# Patient Record
Sex: MALE | Race: WHITE | NOT HISPANIC OR LATINO | URBAN - METROPOLITAN AREA
[De-identification: names, ages, dates, MRNs, and addresses within clinical notes are randomized per-mention and may not be internally consistent; named-entity substitution may affect disease eponyms.]

---

## 2017-06-21 ENCOUNTER — OFFICE (OUTPATIENT)
Dept: URBAN - METROPOLITAN AREA CLINIC 64 | Facility: CLINIC | Age: 52
End: 2017-06-21

## 2017-06-21 VITALS
HEART RATE: 83 BPM | HEIGHT: 74 IN | WEIGHT: 199 LBS | SYSTOLIC BLOOD PRESSURE: 142 MMHG | DIASTOLIC BLOOD PRESSURE: 91 MMHG

## 2017-06-21 DIAGNOSIS — K86.1 OTHER CHRONIC PANCREATITIS: ICD-10-CM

## 2017-06-21 PROCEDURE — 99213 OFFICE O/P EST LOW 20 MIN: CPT | Performed by: NURSE PRACTITIONER

## 2017-06-21 RX ORDER — PANCRELIPASE 60000; 12000; 38000 [USP'U]/1; [USP'U]/1; [USP'U]/1
CAPSULE, DELAYED RELEASE PELLETS ORAL
Qty: 1440 | Refills: 3 | Status: ACTIVE

## 2017-06-21 RX ORDER — PROMETHAZINE HYDROCHLORIDE 25 MG/1
TABLET ORAL
Qty: 60 | Refills: 3 | Status: COMPLETED
Start: 2014-12-11 | End: 2018-09-18

## 2017-06-22 LAB
AMYLASE, SERUM: 93 U/L (ref 31–124)
CBC WITH DIFFERENTIAL/PLATELET: BASO (ABSOLUTE): 0.1 X10E3/UL (ref 0–0.2)
CBC WITH DIFFERENTIAL/PLATELET: BASOS: 1 %
CBC WITH DIFFERENTIAL/PLATELET: EOS (ABSOLUTE): 0.2 X10E3/UL (ref 0–0.4)
CBC WITH DIFFERENTIAL/PLATELET: EOS: 5 %
CBC WITH DIFFERENTIAL/PLATELET: HEMATOCRIT: 42.4 % (ref 37.5–51)
CBC WITH DIFFERENTIAL/PLATELET: HEMATOLOGY COMMENTS: (no result)
CBC WITH DIFFERENTIAL/PLATELET: HEMOGLOBIN: 14 G/DL (ref 12.6–17.7)
CBC WITH DIFFERENTIAL/PLATELET: IMMATURE CELLS: (no result)
CBC WITH DIFFERENTIAL/PLATELET: IMMATURE GRANS (ABS): 0 X10E3/UL (ref 0–0.1)
CBC WITH DIFFERENTIAL/PLATELET: IMMATURE GRANULOCYTES: 0 %
CBC WITH DIFFERENTIAL/PLATELET: LYMPHS (ABSOLUTE): 1.6 X10E3/UL (ref 0.7–3.1)
CBC WITH DIFFERENTIAL/PLATELET: LYMPHS: 36 %
CBC WITH DIFFERENTIAL/PLATELET: MCH: 30.1 PG (ref 26.6–33)
CBC WITH DIFFERENTIAL/PLATELET: MCHC: 33 G/DL (ref 31.5–35.7)
CBC WITH DIFFERENTIAL/PLATELET: MCV: 91 FL (ref 79–97)
CBC WITH DIFFERENTIAL/PLATELET: MONOCYTES(ABSOLUTE): 0.8 X10E3/UL (ref 0.1–0.9)
CBC WITH DIFFERENTIAL/PLATELET: MONOCYTES: 18 %
CBC WITH DIFFERENTIAL/PLATELET: NEUTROPHILS (ABSOLUTE): 1.8 X10E3/UL (ref 1.4–7)
CBC WITH DIFFERENTIAL/PLATELET: NEUTROPHILS: 40 %
CBC WITH DIFFERENTIAL/PLATELET: NRBC: (no result)
CBC WITH DIFFERENTIAL/PLATELET: PLATELETS: 295 X10E3/UL (ref 150–379)
CBC WITH DIFFERENTIAL/PLATELET: RBC: 4.65 X10E6/UL (ref 4.14–5.8)
CBC WITH DIFFERENTIAL/PLATELET: RDW: 15.9 % — HIGH (ref 12.3–15.4)
CBC WITH DIFFERENTIAL/PLATELET: WBC: 4.4 X10E3/UL (ref 3.4–10.8)
COMP. METABOLIC PANEL (14): A/G RATIO: 1.4 (ref 1.2–2.2)
COMP. METABOLIC PANEL (14): ALBUMIN, SERUM: 4.4 G/DL (ref 3.5–5.5)
COMP. METABOLIC PANEL (14): ALKALINE PHOSPHATASE, S: 54 IU/L (ref 39–117)
COMP. METABOLIC PANEL (14): ALT (SGPT): 18 IU/L (ref 0–44)
COMP. METABOLIC PANEL (14): AST (SGOT): 27 IU/L (ref 0–40)
COMP. METABOLIC PANEL (14): BILIRUBIN, TOTAL: 0.3 MG/DL (ref 0–1.2)
COMP. METABOLIC PANEL (14): BUN/CREATININE RATIO: 12 (ref 9–20)
COMP. METABOLIC PANEL (14): BUN: 19 MG/DL (ref 6–24)
COMP. METABOLIC PANEL (14): CALCIUM, SERUM: 9.4 MG/DL (ref 8.7–10.2)
COMP. METABOLIC PANEL (14): CARBON DIOXIDE, TOTAL: 22 MMOL/L (ref 18–29)
COMP. METABOLIC PANEL (14): CHLORIDE, SERUM: 97 MMOL/L (ref 96–106)
COMP. METABOLIC PANEL (14): CREATININE, SERUM: 1.63 MG/DL — HIGH (ref 0.76–1.27)
COMP. METABOLIC PANEL (14): EGFR IF AFRICN AM: 55 ML/MIN/1.73 — LOW (ref 59–?)
COMP. METABOLIC PANEL (14): EGFR IF NONAFRICN AM: 48 ML/MIN/1.73 — LOW (ref 59–?)
COMP. METABOLIC PANEL (14): GLOBULIN, TOTAL: 3.1 G/DL (ref 1.5–4.5)
COMP. METABOLIC PANEL (14): GLUCOSE, SERUM: 90 MG/DL (ref 65–99)
COMP. METABOLIC PANEL (14): POTASSIUM, SERUM: 4.7 MMOL/L (ref 3.5–5.2)
COMP. METABOLIC PANEL (14): PROTEIN, TOTAL, SERUM: 7.5 G/DL (ref 6–8.5)
COMP. METABOLIC PANEL (14): SODIUM, SERUM: 137 MMOL/L (ref 134–144)
LIPASE, SERUM: 46 U/L (ref 0–59)

## 2017-10-12 ENCOUNTER — OFFICE (OUTPATIENT)
Dept: URBAN - METROPOLITAN AREA CLINIC 64 | Facility: CLINIC | Age: 52
End: 2017-10-12

## 2017-10-12 VITALS
SYSTOLIC BLOOD PRESSURE: 119 MMHG | WEIGHT: 192 LBS | DIASTOLIC BLOOD PRESSURE: 83 MMHG | HEART RATE: 95 BPM | HEIGHT: 74 IN

## 2017-10-12 DIAGNOSIS — R10.13 EPIGASTRIC PAIN: ICD-10-CM

## 2017-10-12 DIAGNOSIS — K86.1 OTHER CHRONIC PANCREATITIS: ICD-10-CM

## 2017-10-12 LAB
AMYLASE, SERUM: 69 U/L (ref 31–124)
C-REACTIVE PROTEIN, QUANT: 16 MG/L — HIGH (ref 0–4.9)
COMP. METABOLIC PANEL (14): A/G RATIO: 1.3 (ref 1.2–2.2)
COMP. METABOLIC PANEL (14): ALBUMIN, SERUM: 3.7 G/DL (ref 3.5–5.5)
COMP. METABOLIC PANEL (14): ALKALINE PHOSPHATASE, S: 46 IU/L (ref 39–117)
COMP. METABOLIC PANEL (14): ALT (SGPT): 13 IU/L (ref 0–44)
COMP. METABOLIC PANEL (14): AST (SGOT): 15 IU/L (ref 0–40)
COMP. METABOLIC PANEL (14): BILIRUBIN, TOTAL: <0.2 MG/DL
COMP. METABOLIC PANEL (14): BUN/CREATININE RATIO: 10 (ref 9–20)
COMP. METABOLIC PANEL (14): BUN: 14 MG/DL (ref 6–24)
COMP. METABOLIC PANEL (14): CALCIUM, SERUM: 9.1 MG/DL (ref 8.7–10.2)
COMP. METABOLIC PANEL (14): CARBON DIOXIDE, TOTAL: 25 MMOL/L (ref 18–29)
COMP. METABOLIC PANEL (14): CHLORIDE, SERUM: 99 MMOL/L (ref 96–106)
COMP. METABOLIC PANEL (14): CREATININE, SERUM: 1.45 MG/DL — HIGH (ref 0.76–1.27)
COMP. METABOLIC PANEL (14): EGFR IF AFRICN AM: 64 ML/MIN/1.73 (ref 59–?)
COMP. METABOLIC PANEL (14): EGFR IF NONAFRICN AM: 55 ML/MIN/1.73 — LOW (ref 59–?)
COMP. METABOLIC PANEL (14): GLOBULIN, TOTAL: 2.9 G/DL (ref 1.5–4.5)
COMP. METABOLIC PANEL (14): GLUCOSE, SERUM: 109 MG/DL — HIGH (ref 65–99)
COMP. METABOLIC PANEL (14): POTASSIUM, SERUM: 4.5 MMOL/L (ref 3.5–5.2)
COMP. METABOLIC PANEL (14): PROTEIN, TOTAL, SERUM: 6.6 G/DL (ref 6–8.5)
COMP. METABOLIC PANEL (14): SODIUM, SERUM: 139 MMOL/L (ref 134–144)
LIPASE, SERUM: 26 U/L (ref 13–78)

## 2017-10-12 PROCEDURE — 99213 OFFICE O/P EST LOW 20 MIN: CPT | Performed by: NURSE PRACTITIONER

## 2017-10-12 RX ORDER — AMITRIPTYLINE HYDROCHLORIDE 25 MG/1
TABLET, FILM COATED ORAL
Qty: 30 | Refills: 2 | Status: ACTIVE

## 2017-12-06 ENCOUNTER — OFFICE (OUTPATIENT)
Dept: URBAN - METROPOLITAN AREA CLINIC 64 | Facility: CLINIC | Age: 52
End: 2017-12-06
Payer: COMMERCIAL

## 2017-12-06 VITALS
HEIGHT: 74 IN | DIASTOLIC BLOOD PRESSURE: 111 MMHG | SYSTOLIC BLOOD PRESSURE: 148 MMHG | WEIGHT: 210 LBS | HEART RATE: 111 BPM

## 2017-12-06 DIAGNOSIS — K86.3 PSEUDOCYST OF PANCREAS: ICD-10-CM

## 2017-12-06 DIAGNOSIS — K86.1 OTHER CHRONIC PANCREATITIS: ICD-10-CM

## 2017-12-06 DIAGNOSIS — K21.9 GASTRO-ESOPHAGEAL REFLUX DISEASE WITHOUT ESOPHAGITIS: ICD-10-CM

## 2017-12-06 LAB
AMYLASE, SERUM: 76 U/L (ref 31–124)
C-REACTIVE PROTEIN, QUANT: 3.1 MG/L (ref 0–4.9)
CBC WITH DIFFERENTIAL/PLATELET: BASO (ABSOLUTE): 0 X10E3/UL (ref 0–0.2)
CBC WITH DIFFERENTIAL/PLATELET: BASOS: 1 %
CBC WITH DIFFERENTIAL/PLATELET: EOS (ABSOLUTE): 0.2 X10E3/UL (ref 0–0.4)
CBC WITH DIFFERENTIAL/PLATELET: EOS: 3 %
CBC WITH DIFFERENTIAL/PLATELET: HEMATOCRIT: 40.8 % (ref 37.5–51)
CBC WITH DIFFERENTIAL/PLATELET: HEMATOLOGY COMMENTS: (no result)
CBC WITH DIFFERENTIAL/PLATELET: HEMOGLOBIN: 13.8 G/DL (ref 13–17.7)
CBC WITH DIFFERENTIAL/PLATELET: IMMATURE CELLS: (no result)
CBC WITH DIFFERENTIAL/PLATELET: IMMATURE GRANS (ABS): 0 X10E3/UL (ref 0–0.1)
CBC WITH DIFFERENTIAL/PLATELET: IMMATURE GRANULOCYTES: 0 %
CBC WITH DIFFERENTIAL/PLATELET: LYMPHS (ABSOLUTE): 1.5 X10E3/UL (ref 0.7–3.1)
CBC WITH DIFFERENTIAL/PLATELET: LYMPHS: 31 %
CBC WITH DIFFERENTIAL/PLATELET: MCH: 31.2 PG (ref 26.6–33)
CBC WITH DIFFERENTIAL/PLATELET: MCHC: 33.8 G/DL (ref 31.5–35.7)
CBC WITH DIFFERENTIAL/PLATELET: MCV: 92 FL (ref 79–97)
CBC WITH DIFFERENTIAL/PLATELET: MONOCYTES(ABSOLUTE): 0.5 X10E3/UL (ref 0.1–0.9)
CBC WITH DIFFERENTIAL/PLATELET: MONOCYTES: 10 %
CBC WITH DIFFERENTIAL/PLATELET: NEUTROPHILS (ABSOLUTE): 2.8 X10E3/UL (ref 1.4–7)
CBC WITH DIFFERENTIAL/PLATELET: NEUTROPHILS: 55 %
CBC WITH DIFFERENTIAL/PLATELET: NRBC: (no result)
CBC WITH DIFFERENTIAL/PLATELET: PLATELETS: 240 X10E3/UL (ref 150–379)
CBC WITH DIFFERENTIAL/PLATELET: RBC: 4.43 X10E6/UL (ref 4.14–5.8)
CBC WITH DIFFERENTIAL/PLATELET: RDW: 13.7 % (ref 12.3–15.4)
CBC WITH DIFFERENTIAL/PLATELET: WBC: 5 X10E3/UL (ref 3.4–10.8)
COMP. METABOLIC PANEL (14): A/G RATIO: 1.3 (ref 1.2–2.2)
COMP. METABOLIC PANEL (14): ALBUMIN, SERUM: 4.2 G/DL (ref 3.5–5.5)
COMP. METABOLIC PANEL (14): ALKALINE PHOSPHATASE, S: 54 IU/L (ref 39–117)
COMP. METABOLIC PANEL (14): ALT (SGPT): 26 IU/L (ref 0–44)
COMP. METABOLIC PANEL (14): AST (SGOT): 36 IU/L (ref 0–40)
COMP. METABOLIC PANEL (14): BILIRUBIN, TOTAL: 0.3 MG/DL (ref 0–1.2)
COMP. METABOLIC PANEL (14): BUN/CREATININE RATIO: 7 — LOW (ref 9–20)
COMP. METABOLIC PANEL (14): BUN: 10 MG/DL (ref 6–24)
COMP. METABOLIC PANEL (14): CALCIUM, SERUM: 8.9 MG/DL (ref 8.7–10.2)
COMP. METABOLIC PANEL (14): CARBON DIOXIDE, TOTAL: 24 MMOL/L (ref 18–29)
COMP. METABOLIC PANEL (14): CHLORIDE, SERUM: 100 MMOL/L (ref 96–106)
COMP. METABOLIC PANEL (14): CREATININE, SERUM: 1.45 MG/DL — HIGH (ref 0.76–1.27)
COMP. METABOLIC PANEL (14): EGFR IF AFRICN AM: 64 ML/MIN/1.73 (ref 59–?)
COMP. METABOLIC PANEL (14): EGFR IF NONAFRICN AM: 55 ML/MIN/1.73 — LOW (ref 59–?)
COMP. METABOLIC PANEL (14): GLOBULIN, TOTAL: 3.2 G/DL (ref 1.5–4.5)
COMP. METABOLIC PANEL (14): GLUCOSE, SERUM: 125 MG/DL — HIGH (ref 65–99)
COMP. METABOLIC PANEL (14): POTASSIUM, SERUM: 4.3 MMOL/L (ref 3.5–5.2)
COMP. METABOLIC PANEL (14): PROTEIN, TOTAL, SERUM: 7.4 G/DL (ref 6–8.5)
COMP. METABOLIC PANEL (14): SODIUM, SERUM: 138 MMOL/L (ref 134–144)
LIPASE, SERUM: 27 U/L (ref 13–78)

## 2017-12-06 PROCEDURE — 99214 OFFICE O/P EST MOD 30 MIN: CPT | Performed by: INTERNAL MEDICINE

## 2018-01-26 ENCOUNTER — HOSPITAL ENCOUNTER (OUTPATIENT)
Dept: FAMILY MEDICINE CLINIC | Facility: CLINIC | Age: 53
Setting detail: SPECIMEN
Discharge: HOME | End: 2018-01-26
Attending: FAMILY MEDICINE | Admitting: FAMILY MEDICINE

## 2018-01-26 LAB
ALBUMIN SERPL-MCNC: 3.8 G/DL (ref 3.5–4.8)
ALBUMIN/GLOB SERPL: 1.4 {RATIO} (ref 1–1.7)
ALP SERPL-CCNC: 43 IU/L (ref 32–91)
ALT SERPL-CCNC: 18 IU/L (ref 17–63)
AMYLASE SERPL-CCNC: 56 U/L (ref 36–128)
ANION GAP SERPL CALC-SCNC: 11.8 MMOL/L (ref 10–20)
AST SERPL-CCNC: 28 IU/L (ref 15–41)
BASOPHILS # BLD AUTO: 0 10*3/UL (ref 0–0.2)
BASOPHILS NFR BLD AUTO: 1 % (ref 0–2)
BILIRUB SERPL-MCNC: 0.5 MG/DL (ref 0.3–1.2)
BUN SERPL-MCNC: 14 MG/DL (ref 8–20)
BUN/CREAT SERPL: 7.8 (ref 6.2–20.3)
CALCIUM SERPL-MCNC: 9.3 MG/DL (ref 8.9–10.3)
CHLORIDE SERPL-SCNC: 101 MMOL/L (ref 101–111)
CONV CO2: 26 MMOL/L (ref 22–32)
CONV TOTAL PROTEIN: 6.6 G/DL (ref 6.1–7.9)
CREAT UR-MCNC: 1.8 MG/DL (ref 0.7–1.2)
CRP SERPL-MCNC: 0.18 MG/DL (ref 0–0.7)
DIFFERENTIAL METHOD BLD: (no result)
EOSINOPHIL # BLD AUTO: 0.1 10*3/UL (ref 0–0.3)
EOSINOPHIL # BLD AUTO: 2 % (ref 0–3)
ERYTHROCYTE [DISTWIDTH] IN BLOOD BY AUTOMATED COUNT: 13.5 % (ref 11.5–14.5)
GLOBULIN UR ELPH-MCNC: 2.8 G/DL (ref 2.5–3.8)
GLUCOSE SERPL-MCNC: 132 MG/DL (ref 65–99)
HCT VFR BLD AUTO: 40.2 % (ref 40–54)
HGB BLD-MCNC: 13.7 G/DL (ref 14–18)
LIPASE SERPL-CCNC: 18 U/L (ref 22–51)
LYMPHOCYTES # BLD AUTO: 1.4 10*3/UL (ref 0.8–4.8)
LYMPHOCYTES NFR BLD AUTO: 26 % (ref 18–42)
MCH RBC QN AUTO: 31.5 PG (ref 26–32)
MCHC RBC AUTO-ENTMCNC: 34.1 G/DL (ref 32–36)
MCV RBC AUTO: 92.4 FL (ref 80–94)
MONOCYTES # BLD AUTO: 0.4 10*3/UL (ref 0.1–1.3)
MONOCYTES NFR BLD AUTO: 8 % (ref 2–11)
NEUTROPHILS # BLD AUTO: 3.6 10*3/UL (ref 2.3–8.6)
NEUTROPHILS NFR BLD AUTO: 63 % (ref 50–75)
NRBC BLD AUTO-RTO: 0 /100{WBCS}
NRBC/RBC NFR BLD MANUAL: 0 10*3/UL
PLATELET # BLD AUTO: 249 10*3/UL (ref 150–450)
PMV BLD AUTO: 7.6 FL (ref 7.4–10.4)
POTASSIUM SERPL-SCNC: 3.8 MMOL/L (ref 3.6–5.1)
RBC # BLD AUTO: 4.35 10*6/UL (ref 4.6–6)
SODIUM SERPL-SCNC: 135 MMOL/L (ref 136–144)
WBC # BLD AUTO: 5.6 10*3/UL (ref 4.5–11.5)

## 2018-02-14 ENCOUNTER — HOSPITAL ENCOUNTER (OUTPATIENT)
Dept: FAMILY MEDICINE CLINIC | Facility: CLINIC | Age: 53
Setting detail: SPECIMEN
Discharge: HOME | End: 2018-02-14
Attending: FAMILY MEDICINE | Admitting: FAMILY MEDICINE

## 2018-02-14 LAB
ALBUMIN SERPL-MCNC: 4.1 G/DL (ref 3.5–4.8)
ALBUMIN/GLOB SERPL: 1 {RATIO} (ref 1–1.7)
ALP SERPL-CCNC: 45 IU/L (ref 32–91)
ALT SERPL-CCNC: 22 IU/L (ref 17–63)
AMYLASE SERPL-CCNC: 171 U/L (ref 36–128)
ANION GAP SERPL CALC-SCNC: 13.7 MMOL/L (ref 10–20)
AST SERPL-CCNC: 24 IU/L (ref 15–41)
BASOPHILS # BLD AUTO: 0 10*3/UL (ref 0–0.2)
BASOPHILS NFR BLD AUTO: 0 % (ref 0–2)
BILIRUB SERPL-MCNC: 0.6 MG/DL (ref 0.3–1.2)
BUN SERPL-MCNC: 10 MG/DL (ref 8–20)
BUN/CREAT SERPL: 5.3 (ref 6.2–20.3)
CA-I SERPL ISE-MCNC: 1.39 MMOL/L (ref 1.2–1.3)
CALCIUM SERPL-MCNC: 10.2 MG/DL (ref 8.9–10.3)
CHLORIDE SERPL-SCNC: 100 MMOL/L (ref 101–111)
CONV CO2: 28 MMOL/L (ref 22–32)
CONV TOTAL PROTEIN: 8.1 G/DL (ref 6.1–7.9)
CREAT UR-MCNC: 1.9 MG/DL (ref 0.7–1.2)
CRP SERPL-MCNC: 10.09 MG/DL (ref 0–0.7)
DIFFERENTIAL METHOD BLD: (no result)
EOSINOPHIL # BLD AUTO: 0.1 10*3/UL (ref 0–0.3)
EOSINOPHIL # BLD AUTO: 1 % (ref 0–3)
ERYTHROCYTE [DISTWIDTH] IN BLOOD BY AUTOMATED COUNT: 14 % (ref 11.5–14.5)
GLOBULIN UR ELPH-MCNC: 4 G/DL (ref 2.5–3.8)
GLUCOSE SERPL-MCNC: 127 MG/DL (ref 65–99)
HCT VFR BLD AUTO: 43.8 % (ref 40–54)
HGB BLD-MCNC: 15 G/DL (ref 14–18)
LIPASE SERPL-CCNC: 291 U/L (ref 22–51)
LYMPHOCYTES # BLD AUTO: 0.9 10*3/UL (ref 0.8–4.8)
LYMPHOCYTES NFR BLD AUTO: 14 % (ref 18–42)
MCH RBC QN AUTO: 32.1 PG (ref 26–32)
MCHC RBC AUTO-ENTMCNC: 34.4 G/DL (ref 32–36)
MCV RBC AUTO: 93.4 FL (ref 80–94)
MONOCYTES # BLD AUTO: 0.6 10*3/UL (ref 0.1–1.3)
MONOCYTES NFR BLD AUTO: 8 % (ref 2–11)
NEUTROPHILS # BLD AUTO: 5.3 10*3/UL (ref 2.3–8.6)
NEUTROPHILS NFR BLD AUTO: 77 % (ref 50–75)
NRBC BLD AUTO-RTO: 0 /100{WBCS}
NRBC/RBC NFR BLD MANUAL: 0 10*3/UL
PLATELET # BLD AUTO: 231 10*3/UL (ref 150–450)
PMV BLD AUTO: 8.3 FL (ref 7.4–10.4)
POTASSIUM SERPL-SCNC: 4.7 MMOL/L (ref 3.6–5.1)
RBC # BLD AUTO: 4.69 10*6/UL (ref 4.6–6)
SODIUM SERPL-SCNC: 137 MMOL/L (ref 136–144)
WBC # BLD AUTO: 7 10*3/UL (ref 4.5–11.5)

## 2018-07-10 ENCOUNTER — HOSPITAL ENCOUNTER (OUTPATIENT)
Dept: FAMILY MEDICINE CLINIC | Facility: CLINIC | Age: 53
Setting detail: SPECIMEN
Discharge: HOME | End: 2018-07-10
Attending: FAMILY MEDICINE | Admitting: FAMILY MEDICINE

## 2018-07-10 LAB
ALBUMIN SERPL-MCNC: 3.9 G/DL (ref 3.5–4.8)
ALBUMIN/GLOB SERPL: 1.2 {RATIO} (ref 1–1.7)
ALP SERPL-CCNC: 36 IU/L (ref 32–91)
ALT SERPL-CCNC: 22 IU/L (ref 17–63)
AMYLASE SERPL-CCNC: 62 U/L (ref 36–128)
ANION GAP SERPL CALC-SCNC: 12.2 MMOL/L (ref 10–20)
AST SERPL-CCNC: 30 IU/L (ref 15–41)
BILIRUB SERPL-MCNC: 0.8 MG/DL (ref 0.3–1.2)
BUN SERPL-MCNC: 17 MG/DL (ref 8–20)
BUN/CREAT SERPL: 11.3 (ref 6.2–20.3)
CALCIUM SERPL-MCNC: 8.9 MG/DL (ref 8.9–10.3)
CHLORIDE SERPL-SCNC: 103 MMOL/L (ref 101–111)
CHOLEST SERPL-MCNC: 174 MG/DL
CHOLEST/HDLC SERPL: 3.5 {RATIO}
CONV CO2: 25 MMOL/L (ref 22–32)
CONV LDL CHOLESTEROL DIRECT: 80 MG/DL (ref 0–100)
CONV TOTAL PROTEIN: 7.1 G/DL (ref 6.1–7.9)
CREAT UR-MCNC: 1.5 MG/DL (ref 0.7–1.2)
CRP SERPL-MCNC: 0.22 MG/DL (ref 0–0.7)
GLOBULIN UR ELPH-MCNC: 3.2 G/DL (ref 2.5–3.8)
GLUCOSE SERPL-MCNC: 114 MG/DL (ref 65–99)
HDLC SERPL-MCNC: 50 MG/DL
LDLC/HDLC SERPL: 1.6 {RATIO}
LIPASE SERPL-CCNC: 24 U/L (ref 22–51)
LIPID INTERPRETATION: ABNORMAL
POTASSIUM SERPL-SCNC: 4.2 MMOL/L (ref 3.6–5.1)
SODIUM SERPL-SCNC: 136 MMOL/L (ref 136–144)
TRIGL SERPL-MCNC: 204 MG/DL
VLDLC SERPL CALC-MCNC: 43.2 MG/DL

## 2018-08-02 ENCOUNTER — INPATIENT HOSPITAL (OUTPATIENT)
Dept: URBAN - METROPOLITAN AREA HOSPITAL 84 | Facility: HOSPITAL | Age: 53
End: 2018-08-02

## 2018-08-02 DIAGNOSIS — R10.9 UNSPECIFIED ABDOMINAL PAIN: ICD-10-CM

## 2018-08-02 DIAGNOSIS — K85.90 ACUTE PANCREATITIS WITHOUT NECROSIS OR INFECTION, UNSPECIFIE: ICD-10-CM

## 2018-08-02 PROCEDURE — 99252 IP/OBS CONSLTJ NEW/EST SF 35: CPT | Performed by: NURSE PRACTITIONER

## 2018-08-03 ENCOUNTER — INPATIENT HOSPITAL (OUTPATIENT)
Dept: URBAN - METROPOLITAN AREA HOSPITAL 84 | Facility: HOSPITAL | Age: 53
End: 2018-08-03

## 2018-08-03 DIAGNOSIS — K85.90 ACUTE PANCREATITIS WITHOUT NECROSIS OR INFECTION, UNSPECIFIE: ICD-10-CM

## 2018-08-03 PROCEDURE — 99231 SBSQ HOSP IP/OBS SF/LOW 25: CPT | Performed by: NURSE PRACTITIONER

## 2018-08-04 ENCOUNTER — INPATIENT HOSPITAL (OUTPATIENT)
Dept: URBAN - METROPOLITAN AREA HOSPITAL 84 | Facility: HOSPITAL | Age: 53
End: 2018-08-04

## 2018-08-04 DIAGNOSIS — K85.90 ACUTE PANCREATITIS WITHOUT NECROSIS OR INFECTION, UNSPECIFIE: ICD-10-CM

## 2018-08-04 DIAGNOSIS — R10.9 UNSPECIFIED ABDOMINAL PAIN: ICD-10-CM

## 2018-08-04 PROCEDURE — 99231 SBSQ HOSP IP/OBS SF/LOW 25: CPT | Performed by: INTERNAL MEDICINE

## 2018-08-07 ENCOUNTER — HOSPITAL ENCOUNTER (OUTPATIENT)
Dept: FAMILY MEDICINE CLINIC | Facility: CLINIC | Age: 53
Setting detail: SPECIMEN
Discharge: HOME | End: 2018-08-07
Attending: FAMILY MEDICINE | Admitting: FAMILY MEDICINE

## 2018-08-07 LAB
ALBUMIN SERPL-MCNC: 4.2 G/DL (ref 3.5–4.8)
ALBUMIN/GLOB SERPL: 1.2 {RATIO} (ref 1–1.7)
ALP SERPL-CCNC: 47 IU/L (ref 32–91)
ALT SERPL-CCNC: 21 IU/L (ref 17–63)
AMYLASE SERPL-CCNC: 90 U/L (ref 36–128)
ANION GAP SERPL CALC-SCNC: 11.8 MMOL/L (ref 10–20)
AST SERPL-CCNC: 28 IU/L (ref 15–41)
BILIRUB SERPL-MCNC: 0.5 MG/DL (ref 0.3–1.2)
BUN SERPL-MCNC: 9 MG/DL (ref 8–20)
BUN/CREAT SERPL: 6 (ref 6.2–20.3)
CALCIUM SERPL-MCNC: 9.9 MG/DL (ref 8.9–10.3)
CHLORIDE SERPL-SCNC: 101 MMOL/L (ref 101–111)
CONV CO2: 28 MMOL/L (ref 22–32)
CONV TOTAL PROTEIN: 7.6 G/DL (ref 6.1–7.9)
CREAT UR-MCNC: 1.5 MG/DL (ref 0.7–1.2)
GLOBULIN UR ELPH-MCNC: 3.4 G/DL (ref 2.5–3.8)
GLUCOSE SERPL-MCNC: 114 MG/DL (ref 65–99)
LIPASE SERPL-CCNC: 47 U/L (ref 22–51)
POTASSIUM SERPL-SCNC: 3.8 MMOL/L (ref 3.6–5.1)
SODIUM SERPL-SCNC: 137 MMOL/L (ref 136–144)

## 2018-09-09 ENCOUNTER — INPATIENT HOSPITAL (OUTPATIENT)
Dept: URBAN - METROPOLITAN AREA HOSPITAL 84 | Facility: HOSPITAL | Age: 53
End: 2018-09-09

## 2018-09-09 DIAGNOSIS — K86.1 OTHER CHRONIC PANCREATITIS: ICD-10-CM

## 2018-09-09 DIAGNOSIS — R11.2 NAUSEA WITH VOMITING, UNSPECIFIED: ICD-10-CM

## 2018-09-09 DIAGNOSIS — K92.0 HEMATEMESIS: ICD-10-CM

## 2018-09-09 DIAGNOSIS — K85.90 ACUTE PANCREATITIS WITHOUT NECROSIS OR INFECTION, UNSPECIFIE: ICD-10-CM

## 2018-09-09 DIAGNOSIS — R94.5 ABNORMAL RESULTS OF LIVER FUNCTION STUDIES: ICD-10-CM

## 2018-09-09 DIAGNOSIS — F10.10 ALCOHOL ABUSE, UNCOMPLICATED: ICD-10-CM

## 2018-09-09 DIAGNOSIS — Z87.11 PERSONAL HISTORY OF PEPTIC ULCER DISEASE: ICD-10-CM

## 2018-09-09 PROCEDURE — 99252 IP/OBS CONSLTJ NEW/EST SF 35: CPT | Performed by: NURSE PRACTITIONER

## 2018-09-10 ENCOUNTER — INPATIENT HOSPITAL (OUTPATIENT)
Dept: URBAN - METROPOLITAN AREA HOSPITAL 84 | Facility: HOSPITAL | Age: 53
End: 2018-09-10

## 2018-09-10 DIAGNOSIS — Z87.11 PERSONAL HISTORY OF PEPTIC ULCER DISEASE: ICD-10-CM

## 2018-09-10 DIAGNOSIS — R94.5 ABNORMAL RESULTS OF LIVER FUNCTION STUDIES: ICD-10-CM

## 2018-09-10 DIAGNOSIS — K92.0 HEMATEMESIS: ICD-10-CM

## 2018-09-10 DIAGNOSIS — F10.10 ALCOHOL ABUSE, UNCOMPLICATED: ICD-10-CM

## 2018-09-10 DIAGNOSIS — K86.1 OTHER CHRONIC PANCREATITIS: ICD-10-CM

## 2018-09-10 DIAGNOSIS — R11.2 NAUSEA WITH VOMITING, UNSPECIFIED: ICD-10-CM

## 2018-09-10 DIAGNOSIS — K85.90 ACUTE PANCREATITIS WITHOUT NECROSIS OR INFECTION, UNSPECIFIE: ICD-10-CM

## 2018-09-10 PROCEDURE — 99232 SBSQ HOSP IP/OBS MODERATE 35: CPT | Performed by: NURSE PRACTITIONER

## 2018-09-11 ENCOUNTER — INPATIENT HOSPITAL (OUTPATIENT)
Dept: URBAN - METROPOLITAN AREA HOSPITAL 84 | Facility: HOSPITAL | Age: 53
End: 2018-09-11

## 2018-09-11 DIAGNOSIS — K85.90 ACUTE PANCREATITIS WITHOUT NECROSIS OR INFECTION, UNSPECIFIE: ICD-10-CM

## 2018-09-11 DIAGNOSIS — F10.10 ALCOHOL ABUSE, UNCOMPLICATED: ICD-10-CM

## 2018-09-11 DIAGNOSIS — R11.2 NAUSEA WITH VOMITING, UNSPECIFIED: ICD-10-CM

## 2018-09-11 DIAGNOSIS — R94.5 ABNORMAL RESULTS OF LIVER FUNCTION STUDIES: ICD-10-CM

## 2018-09-11 DIAGNOSIS — Z87.11 PERSONAL HISTORY OF PEPTIC ULCER DISEASE: ICD-10-CM

## 2018-09-11 DIAGNOSIS — K92.0 HEMATEMESIS: ICD-10-CM

## 2018-09-11 DIAGNOSIS — K86.1 OTHER CHRONIC PANCREATITIS: ICD-10-CM

## 2018-09-11 PROCEDURE — 99231 SBSQ HOSP IP/OBS SF/LOW 25: CPT | Performed by: NURSE PRACTITIONER

## 2018-09-18 ENCOUNTER — OFFICE (OUTPATIENT)
Dept: URBAN - METROPOLITAN AREA CLINIC 64 | Facility: CLINIC | Age: 53
End: 2018-09-18

## 2018-09-18 VITALS
SYSTOLIC BLOOD PRESSURE: 132 MMHG | DIASTOLIC BLOOD PRESSURE: 97 MMHG | WEIGHT: 185 LBS | HEIGHT: 74 IN | HEART RATE: 109 BPM

## 2018-09-18 DIAGNOSIS — K86.1 OTHER CHRONIC PANCREATITIS: ICD-10-CM

## 2018-09-18 DIAGNOSIS — R10.13 EPIGASTRIC PAIN: ICD-10-CM

## 2018-09-18 PROCEDURE — 99213 OFFICE O/P EST LOW 20 MIN: CPT | Performed by: NURSE PRACTITIONER

## 2018-10-19 ENCOUNTER — CONVERSION ENCOUNTER (OUTPATIENT)
Dept: FAMILY MEDICINE CLINIC | Facility: CLINIC | Age: 53
End: 2018-10-19

## 2018-10-19 LAB
ALBUMIN SERPL-MCNC: 4.3 G/DL (ref 3.6–5.1)
ALBUMIN/GLOB SERPL: ABNORMAL {RATIO} (ref 1–2.5)
ALP SERPL-CCNC: 45 UNITS/L (ref 40–115)
ALT SERPL-CCNC: 25 UNITS/L (ref 9–46)
AMYLASE SERPL-CCNC: 37 UNITS/L (ref 21–101)
AST SERPL-CCNC: 24 UNITS/L (ref 10–35)
BASOPHILS # BLD AUTO: ABNORMAL 10*3/MM3 (ref 0–200)
BASOPHILS NFR BLD AUTO: 1.1 %
BILIRUB SERPL-MCNC: 0.4 MG/DL (ref 0.2–1.2)
BUN SERPL-MCNC: 17 MG/DL (ref 7–25)
BUN/CREAT SERPL: ABNORMAL (ref 6–22)
CALCIUM SERPL-MCNC: 9 MG/DL (ref 8.6–10.3)
CHLORIDE SERPL-SCNC: 101 MMOL/L (ref 98–110)
CO2 CONTENT VENOUS: 27 MMOL/L (ref 20–32)
CONV % HGB A1C: ABNORMAL %
CONV NEUTROPHILS/100 LEUKOCYTES IN BODY FLUID BY MANUAL COUNT: 56.4 %
CONV TOTAL PROTEIN: 7 G/DL (ref 6.1–8.1)
CREAT UR-MCNC: 1.53 MG/DL (ref 0.7–1.33)
CRP SERPL-MCNC: 0.38 MG/DL
EOSINOPHIL # BLD AUTO: 4.6 %
EOSINOPHIL # BLD AUTO: ABNORMAL 10*3/MM3 (ref 15–500)
ERYTHROCYTE [DISTWIDTH] IN BLOOD BY AUTOMATED COUNT: 11.9 % (ref 11–15)
GLOBULIN UR ELPH-MCNC: ABNORMAL G/DL (ref 1.9–3.7)
GLUCOSE SERPL-MCNC: 211 MG/DL (ref 65–139)
HCT VFR BLD AUTO: 37.3 % (ref 38.5–50)
HGB BLD-MCNC: 13.2 G/DL (ref 13.2–17.1)
LIPASE SERPL-CCNC: 49 UNITS/L (ref 7–60)
LYMPHOCYTES # BLD AUTO: ABNORMAL 10*3/MM3 (ref 850–3900)
LYMPHOCYTES NFR BLD AUTO: 29.7 %
MCH RBC QN AUTO: 31.6 PG (ref 27–33)
MCHC RBC AUTO-ENTMCNC: ABNORMAL % (ref 32–36)
MCV RBC AUTO: 89.2 FL (ref 80–100)
MONOCYTES # BLD AUTO: ABNORMAL 10*3/MICROLITER (ref 200–950)
MONOCYTES NFR BLD AUTO: 8.2 %
NEUTROPHILS # BLD AUTO: ABNORMAL 10*3/MM3 (ref 1500–7800)
PLATELET # BLD AUTO: ABNORMAL 10*3/MM3 (ref 140–400)
PMV BLD AUTO: 10.2 FL (ref 7.5–12.5)
POTASSIUM SERPL-SCNC: 4.5 MMOL/L (ref 3.5–5.3)
PSA SERPL-MCNC: 0.8 NG/ML
RBC # BLD AUTO: ABNORMAL 10*6/MM3 (ref 4.2–5.8)
SODIUM SERPL-SCNC: 135 MMOL/L (ref 135–146)
WBC # BLD AUTO: ABNORMAL K/UL (ref 3.8–10.8)

## 2019-01-23 ENCOUNTER — HOSPITAL ENCOUNTER (OUTPATIENT)
Dept: FAMILY MEDICINE CLINIC | Facility: CLINIC | Age: 54
Setting detail: SPECIMEN
Discharge: HOME | End: 2019-01-23
Attending: FAMILY MEDICINE | Admitting: FAMILY MEDICINE

## 2019-08-13 RX ORDER — SUCRALFATE 1 G/1
1 TABLET ORAL 2 TIMES DAILY
COMMUNITY
Start: 2016-10-11 | End: 2020-02-13

## 2019-08-13 RX ORDER — PAROXETINE HYDROCHLORIDE 20 MG/1
TABLET, FILM COATED ORAL EVERY 24 HOURS
COMMUNITY
Start: 2018-05-31 | End: 2020-02-13

## 2019-08-13 RX ORDER — FENOFIBRATE 160 MG/1
TABLET ORAL EVERY 24 HOURS
COMMUNITY
Start: 2018-03-09 | End: 2019-08-13 | Stop reason: SDUPTHER

## 2019-08-13 RX ORDER — COLESEVELAM 180 1/1
TABLET ORAL EVERY 12 HOURS
COMMUNITY
Start: 2016-11-21 | End: 2020-02-13

## 2019-08-13 RX ORDER — HYDROCODONE BITARTRATE AND ACETAMINOPHEN 10; 325 MG/1; MG/1
TABLET ORAL EVERY 24 HOURS
COMMUNITY
Start: 2017-05-09 | End: 2020-02-13 | Stop reason: SDUPTHER

## 2019-08-13 RX ORDER — LEVETIRACETAM 500 MG/1
500 TABLET ORAL 2 TIMES DAILY
COMMUNITY
Start: 2017-11-07

## 2019-08-13 RX ORDER — OMEPRAZOLE 40 MG/1
1 CAPSULE, DELAYED RELEASE ORAL EVERY 24 HOURS
COMMUNITY
Start: 2016-06-06 | End: 2020-07-23 | Stop reason: SDUPTHER

## 2019-08-13 RX ORDER — ATORVASTATIN CALCIUM 20 MG/1
20 TABLET, FILM COATED ORAL DAILY
COMMUNITY
Start: 2017-05-09 | End: 2022-03-04 | Stop reason: SDUPTHER

## 2019-08-13 RX ORDER — PROMETHAZINE HYDROCHLORIDE 25 MG/1
SUPPOSITORY RECTAL
COMMUNITY
Start: 2017-06-21 | End: 2020-02-13 | Stop reason: SDUPTHER

## 2019-08-13 RX ORDER — ATENOLOL 25 MG/1
TABLET ORAL EVERY 24 HOURS
COMMUNITY
Start: 2017-08-28 | End: 2019-12-23

## 2019-08-14 RX ORDER — FENOFIBRATE 160 MG/1
160 TABLET ORAL EVERY 24 HOURS
Qty: 30 TABLET | Refills: 0 | Status: SHIPPED | OUTPATIENT
Start: 2019-08-14 | End: 2020-02-13 | Stop reason: SDUPTHER

## 2019-12-23 RX ORDER — ATENOLOL 25 MG/1
TABLET ORAL
Qty: 90 TABLET | Refills: 0 | Status: SHIPPED | OUTPATIENT
Start: 2019-12-23 | End: 2020-03-23

## 2020-02-05 RX ORDER — FENOFIBRATE 160 MG/1
TABLET ORAL
Qty: 90 TABLET | Refills: 1 | OUTPATIENT
Start: 2020-02-05

## 2020-02-05 NOTE — TELEPHONE ENCOUNTER
Last visit:2/26/19  Next visit:none  Last labs:1/23/19    Rx requested: fenofibrate 160 MG   Pharmacy:[facility] in [place]

## 2020-02-13 ENCOUNTER — OFFICE VISIT (OUTPATIENT)
Dept: FAMILY MEDICINE CLINIC | Facility: CLINIC | Age: 55
End: 2020-02-13

## 2020-02-13 VITALS
RESPIRATION RATE: 16 BRPM | OXYGEN SATURATION: 100 % | HEART RATE: 69 BPM | DIASTOLIC BLOOD PRESSURE: 93 MMHG | TEMPERATURE: 98 F | HEIGHT: 74 IN | SYSTOLIC BLOOD PRESSURE: 147 MMHG | BODY MASS INDEX: 24.51 KG/M2 | WEIGHT: 191 LBS

## 2020-02-13 DIAGNOSIS — K86.1 CHRONIC RECURRENT PANCREATITIS (HCC): ICD-10-CM

## 2020-02-13 DIAGNOSIS — K58.0 IRRITABLE BOWEL SYNDROME WITH DIARRHEA: ICD-10-CM

## 2020-02-13 DIAGNOSIS — F33.40 RECURRENT MAJOR DEPRESSIVE DISORDER, IN REMISSION (HCC): ICD-10-CM

## 2020-02-13 DIAGNOSIS — I10 ELEVATED BLOOD PRESSURE READING WITH DIAGNOSIS OF HYPERTENSION: ICD-10-CM

## 2020-02-13 DIAGNOSIS — R10.9 ABDOMINAL PAIN, ACUTE: Primary | ICD-10-CM

## 2020-02-13 DIAGNOSIS — R56.9 SEIZURES (HCC): ICD-10-CM

## 2020-02-13 PROBLEM — Z12.5 ENCOUNTER FOR SCREENING FOR MALIGNANT NEOPLASM OF PROSTATE: Status: ACTIVE | Noted: 2018-06-05

## 2020-02-13 PROBLEM — R73.9 HYPERGLYCEMIA: Status: ACTIVE | Noted: 2018-02-02

## 2020-02-13 PROBLEM — R73.01 FASTING HYPERGLYCEMIA: Status: ACTIVE | Noted: 2018-07-17

## 2020-02-13 PROBLEM — F32.A CHRONIC DEPRESSION: Status: ACTIVE | Noted: 2018-01-23

## 2020-02-13 PROBLEM — E78.5 DYSLIPIDEMIA: Status: ACTIVE | Noted: 2018-01-23

## 2020-02-13 PROBLEM — E11.9 TYPE 2 DIABETES MELLITUS WITHOUT COMPLICATIONS: Status: ACTIVE | Noted: 2018-10-30

## 2020-02-13 PROCEDURE — 36415 COLL VENOUS BLD VENIPUNCTURE: CPT | Performed by: FAMILY MEDICINE

## 2020-02-13 PROCEDURE — 99214 OFFICE O/P EST MOD 30 MIN: CPT | Performed by: FAMILY MEDICINE

## 2020-02-13 PROCEDURE — 82150 ASSAY OF AMYLASE: CPT | Performed by: FAMILY MEDICINE

## 2020-02-13 PROCEDURE — 83690 ASSAY OF LIPASE: CPT | Performed by: FAMILY MEDICINE

## 2020-02-13 PROCEDURE — 80053 COMPREHEN METABOLIC PANEL: CPT | Performed by: FAMILY MEDICINE

## 2020-02-13 RX ORDER — DICYCLOMINE HCL 20 MG
TABLET ORAL
Qty: 30 TABLET | Refills: 0 | Status: SHIPPED | OUTPATIENT
Start: 2020-02-13 | End: 2023-03-08 | Stop reason: SDUPTHER

## 2020-02-13 RX ORDER — HYDROCODONE BITARTRATE AND ACETAMINOPHEN 10; 325 MG/1; MG/1
1 TABLET ORAL 2 TIMES DAILY PRN
Qty: 40 TABLET | Refills: 0 | Status: SHIPPED | OUTPATIENT
Start: 2020-02-13 | End: 2021-02-20

## 2020-02-13 RX ORDER — FENOFIBRATE 160 MG/1
160 TABLET ORAL EVERY 24 HOURS
Qty: 90 TABLET | Refills: 1 | Status: SHIPPED | OUTPATIENT
Start: 2020-02-13 | End: 2020-05-22

## 2020-02-13 RX ORDER — SUCRALFATE 1 G/1
1 TABLET ORAL 4 TIMES DAILY
Status: SHIPPED
Start: 2020-02-13 | End: 2021-03-02

## 2020-02-13 RX ORDER — DULOXETIN HYDROCHLORIDE 30 MG/1
30 CAPSULE, DELAYED RELEASE ORAL DAILY
Start: 2020-02-13 | End: 2021-04-06 | Stop reason: SDUPTHER

## 2020-02-13 RX ORDER — PROMETHAZINE HYDROCHLORIDE 25 MG/1
25 SUPPOSITORY RECTAL 2 TIMES DAILY PRN
Qty: 40 SUPPOSITORY | Refills: 0 | Status: SHIPPED | OUTPATIENT
Start: 2020-02-13 | End: 2021-02-20

## 2020-02-13 NOTE — PROGRESS NOTES
Subjective   Mitchell Jacome is a 54 y.o. male.     Chief Complaint   Patient presents with   • Pancreatitis     fenofibrate,hydrocodone.    • Hyperlipidemia   • Depression   • Hypertension         Current Outpatient Medications:   •  atenolol (TENORMIN) 25 MG tablet, TAKE 1 TABLET BY MOUTH EVERY DAY, Disp: 90 tablet, Rfl: 0  •  atorvastatin (LIPITOR) 20 MG tablet, Daily., Disp: , Rfl:   •  fenofibrate 160 MG tablet, Take 1 tablet by mouth Daily., Disp: 90 tablet, Rfl: 1  •  HYDROcodone-acetaminophen (NORCO)  MG per tablet, Take 1 tablet by mouth 2 (Two) Times a Day As Needed for Severe Pain ., Disp: 40 tablet, Rfl: 0  •  levETIRAcetam (KEPPRA) 500 MG tablet, Every 12 (Twelve) Hours., Disp: , Rfl:   •  omeprazole (priLOSEC) 40 MG capsule, 1 capsule Daily., Disp: , Rfl:   •  pancrelipase, Lip-Prot-Amyl, (CREON) 43512-28508 units capsule delayed-release particles capsule, 3 tabs po TID W/ meals, Disp: , Rfl:   •  promethazine (PHENERGAN) 25 MG suppository, Insert 1 suppository into the rectum 2 (Two) Times a Day As Needed for Nausea or Vomiting., Disp: 40 suppository, Rfl: 0  •  sucralfate (CARAFATE) 1 g tablet, Take 1 tablet by mouth 4 (Four) Times a Day., Disp: , Rfl:   •  dicyclomine (BENTYL) 20 MG tablet, 1/2 tab - 1 po q6hrs prn Abd cramping/ bloating/ [id number], Disp: 30 tablet, Rfl: 0  •  DULoxetine (CYMBALTA) 30 MG capsule, Take 1 capsule by mouth Daily., Disp: , Rfl:     Past Medical History:   Diagnosis Date   • Benign essential hypertension     Uncontrolled   • Chronic depression    • Chronic recurrent pancreatitis (CMS/HCC)    • Diabetes mellitus type 2, controlled (CMS/HCC)    • Elevated cholesterol with high triglycerides    • GERD (gastroesophageal reflux disease)    • GI bleed    • Hearing loss, right     85%   • Seizures (CMS/HCC)    • Solitary kidney        Past Surgical History:   Procedure Laterality Date   • APPENDECTOMY     • CHOLECYSTECTOMY     • COLONOSCOPY      NEG = 2016, rech  2021 [facility]       Family History   Problem Relation Age of Onset   • Cerebral aneurysm Mother    • Hypertension Father    • Diabetes type II Father    • Other Father         CHOL   • Cancer Father 60        Bladder   • Hypertension Half-Brother    • Other Half-Brother         CHOL   • Stroke Half-Brother        Social History     Socioeconomic History   • Marital status:      Spouse name: Not on file   • Number of children: Not on file   • Years of education: Not on file   • Highest education level: Not on file   Tobacco Use   • Smoking status: Never Smoker   • Smokeless tobacco: Never Used   Substance and Sexual Activity   • Alcohol use: Not Currently   • Drug use: Not Currently   • Sexual activity: Defer       55 y/o C male here for annual f/u on chronic/recurrent pancreatitis/ CHOL/ Depression    [facility] Creon [facility] lot cheaper; Pt states he recently had an EGD and they did some biopsies-----pt states his abd has been hurting since then    PT admits he has a lot of off/on loose stools/ cramping after eating-----[facility] Wel[facility] but not sure [facility]; admits he never took anything for this on a prn basis       The following portions of the patient's history were reviewed and updated as appropriate: allergies, current medications, past family history, past medical history, past social history, past surgical history and problem list.    Review of Systems   Constitutional: Positive for unexpected weight loss. Negative for activity change, appetite change, fatigue and unexpected weight gain.   Respiratory: Negative for cough, chest tightness and shortness of breath.    Cardiovascular: Negative for chest pain, palpitations and leg swelling.   Gastrointestinal: Positive for abdominal pain, diarrhea and nausea. Negative for constipation and vomiting.   Genitourinary: Negative for frequency, urgency and urinary incontinence.   Musculoskeletal: Negative for  arthralgias and myalgias.   Neurological: Negative for dizziness, facial asymmetry, speech difficulty, weakness, light-headedness, headache, memory problem and confusion.       Vitals:    02/13/20 1409   BP: 147/93   Pulse: 69   Resp: 16   Temp: 98 °F (36.7 °C)   SpO2: 100%       Objective   Physical Exam   Constitutional: He is oriented to person, place, and time. He appears well-developed and well-nourished.   HENT:   Head: Normocephalic and atraumatic.   Neck: Normal range of motion. Neck supple.   Cardiovascular: Normal rate, regular rhythm, normal heart sounds and intact distal pulses.   No murmur heard.  Pulmonary/Chest: Effort normal and breath sounds normal.   Abdominal: Soft. Bowel sounds are normal. He exhibits no distension. There is no rigidity, no rebound and no guarding.   Musculoskeletal: He exhibits no edema.   Neurological: He is alert and oriented to person, place, and time. No cranial nerve deficit.   Skin: Skin is warm and dry. No rash noted. No erythema.   Psychiatric: He has a normal mood and affect. His behavior is normal. Judgment and thought content normal.   Nursing note and vitals reviewed.        Assessment/Plan   Mitchell was seen today for pancreatitis, hyperlipidemia, depression and hypertension.    Diagnoses and all orders for this visit:    Abdominal pain, acute  -     HYDROcodone-acetaminophen (NORCO)  MG per tablet; Take 1 tablet by mouth 2 (Two) Times a Day As Needed for Severe Pain .  -     Comprehensive Metabolic Panel  -     Amylase; Future  -     Lipase; Future  -     Amylase  -     Lipase    Chronic recurrent pancreatitis (CMS/HCC)  -     Comprehensive Metabolic Panel  -     Amylase; Future  -     Lipase; Future  -     Amylase  -     Lipase    Elevated blood pressure reading with diagnosis of hypertension    Other orders  -     pancrelipase, Lip-Prot-Amyl, (CREON) 22421-47192 units capsule delayed-release particles capsule; 3 tabs po TID W/ meals  -     sucralfate  (CARAFATE) 1 g tablet; Take 1 tablet by mouth 4 (Four) Times a Day.  -     promethazine (PHENERGAN) 25 MG suppository; Insert 1 suppository into the rectum 2 (Two) Times a Day As Needed for Nausea or Vomiting.  -     fenofibrate 160 MG tablet; Take 1 tablet by mouth Daily.  -     DULoxetine (CYMBALTA) 30 MG capsule; Take 1 capsule by mouth Daily.  -     dicyclomine (BENTYL) 20 MG tablet; 1/2 tab - 1 po q6hrs prn Abd cramping/ bloating/ [id number]      [facility]  Trial of bentyl prn  Add carafate qid

## 2020-02-14 LAB
ALBUMIN SERPL-MCNC: 4.8 G/DL (ref 3.5–5.2)
ALBUMIN/GLOB SERPL: 1.7 G/DL
ALP SERPL-CCNC: 45 U/L (ref 39–117)
ALT SERPL W P-5'-P-CCNC: 17 U/L (ref 1–41)
AMYLASE SERPL-CCNC: 73 U/L (ref 28–100)
ANION GAP SERPL CALCULATED.3IONS-SCNC: 12.3 MMOL/L (ref 5–15)
AST SERPL-CCNC: 25 U/L (ref 1–40)
BILIRUB SERPL-MCNC: 0.3 MG/DL (ref 0.2–1.2)
BUN BLD-MCNC: 16 MG/DL (ref 6–20)
BUN/CREAT SERPL: 10.1 (ref 7–25)
CALCIUM SPEC-SCNC: 9.9 MG/DL (ref 8.6–10.5)
CHLORIDE SERPL-SCNC: 98 MMOL/L (ref 98–107)
CO2 SERPL-SCNC: 26.7 MMOL/L (ref 22–29)
CREAT BLD-MCNC: 1.59 MG/DL (ref 0.76–1.27)
GFR SERPL CREATININE-BSD FRML MDRD: 46 ML/MIN/1.73
GLOBULIN UR ELPH-MCNC: 2.9 GM/DL
GLUCOSE BLD-MCNC: 101 MG/DL (ref 65–99)
LIPASE SERPL-CCNC: 51 U/L (ref 13–60)
POTASSIUM BLD-SCNC: 4.6 MMOL/L (ref 3.5–5.2)
PROT SERPL-MCNC: 7.7 G/DL (ref 6–8.5)
SODIUM BLD-SCNC: 137 MMOL/L (ref 136–145)

## 2020-02-15 PROBLEM — IMO0002 SOLITARY KIDNEY: Status: ACTIVE | Noted: 2020-02-15

## 2020-02-15 PROBLEM — F33.40 RECURRENT MAJOR DEPRESSIVE DISORDER, IN REMISSION: Status: ACTIVE | Noted: 2018-01-23

## 2020-03-23 RX ORDER — ATENOLOL 25 MG/1
TABLET ORAL
Qty: 90 TABLET | Refills: 0 | Status: SHIPPED | OUTPATIENT
Start: 2020-03-23 | End: 2020-07-23 | Stop reason: SDUPTHER

## 2020-03-23 NOTE — TELEPHONE ENCOUNTER
Last visit:  2/13/20  Next visit:none   Last labs:2/13/20    Rx requested: Atenolol   Pharmacy: [facility] in [place]

## 2020-05-22 RX ORDER — FENOFIBRATE 160 MG/1
TABLET ORAL
Qty: 90 TABLET | Refills: 1 | Status: SHIPPED | OUTPATIENT
Start: 2020-05-22 | End: 2021-02-22 | Stop reason: HOSPADM

## 2020-05-22 NOTE — TELEPHONE ENCOUNTER
Last visit:  2/13/20  Next visit:none  Last labs:2/13/20    Rx requested: Fenofibrate   Pharmacy: [facility]

## 2020-07-23 RX ORDER — OMEPRAZOLE 40 MG/1
40 CAPSULE, DELAYED RELEASE ORAL EVERY 24 HOURS
Qty: 90 CAPSULE | Refills: 0 | Status: SHIPPED | OUTPATIENT
Start: 2020-07-23

## 2020-07-23 RX ORDER — ATENOLOL 25 MG/1
25 TABLET ORAL DAILY
Qty: 90 TABLET | Refills: 0 | Status: SHIPPED | OUTPATIENT
Start: 2020-07-23

## 2020-07-23 NOTE — TELEPHONE ENCOUNTER
atenolol   Omeprazole    Pt needs paper copy faxed to [facility],, so that [facility] can get [facility]    Pt seen 2/13 w/some labs  Not sched  [facility]    Fax to:  [phone]  Attn: Dr Estrada Arreaga

## 2021-02-20 ENCOUNTER — HOSPITAL ENCOUNTER (OUTPATIENT)
Facility: HOSPITAL | Age: 56
Setting detail: OBSERVATION
Discharge: HOME | End: 2021-02-22
Attending: HOSPITALIST | Admitting: INTERNAL MEDICINE

## 2021-02-20 ENCOUNTER — APPOINTMENT (OUTPATIENT)
Dept: CT IMAGING | Facility: HOSPITAL | Age: 56
End: 2021-02-20

## 2021-02-20 ENCOUNTER — APPOINTMENT (OUTPATIENT)
Dept: GENERAL RADIOLOGY | Facility: HOSPITAL | Age: 56
End: 2021-02-20

## 2021-02-20 DIAGNOSIS — R10.9 ABDOMINAL PAIN, UNSPECIFIED ABDOMINAL LOCATION: ICD-10-CM

## 2021-02-20 DIAGNOSIS — K86.1 ACUTE ON CHRONIC PANCREATITIS (HCC): Primary | ICD-10-CM

## 2021-02-20 DIAGNOSIS — K85.90 ACUTE ON CHRONIC PANCREATITIS (HCC): Primary | ICD-10-CM

## 2021-02-20 DIAGNOSIS — R11.2 NAUSEA AND VOMITING, INTRACTABILITY OF VOMITING NOT SPECIFIED, UNSPECIFIED VOMITING TYPE: ICD-10-CM

## 2021-02-20 LAB
ALBUMIN SERPL-MCNC: 4.3 G/DL (ref 3.5–5.2)
ALBUMIN/GLOB SERPL: 1 G/DL
ALP SERPL-CCNC: 79 U/L (ref 39–117)
ALT SERPL W P-5'-P-CCNC: 11 U/L (ref 1–41)
AMPHET+METHAMPHET UR QL: NEGATIVE
AMYLASE SERPL-CCNC: 84 U/L (ref 28–100)
ANION GAP SERPL CALCULATED.3IONS-SCNC: 16 MMOL/L (ref 5–15)
AST SERPL-CCNC: 18 U/L (ref 1–40)
BACTERIA UR QL AUTO: ABNORMAL /HPF
BARBITURATES UR QL SCN: NEGATIVE
BASOPHILS # BLD AUTO: 0 10*3/MM3 (ref 0–0.2)
BASOPHILS NFR BLD AUTO: 0.3 % (ref 0–1.5)
BENZODIAZ UR QL SCN: POSITIVE
BILIRUB SERPL-MCNC: 0.5 MG/DL (ref 0–1.2)
BILIRUB UR QL STRIP: ABNORMAL
BUN SERPL-MCNC: 19 MG/DL (ref 6–20)
BUN/CREAT SERPL: 16.2 (ref 7–25)
CALCIUM SPEC-SCNC: 9.4 MG/DL (ref 8.6–10.5)
CANNABINOIDS SERPL QL: POSITIVE
CHLORIDE SERPL-SCNC: 90 MMOL/L (ref 98–107)
CHOLEST SERPL-MCNC: 148 MG/DL (ref 0–200)
CLARITY UR: CLEAR
CO2 SERPL-SCNC: 25 MMOL/L (ref 22–29)
COCAINE UR QL: NEGATIVE
COLOR UR: YELLOW
CREAT SERPL-MCNC: 1.17 MG/DL (ref 0.76–1.27)
DEPRECATED RDW RBC AUTO: 40.3 FL (ref 37–54)
EOSINOPHIL # BLD AUTO: 0.1 10*3/MM3 (ref 0–0.4)
EOSINOPHIL NFR BLD AUTO: 0.5 % (ref 0.3–6.2)
ERYTHROCYTE [DISTWIDTH] IN BLOOD BY AUTOMATED COUNT: 12.6 % (ref 12.3–15.4)
GFR SERPL CREATININE-BSD FRML MDRD: 65 ML/MIN/1.73
GLOBULIN UR ELPH-MCNC: 4.2 GM/DL
GLUCOSE SERPL-MCNC: 257 MG/DL (ref 65–99)
GLUCOSE UR STRIP-MCNC: ABNORMAL MG/DL
HCT VFR BLD AUTO: 44.7 % (ref 37.5–51)
HDLC SERPL-MCNC: 70 MG/DL (ref 40–60)
HGB BLD-MCNC: 15.6 G/DL (ref 13–17.7)
HGB UR QL STRIP.AUTO: NEGATIVE
HYALINE CASTS UR QL AUTO: ABNORMAL /LPF
KETONES UR QL STRIP: ABNORMAL
LDLC SERPL CALC-MCNC: 56 MG/DL (ref 0–100)
LDLC/HDLC SERPL: 0.75 {RATIO}
LEUKOCYTE ESTERASE UR QL STRIP.AUTO: NEGATIVE
LIPASE SERPL-CCNC: 152 U/L (ref 13–60)
LYMPHOCYTES # BLD AUTO: 1.1 10*3/MM3 (ref 0.7–3.1)
LYMPHOCYTES NFR BLD AUTO: 8.6 % (ref 19.6–45.3)
MCH RBC QN AUTO: 32 PG (ref 26.6–33)
MCHC RBC AUTO-ENTMCNC: 34.8 G/DL (ref 31.5–35.7)
MCV RBC AUTO: 91.9 FL (ref 79–97)
METHADONE UR QL SCN: NEGATIVE
MONOCYTES # BLD AUTO: 0.7 10*3/MM3 (ref 0.1–0.9)
MONOCYTES NFR BLD AUTO: 5.7 % (ref 5–12)
NEUTROPHILS NFR BLD AUTO: 10.5 10*3/MM3 (ref 1.7–7)
NEUTROPHILS NFR BLD AUTO: 84.9 % (ref 42.7–76)
NITRITE UR QL STRIP: NEGATIVE
NRBC BLD AUTO-RTO: 0.3 /100 WBC (ref 0–0.2)
OPIATES UR QL: POSITIVE
OXYCODONE UR QL SCN: POSITIVE
PH UR STRIP.AUTO: 5.5 [PH] (ref 5–8)
PLATELET # BLD AUTO: 278 10*3/MM3 (ref 140–450)
PMV BLD AUTO: 7.9 FL (ref 6–12)
POTASSIUM SERPL-SCNC: 4.1 MMOL/L (ref 3.5–5.2)
PROT SERPL-MCNC: 8.5 G/DL (ref 6–8.5)
PROT UR QL STRIP: ABNORMAL
RBC # BLD AUTO: 4.87 10*6/MM3 (ref 4.14–5.8)
RBC # UR: ABNORMAL /HPF
REF LAB TEST METHOD: ABNORMAL
SARS-COV-2 RNA PNL SPEC NAA+PROBE: NORMAL
SODIUM SERPL-SCNC: 131 MMOL/L (ref 136–145)
SP GR UR STRIP: 1.04 (ref 1–1.03)
SQUAMOUS #/AREA URNS HPF: ABNORMAL /HPF
TRIGL SERPL-MCNC: 129 MG/DL (ref 0–150)
TROPONIN T SERPL-MCNC: <0.01 NG/ML (ref 0–0.03)
UROBILINOGEN UR QL STRIP: ABNORMAL
VLDLC SERPL-MCNC: 22 MG/DL (ref 5–40)
WBC # BLD AUTO: 12.3 10*3/MM3 (ref 3.4–10.8)
WBC UR QL AUTO: ABNORMAL /HPF

## 2021-02-20 PROCEDURE — 25010000002 ONDANSETRON PER 1 MG: Performed by: PHYSICIAN ASSISTANT

## 2021-02-20 PROCEDURE — 80307 DRUG TEST PRSMV CHEM ANLYZR: CPT | Performed by: PHYSICIAN ASSISTANT

## 2021-02-20 PROCEDURE — 80053 COMPREHEN METABOLIC PANEL: CPT | Performed by: PHYSICIAN ASSISTANT

## 2021-02-20 PROCEDURE — 25010000002 MORPHINE PER 10 MG: Performed by: HOSPITALIST

## 2021-02-20 PROCEDURE — 84484 ASSAY OF TROPONIN QUANT: CPT | Performed by: PHYSICIAN ASSISTANT

## 2021-02-20 PROCEDURE — 96375 TX/PRO/DX INJ NEW DRUG ADDON: CPT

## 2021-02-20 PROCEDURE — 25010000002 HYDROMORPHONE PER 4 MG: Performed by: PHYSICIAN ASSISTANT

## 2021-02-20 PROCEDURE — 25010000002 MORPHINE PER 10 MG: Performed by: PHYSICIAN ASSISTANT

## 2021-02-20 PROCEDURE — G0378 HOSPITAL OBSERVATION PER HR: HCPCS

## 2021-02-20 PROCEDURE — 71045 X-RAY EXAM CHEST 1 VIEW: CPT

## 2021-02-20 PROCEDURE — 96376 TX/PRO/DX INJ SAME DRUG ADON: CPT

## 2021-02-20 PROCEDURE — 74177 CT ABD & PELVIS W/CONTRAST: CPT

## 2021-02-20 PROCEDURE — 85025 COMPLETE CBC W/AUTO DIFF WBC: CPT | Performed by: PHYSICIAN ASSISTANT

## 2021-02-20 PROCEDURE — 87635 SARS-COV-2 COVID-19 AMP PRB: CPT | Performed by: PHYSICIAN ASSISTANT

## 2021-02-20 PROCEDURE — 81001 URINALYSIS AUTO W/SCOPE: CPT | Performed by: PHYSICIAN ASSISTANT

## 2021-02-20 PROCEDURE — 99220 PR INITIAL OBSERVATION CARE/DAY 70 MINUTES: CPT | Performed by: HOSPITALIST

## 2021-02-20 PROCEDURE — 80061 LIPID PANEL: CPT | Performed by: HOSPITALIST

## 2021-02-20 PROCEDURE — 25010000002 IOPAMIDOL 61 % SOLUTION: Performed by: PHYSICIAN ASSISTANT

## 2021-02-20 PROCEDURE — 82150 ASSAY OF AMYLASE: CPT | Performed by: PHYSICIAN ASSISTANT

## 2021-02-20 PROCEDURE — C9803 HOPD COVID-19 SPEC COLLECT: HCPCS

## 2021-02-20 PROCEDURE — 96374 THER/PROPH/DIAG INJ IV PUSH: CPT

## 2021-02-20 PROCEDURE — 96361 HYDRATE IV INFUSION ADD-ON: CPT

## 2021-02-20 PROCEDURE — 83690 ASSAY OF LIPASE: CPT | Performed by: PHYSICIAN ASSISTANT

## 2021-02-20 PROCEDURE — 93005 ELECTROCARDIOGRAM TRACING: CPT | Performed by: PHYSICIAN ASSISTANT

## 2021-02-20 PROCEDURE — 25010000002 ONDANSETRON PER 1 MG: Performed by: HOSPITALIST

## 2021-02-20 PROCEDURE — 99284 EMERGENCY DEPT VISIT MOD MDM: CPT

## 2021-02-20 RX ORDER — SODIUM CHLORIDE 0.9 % (FLUSH) 0.9 %
10 SYRINGE (ML) INJECTION EVERY 12 HOURS SCHEDULED
Status: DISCONTINUED | OUTPATIENT
Start: 2021-02-20 | End: 2021-02-22 | Stop reason: HOSPADM

## 2021-02-20 RX ORDER — ONDANSETRON 2 MG/ML
4 INJECTION INTRAMUSCULAR; INTRAVENOUS ONCE
Status: COMPLETED | OUTPATIENT
Start: 2021-02-20 | End: 2021-02-20

## 2021-02-20 RX ORDER — BISACODYL 5 MG/1
5 TABLET, DELAYED RELEASE ORAL DAILY PRN
Status: DISCONTINUED | OUTPATIENT
Start: 2021-02-20 | End: 2021-02-22 | Stop reason: HOSPADM

## 2021-02-20 RX ORDER — SODIUM CHLORIDE 0.9 % (FLUSH) 0.9 %
10 SYRINGE (ML) INJECTION AS NEEDED
Status: DISCONTINUED | OUTPATIENT
Start: 2021-02-20 | End: 2021-02-22 | Stop reason: HOSPADM

## 2021-02-20 RX ORDER — MULTIPLE VITAMINS W/ MINERALS TAB 9MG-400MCG
1 TAB ORAL DAILY
COMMUNITY

## 2021-02-20 RX ORDER — CHOLECALCIFEROL (VITAMIN D3) 125 MCG
5 CAPSULE ORAL NIGHTLY PRN
Status: DISCONTINUED | OUTPATIENT
Start: 2021-02-20 | End: 2021-02-22 | Stop reason: HOSPADM

## 2021-02-20 RX ORDER — LEVETIRACETAM 500 MG/1
500 TABLET ORAL 2 TIMES DAILY
Status: DISCONTINUED | OUTPATIENT
Start: 2021-02-20 | End: 2021-02-22 | Stop reason: HOSPADM

## 2021-02-20 RX ORDER — MORPHINE SULFATE 4 MG/ML
4 INJECTION, SOLUTION INTRAMUSCULAR; INTRAVENOUS ONCE
Status: COMPLETED | OUTPATIENT
Start: 2021-02-20 | End: 2021-02-20

## 2021-02-20 RX ORDER — ONDANSETRON 2 MG/ML
4 INJECTION INTRAMUSCULAR; INTRAVENOUS EVERY 4 HOURS PRN
Status: DISCONTINUED | OUTPATIENT
Start: 2021-02-20 | End: 2021-02-22 | Stop reason: HOSPADM

## 2021-02-20 RX ORDER — MORPHINE SULFATE 4 MG/ML
2 INJECTION, SOLUTION INTRAMUSCULAR; INTRAVENOUS
Status: DISCONTINUED | OUTPATIENT
Start: 2021-02-20 | End: 2021-02-22 | Stop reason: HOSPADM

## 2021-02-20 RX ORDER — PAROXETINE HYDROCHLORIDE 20 MG/1
20 TABLET, FILM COATED ORAL EVERY MORNING
COMMUNITY
End: 2021-03-02

## 2021-02-20 RX ORDER — PANTOPRAZOLE SODIUM 40 MG/1
40 TABLET, DELAYED RELEASE ORAL EVERY MORNING
Status: DISCONTINUED | OUTPATIENT
Start: 2021-02-21 | End: 2021-02-22 | Stop reason: HOSPADM

## 2021-02-20 RX ORDER — ATENOLOL 25 MG/1
25 TABLET ORAL DAILY
Status: DISCONTINUED | OUTPATIENT
Start: 2021-02-20 | End: 2021-02-22 | Stop reason: HOSPADM

## 2021-02-20 RX ORDER — BISACODYL 10 MG
10 SUPPOSITORY, RECTAL RECTAL DAILY PRN
Status: DISCONTINUED | OUTPATIENT
Start: 2021-02-20 | End: 2021-02-22 | Stop reason: HOSPADM

## 2021-02-20 RX ORDER — PAROXETINE HYDROCHLORIDE 20 MG/1
20 TABLET, FILM COATED ORAL EVERY MORNING
Status: DISCONTINUED | OUTPATIENT
Start: 2021-02-21 | End: 2021-02-22 | Stop reason: HOSPADM

## 2021-02-20 RX ORDER — ATORVASTATIN CALCIUM 20 MG/1
20 TABLET, FILM COATED ORAL NIGHTLY
Status: DISCONTINUED | OUTPATIENT
Start: 2021-02-20 | End: 2021-02-22 | Stop reason: HOSPADM

## 2021-02-20 RX ORDER — SODIUM CHLORIDE 9 MG/ML
125 INJECTION, SOLUTION INTRAVENOUS CONTINUOUS
Status: DISCONTINUED | OUTPATIENT
Start: 2021-02-20 | End: 2021-02-22 | Stop reason: HOSPADM

## 2021-02-20 RX ORDER — DULOXETIN HYDROCHLORIDE 30 MG/1
30 CAPSULE, DELAYED RELEASE ORAL DAILY
Status: DISCONTINUED | OUTPATIENT
Start: 2021-02-20 | End: 2021-02-22 | Stop reason: HOSPADM

## 2021-02-20 RX ORDER — HYDROMORPHONE HCL 110MG/55ML
0.5 PATIENT CONTROLLED ANALGESIA SYRINGE INTRAVENOUS ONCE
Status: COMPLETED | OUTPATIENT
Start: 2021-02-20 | End: 2021-02-20

## 2021-02-20 RX ADMIN — SODIUM CHLORIDE 1000 ML: 9 INJECTION, SOLUTION INTRAVENOUS at 12:13

## 2021-02-20 RX ADMIN — LEVETIRACETAM 500 MG: 500 TABLET ORAL at 20:59

## 2021-02-20 RX ADMIN — ONDANSETRON 4 MG: 2 INJECTION, SOLUTION INTRAMUSCULAR; INTRAVENOUS at 18:34

## 2021-02-20 RX ADMIN — ONDANSETRON 4 MG: 2 INJECTION, SOLUTION INTRAMUSCULAR; INTRAVENOUS at 12:03

## 2021-02-20 RX ADMIN — IOPAMIDOL 100 ML: 612 INJECTION, SOLUTION INTRAVENOUS at 14:06

## 2021-02-20 RX ADMIN — SODIUM CHLORIDE 125 ML/HR: 9 INJECTION, SOLUTION INTRAVENOUS at 21:02

## 2021-02-20 RX ADMIN — Medication 10 ML: at 20:59

## 2021-02-20 RX ADMIN — MORPHINE SULFATE 4 MG: 4 INJECTION INTRAVENOUS at 12:04

## 2021-02-20 RX ADMIN — SODIUM CHLORIDE 1000 ML: 9 INJECTION, SOLUTION INTRAVENOUS at 21:02

## 2021-02-20 RX ADMIN — ATORVASTATIN CALCIUM 20 MG: 20 TABLET, FILM COATED ORAL at 20:59

## 2021-02-20 RX ADMIN — ATENOLOL 25 MG: 25 TABLET ORAL at 20:59

## 2021-02-20 RX ADMIN — PANCRELIPASE 12000 UNITS OF LIPASE: 60000; 12000; 38000 CAPSULE, DELAYED RELEASE PELLETS ORAL at 20:59

## 2021-02-20 RX ADMIN — ONDANSETRON 4 MG: 2 INJECTION, SOLUTION INTRAMUSCULAR; INTRAVENOUS at 14:48

## 2021-02-20 RX ADMIN — HYDROMORPHONE HYDROCHLORIDE 0.5 MG: 2 INJECTION, SOLUTION INTRAMUSCULAR; INTRAVENOUS; SUBCUTANEOUS at 16:04

## 2021-02-20 RX ADMIN — MORPHINE SULFATE 2 MG: 4 INJECTION INTRAVENOUS at 18:48

## 2021-02-20 RX ADMIN — ONDANSETRON 4 MG: 2 INJECTION, SOLUTION INTRAMUSCULAR; INTRAVENOUS at 22:22

## 2021-02-20 RX ADMIN — DULOXETINE 30 MG: 30 CAPSULE, DELAYED RELEASE ORAL at 20:58

## 2021-02-20 RX ADMIN — MORPHINE SULFATE 2 MG: 4 INJECTION INTRAVENOUS at 22:22

## 2021-02-20 RX ADMIN — MORPHINE SULFATE 4 MG: 4 INJECTION INTRAVENOUS at 14:26

## 2021-02-20 NOTE — ED NOTES
Called lab to add on amylase to green top and they were adding it into the system     Arlin Redmond RN  02/20/21 9344

## 2021-02-20 NOTE — ED NOTES
Pt reports abd pain in center of upper abd X 5 days. Pt reports N/V X 5 days. Pt reports the pain is a 9 and feels like a burning pressure. Pt reports pain feels like the last time he had pancreatitis. Pt reports he has a Hx of gallstones as well.     Arlin Redmond, RN  02/20/21 2486

## 2021-02-20 NOTE — ED PROVIDER NOTES
"Subjective   Patient is a 55-year-old male who presents with complaints of epigastric abdominal pain that has progressed over the past 5 days.  He describes as a constant sharp squeezing type pain that he currently rates a 9/10 severity.  Patient states he does have a history of chronic pancreatitis and feels like he is having \"a flareup\" he does report associated nausea and vomiting denies any hematemesis.  He does report 3 episodes of watery diarrhea over the past 24 hours denies any hematochezia or melena.  Patient states his epigastric pain does radiate up into his chest and through to his back.  He states he took his normal hydrocodone and migraine relief with no help.  He denies any fever body aches or chills recent travel or known sick contacts.  He also denies any urinary symptoms include dysuria hematuria.          Review of Systems   Constitutional: Negative.    HENT: Negative.    Eyes: Negative for photophobia and visual disturbance.   Respiratory: Positive for chest tightness and shortness of breath. Negative for apnea, cough, choking, wheezing and stridor.    Cardiovascular: Negative.    Gastrointestinal: Positive for abdominal pain, diarrhea, nausea and vomiting. Negative for abdominal distention, blood in stool and constipation.   Genitourinary: Negative.    Musculoskeletal: Negative for neck pain and neck stiffness.   Skin: Negative.    Neurological: Negative.        Past Medical History:   Diagnosis Date   • Benign essential hypertension     Uncontrolled   • Chronic recurrent pancreatitis (CMS/HCC)    • Elevated cholesterol with high triglycerides    • GERD (gastroesophageal reflux disease)    • Hearing loss, right     85%   • Seizures (CMS/HCC)    • Solitary kidney        No Known Allergies    Past Surgical History:   Procedure Laterality Date   • APPENDECTOMY     • CHOLECYSTECTOMY     • COLONOSCOPY      NEG = 2016, rech 2021 [facility]       Family History   Problem Relation Age of Onset   • Cerebral " aneurysm Mother    • Hypertension Father    • Diabetes type II Father    • Other Father         CHOL   • Cancer Father 60        Bladder   • Hypertension Half-Brother    • Other Half-Brother         CHOL   • Stroke Half-Brother        Social History     Socioeconomic History   • Marital status:      Spouse name: Not on file   • Number of children: Not on file   • Years of education: Not on file   • Highest education level: Not on file   Tobacco Use   • Smoking status: Never Smoker   • Smokeless tobacco: Never Used   Substance and Sexual Activity   • Alcohol use: Not Currently   • Drug use: Not Currently   • Sexual activity: Defer           Objective   Physical Exam  Vitals signs and nursing note reviewed.   Constitutional:       General: He is not in acute distress.     Appearance: He is well-developed. He is not ill-appearing, toxic-appearing or diaphoretic.   HENT:      Head: Normocephalic and atraumatic.      Mouth/Throat:      Mouth: Mucous membranes are moist.      Pharynx: Oropharynx is clear.   Eyes:      Extraocular Movements: Extraocular movements intact.      Pupils: Pupils are equal, round, and reactive to light.   Cardiovascular:      Rate and Rhythm: Regular rhythm. Tachycardia present.      Heart sounds: No murmur. No friction rub. No gallop.    Pulmonary:      Effort: Pulmonary effort is normal. No tachypnea or accessory muscle usage.      Breath sounds: Normal breath sounds. No stridor. No decreased breath sounds, wheezing, rhonchi or rales.   Chest:      Chest wall: Tenderness present. No mass, deformity or crepitus.   Abdominal:      General: Abdomen is flat. Bowel sounds are normal.      Palpations: Abdomen is soft. There is no hepatomegaly, splenomegaly or mass.      Tenderness: There is abdominal tenderness in the right upper quadrant and epigastric area. There is no right CVA tenderness, left CVA tenderness, guarding or rebound.      Hernia: No hernia is present.   Skin:     General:  "Skin is warm.      Capillary Refill: Capillary refill takes less than 2 seconds.      Findings: No rash.   Neurological:      Mental Status: He is alert and oriented to person, place, and time.   Psychiatric:         Mood and Affect: Mood normal.         Behavior: Behavior normal.         Procedures           ED Course  ED Course as of Feb 20 1504   Sat Feb 20, 2021   1502 Globulin: 4.2 [AA]      ED Course User Index  [AA] Heaven Saha PA          /98   Pulse 106   Temp 97.9 °F (36.6 °C) (Oral)   Resp 18   Ht 188 cm (74\")   Wt 80.8 kg (178 lb 2.1 oz)   SpO2 94%   BMI 22.87 kg/m²   Medications   sodium chloride 0.9 % flush 10 mL (has no administration in time range)   sodium chloride 0.9 % bolus 1,000 mL (0 mL Intravenous Stopped 2/20/21 1313)   ondansetron (ZOFRAN) injection 4 mg (4 mg Intravenous Given 2/20/21 1203)   Morphine sulfate (PF) injection 4 mg (4 mg Intravenous Given 2/20/21 1204)   iopamidol (ISOVUE-300) 61 % injection 100 mL (100 mL Intravenous Given 2/20/21 1406)   Morphine sulfate (PF) injection 4 mg (4 mg Intravenous Given 2/20/21 1426)   ondansetron (ZOFRAN) injection 4 mg (4 mg Intravenous Given 2/20/21 1448)     Labs Reviewed   COMPREHENSIVE METABOLIC PANEL - Abnormal; Notable for the following components:       Result Value    Glucose 257 (*)     Sodium 131 (*)     Chloride 90 (*)     Anion Gap 16.0 (*)     All other components within normal limits    Narrative:     GFR Normal >60  Chronic Kidney Disease <60  Kidney Failure <15     LIPASE - Abnormal; Notable for the following components:    Lipase 152 (*)     All other components within normal limits   URINALYSIS W/ CULTURE IF INDICATED - Abnormal; Notable for the following components:    Specific Gravity, UA 1.044 (*)     Glucose, UA >=1000 mg/dL (3+) (*)     Ketones, UA 15 mg/dL (1+) (*)     Bilirubin, UA Small (1+) (*)     Protein,  mg/dL (2+) (*)     All other components within normal limits   CBC WITH AUTO " DIFFERENTIAL - Abnormal; Notable for the following components:    WBC 12.30 (*)     Neutrophil % 84.9 (*)     Lymphocyte % 8.6 (*)     Neutrophils, Absolute 10.50 (*)     nRBC 0.3 (*)     All other components within normal limits   URINE DRUG SCREEN - Abnormal; Notable for the following components:    Benzodiazepine Screen, Urine Positive (*)     Opiate Screen Positive (*)     THC, Screen, Urine Positive (*)     Oxycodone Screen, Urine Positive (*)     All other components within normal limits    Narrative:     Negative Thresholds Per Drugs Screened:    Amphetamines                 500 ng/ml  Barbiturates                 200 ng/ml  Benzodiazepines              100 ng/ml  Cocaine                      300 ng/ml  Methadone                    300 ng/ml  Opiates                      300 ng/ml  Oxycodone                    100 ng/ml  THC                           50 ng/ml    The Normal Value for all drugs tested is negative. This report includes final unconfirmed screening results to be used for medical treatment purposes only. Unconfirmed results must not be used for non-medical purposes such as employment or legal testing. Clinical consideration should be applied to any drug of abuse test, particularly when unconfirmed results are used.          All urine drugs of abuse requests without chain of custody are for medical screening purposes only.  False positives are possible.     URINALYSIS, MICROSCOPIC ONLY - Abnormal; Notable for the following components:    RBC, UA 0-2 (*)     WBC, UA 0-2 (*)     All other components within normal limits   COVID-19,ABBOTT IN-HOUSE,NASAL SWAB (NO TRANSPORT MEDIA) 2 HR TAT - Normal    Narrative:     Fact sheet for providers: https://www.fda.gov/media/731155/download     Fact sheet for patients: https://www.fda.gov/media/114787/download    Test performed by PCR.  If inconsistent with clinical signs and symptoms patient should be tested with different authorized molecular test.   TROPONIN  (IN-HOUSE) - Normal    Narrative:     Troponin T Reference Range:  <= 0.03 ng/mL-   Negative for AMI  >0.03 ng/mL-     Abnormal for myocardial necrosis.  Clinicians would have to utilize clinical acumen, EKG, Troponin and serial changes to determine if it is an Acute Myocardial Infarction or myocardial injury due to an underlying chronic condition.       Results may be falsely decreased if patient taking Biotin.     AMYLASE - Normal   CBC AND DIFFERENTIAL    Narrative:     The following orders were created for panel order CBC & Differential.  Procedure                               Abnormality         Status                     ---------                               -----------         ------                     CBC Auto Differential[[id number]]        Abnormal            Final result                 Please view results for these tests on the individual orders.     Ct Abdomen Pelvis With Contrast    Result Date: 2/20/2021   1. Findings consistent with acute on chronic pancreatitis. Patchy hypodensities are present within the pancreatic head and body component of this likely related to previous pancreatitis with mild dilatation of the pancreatic duct which also appears stable. Follow-up is recommended to evaluate for pseudocyst formation. No drainable collections identified. No evidence of necrosis or suspicious air. Adjacent wall thickening is present within the second and third portions of the duodenum likely related to reactive duodenitis. 2. Distention of the bladder which may be physiologic. Correlate with findings related to outlet obstruction. 3. Status post right nephrectomy. 4. Ancillary findings as described above.  Electronically Signed By-Melissa Padilla MD On:2/20/2021 2:14 PM This report was finalized on [id number] by  Melissa Padilla MD.    Xr Chest 1 View    Result Date: 2/20/2021  No acute cardiopulmonary process.  Electronically Signed By-Melissa Padilla MD On:2/20/2021 12:59 PM This report was finalized  on [id number] by  Melissa Padilla MD.                                      MDM  Number of Diagnoses or Management Options  Abdominal pain, unspecified abdominal location:   Acute on chronic pancreatitis (CMS/HCC):   Nausea and vomiting, intractability of vomiting not specified, unspecified vomiting type:   Diagnosis management comments: Chart Review:  Comorbidity: As per past medical history  Differentials: Cute on chronic pancreatitis, intra-abdominal infection, dissection, peritonitis, peptic ulcer disease, pancreatitis, hepatitis, ischemic bowel, bowel obstruction, appendicitis      ;this list is not all inclusive and does not constitute the entirety of considered causes  ECG: Interpreted by myself and Dr. Burgos shows sinus tachycardia rate 123 left atrial enlargement previous EKG was reviewed from 9/8/2018.  Labs: Urine drug screen positive for benzodiazepines opiates THC and oxycodone.  Amylase 84 lipase 152 COVID-19 swab negative troponin within normal limits.  CBC shows WBC 12.3 hemoglobin 15.6 hematocrit 44.7 platelets 278.  CMP showed glucose 257 BUN 19 creatinine 1.17 sodium 131 potassium 4.1 liver enzymes within normal limits.  No UTI on urinalysis  Imaging: Was interpreted by physician and reviewed by myself:  Ct Abdomen Pelvis With Contrast  Result Date: 2/20/2021   1. Findings consistent with acute on chronic pancreatitis. Patchy hypodensities are present within the pancreatic head and body component of this likely related to previous pancreatitis with mild dilatation of the pancreatic duct which also appears stable. Follow-up is recommended to evaluate for pseudocyst formation. No drainable collections identified. No evidence of necrosis or suspicious air. Adjacent wall thickening is present within the second and third portions of the duodenum likely related to reactive duodenitis. 2. Distention of the bladder which may be physiologic. Correlate with findings related to outlet obstruction. 3.  Status post right nephrectomy. 4. Ancillary findings as described above.  Electronically Signed By-Melissa Padilla MD On:2/20/2021 2:14 PM This report was finalized on [id number] by  Melissa Padilla MD.    Xr Chest 1 View  Result Date: 2/20/2021  No acute cardiopulmonary process.  Electronically Signed By-Melissa Padilla MD On:2/20/2021 12:59 PM This report was finalized on [id number] by  Melissa Padilla MD.    Disposition/Treatment:  Appropriate PPE was worn during exam and throughout all encounters with the patient.  While in the ED IV was placed and labs were obtained patient given morphine Zofran and fluid he was given additional morphine and Zofran with some relief but still had continued pain.  Lab results were significant for an elevated lipase of 152.  Amylase was within normal limits.  CBC showed elevated WBC at 12.3 urinalysis showed no signs of UTI drug screen positive for benzodiazepines opiates THC and oxycodone.  CT of abdomen pelvis was significant for acute on chronic pancreatitis and reactive duodenitis.  EKG showed sinus tachycardia troponin was within normal limits chest x-ray was unremarkable.  Lab results and findings were discussed with the patient who voiced understanding admission for hydration and pain control he was in agreement with plan.  Spoke to Dr. Maxwell who agreed for admission the hospitalist group       Amount and/or Complexity of Data Reviewed  Clinical lab tests: reviewed  Tests in the radiology section of CPT®: reviewed  Tests in the medicine section of CPT®: reviewed        Final diagnoses:   Acute on chronic pancreatitis (CMS/HCC)   Abdominal pain, unspecified abdominal location   Nausea and vomiting, intractability of vomiting not specified, unspecified vomiting type            Heaven Saha PA  02/20/21 [id number]

## 2021-02-20 NOTE — H&P
[facility] Medicine Services      Patient Name: Mitchell Jacome  : 1965  MRN: [id number]  Primary Care Physician: Krystyna Hardwick DO  Date of admission: 2021    Patient Care Team:  Krystyna Hardwick DO as PCP - General          Subjective   History Present Illness     Chief Complaint:   Chief Complaint   Patient presents with   • Vomiting   • Abdominal Pain     The patient is a 55-year-old male with history of chronic pancreatitis, solitary kidney,  triglyceridemia, hypertension, GERD and cholecystectomy.    The patient has been having about 5 days of persistent epigastric pain that radiates to his thoracic back in association with fatigue and decreased oral intake.  The patient denies alcohol intake and has been compliant with his medications.    CT of the abdomen and pelvis is read as acute on chronic pancreatitis with patchy hypodensities present in the pancreatic head and body component.    The patient denies recent fevers, chills, sweats, chest pain, shortness of air, constipation or diarrhea.      Review of Systems   All other systems reviewed and are negative.          Personal History     Past Medical History:   Past Medical History:   Diagnosis Date   • Benign essential hypertension     Uncontrolled   • Chronic recurrent pancreatitis (CMS/HCC)    • Elevated cholesterol with high triglycerides    • GERD (gastroesophageal reflux disease)    • Hearing loss, right     85%   • Seizures (CMS/HCC)    • Solitary kidney        Surgical History:      Past Surgical History:   Procedure Laterality Date   • APPENDECTOMY     • CHOLECYSTECTOMY     • COLONOSCOPY      NEG = , rech  GSI           Family History: family history includes Cancer (age of onset: 60) in his father; Cerebral aneurysm in his mother; Diabetes type II in his father; Hypertension in his father and half-brother; Other in his father and half-brother; Stroke in his half-brother. Otherwise pertinent FHx  was reviewed and unremarkable.     Social History:  reports that he has never smoked. He has never used smokeless tobacco. He reports previous alcohol use. He reports previous drug use.      Medications:  Prior to Admission medications    Medication Sig Start Date End Date Taking? Authorizing Provider   atenolol (TENORMIN) 25 MG tablet Take 1 tablet by mouth Daily. 7/23/20  Yes Krystyna Hardwick DO   atorvastatin (LIPITOR) 20 MG tablet Take 20 mg by mouth Daily. 5/9/17  Yes Rachelle Alexis MD   DULoxetine (CYMBALTA) 30 MG capsule Take 1 capsule by mouth Daily. 2/13/20  Yes Krystyna Hardwick DO   fenofibrate 160 MG tablet TAKE 1 TABLET BY MOUTH EVERY DAY 5/22/20  Yes Krystyna Hardwick DO   levETIRAcetam (KEPPRA) 500 MG tablet Take 500 mg by mouth 2 (Two) Times a Day. 11/7/17  Yes Rachelle Alexis MD   multivitamin with minerals (Centrum Silver) tablet tablet Take 1 tablet by mouth Daily.   Yes Rachelle Alexis MD   omeprazole (priLOSEC) 40 MG capsule Take 1 capsule by mouth Daily. 7/23/20  Yes Krystyna Hardwick DO   pancrelipase, Lip-Prot-Amyl, (CREON) 13989-13485 units capsule delayed-release particles capsule 3 tabs po TID W/ meals 2/13/20  Yes Krystyna Hardwick DO   PARoxetine (PAXIL) 20 MG tablet Take 20 mg by mouth Every Morning.   Yes Rachelle Alexis MD   sucralfate (CARAFATE) 1 g tablet Take 1 tablet by mouth 4 (Four) Times a Day. 2/13/20  Yes Krystyna Hardwick DO   dicyclomine (BENTYL) 20 MG tablet 1/2 tab - 1 po q6hrs prn Abd cramping/ bloating/ [id number] 2/13/20   Krystyna Hardwick DO   HYDROcodone-acetaminophen (NORCO)  MG per tablet Take 1 tablet by mouth 2 (Two) Times a Day As Needed for Severe Pain . 2/13/20 2/20/21  Krystyna Hardwick DO   promethazine (PHENERGAN) 25 MG suppository Insert 1 suppository into the rectum 2 (Two) Times a Day As Needed for Nausea or Vomiting. 2/13/20 2/20/21  Krystyna Hardwick,        Allergies:  No Known Allergies    Objective   Objective     Vital  Signs  Temp:  [97.9 °F (36.6 °C)] 97.9 °F (36.6 °C)  Heart Rate:  [105-133] 105  Resp:  [18] 18  BP: (144-167)/() 163/97  SpO2:  [93 %-99 %] 96 %  on   ;   Device (Oxygen Therapy): room air  Body mass index is 22.87 kg/m².    Physical Exam  HENT:      Head: Normocephalic.      Mouth/Throat:      Mouth: Mucous membranes are moist.   Eyes:      Extraocular Movements: Extraocular movements intact.      Pupils: Pupils are equal, round, and reactive to light.   Neck:      Musculoskeletal: Normal range of motion.   Cardiovascular:      Rate and Rhythm: Normal rate and regular rhythm.   Pulmonary:      Effort: Pulmonary effort is normal.      Breath sounds: Normal breath sounds.   Abdominal:      General: Bowel sounds are normal.      Tenderness: There is abdominal tenderness in the epigastric area. There is guarding. There is no rebound.   Musculoskeletal: Normal range of motion.   Skin:     General: Skin is warm.   Neurological:      Mental Status: He is alert and oriented to person, place, and time. Mental status is at baseline.   Psychiatric:         Mood and Affect: Mood normal.           Results Review:  I have personally reviewed most recent lab results .    Results from last 7 days   Lab Units 02/20/21  1208   WBC 10*3/mm3 12.30*   HEMOGLOBIN g/dL 15.6   HEMATOCRIT % 44.7   PLATELETS 10*3/mm3 278     Results from last 7 days   Lab Units 02/20/21  1208   SODIUM mmol/L 131*   POTASSIUM mmol/L 4.1   CHLORIDE mmol/L 90*   CO2 mmol/L 25.0   BUN mg/dL 19   CREATININE mg/dL 1.17   GLUCOSE mg/dL 257*   CALCIUM mg/dL 9.4   ALT (SGPT) U/L 11   AST (SGOT) U/L 18   TROPONIN T ng/mL <0.010     Estimated Creatinine Clearance: 81.5 mL/min (by C-G formula based on SCr of 1.17 mg/dL).  Brief Urine Lab Results  (Last result in the past 365 days)      Color   Clarity   Blood   Leuk Est   Nitrite   Protein   CREAT   Urine HCG        02/20/21 1420 Yellow Clear Negative Negative Negative 100 mg/dL (2+)               Microbiology  Results (last 10 days)     Procedure Component Value - Date/Time    COVID-19, ABBOTT IN-HOUSE,NASAL Swab (NO TRANSPORT MEDIA) 2 HR TAT - Swab, Nasopharynx [[id number]]  (Normal) Collected: 02/20/21 1209    Lab Status: Final result Specimen: Swab from Nasopharynx Updated: 02/20/21 1238     COVID19 Presumptive Negative    Narrative:      Fact sheet for providers: https://www.fda.gov/media/780664/download     Fact sheet for patients: https://www.fda.gov/media/441978/download    Test performed by PCR.  If inconsistent with clinical signs and symptoms patient should be tested with different authorized molecular test.          ECG/EMG Results (most recent)     Procedure Component Value Units Date/Time    ECG 12 Lead [[id number]] Collected: 02/20/21 1155     Updated: 02/20/21 1158     QT Interval 308 ms     Narrative:      HEART RATE= 123  bpm  RR Interval= 488  ms  PA Interval= 143  ms  P Horizontal Axis= -3  deg  P Front Axis= 71  deg  QRSD Interval= 79  ms  QT Interval= 308  ms  QRS Axis= 55  deg  T Wave Axis= 52  deg  - BORDERLINE ECG -  Sinus tachycardia  LAE, consider biatrial enlargement  When compared with ECG of 08-Sep-2018 22:52:54,  No significant change  Electronically Signed By:   Date and Time of Study: 2021-02-20 11:55:35                    Ct Abdomen Pelvis With Contrast    Result Date: 2/20/2021   1. Findings consistent with acute on chronic pancreatitis. Patchy hypodensities are present within the pancreatic head and body component of this likely related to previous pancreatitis with mild dilatation of the pancreatic duct which also appears stable. Follow-up is recommended to evaluate for pseudocyst formation. No drainable collections identified. No evidence of necrosis or suspicious air. Adjacent wall thickening is present within the second and third portions of the duodenum likely related to reactive duodenitis. 2. Distention of the bladder which may be physiologic. Correlate with findings related to  outlet obstruction. 3. Status post right nephrectomy. 4. Ancillary findings as described above.  Electronically Signed By-Melissa Padilla MD On:2/20/2021 2:14 PM This report was finalized on [id number] by  Melissa Padilla MD.    Xr Chest 1 View    Result Date: 2/20/2021  No acute cardiopulmonary process.  Electronically Signed By-Melissa Padilla MD On:2/20/2021 12:59 PM This report was finalized on [id number] by  Melissa Padilla MD.        Estimated Creatinine Clearance: 81.5 mL/min (by C-G formula based on SCr of 1.17 mg/dL).    Assessment/Plan   Assessment/Plan       Active Hospital Problems    Diagnosis  POA   • **Acute on chronic pancreatitis (CMS/HCC) [K85.90, K86.1]  Yes     Priority: High   • Solitary kidney [Q60.0]  Not Applicable     Priority: Medium   • Benign essential hypertension [I10]  Yes     Priority: Medium   • Recurrent major depressive disorder, in remission (CMS/HCC) [F33.40]  Yes     Priority: Medium   • Dyslipidemia [E78.5]  Yes     Priority: Medium   • Seizures (CMS/HCC) [R56.9]  Yes     Priority: Medium      Resolved Hospital Problems   No resolved problems to display.       Acute on chronic pancreatitis:  -s/p CT abdomen and pelvis in ED  -Continue IVF and pain control  -Check triglyceride levels  -History of cholecystectomy  -Continue Creon    Hypertension:  -Continue Tylenol    GERD:  -Continue PPI    Seizure disorder:  -Continue Keppra          VTE Prophylaxis -   Mechanical Order History:     None      Pharmalogical Order History:      Ordered     Dose Route Frequency Stop    Signed and Held  enoxaparin (LOVENOX) syringe 40 mg      40 mg SC Every 24 Hours --                CODE STATUS:    Code Status and Medical Interventions:   Ordered at: 02/20/21 1548     Level Of Support Discussed With:    Patient     Code Status:    CPR     Medical Interventions (Level of Support Prior to Arrest):    Full       This patient has been examined wearing appropriate Personal Protective Equipment and  discussed with nursing. 02/20/21      I discussed the patient's findings and my recommendations with patient.      Signature: Electronically signed by Marc Lozada DO, 02/20/21, 3:46 PM EST.    [facility]

## 2021-02-21 LAB
ALBUMIN SERPL-MCNC: 3.7 G/DL (ref 3.5–5.2)
ALBUMIN/GLOB SERPL: 1 G/DL
ALP SERPL-CCNC: 116 U/L (ref 39–117)
ALT SERPL W P-5'-P-CCNC: 34 U/L (ref 1–41)
ANION GAP SERPL CALCULATED.3IONS-SCNC: 12 MMOL/L (ref 5–15)
AST SERPL-CCNC: 71 U/L (ref 1–40)
BASOPHILS # BLD AUTO: 0 10*3/MM3 (ref 0–0.2)
BASOPHILS NFR BLD AUTO: 0.5 % (ref 0–1.5)
BILIRUB SERPL-MCNC: 0.4 MG/DL (ref 0–1.2)
BUN SERPL-MCNC: 14 MG/DL (ref 6–20)
BUN/CREAT SERPL: 14.1 (ref 7–25)
CALCIUM SPEC-SCNC: 8.6 MG/DL (ref 8.6–10.5)
CHLORIDE SERPL-SCNC: 95 MMOL/L (ref 98–107)
CO2 SERPL-SCNC: 26 MMOL/L (ref 22–29)
CREAT SERPL-MCNC: 0.99 MG/DL (ref 0.76–1.27)
D-LACTATE SERPL-SCNC: 0.9 MMOL/L (ref 0.5–2)
DEPRECATED RDW RBC AUTO: 40.3 FL (ref 37–54)
EOSINOPHIL # BLD AUTO: 0.2 10*3/MM3 (ref 0–0.4)
EOSINOPHIL NFR BLD AUTO: 3.2 % (ref 0.3–6.2)
ERYTHROCYTE [DISTWIDTH] IN BLOOD BY AUTOMATED COUNT: 12.6 % (ref 12.3–15.4)
GFR SERPL CREATININE-BSD FRML MDRD: 78 ML/MIN/1.73
GLOBULIN UR ELPH-MCNC: 3.6 GM/DL
GLUCOSE SERPL-MCNC: 191 MG/DL (ref 65–99)
HCT VFR BLD AUTO: 39.8 % (ref 37.5–51)
HGB BLD-MCNC: 13.9 G/DL (ref 13–17.7)
LYMPHOCYTES # BLD AUTO: 1.1 10*3/MM3 (ref 0.7–3.1)
LYMPHOCYTES NFR BLD AUTO: 15.4 % (ref 19.6–45.3)
MAGNESIUM SERPL-MCNC: 1.6 MG/DL (ref 1.6–2.6)
MCH RBC QN AUTO: 32.2 PG (ref 26.6–33)
MCHC RBC AUTO-ENTMCNC: 34.9 G/DL (ref 31.5–35.7)
MCV RBC AUTO: 92.2 FL (ref 79–97)
MONOCYTES # BLD AUTO: 0.6 10*3/MM3 (ref 0.1–0.9)
MONOCYTES NFR BLD AUTO: 8.2 % (ref 5–12)
NEUTROPHILS NFR BLD AUTO: 5.4 10*3/MM3 (ref 1.7–7)
NEUTROPHILS NFR BLD AUTO: 72.7 % (ref 42.7–76)
NRBC BLD AUTO-RTO: 0.1 /100 WBC (ref 0–0.2)
PHOSPHATE SERPL-MCNC: 1.8 MG/DL (ref 2.5–4.5)
PHOSPHATE SERPL-MCNC: 1.8 MG/DL (ref 2.5–4.5)
PLATELET # BLD AUTO: 227 10*3/MM3 (ref 140–450)
PMV BLD AUTO: 7.7 FL (ref 6–12)
POTASSIUM SERPL-SCNC: 4 MMOL/L (ref 3.5–5.2)
PROCALCITONIN SERPL-MCNC: 0.27 NG/ML (ref 0–0.25)
PROT SERPL-MCNC: 7.3 G/DL (ref 6–8.5)
QT INTERVAL: 308 MS
RBC # BLD AUTO: 4.32 10*6/MM3 (ref 4.14–5.8)
SODIUM SERPL-SCNC: 133 MMOL/L (ref 136–145)
T4 FREE SERPL-MCNC: 1.18 NG/DL (ref 0.93–1.7)
TSH SERPL DL<=0.05 MIU/L-ACNC: 2.03 UIU/ML (ref 0.27–4.2)
WBC # BLD AUTO: 7.4 10*3/MM3 (ref 3.4–10.8)

## 2021-02-21 PROCEDURE — 80053 COMPREHEN METABOLIC PANEL: CPT | Performed by: HOSPITALIST

## 2021-02-21 PROCEDURE — 84443 ASSAY THYROID STIM HORMONE: CPT | Performed by: HOSPITALIST

## 2021-02-21 PROCEDURE — 25010000002 MORPHINE PER 10 MG: Performed by: HOSPITALIST

## 2021-02-21 PROCEDURE — 96376 TX/PRO/DX INJ SAME DRUG ADON: CPT

## 2021-02-21 PROCEDURE — 83735 ASSAY OF MAGNESIUM: CPT | Performed by: HOSPITALIST

## 2021-02-21 PROCEDURE — 84439 ASSAY OF FREE THYROXINE: CPT | Performed by: HOSPITALIST

## 2021-02-21 PROCEDURE — 85025 COMPLETE CBC W/AUTO DIFF WBC: CPT | Performed by: HOSPITALIST

## 2021-02-21 PROCEDURE — 25010000002 ONDANSETRON PER 1 MG: Performed by: HOSPITALIST

## 2021-02-21 PROCEDURE — 84145 PROCALCITONIN (PCT): CPT | Performed by: HOSPITALIST

## 2021-02-21 PROCEDURE — 84100 ASSAY OF PHOSPHORUS: CPT | Performed by: HOSPITALIST

## 2021-02-21 PROCEDURE — G0378 HOSPITAL OBSERVATION PER HR: HCPCS

## 2021-02-21 PROCEDURE — 99225 PR SBSQ OBSERVATION CARE/DAY 25 MINUTES: CPT | Performed by: HOSPITALIST

## 2021-02-21 PROCEDURE — 25010000002 ENOXAPARIN PER 10 MG: Performed by: HOSPITALIST

## 2021-02-21 PROCEDURE — 83605 ASSAY OF LACTIC ACID: CPT | Performed by: HOSPITALIST

## 2021-02-21 PROCEDURE — 96372 THER/PROPH/DIAG INJ SC/IM: CPT

## 2021-02-21 PROCEDURE — 83036 HEMOGLOBIN GLYCOSYLATED A1C: CPT | Performed by: HOSPITALIST

## 2021-02-21 RX ORDER — POTASSIUM CHLORIDE 20 MEQ/1
40 TABLET, EXTENDED RELEASE ORAL AS NEEDED
Status: DISCONTINUED | OUTPATIENT
Start: 2021-02-21 | End: 2021-02-22 | Stop reason: HOSPADM

## 2021-02-21 RX ORDER — POTASSIUM CHLORIDE 1.5 G/1.77G
40 POWDER, FOR SOLUTION ORAL AS NEEDED
Status: DISCONTINUED | OUTPATIENT
Start: 2021-02-21 | End: 2021-02-22 | Stop reason: HOSPADM

## 2021-02-21 RX ORDER — MAGNESIUM SULFATE HEPTAHYDRATE 40 MG/ML
2 INJECTION, SOLUTION INTRAVENOUS AS NEEDED
Status: DISCONTINUED | OUTPATIENT
Start: 2021-02-21 | End: 2021-02-22 | Stop reason: HOSPADM

## 2021-02-21 RX ORDER — MAGNESIUM SULFATE HEPTAHYDRATE 40 MG/ML
4 INJECTION, SOLUTION INTRAVENOUS AS NEEDED
Status: DISCONTINUED | OUTPATIENT
Start: 2021-02-21 | End: 2021-02-22 | Stop reason: HOSPADM

## 2021-02-21 RX ADMIN — ONDANSETRON 4 MG: 2 INJECTION, SOLUTION INTRAMUSCULAR; INTRAVENOUS at 09:36

## 2021-02-21 RX ADMIN — LEVETIRACETAM 500 MG: 500 TABLET ORAL at 20:13

## 2021-02-21 RX ADMIN — LEVETIRACETAM 500 MG: 500 TABLET ORAL at 09:36

## 2021-02-21 RX ADMIN — Medication 2 PACKET: at 09:35

## 2021-02-21 RX ADMIN — ATENOLOL 25 MG: 25 TABLET ORAL at 09:36

## 2021-02-21 RX ADMIN — PANCRELIPASE 12000 UNITS OF LIPASE: 60000; 12000; 38000 CAPSULE, DELAYED RELEASE PELLETS ORAL at 12:21

## 2021-02-21 RX ADMIN — MORPHINE SULFATE 2 MG: 4 INJECTION INTRAVENOUS at 20:13

## 2021-02-21 RX ADMIN — PANCRELIPASE 12000 UNITS OF LIPASE: 60000; 12000; 38000 CAPSULE, DELAYED RELEASE PELLETS ORAL at 09:00

## 2021-02-21 RX ADMIN — MORPHINE SULFATE 2 MG: 4 INJECTION INTRAVENOUS at 23:28

## 2021-02-21 RX ADMIN — ONDANSETRON 4 MG: 2 INJECTION, SOLUTION INTRAMUSCULAR; INTRAVENOUS at 04:42

## 2021-02-21 RX ADMIN — Medication 10 ML: at 20:14

## 2021-02-21 RX ADMIN — Medication 10 ML: at 23:29

## 2021-02-21 RX ADMIN — ONDANSETRON 4 MG: 2 INJECTION, SOLUTION INTRAMUSCULAR; INTRAVENOUS at 20:13

## 2021-02-21 RX ADMIN — PANCRELIPASE 12000 UNITS OF LIPASE: 60000; 12000; 38000 CAPSULE, DELAYED RELEASE PELLETS ORAL at 17:58

## 2021-02-21 RX ADMIN — MORPHINE SULFATE 2 MG: 4 INJECTION INTRAVENOUS at 01:17

## 2021-02-21 RX ADMIN — ATORVASTATIN CALCIUM 20 MG: 20 TABLET, FILM COATED ORAL at 20:13

## 2021-02-21 RX ADMIN — MORPHINE SULFATE 2 MG: 4 INJECTION INTRAVENOUS at 09:36

## 2021-02-21 RX ADMIN — MORPHINE SULFATE 2 MG: 4 INJECTION INTRAVENOUS at 04:41

## 2021-02-21 RX ADMIN — PAROXETINE HYDROCHLORIDE 20 MG: 20 TABLET, FILM COATED ORAL at 04:40

## 2021-02-21 RX ADMIN — Medication 2 PACKET: at 23:29

## 2021-02-21 RX ADMIN — ENOXAPARIN SODIUM 40 MG: 40 INJECTION SUBCUTANEOUS at 17:00

## 2021-02-21 RX ADMIN — PANTOPRAZOLE SODIUM 40 MG: 40 TABLET, DELAYED RELEASE ORAL at 04:41

## 2021-02-21 RX ADMIN — ONDANSETRON 4 MG: 2 INJECTION, SOLUTION INTRAMUSCULAR; INTRAVENOUS at 14:53

## 2021-02-21 RX ADMIN — MORPHINE SULFATE 2 MG: 4 INJECTION INTRAVENOUS at 14:52

## 2021-02-21 RX ADMIN — DULOXETINE 30 MG: 30 CAPSULE, DELAYED RELEASE ORAL at 09:36

## 2021-02-21 RX ADMIN — Medication 10 ML: at 09:36

## 2021-02-21 NOTE — PROGRESS NOTES
"      [facility] Medicine Services Daily Progress Note      Hospitalist Team  LOS 0 days      Patient Care Team:  Krystyna Hardwick DO as PCP - General    Patient Location: 359/1      Subjective   Subjective     Chief Complaint / Subjective  Chief Complaint   Patient presents with   • Vomiting   • Abdominal Pain         Brief Synopsis of Hospital Course/HPI    The patient is a 55-year-old male with history of chronic pancreatitis, solitary kidney,  triglyceridemia, hypertension, GERD and cholecystectomy.     The patient has been having about 5 days of persistent epigastric pain that radiates to his thoracic back in association with fatigue and decreased oral intake.  The patient denies alcohol intake and has been compliant with his medications.     CT of the abdomen and pelvis is read as acute on chronic pancreatitis with patchy hypodensities present in the pancreatic head and body component.     The patient denies recent fevers, chills, sweats, chest pain, shortness of air, constipation or diarrhea.         Date::    2/21/21: Complains of abdominal pain. Will advance diet. UDS with benzos, opiates, oxycodone and THC      Review of Systems   All other systems reviewed and are negative.        Objective   Objective      Vital Signs  Temp:  [98 °F (36.7 °C)-98.5 °F (36.9 °C)] 98.5 °F (36.9 °C)  Heart Rate:  [] 92  Resp:  [16-17] 16  BP: (148-174)/() 158/101  Oxygen Therapy  SpO2: 98 %  Pulse Oximetry Type: Intermittent  Device (Oxygen Therapy): room air  Flowsheet Rows      First Filed Value   Admission Height  188 cm (74\") Documented at 02/20/2021 1130   Admission Weight  80.8 kg (178 lb 2.1 oz) Documented at 02/20/2021 1130        Intake & Output (last 3 days)       02/18 0701 - 02/19 0700 02/19 0701 - 02/20 0700 02/20 0701 - 02/21 0700 02/21 0701 - 02/22 0700    P.O.   720 240    IV Piggyback   1000     Total Intake(mL/kg)   1720 (21.1) 240 (2.9)    Urine (mL/kg/hr)   1100 1820 (2.5) "    Total Output   1100 1820    Net   +620 -1580                Lines, Drains & Airways    Active LDAs     Name:   Placement date:   Placement time:   Site:   Days:    Peripheral IV 02/20/21 1159 Left Antecubital   02/20/21    1159    Antecubital   1                  Physical Exam:    Physical Exam  HENT:      Head: Normocephalic and atraumatic.      Nose: Nose normal.   Eyes:      General: No scleral icterus.     Extraocular Movements: Extraocular movements intact.      Pupils: Pupils are equal, round, and reactive to light.   Neck:      Musculoskeletal: Normal range of motion.      Thyroid: No thyroid mass.   Cardiovascular:      Rate and Rhythm: Normal rate and regular rhythm.   Pulmonary:      Effort: Pulmonary effort is normal.      Breath sounds: Normal breath sounds.   Abdominal:      General: Bowel sounds are normal.      Tenderness: There is abdominal tenderness in the epigastric area.   Musculoskeletal: Normal range of motion.   Lymphadenopathy:      Cervical: No cervical adenopathy.   Skin:     General: Skin is warm.      Findings: No rash.   Neurological:      Mental Status: He is alert and oriented to person, place, and time.   Psychiatric:         Mood and Affect: Mood normal.         Speech: Speech normal.         Cognition and Memory: Cognition normal.               Procedures:              Results Review:     I reviewed the patient's new clinical results.      Lab Results (last 24 hours)     Procedure Component Value Units Date/Time    Phosphorus [[id number]]  (Abnormal) Collected: 02/21/21 0429    Specimen: Blood Updated: 02/21/21 0543     Phosphorus 1.8 mg/dL     Comprehensive Metabolic Panel [[id number]]  (Abnormal) Collected: 02/21/21 0429    Specimen: Blood Updated: 02/21/21 0541     Glucose 191 mg/dL      BUN 14 mg/dL      Creatinine 0.99 mg/dL      Sodium 133 mmol/L      Potassium 4.0 mmol/L      Comment: Slight hemolysis detected by analyzer. Results may be affected.        Chloride 95 mmol/L   "    CO2 26.0 mmol/L      Calcium 8.6 mg/dL      Total Protein 7.3 g/dL      Albumin 3.70 g/dL      ALT (SGPT) 34 U/L      AST (SGOT) 71 U/L      Alkaline Phosphatase 116 U/L      Total Bilirubin 0.4 mg/dL      eGFR Non African Amer 78 mL/min/1.73      Globulin 3.6 gm/dL      A/G Ratio 1.0 g/dL      BUN/Creatinine Ratio 14.1     Anion Gap 12.0 mmol/L     Narrative:      GFR Normal >60  Chronic Kidney Disease <60  Kidney Failure <15      Magnesium [[id number]]  (Normal) Collected: 02/21/21 0429    Specimen: Blood Updated: 02/21/21 0541     Magnesium 1.6 mg/dL     T4, Free [[id number]]  (Normal) Collected: 02/21/21 0429    Specimen: Blood Updated: 02/21/21 0532     Free T4 1.18 ng/dL     Narrative:      Results may be falsely increased if patient taking Biotin.      TSH [[id number]]  (Normal) Collected: 02/21/21 0429    Specimen: Blood Updated: 02/21/21 0532     TSH 2.030 uIU/mL     Procalcitonin [[id number]]  (Abnormal) Collected: 02/21/21 0429    Specimen: Blood Updated: 02/21/21 0532     Procalcitonin 0.27 ng/mL     Narrative:      As a Marker for Sepsis (Non-Neonates):   1. <0.5 ng/mL represents a low risk of severe sepsis and/or septic shock.  1. >2 ng/mL represents a high risk of severe sepsis and/or septic shock.    As a Marker for Lower Respiratory Tract Infections that require antibiotic therapy:  PCT on Admission     Antibiotic Therapy             6-12 Hrs later  > 0.5                Strongly Recommended            >0.25 - <0.5         Recommended  0.1 - 0.25           Discouraged                   Remeasure/reassess PCT  <0.1                 Strongly Discouraged          Remeasure/reassess PCT      As 28 day mortality risk marker: \"Change in Procalcitonin Result\" (> 80 % or <=80 %) if Day 0 (or Day 1) and Day 4 values are available. Refer to http://www.[facility]-pct-calculator.com/   Change in PCT <=80 %   A decrease of PCT levels below or equal to 80 % defines a positive change in PCT test result representing a " higher risk for 28-day all-cause mortality of patients diagnosed with severe sepsis or septic shock.  Change in PCT > 80 %   A decrease of PCT levels of more than 80 % defines a negative change in PCT result representing a lower risk for 28-day all-cause mortality of patients diagnosed with severe sepsis or septic shock.                Results may be falsely decreased if patient taking Biotin.     Lactic Acid, Plasma [[id number]]  (Normal) Collected: 02/21/21 0429    Specimen: Blood Updated: 02/21/21 0501     Lactate 0.9 mmol/L     CBC Auto Differential [[id number]]  (Abnormal) Collected: 02/21/21 0429    Specimen: Blood Updated: 02/21/21 0445     WBC 7.40 10*3/mm3      RBC 4.32 10*6/mm3      Hemoglobin 13.9 g/dL      Hematocrit 39.8 %      MCV 92.2 fL      MCH 32.2 pg      MCHC 34.9 g/dL      RDW 12.6 %      RDW-SD 40.3 fl      MPV 7.7 fL      Platelets 227 10*3/mm3      Neutrophil % 72.7 %      Lymphocyte % 15.4 %      Monocyte % 8.2 %      Eosinophil % 3.2 %      Basophil % 0.5 %      Neutrophils, Absolute 5.40 10*3/mm3      Lymphocytes, Absolute 1.10 10*3/mm3      Monocytes, Absolute 0.60 10*3/mm3      Eosinophils, Absolute 0.20 10*3/mm3      Basophils, Absolute 0.00 10*3/mm3      nRBC 0.1 /100 WBC     Hemoglobin A1c [[id number]] Collected: 02/21/21 0429    Specimen: Blood Updated: 02/21/21 0439    Lipid Panel [[id number]]  (Abnormal) Collected: 02/20/21 1208    Specimen: Blood Updated: 02/20/21 1629     Total Cholesterol 148 mg/dL      Triglycerides 129 mg/dL      HDL Cholesterol 70 mg/dL      LDL Cholesterol  56 mg/dL      VLDL Cholesterol 22 mg/dL      LDL/HDL Ratio 0.75    Narrative:      Cholesterol Reference Ranges  (U.S. Department of Health and Human Services ATP III Classifications)    Desirable          <200 mg/dL  Borderline High    200-239 mg/dL  High Risk          >240 mg/dL      Triglyceride Reference Ranges  (U.S. Department of Health and Human Services ATP III Classifications)    Normal            <150 mg/dL  Borderline High  150-199 mg/dL  High             200-499 mg/dL  Very High        >500 mg/dL    HDL Reference Ranges  (U.S. Department of Health and Human Services ATP III Classifcations)    Low     <40 mg/dl (major risk factor for CHD)  High    >60 mg/dl ('negative' risk factor for CHD)        LDL Reference Ranges  (U.S. Department of Health and Human Services ATP III Classifcations)    Optimal          <100 mg/dL  Near Optimal     100-129 mg/dL  Borderline High  130-159 mg/dL  High             160-189 mg/dL  Very High        >189 mg/dL        No results found for: HGBA1C            Lab Results   Component Value Date    LIPASE 152 (H) 02/20/2021     Lab Results   Component Value Date    CHOL 148 02/20/2021    TRIG 129 02/20/2021    HDL 70 (H) 02/20/2021    LDL 56 02/20/2021       No results found for: INTRAOP, PREDX, FINALDX, COMDX    Microbiology Results (last 10 days)     Procedure Component Value - Date/Time    COVID-19, ABBOTT IN-HOUSE,NASAL Swab (NO TRANSPORT MEDIA) 2 HR TAT - Swab, Nasopharynx [[id number]]  (Normal) Collected: 02/20/21 1209    Lab Status: Final result Specimen: Swab from Nasopharynx Updated: 02/20/21 1238     COVID19 Presumptive Negative    Narrative:      Fact sheet for providers: https://www.fda.gov/media/088973/download     Fact sheet for patients: https://www.fda.gov/media/891328/download    Test performed by PCR.  If inconsistent with clinical signs and symptoms patient should be tested with different authorized molecular test.          ECG/EMG Results (most recent)     Procedure Component Value Units Date/Time    ECG 12 Lead [[id number]] Collected: 02/20/21 1155     Updated: 02/21/21 1300     QT Interval 308 ms     Narrative:      HEART RATE= 123  bpm  RR Interval= 488  ms  [place] Interval= 143  ms  P Horizontal Axis= -3  deg  P Front Axis= 71  deg  QRSD Interval= 79  ms  QT Interval= 308  ms  QRS Axis= 55  deg  T Wave Axis= 52  deg  - BORDERLINE ECG -  Sinus tachycardia  LAE,  consider biatrial enlargement  When compared with ECG of 08-Sep-2018 22:52:54,  No significant change  Electronically Signed By: Nikita Carbajal (ANNABEL) 21-Feb-2021 12:55:33  Date and Time of Study: 2021-02-20 11:55:35                    Ct Abdomen Pelvis With Contrast    Result Date: 2/20/2021   1. Findings consistent with acute on chronic pancreatitis. Patchy hypodensities are present within the pancreatic head and body component of this likely related to previous pancreatitis with mild dilatation of the pancreatic duct which also appears stable. Follow-up is recommended to evaluate for pseudocyst formation. No drainable collections identified. No evidence of necrosis or suspicious air. Adjacent wall thickening is present within the second and third portions of the duodenum likely related to reactive duodenitis. 2. Distention of the bladder which may be physiologic. Correlate with findings related to outlet obstruction. 3. Status post right nephrectomy. 4. Ancillary findings as described above.  Electronically Signed By-Melissa Padilla MD On:2/20/2021 2:14 PM This report was finalized on [id number] by  Melissa Padilla MD.    Xr Chest 1 View    Result Date: 2/20/2021  No acute cardiopulmonary process.  Electronically Signed By-Melissa Padilla MD On:2/20/2021 12:59 PM This report was finalized on [id number] by  Melissa Padilla MD.          Xrays, labs reviewed personally by physician.    Medication Review:   I have reviewed the patient's current medication list      Scheduled Meds  atenolol, 25 mg, Oral, Daily  atorvastatin, 20 mg, Oral, Nightly  DULoxetine, 30 mg, Oral, Daily  enoxaparin, 40 mg, Subcutaneous, Q24H  levETIRAcetam, 500 mg, Oral, BID  pancrelipase (Lip-Prot-Amyl), 12,000 units of lipase, Oral, TID With Meals  pantoprazole, 40 mg, Oral, QAM  PARoxetine, 20 mg, Oral, QAM  sodium chloride, 10 mL, Intravenous, Q12H        Meds Infusions  sodium chloride, 125 mL/hr, Last Rate: 125 mL/hr (02/20/21  2102)        Meds PRN  bisacodyl  •  bisacodyl  •  influenza vaccine  •  magnesium sulfate **OR** magnesium sulfate **OR** magnesium sulfate  •  melatonin  •  Morphine  •  ondansetron  •  potassium & sodium phosphates **OR** potassium & sodium phosphates  •  potassium chloride  •  potassium chloride  •  sodium chloride  •  sodium chloride        Assessment/Plan   Assessment/Plan     Active Hospital Problems    Diagnosis  POA   • **Acute on chronic pancreatitis (CMS/HCC) [K85.90, K86.1]  Yes     Priority: High   • Solitary kidney [Q60.0]  Not Applicable     Priority: Medium   • Benign essential hypertension [I10]  Yes     Priority: Medium   • Recurrent major depressive disorder, in remission (CMS/HCC) [F33.40]  Yes     Priority: Medium   • Dyslipidemia [E78.5]  Yes     Priority: Medium   • Seizures (CMS/[facility]) [R56.9]  Yes     Priority: Medium      Resolved Hospital Problems   No resolved problems to display.       MEDICAL DECISION MAKING COMPLEXITY BY PROBLEM:     Acute on chronic pancreatitis:  -s/p CT abdomen and pelvis in ED  -Continue IVF and pain control  -Checked triglyceride levels-->129  -History of cholecystectomy  -Continue Creon     Hypertension:  -Continue Tylenol     GERD:  -Continue PPI     Seizure disorder:  -Continue Keppra     Urine drug screen with benzodiazepines THC         VTE Prophylaxis -   Mechanical Order History:     None      Pharmalogical Order History:      Ordered     Dose Route Frequency Stop    02/20/21 1821  enoxaparin (LOVENOX) syringe 40 mg      40 mg SC Every 24 Hours --                  Code Status -   Code Status and Medical Interventions:   Ordered at: 02/20/21 1545     Level Of Support Discussed With:    Patient     Code Status:    CPR     Medical Interventions (Level of Support Prior to Arrest):    Full       This patient has been examined wearing appropriate Personal Protective Equipment and discussed with nursing. 02/21/21        Discharge Planning  Discharge home when  patient tolerates diet and pain is controlled        Electronically signed by Marc Lozada DO, 02/21/21, 16:03 EST.  [facility]ist Team

## 2021-02-21 NOTE — NURSING NOTE
Patients wife requested that I call the MD for an  Increase in pain medication. Nurse explained to the wife that they would not increase it since the patient isn't voicing pain to the nurse. Patient only requested pain medication once in the AM. Nurse explained if the pain was persistent he needed to voice that so that Nurse could help him. Patient stated he understood. Will Continue to monitor.

## 2021-02-22 ENCOUNTER — READMISSION MANAGEMENT (OUTPATIENT)
Dept: CALL CENTER | Facility: HOSPITAL | Age: 56
End: 2021-02-22

## 2021-02-22 VITALS
HEART RATE: 82 BPM | WEIGHT: 179.5 LBS | SYSTOLIC BLOOD PRESSURE: 135 MMHG | HEIGHT: 74 IN | DIASTOLIC BLOOD PRESSURE: 89 MMHG | TEMPERATURE: 98 F | RESPIRATION RATE: 14 BRPM | OXYGEN SATURATION: 98 % | BODY MASS INDEX: 23.04 KG/M2

## 2021-02-22 LAB
HBA1C MFR BLD: 8.9 % (ref 3.5–5.6)
PHOSPHATE SERPL-MCNC: 2 MG/DL (ref 2.5–4.5)
PHOSPHATE SERPL-MCNC: 2.6 MG/DL (ref 2.5–4.5)

## 2021-02-22 PROCEDURE — G0378 HOSPITAL OBSERVATION PER HR: HCPCS

## 2021-02-22 PROCEDURE — 84100 ASSAY OF PHOSPHORUS: CPT | Performed by: HOSPITALIST

## 2021-02-22 PROCEDURE — 96376 TX/PRO/DX INJ SAME DRUG ADON: CPT

## 2021-02-22 PROCEDURE — 25010000002 MORPHINE PER 10 MG: Performed by: HOSPITALIST

## 2021-02-22 PROCEDURE — G0108 DIAB MANAGE TRN  PER INDIV: HCPCS

## 2021-02-22 PROCEDURE — 99217 PR OBSERVATION CARE DISCHARGE MANAGEMENT: CPT | Performed by: INTERNAL MEDICINE

## 2021-02-22 PROCEDURE — 25010000002 ONDANSETRON PER 1 MG: Performed by: HOSPITALIST

## 2021-02-22 RX ORDER — SODIUM PHOSPHATE, DIBASIC, ANHYDROUS, POTASSIUM PHOSPHATE, MONOBASIC, AND SODIUM PHOSPHATE, MONOBASIC, MONOHYDRATE 852; 155; 130 MG/1; MG/1; MG/1
1 TABLET, COATED ORAL 2 TIMES DAILY
Qty: 14 EACH | Refills: 0 | Status: SHIPPED | OUTPATIENT
Start: 2021-02-22 | End: 2021-03-02

## 2021-02-22 RX ADMIN — ONDANSETRON 4 MG: 2 INJECTION, SOLUTION INTRAMUSCULAR; INTRAVENOUS at 00:32

## 2021-02-22 RX ADMIN — SODIUM CHLORIDE 125 ML/HR: 9 INJECTION, SOLUTION INTRAVENOUS at 12:43

## 2021-02-22 RX ADMIN — DULOXETINE 30 MG: 30 CAPSULE, DELAYED RELEASE ORAL at 07:13

## 2021-02-22 RX ADMIN — PANTOPRAZOLE SODIUM 40 MG: 40 TABLET, DELAYED RELEASE ORAL at 06:04

## 2021-02-22 RX ADMIN — MORPHINE SULFATE 2 MG: 4 INJECTION INTRAVENOUS at 12:43

## 2021-02-22 RX ADMIN — PANCRELIPASE 12000 UNITS OF LIPASE: 60000; 12000; 38000 CAPSULE, DELAYED RELEASE PELLETS ORAL at 07:12

## 2021-02-22 RX ADMIN — SODIUM CHLORIDE 125 ML/HR: 9 INJECTION, SOLUTION INTRAVENOUS at 04:42

## 2021-02-22 RX ADMIN — PANCRELIPASE 12000 UNITS OF LIPASE: 60000; 12000; 38000 CAPSULE, DELAYED RELEASE PELLETS ORAL at 12:44

## 2021-02-22 RX ADMIN — LEVETIRACETAM 500 MG: 500 TABLET ORAL at 07:13

## 2021-02-22 RX ADMIN — Medication 2 PACKET: at 07:12

## 2021-02-22 RX ADMIN — ONDANSETRON 4 MG: 2 INJECTION, SOLUTION INTRAMUSCULAR; INTRAVENOUS at 12:43

## 2021-02-22 RX ADMIN — ATENOLOL 25 MG: 25 TABLET ORAL at 07:13

## 2021-02-22 RX ADMIN — PAROXETINE HYDROCHLORIDE 20 MG: 20 TABLET, FILM COATED ORAL at 06:04

## 2021-02-22 RX ADMIN — MORPHINE SULFATE 2 MG: 4 INJECTION INTRAVENOUS at 07:11

## 2021-02-22 RX ADMIN — ONDANSETRON 4 MG: 2 INJECTION, SOLUTION INTRAMUSCULAR; INTRAVENOUS at 07:12

## 2021-02-22 RX ADMIN — MORPHINE SULFATE 2 MG: 4 INJECTION INTRAVENOUS at 03:38

## 2021-02-22 NOTE — DISCHARGE SUMMARY
[facility] Medicine Services  DISCHARGE SUMMARY        Prepared For PCP:  Krystyna Hardwick DO    Patient Name: Mitchell Jacome  : 1965  MRN: [id number]      Date of Admission:   2021    Date of Discharge:  2021    Length of stay:  LOS: 0 days     Hospital Course     Presenting Problem:   Abdominal pain, unspecified abdominal location [R10.9]  Acute on chronic pancreatitis (CMS/HCC) [K85.90, K86.1]  Nausea and vomiting, intractability of vomiting not specified, unspecified vomiting type [R11.2]      Active Hospital Problems    Diagnosis  POA   • **Acute on chronic pancreatitis (CMS/HCC) [K85.90, K86.1]  Yes   • Solitary kidney [Q60.0]  Not Applicable   • Benign essential hypertension [I10]  Yes   • Recurrent major depressive disorder, in remission (CMS/HCC) [F33.40]  Yes   • Dyslipidemia [E78.5]  Yes   • Seizures (CMS/HCC) [R56.9]  Yes      Resolved Hospital Problems   No resolved problems to display.     Acute on chronic pancreatitis:  -s/p CT abdomen and pelvis in ED  -Continue IVF and pain control  -Checked triglyceride levels-->129  -History of cholecystectomy  -Continue Creon     Hypertension:  -Continue Tylenol     GERD:  -Continue PPI     Seizure disorder:  -Continue Keppra    DM2  -lantus 6 units hs     Urine drug screen with benzodiazepines THC      Hospital Course:  Mitcehll Jacome is a 55 y.o. male with history of chronic pancreatitis, solitary kidney,  triglyceridemia, hypertension, GERD and cholecystectomy.     The patient has been having about 5 days of persistent epigastric pain that radiates to his thoracic back in association with fatigue and decreased oral intake.  The patient denies alcohol intake and has been compliant with his medications.     CT of the abdomen and pelvis is read as acute on chronic pancreatitis with patchy hypodensities present in the pancreatic head and body component.     The patient denies recent fevers, chills, sweats,  chest pain, shortness of air, constipation or diarrhea.     Date:    2/21/21: Complains of abdominal pain. Will advance diet. UDS with benzos, opiates, oxycodone and THC     2/22/21-doing better today, tolerating diet. Will dc home    Day of Discharge     Vital Signs:   Temp:  [98 °F (36.7 °C)] 98 °F (36.7 °C)  Heart Rate:  [82] 82  Resp:  [14] 14  BP: (135)/(89) 135/89       Physical Exam  Vitals signs and nursing note reviewed.   Constitutional:       General: He is not in acute distress.     Appearance: Normal appearance. He is well-developed. He is not ill-appearing, toxic-appearing or diaphoretic.   HENT:      Head: Normocephalic and atraumatic.      Nose: Nose normal. No congestion or rhinorrhea.      Mouth/Throat:      Mouth: Mucous membranes are moist.      Pharynx: No oropharyngeal exudate.   Eyes:      General: No scleral icterus.        Right eye: No discharge.         Left eye: No discharge.      Extraocular Movements: Extraocular movements intact.      Pupils: Pupils are equal, round, and reactive to light.   Neck:      Musculoskeletal: Normal range of motion and neck supple. No neck rigidity or muscular tenderness.      Thyroid: No thyromegaly.      Vascular: No carotid bruit or JVD.      Trachea: No tracheal deviation.   Cardiovascular:      Rate and Rhythm: Normal rate and regular rhythm.      Pulses: Normal pulses.      Heart sounds: Normal heart sounds. No murmur. No friction rub. No gallop.    Pulmonary:      Effort: Pulmonary effort is normal. No respiratory distress.      Breath sounds: Normal breath sounds. No stridor. No wheezing, rhonchi or rales.   Chest:      Chest wall: No tenderness.   Abdominal:      General: Bowel sounds are normal. There is no distension.      Palpations: Abdomen is soft. There is no mass.      Tenderness: There is abdominal tenderness. There is no guarding or rebound.      Hernia: No hernia is present.   Musculoskeletal: Normal range of motion.         General: No  swelling, tenderness, deformity or signs of injury.      Right lower leg: No edema.      Left lower leg: No edema.   Lymphadenopathy:      Cervical: No cervical adenopathy.   Skin:     General: Skin is warm and dry.      Coloration: Skin is not jaundiced or pale.      Findings: No bruising, erythema or rash.   Neurological:      General: No focal deficit present.      Mental Status: He is alert and oriented to person, place, and time. Mental status is at baseline.      Cranial Nerves: No cranial nerve deficit.      Sensory: No sensory deficit.      Motor: No weakness or abnormal muscle tone.      Coordination: Coordination normal.   Psychiatric:         Mood and Affect: Mood normal.         Behavior: Behavior normal.         Thought Content: Thought content normal.         Pertinent  and/or Most Recent Results     Results from last 7 days   Lab Units 02/21/21  0429 02/20/21  1208   WBC 10*3/mm3 7.40 12.30*   HEMOGLOBIN g/dL 13.9 15.6   HEMATOCRIT % 39.8 44.7   PLATELETS 10*3/mm3 227 278   SODIUM mmol/L 133* 131*   POTASSIUM mmol/L 4.0 4.1   CHLORIDE mmol/L 95* 90*   CO2 mmol/L 26.0 25.0   BUN mg/dL 14 19   CREATININE mg/dL 0.99 1.17   GLUCOSE mg/dL 191* 257*   CALCIUM mg/dL 8.6 9.4     Results from last 7 days   Lab Units 02/21/21  0429 02/20/21  1208   BILIRUBIN mg/dL 0.4 0.5   ALK PHOS U/L 116 79   ALT (SGPT) U/L 34 11   AST (SGOT) U/L 71* 18     Results from last 7 days   Lab Units 02/20/21  1208   CHOLESTEROL mg/dL 148   TRIGLYCERIDES mg/dL 129   HDL CHOL mg/dL 70*     Results from last 7 days   Lab Units 02/21/21  0429 02/20/21  1208   TSH uIU/mL 2.030  --    HEMOGLOBIN A1C % 8.9*  --    TROPONIN T ng/mL  --  <0.010   PROCALCITONIN ng/mL 0.27*  --    LACTATE mmol/L 0.9  --        Brief Urine Lab Results  (Last result in the past 365 days)      Color   Clarity   Blood   Leuk Est   Nitrite   Protein   CREAT   Urine HCG        02/20/21 1420 Yellow Clear Negative Negative Negative 100 mg/dL (2+)                Microbiology Results Abnormal     Procedure Component Value - Date/Time    COVID-19, ABBOTT IN-HOUSE,NASAL Swab (NO TRANSPORT MEDIA) 2 HR TAT - Swab, Nasopharynx [[id number]]  (Normal) Collected: 02/20/21 1209    Lab Status: Final result Specimen: Swab from Nasopharynx Updated: 02/20/21 1238     COVID19 Presumptive Negative    Narrative:      Fact sheet for providers: https://www.fda.gov/media/050888/download     Fact sheet for patients: https://www.fda.gov/media/543539/download    Test performed by PCR.  If inconsistent with clinical signs and symptoms patient should be tested with different authorized molecular test.          Ct Abdomen Pelvis With Contrast    Result Date: 2/20/2021  Impression:  1. Findings consistent with acute on chronic pancreatitis. Patchy hypodensities are present within the pancreatic head and body component of this likely related to previous pancreatitis with mild dilatation of the pancreatic duct which also appears stable. Follow-up is recommended to evaluate for pseudocyst formation. No drainable collections identified. No evidence of necrosis or suspicious air. Adjacent wall thickening is present within the second and third portions of the duodenum likely related to reactive duodenitis. 2. Distention of the bladder which may be physiologic. Correlate with findings related to outlet obstruction. 3. Status post right nephrectomy. 4. Ancillary findings as described above.  Electronically Signed By-Melissa Padilla MD On:2/20/2021 2:14 PM This report was finalized on [id number] by  Melissa Padilla MD.    Xr Chest 1 View    Result Date: 2/20/2021  Impression: No acute cardiopulmonary process.  Electronically Signed By-Melissa Padilla MD On:2/20/2021 12:59 PM This report was finalized on [id number] by  Melissa Padilla MD.                             Test Results Pending at Discharge        Procedures Performed           Consults:   Consults     Date and Time Order Name Status Description     2/20/2021 1451 Hospitalist (on-call MD unless specified) Completed             Discharge Details        Discharge Medications      New Medications      Instructions Start Date   Alcohol Wipes 70 % misc   1 each, Apply externally, 4 Times Daily Before Meals & Nightly      Insulin Glargine 100 UNIT/ML injection pen  Commonly known as: LANTUS SOLOSTAR   6 Units, Subcutaneous, Nightly      Insulin Pen Needle 32G X 4 MM misc   1 each, Does not apply, Every Night at Bedtime      K-Phos-Neutral [id number] MG tablet   1 tablet, Oral, 2 Times Daily      OneTouch Delica Plus Wyeiav25F misc   1 each, Does not apply, 4 Times Daily Before Meals & Nightly      OneTouch Verio test strip  Generic drug: glucose blood   1 each, Other, 4 Times Daily Before Meals & Nightly, Use as instructed         Continue These Medications      Instructions Start Date   atenolol 25 MG tablet  Commonly known as: TENORMIN   25 mg, Oral, Daily      atorvastatin 20 MG tablet  Commonly known as: LIPITOR   20 mg, Oral, Daily      Centrum Silver tablet tablet   1 tablet, Oral, Daily      dicyclomine 20 MG tablet  Commonly known as: BENTYL   1/2 tab - 1 po q6hrs prn Abd cramping/ bloating/ [id number]      DULoxetine 30 MG capsule  Commonly known as: CYMBALTA   30 mg, Oral, Daily      levETIRAcetam 500 MG tablet  Commonly known as: KEPPRA   500 mg, Oral, 2 Times Daily      omeprazole 40 MG capsule  Commonly known as: priLOSEC   40 mg, Oral, Every 24 Hours      pancrelipase (Lip-Prot-Amyl) 91195-41980 units capsule delayed-release particles capsule  Commonly known as: Creon   3 tabs po TID W/ meals      PARoxetine 20 MG tablet  Commonly known as: PAXIL   20 mg, Oral, Every Morning      sucralfate 1 g tablet  Commonly known as: CARAFATE   1 g, Oral, 4 Times Daily         Stop These Medications    fenofibrate 160 MG tablet            No Known Allergies      Discharge Disposition:  Home or Self Care    Diet:  Hospital:  No active diet order        Discharge  Activity:   Activity Instructions     As Tolerated                  CODE STATUS:    Code Status and Medical Interventions:   Ordered at: 02/20/21 1545     Level Of Support Discussed With:    Patient     Code Status:    CPR     Medical Interventions (Level of Support Prior to Arrest):    Full         Follow-up Appointments  No future appointments.      Condition on Discharge:      Stable      This patient has been examined wearing appropriate Personal Protective Equipment and discussed with rn. 02/23/21      Electronically signed by Omari Campos MD, 02/22/21, 2:38 PM EST.      Time: I spent  35  minutes on this discharge activity which included face-to-face encounter with the patient/reviewing the data in the system/coordination of the care with the nursing staff as well as consultants/documentation/entering orders.

## 2021-02-22 NOTE — CONSULTS
"Diabetes Education  Assessment/Teaching    Patient Name:  Mitchell Jacome  YOB: 1965  MRN: [id number]  Admit Date:  2/20/2021      Assessment Date:  2/22/2021    Most Recent Value   General Information    Referral From:  MD order, Blood glucose, A1c [MD consult for newly diagnosed, admisison blood sugar 257, and on 2/21/2021 A1c was 8.9%.]   Height  188 cm (74\")   Height Method  Stated   Weight  81.4 kg (179 lb 8 oz)   Weight Method  Bed scale   Pregnancy Assessment   Diabetes History   What type of diabetes do you have?  Type 2   Length of Diabetes Diagnosis  Newly diagnosed <6 months [Diagnosed this admission.]   Current DM knowledge  poor   Have you had diabetes education/teaching in the past?  no   Do you test your blood sugar at home?  no   Have you had high blood sugar? (>140mg/dl)  yes   How often do you have high blood sugar?  unknown   When was your last high blood sugar?  Admisison blood sugar 257.   Education Preferences   What areas of diabetes would you like to learn about?  avoiding high blood sugar, avoiding low blood sugar, diabetes complications, diet information, medications for diabetes, understanding diabetes, resources on diabetes, testing my blood sugar at home   Nutrition Information   Assessment Topics   Healthy Eating - Assessment  Needs education   Taking Medication - Assessment  Needs education   Problem Solving - Assessment  Needs education   Reducing Risk - Assessment  Needs education   Monitoring - Assessment  Needs education   DM Goals   Healthy Eating - Goal  Today   Taking Medication - Goal  Today   Problem Solving - Goal  Today   Reducing Risk - Goal  Today   Monitoring - Goal  Today            Most Recent Value   DM Education Needs   Meter  Meter provided   Meter Type  One Touch [One Touch Verio Reflect glucometer given to patient with patient doing return demonstration using the lancing device, inserting the test strip into the meter, and applying blood to " the test strip.  Blood sugar 251.]   Frequency of Testing  AC/HS [Discussed checking blood sugar before meals and at bedtime.  Plans on using manny on phone to connect with meter.]   Medication  Insulin, Actions, Side effects, Administration, Pen   Problem Solving  Hypoglycemia, Hyperglycemia, Signs, Symptoms, Treatment [Handouts given with discussion on hypoglycemia and hyperglycemia, signs, symptoms, and treatment.]   Reducing Risks  A1C testing, Lipids, Blood pressure, Eye exam, Dental exam, Foot care, Immunizations, Cardiovascular, Neuropathy, Retinopathy   Healthy Eating  Basic meal plan provided   Discharge Plan  Home   Motivation  Engaged, Strong   Teaching Method  Explanation, Discussion, Demonstration, Handouts, Teach back   Patient Response  Demonstrates adequately, Verbalized understanding            Other Comments:   Patient stated that the [facility] told him he was borderline for diabetes about a year ago. First Steps book to managing diabetes given to patient with discussion on diabetes and diagnosis. Discussed how diabetes puts you at higher risk for heart attack, stroke, kidney failure, blindness, and neuropathy.  Also discussed the importance of yearly eye exams, dental exams, regular follow-up with PCP to check blood pressure, lipids, kidney function, liver function, and to keep up-to-date on vaccinations. Foot care discussed with checking feet daily and wearing shoes when out of bed. Sharona pamphlet given on offering of classes. A1c info sheet given with discussion on A1c target and healthy blood sugar range. Handouts given with discussion on 1 serving of carbs being = 15 grams, being allowed 4 servings  of carbs per meal, counting carbs, reading food labels, and meal planning.Handouts given with discussion on types of insulin, storage of insulin, disposal of needles, injection sites, rotation of sites, and how to use an insulin pen. Patient did return demonstration applying the pen needle to the  insulin pen, priming the pen, administering the shot into the foam pad, and holding the pen for 10 seconds after administration.  Prescriptions started in discharge orders for lancets, test strips, Lantus, pen needles, and alcohol wipes. Patient has no further questions or concerns related to diabetes at this time.          Electronically signed by:  Amber Cooper RN  02/22/21 16:22 EST

## 2021-02-22 NOTE — PLAN OF CARE
Goal Outcome Evaluation:        Outcome Summary: pt requiring pain/nausea meds consistantly throughout the shift. Phosporus resulted at 1.8 again tonight. replaced, recheck in the AM. Will continue to monitor.

## 2021-02-22 NOTE — OUTREACH NOTE
Prep Survey      Responses   [facility] facility patient discharged from?  [facility]   Is LACE score < 7 ?  Yes   Emergency Room discharge w/ pulse ox?  No   Eligibility  [facility]   Date of Admission  02/20/21   Date of Discharge  02/22/21   Discharge Disposition  Home or Self Care   Discharge diagnosis  Acute on chronic pancreatitis (   Does the patient have one of the following disease processes/diagnoses(primary or secondary)?  Other   Does the patient have Home health ordered?  No   Is there a DME ordered?  No   Prep survey completed?  Yes          Amber Norman RN

## 2021-02-22 NOTE — PROGRESS NOTES
Discharge Planning Assessment   [facility]     Patient Name: Mitchell Jacome  MRN: [id number]  Today's Date: 2/22/2021    Admit Date: 2/20/2021    Discharge Needs Assessment     Row Name 02/22/21 1317       Living Environment    Lives With  spouse    Current Living Arrangements  home/apartment/condo    Potentially Unsafe Housing Conditions  unable to assess    Primary Care Provided by  self    Provides Primary Care For  no one    Family Caregiver if Needed  spouse    Quality of Family Relationships  unable to assess    Able to Return to Prior Arrangements  yes       Resource/Environmental Concerns    Resource/Environmental Concerns  none       Transition Planning    Patient/Family Anticipates Transition to  home    Patient/Family Anticipated Services at Transition  none    Transportation Anticipated  family or friend will provide       Discharge Needs Assessment    Equipment Currently Used at Home  none        Discharge Plan     Row Name 02/22/21 1938       Plan    Plan  anticipate return home    Patient/Family in Agreement with Plan  yes    Plan Comments  spoke to patient in room with ppe(goggles and amsk); staying 6 feet away and 15 mins; he states he is independent at home and follows with veronica michaels; he gets medication from [facility] in [place]; he denies any needs for discharge;  barrier is pendig pain control and diet increase      Row Name 02/22/21 1317       Functional Status    Usual Activity Tolerance  fair    Current Activity Tolerance  fair       Functional Status, IADL    Medications  independent    Meal Preparation  independent       Carol naegele rn  Case management  Office number [phone]  Cell phone [phone]

## 2021-02-23 ENCOUNTER — TRANSITIONAL CARE MANAGEMENT TELEPHONE ENCOUNTER (OUTPATIENT)
Dept: CALL CENTER | Facility: HOSPITAL | Age: 56
End: 2021-02-23

## 2021-02-23 ENCOUNTER — TELEPHONE (OUTPATIENT)
Dept: FAMILY MEDICINE CLINIC | Facility: CLINIC | Age: 56
End: 2021-02-23

## 2021-02-23 ENCOUNTER — TELEPHONE (OUTPATIENT)
Dept: DIABETES SERVICES | Facility: HOSPITAL | Age: 56
End: 2021-02-23

## 2021-02-23 RX ORDER — ISOPROPYL ALCOHOL 700 MG/ML
1 CLOTH TOPICAL
Qty: 200 EACH | Refills: 0 | Status: SHIPPED | OUTPATIENT
Start: 2021-02-23

## 2021-02-23 RX ORDER — LANCETS 30 GAUGE
1 EACH MISCELLANEOUS
Qty: 200 EACH | Refills: 0 | Status: SHIPPED | OUTPATIENT
Start: 2021-02-23 | End: 2021-05-11 | Stop reason: SDUPTHER

## 2021-02-23 RX ORDER — BLOOD SUGAR DIAGNOSTIC
1 STRIP MISCELLANEOUS
Qty: 200 EACH | Refills: 0 | Status: SHIPPED | OUTPATIENT
Start: 2021-02-23 | End: 2021-06-11 | Stop reason: SDUPTHER

## 2021-02-23 NOTE — PROGRESS NOTES
Discharge Planning Assessment   [facility]     Patient Name: Mitchell Jacome  MRN: [id number]  Today's Date: 2/23/2021    Admit Date: 2/20/2021          Plan    Final Discharge Disposition Code  01 - home or self-care    Final Note  return home       Carol naegele rn  Case management  Office number [phone]  Cell phone [phone]

## 2021-02-23 NOTE — TELEPHONE ENCOUNTER
Caller: ZAINAB CRUZ    Relationship to patient: Emergency Contact    Best call back number: [phone] (M)    New or established patient?  [] New  [x] Established    Date of discharge: 2/22/21    Facility discharged from: [facility]    Diagnosis/Symptoms: Diabetic Flare up     Length of stay (If applicable): Couple days     Specialty Only: Did you see a [facility] health provider?    [] Yes  [] No  If so, who?

## 2021-02-23 NOTE — OUTREACH NOTE
Call Center TCM Note      Responses   [facility] patient discharged from?  [facility]   Does the patient have one of the following disease processes/diagnoses(primary or secondary)?  Other   TCM attempt successful?  Yes   Discharge diagnosis  Acute on chronic pancreatitis (   Meds reviewed with patient/caregiver?  Yes   Is the patient having any side effects they believe may be caused by any medication additions or changes?  No   Does the patient have all medications ordered at discharge?  Yes   Prescription comments  Several meds, diabetic supplies not sent by hospital to pt pharm, but PCP has taken care of this and pt has all.    Is the patient taking all medications as directed (includes completed medication regime)?  Yes   Does the patient have a primary care provider?   Yes   Does the patient have an appointment with their PCP within 7 days of discharge?  No   Comments regarding PCP  Krystyna Hardwick DO  Pt states his wife will get TCM [facility] sched.    Has the patient kept scheduled appointments due by today?  N/A   Has home health visited the patient within 72 hours of discharge?  N/A   Psychosocial issues?  No   Did the patient receive a copy of their discharge instructions?  Yes   Nursing interventions  Reviewed instructions with patient   What is the patient's perception of their health status since discharge?  Improving   Is the patient/caregiver able to teach back signs and symptoms related to disease process for when to call PCP?  Yes   Is the patient/caregiver able to teach back signs and symptoms related to disease process for when to call 911?  Yes   Is the patient/caregiver able to teach back the hierarchy of who to call/visit for symptoms/problems? PCP, Specialist, Home health nurse, Urgent Care, ED, 911  Yes   If the patient is a current smoker, are they able to teach back resources for cessation?  Not a smoker   TCM call completed?  Yes   Wrap up additional comments  Pt doing ok, pretty weak, but  epigastric pain much better. Pt has all meds in place now and is focused on controlling glucose levels. Pt states his wife will get TCM [facility] sched.           Amber Bang MA    2/23/2021, 13:44 EST

## 2021-02-25 ENCOUNTER — TELEPHONE (OUTPATIENT)
Dept: DIABETES SERVICES | Facility: HOSPITAL | Age: 56
End: 2021-02-25

## 2021-02-25 NOTE — TELEPHONE ENCOUNTER
Wife, Corina, called. She states Lantus and monitoring supplies was going to cost over $500. Requesting Lantus be changed from pens to vials. Educator contacted pt's pharmacy, [facility] in [facility] ([phone]) and changed Lantus rx to vial, #1 vial with 2 refills, pt to take 6 units at hs and insulin syringes, box of 100 with 2 refills. Wife states not needing additional assistance at this time.

## 2021-03-02 ENCOUNTER — OFFICE VISIT (OUTPATIENT)
Dept: FAMILY MEDICINE CLINIC | Facility: CLINIC | Age: 56
End: 2021-03-02

## 2021-03-02 VITALS
HEART RATE: 85 BPM | TEMPERATURE: 96.6 F | OXYGEN SATURATION: 98 % | RESPIRATION RATE: 16 BRPM | SYSTOLIC BLOOD PRESSURE: 136 MMHG | WEIGHT: 173 LBS | DIASTOLIC BLOOD PRESSURE: 79 MMHG | BODY MASS INDEX: 22.2 KG/M2 | HEIGHT: 74 IN

## 2021-03-02 DIAGNOSIS — I10 BENIGN ESSENTIAL HYPERTENSION: ICD-10-CM

## 2021-03-02 DIAGNOSIS — E11.65 UNCONTROLLED TYPE 2 DIABETES MELLITUS WITH HYPERGLYCEMIA (HCC): Chronic | ICD-10-CM

## 2021-03-02 DIAGNOSIS — K86.1 CHRONIC RECURRENT PANCREATITIS (HCC): Primary | ICD-10-CM

## 2021-03-02 PROCEDURE — 99495 TRANSJ CARE MGMT MOD F2F 14D: CPT | Performed by: FAMILY MEDICINE

## 2021-03-02 RX ORDER — INSULIN GLARGINE 100 [IU]/ML
9 INJECTION, SOLUTION SUBCUTANEOUS NIGHTLY
Qty: 1 PEN | Refills: 1 | COMMUNITY
Start: 2021-03-02 | End: 2021-04-06

## 2021-03-02 RX ORDER — PROMETHAZINE HYDROCHLORIDE 25 MG/1
25 TABLET ORAL EVERY 6 HOURS PRN
Qty: 60 TABLET | Refills: 1 | Status: SHIPPED | OUTPATIENT
Start: 2021-03-02 | End: 2022-01-10

## 2021-03-02 RX ORDER — HYDROCODONE BITARTRATE AND ACETAMINOPHEN 5; 325 MG/1; MG/1
1 TABLET ORAL EVERY 6 HOURS PRN
Qty: 28 TABLET | Refills: 0 | Status: SHIPPED | OUTPATIENT
Start: 2021-03-02 | End: 2021-03-25

## 2021-03-02 RX ORDER — FENOFIBRATE 160 MG/1
160 TABLET ORAL DAILY
Qty: 90 TABLET | Refills: 0 | Status: SHIPPED | OUTPATIENT
Start: 2021-03-02 | End: 2021-06-29

## 2021-03-02 RX ORDER — PROMETHAZINE HYDROCHLORIDE 25 MG/1
25 SUPPOSITORY RECTAL 2 TIMES DAILY PRN
Qty: 40 SUPPOSITORY | Refills: 0 | Status: SHIPPED | OUTPATIENT
Start: 2021-03-02

## 2021-03-02 RX ORDER — SUCRALFATE 1 G/1
1 TABLET ORAL
Status: SHIPPED
Start: 2021-03-02 | End: 2021-05-11 | Stop reason: SDUPTHER

## 2021-03-02 NOTE — PROGRESS NOTES
Subjective   Mitchell Jacome is a 55 y.o. male.     Chief Complaint   Patient presents with   • Transitional Care Management   • Diabetes   • Pancreatitis         Current Outpatient Medications:   •  atenolol (TENORMIN) 25 MG tablet, Take 1 tablet by mouth Daily., Disp: 90 tablet, Rfl: 0  •  atorvastatin (LIPITOR) 20 MG tablet, Take 20 mg by mouth Daily., Disp: , Rfl:   •  dicyclomine (BENTYL) 20 MG tablet, 1/2 tab - 1 po q6hrs prn Abd cramping/ bloating/ [id number], Disp: 30 tablet, Rfl: 0  •  DULoxetine (CYMBALTA) 30 MG capsule, Take 1 capsule by mouth Daily., Disp: , Rfl:   •  glucose blood (OneTouch Verio) test strip, 1 each by Other route 4 (Four) Times a Day Before Meals & at Bedtime. Use as instructed, Disp: 200 each, Rfl: 0  •  Insulin Pen Needle 32G X 4 MM misc, 1 each every night at bedtime., Disp: 100 each, Rfl: 0  •  Isopropyl Alcohol (Alcohol Wipes) 70 % misc, Apply 1 each topically 4 (Four) Times a Day Before Meals & at Bedtime., Disp: 200 each, Rfl: 0  •  Lancets (OneTouch Delica Plus [id number]) misc, 1 each 4 (Four) Times a Day Before Meals & at Bedtime., Disp: 200 each, Rfl: 0  •  levETIRAcetam (KEPPRA) 500 MG tablet, Take 500 mg by mouth 2 (Two) Times a Day., Disp: , Rfl:   •  multivitamin with minerals (Centrum Silver) tablet tablet, Take 1 tablet by mouth Daily., Disp: , Rfl:   •  omeprazole (priLOSEC) 40 MG capsule, Take 1 capsule by mouth Daily., Disp: 90 capsule, Rfl: 0  •  pancrelipase, Lip-Prot-Amyl, (CREON) 22561-76481 units capsule delayed-release particles capsule, 3 tabs po TID W/ meals, Disp: , Rfl:   •  sucralfate (CARAFATE) 1 g tablet, Take 1 tablet by mouth 4 (Four) Times a Day Before Meals & at Bedtime As Needed., Disp: , Rfl:   •  fenofibrate 160 MG tablet, Take 1 tablet by mouth Daily., Disp: 90 tablet, Rfl: 0  •  HYDROcodone-acetaminophen (Norco) 7.5-325 MG per tablet, Take 1 tablet by mouth Every 8 (Eight) Hours As Needed for Severe Pain ., Disp: 45 tablet, Rfl: 0  •   Insulin Glargine (BASAGLAR KWIKPEN) 100 UNIT/ML injection pen, Inject 9 Units under the skin into the appropriate area as directed Every Night., Disp: 1 pen, Rfl: 1  •  promethazine (PHENERGAN) 25 MG suppository, Insert 1 suppository into the rectum 2 (Two) Times a Day As Needed for Nausea or Vomiting., Disp: 40 suppository, Rfl: 0  •  promethazine (PHENERGAN) 25 MG tablet, Take 1 tablet by mouth Every 6 (Six) Hours As Needed for Nausea or Vomiting., Disp: 60 tablet, Rfl: 1    Past Medical History:   Diagnosis Date   • Benign essential hypertension     Uncontrolled   • CHOL    • Chronic recurrent pancreatitis    • GERD    • Hearing loss, right     85%   • Seizures    • Solitary kidney        Past Surgical History:   Procedure Laterality Date   • APPENDECTOMY     • CHOLECYSTECTOMY     • COLONOSCOPY      NEG = 2016, rech 2021 [facility]       Family History   Problem Relation Age of Onset   • Cerebral aneurysm Mother    • Hypertension Father    • Cancer Father 60        Bladder   • Diabetes Father    • Hyperlipidemia Father    • Hypertension Half-Brother    • Other Half-Brother         CHOL   • Stroke Half-Brother    • Hypertension Brother    • Hyperlipidemia Brother    • Heart disease Brother         CVA       Social History     Socioeconomic History   • Marital status:      Spouse name: Not on file   • Number of children: Not on file   • Years of education: Not on file   • Highest education level: Not on file   Tobacco Use   • Smoking status: Never Smoker   • Smokeless tobacco: Never Used   Vaping Use   • Vaping Use: Never used   Substance and Sexual Activity   • Alcohol use: Not Currently   • Sexual activity: Defer       54 y/o C male here w/ his wife for f/u from hosp for acute pancreatitis and new onset DMII    Pt states he is some better but still having upper abd pain....     Pt needs to disc his BS's; [facility] and no new appt made yet w/ a new one       The following  portions of the patient's history were reviewed and updated as appropriate: allergies, current medications, past family history, past medical history, past social history, past surgical history and problem list.    Review of Systems   Constitutional: Positive for activity change, appetite change, fatigue and unexpected weight loss. Negative for fever and unexpected weight gain.   Gastrointestinal: Positive for abdominal distention and abdominal pain.   Skin: Negative for rash.   Psychiatric/Behavioral: Positive for sleep disturbance and stress.       Vitals:    03/02/21 1557   BP: 136/79   Pulse: 85   Resp: 16   Temp: 96.6 °F (35.9 °C)   SpO2: 98%       Objective   Physical Exam  Vitals and nursing note reviewed.   Constitutional:       General: He is not in acute distress.     Appearance: He is well-developed. He is not ill-appearing or toxic-appearing.   HENT:      Head: Normocephalic and atraumatic.   Cardiovascular:      Rate and Rhythm: Normal rate and regular rhythm.      Heart sounds: Normal heart sounds. No murmur heard.     Pulmonary:      Effort: Pulmonary effort is normal. No respiratory distress.      Breath sounds: Normal breath sounds. No wheezing, rhonchi or rales.   Abdominal:      General: Abdomen is flat.      Palpations: Abdomen is soft.      Tenderness: There is abdominal tenderness in the epigastric area. There is no guarding or rebound.   Musculoskeletal:      Cervical back: Normal range of motion and neck supple.   Skin:     General: Skin is warm and dry.      Findings: No rash.   Neurological:      Mental Status: He is alert and oriented to person, place, and time.      Cranial Nerves: No cranial nerve deficit.   Psychiatric:         Attention and Perception: Attention and perception normal.         Mood and Affect: Mood and affect normal.         Speech: Speech normal.         Behavior: Behavior normal. Behavior is cooperative.         Thought Content: Thought content normal.          Cognition and Memory: Cognition and memory normal.         Judgment: Judgment normal.           Assessment/Plan   Diagnoses and all orders for this visit:    1. Chronic recurrent pancreatitis (CMS/HCC) (Primary)  -     Discontinue: HYDROcodone-acetaminophen (NORCO) 5-325 MG per tablet; Take 1 tablet by mouth Every 6 (Six) Hours As Needed for Severe Pain .  Dispense: 28 tablet; Refill: 0    2. Uncontrolled type 2 diabetes mellitus with hyperglycemia (CMS/HCC)    3. Benign essential hypertension    Other orders  -     sucralfate (CARAFATE) 1 g tablet; Take 1 tablet by mouth 4 (Four) Times a Day Before Meals & at Bedtime As Needed.  -     Discontinue: Insulin Glargine (LANTUS SOLOSTAR) 100 UNIT/ML injection pen; Inject 9 Units under the skin into the appropriate area as directed Every Night.  Dispense: 10 mL; Refill: 6  -     fenofibrate 160 MG tablet; Take 1 tablet by mouth Daily.  Dispense: 90 tablet; Refill: 0  -     promethazine (PHENERGAN) 25 MG suppository; Insert 1 suppository into the rectum 2 (Two) Times a Day As Needed for Nausea or Vomiting.  Dispense: 40 suppository; Refill: 0  -     promethazine (PHENERGAN) 25 MG tablet; Take 1 tablet by mouth Every 6 (Six) Hours As Needed for Nausea or Vomiting.  Dispense: 60 tablet; Refill: 1  -     Insulin Glargine (BASAGLAR KWIKPEN) 100 UNIT/ML injection pen; Inject 9 Units under the skin into the appropriate area as directed Every Night.  Dispense: 1 pen; Refill: 1    DMII disc'd at length --------info given for management  Rx Basaglar pen  Pt to check BS's QAC and keep for [facility]  Med refills

## 2021-03-09 ENCOUNTER — TELEPHONE (OUTPATIENT)
Dept: FAMILY MEDICINE CLINIC | Facility: CLINIC | Age: 56
End: 2021-03-09

## 2021-03-09 NOTE — TELEPHONE ENCOUNTER
Pt called stating he wants [facility] to f[facility]u [facility], and not [facility]. Said he called to sched the f/u appt and the HUB put him on today's sched, which he feels is too soon and so he will resched for 3 mon    Also, just wanted to see if you wanted to increase his insulin units?     Current daily units = 9   FBS = 223 - 297    Please advise.

## 2021-03-25 ENCOUNTER — TELEPHONE (OUTPATIENT)
Dept: FAMILY MEDICINE CLINIC | Facility: CLINIC | Age: 56
End: 2021-03-25

## 2021-03-25 DIAGNOSIS — K86.1 CHRONIC RECURRENT PANCREATITIS (HCC): ICD-10-CM

## 2021-03-25 PROBLEM — E11.65 UNCONTROLLED TYPE 2 DIABETES MELLITUS WITH HYPERGLYCEMIA: Chronic | Status: ACTIVE | Noted: 2018-10-30

## 2021-03-25 RX ORDER — HYDROCODONE BITARTRATE AND ACETAMINOPHEN 7.5; 325 MG/1; MG/1
1 TABLET ORAL EVERY 8 HOURS PRN
Qty: 45 TABLET | Refills: 0 | Status: SHIPPED | OUTPATIENT
Start: 2021-03-25 | End: 2021-04-06 | Stop reason: SDUPTHER

## 2021-03-25 RX ORDER — HYDROCODONE BITARTRATE AND ACETAMINOPHEN 5; 325 MG/1; MG/1
1 TABLET ORAL EVERY 6 HOURS PRN
Qty: 28 TABLET | Refills: 0 | Status: CANCELLED | OUTPATIENT
Start: 2021-03-25

## 2021-03-25 NOTE — TELEPHONE ENCOUNTER
Caller: Mitchell Jacome    Relationship: Self    Best call back number: [phone]    Medication needed:   Requested Prescriptions     Pending Prescriptions Disp Refills   • HYDROcodone-acetaminophen (NORCO) 5-325 MG per tablet 28 tablet 0     Sig: Take 1 tablet by mouth Every 6 (Six) Hours As Needed for Severe Pain .       When do you need the refill by: ASAP    What additional details did the patient provide when requesting the medication: THE PATIENT IS OUT OF THIS MEDICATION. THE PATIENT STATES THAT HE IS ALSO USED TO 10 MG AND HE NOW ONLY HAS 5 MG    Does the patient have less than a 3 day supply:  [x] Yes  [] No    What is the patient's preferred pharmacy: [id number] [address] - [phone] [facility] [phone] FX       Caller: Mitchell Jacome    Relationship: Self    Best call back number: [phone]    What medications are you currently taking:   Current Outpatient Medications on File Prior to Visit   Medication Sig Dispense Refill   • atenolol (TENORMIN) 25 MG tablet Take 1 tablet by mouth Daily. 90 tablet 0   • atorvastatin (LIPITOR) 20 MG tablet Take 20 mg by mouth Daily.     • dicyclomine (BENTYL) 20 MG tablet 1/2 tab - 1 po q6hrs prn Abd cramping/ bloating/ [id number] 30 tablet 0   • DULoxetine (CYMBALTA) 30 MG capsule Take 1 capsule by mouth Daily.     • fenofibrate 160 MG tablet Take 1 tablet by mouth Daily. 90 tablet 0   • glucose blood (OneTouch Verio) test strip 1 each by Other route 4 (Four) Times a Day Before Meals & at Bedtime. Use as instructed 200 each 0   • HYDROcodone-acetaminophen (NORCO) 5-325 MG per tablet Take 1 tablet by mouth Every 6 (Six) Hours As Needed for Severe Pain . 28 tablet 0   • Insulin Glargine (BASAGLAR KWIKPEN) 100 UNIT/ML injection pen Inject 9 Units under the skin into the appropriate area as directed Every Night. 1 pen 1   • Insulin Pen Needle 32G X 4 MM misc 1 each every night at bedtime. 100 each 0   • Isopropyl Alcohol  (Alcohol Wipes) 70 % misc Apply 1 each topically 4 (Four) Times a Day Before Meals & at Bedtime. 200 each 0   • Lancets (OneTouch Delica Plus [id number]) misc 1 each 4 (Four) Times a Day Before Meals & at Bedtime. 200 each 0   • levETIRAcetam (KEPPRA) 500 MG tablet Take 500 mg by mouth 2 (Two) Times a Day.     • multivitamin with minerals (Centrum Silver) tablet tablet Take 1 tablet by mouth Daily.     • omeprazole (priLOSEC) 40 MG capsule Take 1 capsule by mouth Daily. 90 capsule 0   • pancrelipase, Lip-Prot-Amyl, (CREON) 53504-60660 units capsule delayed-release particles capsule 3 tabs po TID W/ meals     • promethazine (PHENERGAN) 25 MG suppository Insert 1 suppository into the rectum 2 (Two) Times a Day As Needed for Nausea or Vomiting. 40 suppository 0   • promethazine (PHENERGAN) 25 MG tablet Take 1 tablet by mouth Every 6 (Six) Hours As Needed for Nausea or Vomiting. 60 tablet 1   • sucralfate (CARAFATE) 1 g tablet Take 1 tablet by mouth 4 (Four) Times a Day Before Meals & at Bedtime As Needed.       No current facility-administered medications on file prior to visit.            Which medication are you concerned about:Insulin Glargine (BASAGLAR KWIKPEN) 100 UNIT/ML injection pen     What are your concerns: THE PATIENT STATES THAT HE HAS WENT UP TO THE 15 INJECTION ON 03/10/21. THE PATIENT STATES THAT HIS BLOOD SUGAR HAS BEEN VERY HIGH 441 BUT THE PATIENT STATES THAT HE IS FEELING VERY LETHARGIC AND HAS LITTLE TO NO ENERGY. TH PATIENT STATES THAT HIS BLOOD SUGAR HAS BEEN HIGH SINCE 03/10/21    How long have you been taking these medications: 03/10/21    How long have you had these concerns: 15 DAYS    THE PATIENT ALSO STATES THAT HE HAS LEFT AN ENVELOPE IN DR. BELLAMY'S MAILBOX. THE PATIENT STATES THAT HE NEEDS HER TO FILL OUT THE SHORT TERM DISABILITY PAPERWORK AS WELL. PLEASE ADVISE.

## 2021-03-25 NOTE — TELEPHONE ENCOUNTER
Hub to read.  Hub to ask question.    Left message for patient to call back to change the June appointment to early April and he needs to bring a blood sugar log if  is going to be managing this per  and we need to know the dates he has been off/will be off too for the shirt term disability paperwork.

## 2021-03-25 NOTE — TELEPHONE ENCOUNTER
He needs appt sooner than June If I am putting him on STDisability----like in 1mo from last visit  I will not order the 10mg pain pill at this time

## 2021-04-06 ENCOUNTER — OFFICE VISIT (OUTPATIENT)
Dept: FAMILY MEDICINE CLINIC | Facility: CLINIC | Age: 56
End: 2021-04-06

## 2021-04-06 VITALS
SYSTOLIC BLOOD PRESSURE: 121 MMHG | WEIGHT: 181 LBS | HEART RATE: 73 BPM | HEIGHT: 74 IN | TEMPERATURE: 97.3 F | DIASTOLIC BLOOD PRESSURE: 78 MMHG | BODY MASS INDEX: 23.23 KG/M2 | RESPIRATION RATE: 16 BRPM | OXYGEN SATURATION: 98 %

## 2021-04-06 DIAGNOSIS — F33.40 RECURRENT MAJOR DEPRESSIVE DISORDER, IN REMISSION (HCC): ICD-10-CM

## 2021-04-06 DIAGNOSIS — I10 BENIGN ESSENTIAL HYPERTENSION: ICD-10-CM

## 2021-04-06 DIAGNOSIS — E11.65 UNCONTROLLED TYPE 2 DIABETES MELLITUS WITH HYPERGLYCEMIA (HCC): Primary | Chronic | ICD-10-CM

## 2021-04-06 DIAGNOSIS — K86.1 CHRONIC RECURRENT PANCREATITIS (HCC): ICD-10-CM

## 2021-04-06 PROCEDURE — 99214 OFFICE O/P EST MOD 30 MIN: CPT | Performed by: FAMILY MEDICINE

## 2021-04-06 RX ORDER — INSULIN GLARGINE 100 [IU]/ML
15 INJECTION, SOLUTION SUBCUTANEOUS NIGHTLY
Qty: 1 PEN | Refills: 1 | COMMUNITY
Start: 2021-04-06 | End: 2021-05-11

## 2021-04-06 RX ORDER — DULOXETIN HYDROCHLORIDE 30 MG/1
30 CAPSULE, DELAYED RELEASE ORAL DAILY
Qty: 90 CAPSULE | Refills: 0 | Status: SHIPPED | OUTPATIENT
Start: 2021-04-06 | End: 2021-07-01

## 2021-04-06 RX ORDER — INSULIN LISPRO 100 [IU]/ML
INJECTION, SOLUTION INTRAVENOUS; SUBCUTANEOUS
Qty: 3 PEN | Refills: 0 | COMMUNITY
Start: 2021-04-06 | End: 2021-09-07

## 2021-04-06 RX ORDER — HYDROCODONE BITARTRATE AND ACETAMINOPHEN 7.5; 325 MG/1; MG/1
1 TABLET ORAL EVERY 8 HOURS PRN
Qty: 60 TABLET | Refills: 0 | Status: SHIPPED | OUTPATIENT
Start: 2021-04-06 | End: 2021-05-11 | Stop reason: SDUPTHER

## 2021-04-06 NOTE — PROGRESS NOTES
Subjective   Mitchell Jacome is a 55 y.o. male.     Chief Complaint   Patient presents with   • Diabetes   • Pain         Current Outpatient Medications:   •  atenolol (TENORMIN) 25 MG tablet, Take 1 tablet by mouth Daily., Disp: 90 tablet, Rfl: 0  •  atorvastatin (LIPITOR) 20 MG tablet, Take 20 mg by mouth Daily., Disp: , Rfl:   •  dicyclomine (BENTYL) 20 MG tablet, 1/2 tab - 1 po q6hrs prn Abd cramping/ bloating/ [id number], Disp: 30 tablet, Rfl: 0  •  DULoxetine (CYMBALTA) 30 MG capsule, Take 1 capsule by mouth Daily., Disp: 90 capsule, Rfl: 0  •  fenofibrate 160 MG tablet, Take 1 tablet by mouth Daily., Disp: 90 tablet, Rfl: 0  •  glucose blood (OneTouch Verio) test strip, 1 each by Other route 4 (Four) Times a Day Before Meals & at Bedtime. Use as instructed, Disp: 200 each, Rfl: 0  •  HYDROcodone-acetaminophen (Norco) 7.5-325 MG per tablet, Take 1 tablet by mouth Every 8 (Eight) Hours As Needed for Severe Pain ., Disp: 60 tablet, Rfl: 0  •  Insulin Glargine (BASAGLAR KWIKPEN) 100 UNIT/ML injection pen, Inject 15 Units under the skin into the appropriate area as directed Every Night., Disp: 1 pen, Rfl: 1  •  Insulin Pen Needle 32G X 4 MM misc, 1 each every night at bedtime., Disp: 360 each, Rfl: 0  •  Isopropyl Alcohol (Alcohol Wipes) 70 % misc, Apply 1 each topically 4 (Four) Times a Day Before Meals & at Bedtime., Disp: 200 each, Rfl: 0  •  Lancets (OneTouch Delica Plus [id number]) misc, 1 each 4 (Four) Times a Day Before Meals & at Bedtime., Disp: 200 each, Rfl: 0  •  levETIRAcetam (KEPPRA) 500 MG tablet, Take 500 mg by mouth 2 (Two) Times a Day., Disp: , Rfl:   •  multivitamin with minerals (Centrum Silver) tablet tablet, Take 1 tablet by mouth Daily., Disp: , Rfl:   •  omeprazole (priLOSEC) 40 MG capsule, Take 1 capsule by mouth Daily., Disp: 90 capsule, Rfl: 0  •  pancrelipase, Lip-Prot-Amyl, (CREON) 48138-23301 units capsule delayed-release particles capsule, 3 tabs po TID W/ meals, Disp: , Rfl:    •  promethazine (PHENERGAN) 25 MG suppository, Insert 1 suppository into the rectum 2 (Two) Times a Day As Needed for Nausea or Vomiting., Disp: 40 suppository, Rfl: 0  •  promethazine (PHENERGAN) 25 MG tablet, Take 1 tablet by mouth Every 6 (Six) Hours As Needed for Nausea or Vomiting., Disp: 60 tablet, Rfl: 1  •  sucralfate (CARAFATE) 1 g tablet, Take 1 tablet by mouth 4 (Four) Times a Day Before Meals & at Bedtime As Needed., Disp: , Rfl:   •  Insulin Lispro, 1 Unit Dial, (HumaLOG KwikPen) 100 UNIT/ML solution pen-injector, Sliding scale qac, Disp: 3 pen, Rfl: 0    Past Medical History:   Diagnosis Date   • Chronic recurrent pancreatitis    • DMII    • GERD    • Hearing loss, right     85%   • HTN    • Seizures    • Solitary kidney        Past Surgical History:   Procedure Laterality Date   • APPENDECTOMY     • CHOLECYSTECTOMY     • COLONOSCOPY      NEG = 2016, rech 2021 [facility]SI       Family History   Problem Relation Age of Onset   • Cerebral aneurysm Mother    • Hypertension Father    • Cancer Father 60        Bladder   • Diabetes Father    • Hyperlipidemia Father    • Hypertension Half-Brother    • Other Half-Brother         CHOL   • Stroke Half-Brother    • Hypertension Brother    • Hyperlipidemia Brother    • Heart disease Brother         CVA       Social History     Socioeconomic History   • Marital status:      Spouse name: Not on file   • Number of children: Not on file   • Years of education: Not on file   • Highest education level: Not on file   Tobacco Use   • Smoking status: Never Smoker   • Smokeless tobacco: Never Used   Vaping Use   • Vaping Use: Never used   Substance and Sexual Activity   • Alcohol use: Not Currently   • Sexual activity: Defer       54 y/o C male here for f/u on new onset DMII/ IRDM/ recurrent pancreatitis    Pt brought his BS's and states they are still running pretty high despite trying to follow a DM diet    Pt hoping to go on vacation to the [place] in May and  Return to work at end of the month       The following portions of the patient's history were reviewed and updated as appropriate: allergies, current medications, past family history, past medical history, past social history, past surgical history and problem list.    Review of Systems   Constitutional: Negative for activity change, appetite change, unexpected weight gain and unexpected weight loss.   Eyes: Negative for blurred vision, double vision, pain and visual disturbance.   Cardiovascular: Negative for leg swelling.   Gastrointestinal: Positive for abdominal pain (sharp stabbing pain in epigastric area off/on). Negative for abdominal distention, constipation, diarrhea, nausea and vomiting.   Endocrine: Negative for polydipsia, polyphagia and polyuria.   Genitourinary: Negative for frequency.   Musculoskeletal: Negative for gait problem.   Skin: Negative for color change, dry skin, rash and skin lesions.   Neurological: Negative for weakness and numbness.       Vitals:    04/06/21 1259   BP: 121/78   Pulse: 73   Resp: 16   Temp: 97.3 °F (36.3 °C)   SpO2: 98%       Objective   Physical Exam  Vitals and nursing note reviewed.   Constitutional:       General: He is not in acute distress.     Appearance: Normal appearance. He is not ill-appearing or toxic-appearing.   Cardiovascular:      Pulses:           Dorsalis pedis pulses are 2+ on the right side and 2+ on the left side.   Musculoskeletal:      Right lower leg: No edema.      Left lower leg: No edema.      Right foot: Normal range of motion. No deformity, bunion, Charcot foot, foot drop or prominent metatarsal heads.      Left foot: Normal range of motion. No deformity, bunion, Charcot foot, foot drop or prominent metatarsal heads.   Feet:      Right foot:      Protective Sensation: 8 sites tested. 8 sites sensed.      Skin integrity: Skin integrity normal. No ulcer, blister, skin breakdown, erythema, warmth, callus, dry skin or fissure.      Toenail  Condition: Right toenails are normal.      Left foot:      Protective Sensation: 8 sites tested. 8 sites sensed.      Skin integrity: Skin integrity normal. No ulcer, blister, skin breakdown, erythema, warmth, callus, dry skin or fissure.      Toenail Condition: Left toenails are normal.   Skin:     General: Skin is warm and dry.   Neurological:      Mental Status: He is alert and oriented to person, place, and time.      Cranial Nerves: No cranial nerve deficit.   Psychiatric:         Mood and Affect: Mood normal.         Behavior: Behavior normal.         Thought Content: Thought content normal.         Judgment: Judgment normal.           Assessment/Plan   Diagnoses and all orders for this visit:    1. Uncontrolled type 2 diabetes mellitus with hyperglycemia (CMS/HCC) (Primary)    2. Chronic recurrent pancreatitis (CMS/HCC)  -     HYDROcodone-acetaminophen (Norco) 7.5-325 MG per tablet; Take 1 tablet by mouth Every 8 (Eight) Hours As Needed for Severe Pain .  Dispense: 60 tablet; Refill: 0    Other orders  -     Insulin Glargine (BASAGLAR KWIKPEN) 100 UNIT/ML injection pen; Inject 15 Units under the skin into the appropriate area as directed Every Night.  Dispense: 1 pen; Refill: 1  -     Insulin Lispro, 1 Unit Dial, (HumaLOG KwikPen) 100 UNIT/ML solution pen-injector; Sliding scale qac  Dispense: 3 pen; Refill: 0  -     Insulin Pen Needle 32G X 4 MM misc; 1 each every night at bedtime.  Dispense: 360 each; Refill: 0  -     DULoxetine (CYMBALTA) 30 MG capsule; Take 1 capsule by mouth Daily.  Dispense: 90 capsule; Refill: 0    Disc'd DM diet options  Start SSI w/ Humalog Kwikpen QAC/ Cont Basaglar 15units qhs  Spent >1/2 time disc. DM diet and tx     Pt to drop off BS readings in 1 week for review and adjustment

## 2021-04-19 ENCOUNTER — TELEPHONE (OUTPATIENT)
Dept: FAMILY MEDICINE CLINIC | Facility: CLINIC | Age: 56
End: 2021-04-19

## 2021-04-21 ENCOUNTER — TELEPHONE (OUTPATIENT)
Dept: FAMILY MEDICINE CLINIC | Facility: CLINIC | Age: 56
End: 2021-04-21

## 2021-04-21 NOTE — TELEPHONE ENCOUNTER
Per :  JUANCHO -----The [employer] lady says that [facility] where they get a specialist and possible insulin pump or monitoring devices (she knows because she does the device training.....) If he is interested (if [facility] would cover this stuff for him)

## 2021-04-21 NOTE — TELEPHONE ENCOUNTER
----- Message from Krystyna Hardwick DO sent at 4/20/2021  6:30 PM EDT -----      ----- Message -----  From: Tyesha De Leon RT  Sent: 4/20/2021  10:32 AM EDT  To: Krystyna Hardwick DO        ----- Message -----  From: Dedra Jackson CMA  Sent: 4/20/2021  10:12 AM EDT  To: [facility]      ----- Message -----  From: Krystyna Hardwick DO  Sent: 4/19/2021   8:05 PM EDT  To: Dedra Jackson CMA        ----- Message -----  From: Iesha Parker RegSched Rep  Sent: 4/13/2021  12:27 PM EDT  To: Krystyna Hardwick DO

## 2021-04-21 NOTE — TELEPHONE ENCOUNTER
HUB to share    Per :  JUANCHO -----The [employer] lady says that [facility] where they get a specialist and possible insulin pump or monitoring devices (she knows because she does the device training.....) If he is interested (if [facility] would cover this stuff for him)    LM informing pt of these options. He would have to call [facility] or his ins to see if this is covered, etc.

## 2021-04-26 ENCOUNTER — TELEPHONE (OUTPATIENT)
Dept: FAMILY MEDICINE CLINIC | Facility: CLINIC | Age: 56
End: 2021-04-26

## 2021-05-10 NOTE — TELEPHONE ENCOUNTER
LANDRY DOYLE CALLED TO SEE IF FAX WAS RECEIVED THAT WAS SENT ON 05/06/2021 AND 04/26/2021.     PATIENT IS ON SHORT TERM DISABILITY, REQUEST FOR UPDATED MEDICAL INFORMATION, CHART NOTES AND LETTER FROM DOCTOR IS NEEDED TO CONTINUE SHORT TERM DISABILITY.    TO BE SENT TO FAX NUMBER [phone]    CONTACT: [phone]

## 2021-05-11 ENCOUNTER — OFFICE VISIT (OUTPATIENT)
Dept: FAMILY MEDICINE CLINIC | Facility: CLINIC | Age: 56
End: 2021-05-11

## 2021-05-11 VITALS
RESPIRATION RATE: 14 BRPM | HEIGHT: 74 IN | HEART RATE: 68 BPM | OXYGEN SATURATION: 100 % | WEIGHT: 183 LBS | DIASTOLIC BLOOD PRESSURE: 82 MMHG | BODY MASS INDEX: 23.49 KG/M2 | TEMPERATURE: 96.9 F | SYSTOLIC BLOOD PRESSURE: 129 MMHG

## 2021-05-11 DIAGNOSIS — E11.65 UNCONTROLLED TYPE 2 DIABETES MELLITUS WITH HYPERGLYCEMIA (HCC): Primary | Chronic | ICD-10-CM

## 2021-05-11 DIAGNOSIS — E11.649 HYPOGLYCEMIC EPISODE IN PATIENT WITH DIABETES MELLITUS (HCC): ICD-10-CM

## 2021-05-11 DIAGNOSIS — K86.1 CHRONIC RECURRENT PANCREATITIS (HCC): ICD-10-CM

## 2021-05-11 PROCEDURE — 99214 OFFICE O/P EST MOD 30 MIN: CPT | Performed by: FAMILY MEDICINE

## 2021-05-11 RX ORDER — LANCETS 30 GAUGE
1 EACH MISCELLANEOUS
Qty: 360 EACH | Refills: 1 | Status: SHIPPED | OUTPATIENT
Start: 2021-05-11

## 2021-05-11 RX ORDER — HYDROCODONE BITARTRATE AND ACETAMINOPHEN 7.5; 325 MG/1; MG/1
1 TABLET ORAL EVERY 8 HOURS PRN
Qty: 60 TABLET | Refills: 0 | Status: SHIPPED | OUTPATIENT
Start: 2021-05-11 | End: 2021-06-11 | Stop reason: SDUPTHER

## 2021-05-11 RX ORDER — INSULIN GLARGINE 100 [IU]/ML
17 INJECTION, SOLUTION SUBCUTANEOUS NIGHTLY
Qty: 2 PEN | Refills: 0 | COMMUNITY
Start: 2021-05-11 | End: 2021-08-02 | Stop reason: SDUPTHER

## 2021-05-11 RX ORDER — IBUPROFEN 600 MG/1
1 TABLET ORAL AS NEEDED
Qty: 1 EACH | Refills: 2 | Status: SHIPPED | OUTPATIENT
Start: 2021-05-11 | End: 2022-06-07 | Stop reason: SDUPTHER

## 2021-05-11 RX ORDER — SUCRALFATE 1 G/1
1 TABLET ORAL
Qty: 360 TABLET | Refills: 0 | Status: SHIPPED | OUTPATIENT
Start: 2021-05-11 | End: 2021-08-02

## 2021-05-11 NOTE — PROGRESS NOTES
Subjective   Mitchell Jacome is a 55 y.o. male.     Chief Complaint   Patient presents with   • Disability paperwork   • Diabetes         Current Outpatient Medications:   •  atenolol (TENORMIN) 25 MG tablet, Take 1 tablet by mouth Daily., Disp: 90 tablet, Rfl: 0  •  atorvastatin (LIPITOR) 20 MG tablet, Take 20 mg by mouth Daily., Disp: , Rfl:   •  dicyclomine (BENTYL) 20 MG tablet, 1/2 tab - 1 po q6hrs prn Abd cramping/ bloating/ [id number], Disp: 30 tablet, Rfl: 0  •  DULoxetine (CYMBALTA) 30 MG capsule, Take 1 capsule by mouth Daily., Disp: 90 capsule, Rfl: 0  •  fenofibrate 160 MG tablet, Take 1 tablet by mouth Daily., Disp: 90 tablet, Rfl: 0  •  glucose blood (OneTouch Verio) test strip, 1 each by Other route 4 (Four) Times a Day Before Meals & at Bedtime. Use as instructed, Disp: 200 each, Rfl: 0  •  HYDROcodone-acetaminophen (Norco) 7.5-325 MG per tablet, Take 1 tablet by mouth Every 8 (Eight) Hours As Needed for Severe Pain ., Disp: 60 tablet, Rfl: 0  •  Insulin Glargine (BASAGLAR KWIKPEN) 100 UNIT/ML injection pen, Inject 17 Units under the skin into the appropriate area as directed Every Night., Disp: 2 pen, Rfl: 0  •  Insulin Lispro, 1 Unit Dial, (HumaLOG KwikPen) 100 UNIT/ML solution pen-injector, Sliding scale qac, Disp: 3 pen, Rfl: 0  •  Insulin Pen Needle 32G X 4 MM misc, 1 each every night at bedtime., Disp: 360 each, Rfl: 0  •  Isopropyl Alcohol (Alcohol Wipes) 70 % misc, Apply 1 each topically 4 (Four) Times a Day Before Meals & at Bedtime., Disp: 200 each, Rfl: 0  •  Lancets (OneTouch Delica Plus [id number]) misc, 1 each 4 (Four) Times a Day Before Meals & at Bedtime., Disp: 360 each, Rfl: 1  •  levETIRAcetam (KEPPRA) 500 MG tablet, Take 500 mg by mouth 2 (Two) Times a Day., Disp: , Rfl:   •  multivitamin with minerals (Centrum Silver) tablet tablet, Take 1 tablet by mouth Daily., Disp: , Rfl:   •  omeprazole (priLOSEC) 40 MG capsule, Take 1 capsule by mouth Daily., Disp: 90 capsule, Rfl:  0  •  pancrelipase, Lip-Prot-Amyl, (CREON) 49808-69757 units capsule delayed-release particles capsule, 3 tabs po TID W/ meals, Disp: , Rfl:   •  promethazine (PHENERGAN) 25 MG suppository, Insert 1 suppository into the rectum 2 (Two) Times a Day As Needed for Nausea or Vomiting., Disp: 40 suppository, Rfl: 0  •  promethazine (PHENERGAN) 25 MG tablet, Take 1 tablet by mouth Every 6 (Six) Hours As Needed for Nausea or Vomiting., Disp: 60 tablet, Rfl: 1  •  sucralfate (CARAFATE) 1 g tablet, Take 1 tablet by mouth 4 (Four) Times a Day With Meals & at Bedtime., Disp: 360 tablet, Rfl: 0  •  Glucagon, rDNA, (Glucagon Emergency) 1 MG kit, Inject 1 kit as directed As Needed (for low BS)., Disp: 1 each, Rfl: 2    Past Medical History:   Diagnosis Date   • Chronic recurrent pancreatitis    • Depression    • DMII    • GERD    • Hearing loss, right     85%   • HTN    • Seizures    • Solitary kidney        Past Surgical History:   Procedure Laterality Date   • APPENDECTOMY     • CHOLECYSTECTOMY     • COLONOSCOPY      NEG = 2016, rech 2021 GSI       Family History   Problem Relation Age of Onset   • Cerebral aneurysm Mother    • Hypertension Father    • Cancer Father 60        Bladder   • Diabetes Father    • Hyperlipidemia Father    • Hypertension Half-Brother    • Other Half-Brother         CHOL   • Stroke Half-Brother    • Hypertension Brother    • Hyperlipidemia Brother    • Heart disease Brother         CVA       Social History     Socioeconomic History   • Marital status:      Spouse name: Not on file   • Number of children: Not on file   • Years of education: Not on file   • Highest education level: Not on file   Tobacco Use   • Smoking status: Never Smoker   • Smokeless tobacco: Never Used   Vaping Use   • Vaping Use: Never used   Substance and Sexual Activity   • Alcohol use: Not Currently   • Sexual activity: Defer       54 y/o C male here w/ his wife for f/u on new DMII/ IRDM/ recurrent pancreatitis and Std  paperwork    Pt states he has been taking the long acting insulin qday and notices when he takes the short acting insulin before meals, it can drop his BS down to 60 off/on-----Pt states it doesn't seem to matter that he ate carbs and started at about 250 before eating and only took 3units!!!  [facility] of [facility] so will prob get an appt soon as well........... Pt wondering if his pancreas is working some off/on and that is why his BS will drop so low w/ minimal short acting insulin....... Pt's biggest worry is that it might happen while driving or at work and not have any notice.....    Pt is trying to do better w/ his eating and has put on about 10#   Pt conts to need pain med on most days but admits he isnt always taking the carafate QAC/ QHS       The following portions of the patient's history were reviewed and updated as appropriate: allergies, current medications, past family history, past medical history, past social history, past surgical history and problem list.    Review of Systems   Constitutional: Negative for activity change, appetite change, fatigue, unexpected weight gain and unexpected weight loss.   Respiratory: Negative for cough, chest tightness and shortness of breath.    Cardiovascular: Negative for chest pain, palpitations and leg swelling.   Endocrine: Negative for polydipsia, polyphagia and polyuria.   Genitourinary: Negative for frequency, urgency and urinary incontinence.   Musculoskeletal: Negative for arthralgias and myalgias.   Neurological: Negative for dizziness, facial asymmetry, speech difficulty, weakness, light-headedness, headache, memory problem and confusion.       Vitals:    05/11/21 0953   BP: 129/82   Pulse: 68   Resp: 14   Temp: 96.9 °F (36.1 °C)   SpO2: 100%       Objective   Physical Exam  Vitals and nursing note reviewed.   Constitutional:       General: He is not in acute distress.     Appearance: Normal appearance. He is normal  weight. He is not ill-appearing or toxic-appearing.   Pulmonary:      Effort: Pulmonary effort is normal. No respiratory distress.   Musculoskeletal:      Right lower leg: No edema.      Left lower leg: No edema.   Neurological:      Mental Status: He is alert and oriented to person, place, and time.      Cranial Nerves: No cranial nerve deficit.   Psychiatric:         Mood and Affect: Mood normal.         Behavior: Behavior normal.         Thought Content: Thought content normal.         Judgment: Judgment normal.           Assessment/Plan   Diagnoses and all orders for this visit:    1. Uncontrolled type 2 diabetes mellitus with hyperglycemia (CMS/HCC) (Primary)    2. Chronic recurrent pancreatitis (CMS/HCC)  -     HYDROcodone-acetaminophen (Norco) 7.5-325 MG per tablet; Take 1 tablet by mouth Every 8 (Eight) Hours As Needed for Severe Pain .  Dispense: 60 tablet; Refill: 0    3. Hypoglycemic episode in patient with diabetes mellitus (CMS/HCC)    Other orders  -     Glucagon, rDNA, (Glucagon Emergency) 1 MG kit; Inject 1 kit as directed As Needed (for low BS).  Dispense: 1 each; Refill: 2  -     Insulin Glargine (BASAGLAR KWIKPEN) 100 UNIT/ML injection pen; Inject 17 Units under the skin into the appropriate area as directed Every Night.  Dispense: 2 pen; Refill: 0  -     Insulin Pen Needle 32G X 4 MM misc; 1 each every night at bedtime.  Dispense: 360 each; Refill: 0  -     Lancets (OneTouch Delica Plus [id number]) misc; 1 each 4 (Four) Times a Day Before Meals & at Bedtime.  Dispense: 360 each; Refill: 1  -     sucralfate (CARAFATE) 1 g tablet; Take 1 tablet by mouth 4 (Four) Times a Day With Meals & at Bedtime.  Dispense: 360 tablet; Refill: 0    Reviewed pt's BS log  [facility] of [facility]  Will try starting SSI at 200 and start w/ 2units  Cont w/ the long acting qday  Disc'd emergency glucose tx options  Disc'd poss Dexcom6 24 hr monitor thru the [facility] if covered  Rx pain med  FMLA/ STD paperwork  disc'd  Encouraged taking the carafate qid and PPI

## 2021-05-12 ENCOUNTER — TELEPHONE (OUTPATIENT)
Dept: FAMILY MEDICINE CLINIC | Facility: CLINIC | Age: 56
End: 2021-05-12

## 2021-05-12 ENCOUNTER — PATIENT MESSAGE (OUTPATIENT)
Dept: FAMILY MEDICINE CLINIC | Facility: CLINIC | Age: 56
End: 2021-05-12

## 2021-05-12 DIAGNOSIS — N28.9 RENAL INSUFFICIENCY: Primary | ICD-10-CM

## 2021-05-12 DIAGNOSIS — E11.65 UNCONTROLLED TYPE 2 DIABETES MELLITUS WITH HYPERGLYCEMIA (HCC): ICD-10-CM

## 2021-05-12 NOTE — TELEPHONE ENCOUNTER
PATIENT CALLED STATING THE OFFICE WAS SUPPOSED TO BE SENDING OVER AN EXTENSION FOR THE PATIENTS FMLA SHORT TERM DISABILITY. PATIENT STATES THEY STILL HAVE NOT RECEIVED.PLEASE ADVISE IF THIS HAS BEEN SENT     CALLBACK NUMBER-[phone]

## 2021-05-12 NOTE — TELEPHONE ENCOUNTER
Patient was scheduled to have the BMP checked here in 1 month. Can you add the blood work order in for that as well as the HgbA1c for 3 months from now.

## 2021-05-12 NOTE — TELEPHONE ENCOUNTER
I called and let the patient know that   Yesterdays chart note was just signed today so I'm faxing the paperwork now.      I also gave the patient his lab results.     [facility] LAB[facility]-----------  Kidneys more dry------push more fluids and rech the BMP in 1mo  A1C = 8.5......will cont to work on this and rech in 3mos w/ [facility] or [facility]

## 2021-06-03 ENCOUNTER — TELEPHONE (OUTPATIENT)
Dept: FAMILY MEDICINE CLINIC | Facility: CLINIC | Age: 56
End: 2021-06-03

## 2021-06-03 NOTE — TELEPHONE ENCOUNTER
PATIENT IS RETURNING TO WORK ON June 10TH AND IS NEEDING PAPERWORK FAXED TO [employer]  AND HE ALSO NEEDS A COPY TO TAKE TO WORK Monday     PLEASE ADVISE   Mitchell Jacome ([facility]) [phone] (H)

## 2021-06-11 ENCOUNTER — CLINICAL SUPPORT (OUTPATIENT)
Dept: FAMILY MEDICINE CLINIC | Facility: CLINIC | Age: 56
End: 2021-06-11

## 2021-06-11 DIAGNOSIS — K86.1 CHRONIC RECURRENT PANCREATITIS (HCC): ICD-10-CM

## 2021-06-11 DIAGNOSIS — N28.9 RENAL INSUFFICIENCY: ICD-10-CM

## 2021-06-11 PROCEDURE — 36415 COLL VENOUS BLD VENIPUNCTURE: CPT | Performed by: FAMILY MEDICINE

## 2021-06-11 PROCEDURE — 80048 BASIC METABOLIC PNL TOTAL CA: CPT | Performed by: FAMILY MEDICINE

## 2021-06-11 RX ORDER — BLOOD SUGAR DIAGNOSTIC
1 STRIP MISCELLANEOUS
Qty: 360 EACH | Refills: 3 | Status: SHIPPED | OUTPATIENT
Start: 2021-06-11 | End: 2023-03-08 | Stop reason: SDUPTHER

## 2021-06-11 RX ORDER — HYDROCODONE BITARTRATE AND ACETAMINOPHEN 7.5; 325 MG/1; MG/1
1 TABLET ORAL EVERY 8 HOURS PRN
Qty: 60 TABLET | Refills: 0 | Status: SHIPPED | OUTPATIENT
Start: 2021-06-11 | End: 2021-08-02 | Stop reason: SDUPTHER

## 2021-06-12 LAB
ANION GAP SERPL CALCULATED.3IONS-SCNC: 10.3 MMOL/L (ref 5–15)
BUN SERPL-MCNC: 20 MG/DL (ref 6–20)
BUN/CREAT SERPL: 15 (ref 7–25)
CALCIUM SPEC-SCNC: 9.2 MG/DL (ref 8.6–10.5)
CHLORIDE SERPL-SCNC: 102 MMOL/L (ref 98–107)
CO2 SERPL-SCNC: 24.7 MMOL/L (ref 22–29)
CREAT SERPL-MCNC: 1.33 MG/DL (ref 0.76–1.27)
GFR SERPL CREATININE-BSD FRML MDRD: 56 ML/MIN/1.73
GLUCOSE SERPL-MCNC: 166 MG/DL (ref 65–99)
POTASSIUM SERPL-SCNC: 4.3 MMOL/L (ref 3.5–5.2)
SODIUM SERPL-SCNC: 137 MMOL/L (ref 136–145)

## 2021-06-14 ENCOUNTER — TELEPHONE (OUTPATIENT)
Dept: FAMILY MEDICINE CLINIC | Facility: CLINIC | Age: 56
End: 2021-06-14

## 2021-06-14 ENCOUNTER — PATIENT MESSAGE (OUTPATIENT)
Dept: FAMILY MEDICINE CLINIC | Facility: CLINIC | Age: 56
End: 2021-06-14

## 2021-06-14 NOTE — TELEPHONE ENCOUNTER
Patient states that he has been taking 17 units in the evening and watching what he eats. Blood sugar readings have been anywhere from 140-200.     Patient was notified that the Freestyle Curry scripts are at the  for .

## 2021-06-14 NOTE — TELEPHONE ENCOUNTER
Per :  Kidneys dry----push more fluids; what are his Blood Sugar readings?  Sodium normal now    Patient was notified of the results.

## 2021-06-15 RX ORDER — INSULIN GLARGINE 100 [IU]/ML
INJECTION, SOLUTION SUBCUTANEOUS
COMMUNITY
Start: 2021-06-15 | End: 2021-08-02

## 2021-06-29 RX ORDER — FENOFIBRATE 160 MG/1
TABLET ORAL
Qty: 90 TABLET | Refills: 0 | Status: SHIPPED | OUTPATIENT
Start: 2021-06-29 | End: 2021-09-07 | Stop reason: SDUPTHER

## 2021-06-29 NOTE — TELEPHONE ENCOUNTER
Last visit:  5/11/21  Next visit: 8/10/21  Last labs: 6/11/21  Last Lipid:2/20/21    Rx requested: Fenofibrate  Pharmacy:[facility] in [place]

## 2021-07-01 RX ORDER — DULOXETIN HYDROCHLORIDE 30 MG/1
CAPSULE, DELAYED RELEASE ORAL
Qty: 90 CAPSULE | Refills: 0 | Status: SHIPPED | OUTPATIENT
Start: 2021-07-01 | End: 2021-09-07 | Stop reason: SDUPTHER

## 2021-07-01 NOTE — TELEPHONE ENCOUNTER
Last visit:5/11/21  Next visit:8/10/21  Last labs: 6/11/21  Last Lipid:2/20/21    Rx requested: Duloxetine   Pharmacy: [facility] in [place]

## 2021-08-02 ENCOUNTER — TELEPHONE (OUTPATIENT)
Dept: FAMILY MEDICINE CLINIC | Facility: CLINIC | Age: 56
End: 2021-08-02

## 2021-08-02 DIAGNOSIS — K86.1 CHRONIC RECURRENT PANCREATITIS (HCC): ICD-10-CM

## 2021-08-02 RX ORDER — INSULIN GLARGINE 100 [IU]/ML
17 INJECTION, SOLUTION SUBCUTANEOUS NIGHTLY
Qty: 2 PEN | Refills: 0 | COMMUNITY
Start: 2021-08-02 | End: 2021-08-04 | Stop reason: SDUPTHER

## 2021-08-02 RX ORDER — HYDROCODONE BITARTRATE AND ACETAMINOPHEN 7.5; 325 MG/1; MG/1
1 TABLET ORAL EVERY 8 HOURS PRN
Qty: 60 TABLET | Refills: 0 | Status: SHIPPED | OUTPATIENT
Start: 2021-08-02 | End: 2021-09-07 | Stop reason: SDUPTHER

## 2021-08-02 RX ORDER — SUCRALFATE 1 G/1
TABLET ORAL
Qty: 360 TABLET | Refills: 0 | Status: SHIPPED | OUTPATIENT
Start: 2021-08-02 | End: 2022-07-26

## 2021-08-02 NOTE — TELEPHONE ENCOUNTER
Last visit:  5/11/21  Next visit: 8/10/21  Last labs:6/11/21    Rx requested: Sucralfate   Pharmacy: [facility] in [facility]

## 2021-08-02 NOTE — TELEPHONE ENCOUNTER
Last visit:  5/11/21  Next visit: 8/10/21  Last labs:6/11/21     Rx requested: Hydrocodone,Basaglar   Pharmacy: [facility] in [facility]

## 2021-08-04 RX ORDER — INSULIN GLARGINE 100 [IU]/ML
17 INJECTION, SOLUTION SUBCUTANEOUS NIGHTLY
Qty: 2 PEN | Refills: 0 | Status: SHIPPED | OUTPATIENT
Start: 2021-08-04 | End: 2021-09-07

## 2021-08-11 ENCOUNTER — TELEPHONE (OUTPATIENT)
Dept: FAMILY MEDICINE CLINIC | Facility: CLINIC | Age: 56
End: 2021-08-11

## 2021-08-11 NOTE — TELEPHONE ENCOUNTER
Caller: ZAINAB CRUZ    Relationship: Emergency Contact    Best call back number: [phone]     What medication are you requesting: ANY    What are your current symptoms: SEVERE BODY ACHES DUE TO COVID / COUGH - OTC MEDICATION NOT HELPING    How long have you been experiencing symptoms: 08/09/21    Have you had these symptoms before:    [] Yes  [x] No    Have you been treated for these symptoms before:   [] Yes  [x] No    If a prescription is needed, what is your preferred pharmacy and phone number: [facility]ACY #[id number] - [place], [place] - [address] - [phone]  - [phone] FX

## 2021-08-11 NOTE — TELEPHONE ENCOUNTER
Rx Refill Note  Requested Prescriptions     Pending Prescriptions Disp Refills   • Continuous Blood Gluc Sensor (FreeStyle Curry 2 Sensor) misc 6 each 3     Si package Every 14 (Fourteen) Days.      Last office visit with prescribing clinician: 2021      Next office visit with prescribing clinician: 2021     Basic Metabolic Panel (2021 14:39)  POC Glycosylated Hemoglobin (Hb A1C) (2021 15:50)  Lipid Panel (2021 12:08)         Dedra Jackson CMA  21, 13:30 EDT

## 2021-08-11 NOTE — TELEPHONE ENCOUNTER
Caller: ZAINAB CRUZ    Relationship: Emergency Contact    Best call back number: [phone]     Medication needed:   Requested Prescriptions     Pending Prescriptions Disp Refills   • Continuous Blood Gluc Sensor (FreeStyle Curry 2 Sensor) misc 6 each 3     Si package Every 14 (Fourteen) Days.       When do you need the refill by: 21    What additional details did the patient provide when requesting the medication: PATIENT IS COVID POSITIVE AND WILL NOT HAVE ENOUGH OF THESE TO LAST UNTIL AFTER QUARANTINE     Does the patient have less than a 3 day supply:  [x] Yes  [] No    What is the patient's preferred pharmacy: [facility]/PHARMACY #[id number] - [place], [place] - [address] - [phone]  - [phone] FX

## 2021-08-19 ENCOUNTER — TELEPHONE (OUTPATIENT)
Dept: FAMILY MEDICINE CLINIC | Facility: CLINIC | Age: 56
End: 2021-08-19

## 2021-08-19 NOTE — TELEPHONE ENCOUNTER
Caller: Mitchell Jacome    Relationship: Self    Best call back number:[phone]    Medication needed:   Requested Prescriptions     Pending Prescriptions Disp Refills   • Continuous Blood Gluc Sensor (FreeStyle Curry 2 Sensor) misc 6 each 3     Si package Every 14 (Fourteen) Days.       When do you need the refill by: ASAP    What additional details did the patient provide when requesting the medication: PATIENT IS OUT  Does the patient have less than a 3 day supply:  [x] Yes  [] No    What is the patient's preferred pharmacy: [facility]/PHARMACY #[id number] - [place], [place] - [address] - [phone]  - [phone] FX

## 2021-08-19 NOTE — TELEPHONE ENCOUNTER
Rx Refill Note  Requested Prescriptions     Pending Prescriptions Disp Refills   • Continuous Blood Gluc Sensor (FreeStyle Curry 2 Sensor) misc 6 each 3     Si package Every 14 (Fourteen) Days.      Last office visit with prescribing clinician: 2021      Next office visit with prescribing clinician: 2021     SCANNED - LABS (2021)         Tyesha De Leon, RT  21, 12:07 EDT

## 2021-09-07 ENCOUNTER — OFFICE VISIT (OUTPATIENT)
Dept: FAMILY MEDICINE CLINIC | Facility: CLINIC | Age: 56
End: 2021-09-07

## 2021-09-07 VITALS
HEIGHT: 74 IN | RESPIRATION RATE: 16 BRPM | SYSTOLIC BLOOD PRESSURE: 118 MMHG | OXYGEN SATURATION: 97 % | BODY MASS INDEX: 23.74 KG/M2 | TEMPERATURE: 98.4 F | WEIGHT: 185 LBS | DIASTOLIC BLOOD PRESSURE: 79 MMHG | HEART RATE: 80 BPM

## 2021-09-07 DIAGNOSIS — K86.1 CHRONIC RECURRENT PANCREATITIS (HCC): ICD-10-CM

## 2021-09-07 DIAGNOSIS — E11.65 UNCONTROLLED TYPE 2 DIABETES MELLITUS WITH HYPERGLYCEMIA (HCC): Primary | ICD-10-CM

## 2021-09-07 DIAGNOSIS — I10 BENIGN ESSENTIAL HYPERTENSION: ICD-10-CM

## 2021-09-07 DIAGNOSIS — F32.9 REACTIVE DEPRESSION: ICD-10-CM

## 2021-09-07 LAB — HBA1C MFR BLD: 6.9 %

## 2021-09-07 PROCEDURE — 99214 OFFICE O/P EST MOD 30 MIN: CPT | Performed by: FAMILY MEDICINE

## 2021-09-07 PROCEDURE — 3044F HG A1C LEVEL LT 7.0%: CPT | Performed by: FAMILY MEDICINE

## 2021-09-07 PROCEDURE — 83036 HEMOGLOBIN GLYCOSYLATED A1C: CPT | Performed by: FAMILY MEDICINE

## 2021-09-07 RX ORDER — PROCHLORPERAZINE 25 MG/1
SUPPOSITORY RECTAL
Qty: 3 EACH | Refills: 3 | Status: SHIPPED | OUTPATIENT
Start: 2021-09-07 | End: 2021-11-11

## 2021-09-07 RX ORDER — PROCHLORPERAZINE 25 MG/1
SUPPOSITORY RECTAL
Qty: 3 EACH | Refills: 3 | Status: SHIPPED | OUTPATIENT
Start: 2021-09-07 | End: 2021-09-07

## 2021-09-07 RX ORDER — PROCHLORPERAZINE 25 MG/1
1 SUPPOSITORY RECTAL DAILY
Qty: 1 EACH | Refills: 3 | Status: SHIPPED | OUTPATIENT
Start: 2021-09-07 | End: 2021-11-11

## 2021-09-07 RX ORDER — FENOFIBRATE 160 MG/1
160 TABLET ORAL DAILY
Qty: 90 TABLET | Refills: 1 | Status: SHIPPED | OUTPATIENT
Start: 2021-09-07 | End: 2021-12-22

## 2021-09-07 RX ORDER — PROCHLORPERAZINE 25 MG/1
1 SUPPOSITORY RECTAL DAILY
Qty: 1 EACH | Refills: 0 | Status: SHIPPED | OUTPATIENT
Start: 2021-09-07 | End: 2021-11-11

## 2021-09-07 RX ORDER — PROCHLORPERAZINE 25 MG/1
1 SUPPOSITORY RECTAL DAILY
Qty: 1 EACH | Refills: 0 | Status: SHIPPED | OUTPATIENT
Start: 2021-09-07 | End: 2021-09-07

## 2021-09-07 RX ORDER — DULOXETIN HYDROCHLORIDE 30 MG/1
30 CAPSULE, DELAYED RELEASE ORAL DAILY
Qty: 90 CAPSULE | Refills: 0 | Status: SHIPPED | OUTPATIENT
Start: 2021-09-07 | End: 2021-10-28

## 2021-09-07 RX ORDER — HYDROCODONE BITARTRATE AND ACETAMINOPHEN 7.5; 325 MG/1; MG/1
1 TABLET ORAL EVERY 8 HOURS PRN
Qty: 60 TABLET | Refills: 0 | Status: SHIPPED | OUTPATIENT
Start: 2021-09-07 | End: 2021-10-29 | Stop reason: SDUPTHER

## 2021-09-07 RX ORDER — PROCHLORPERAZINE 25 MG/1
1 SUPPOSITORY RECTAL DAILY
Qty: 1 EACH | Refills: 3 | Status: SHIPPED | OUTPATIENT
Start: 2021-09-07 | End: 2021-09-07

## 2021-09-07 RX ORDER — INSULIN GLARGINE 300 U/ML
20 INJECTION, SOLUTION SUBCUTANEOUS EVERY MORNING
Qty: 3 PEN | Refills: 0 | Status: SHIPPED | OUTPATIENT
Start: 2021-09-07 | End: 2022-02-07

## 2021-09-07 NOTE — PROGRESS NOTES
Subjective   Mitchell Jacome is a 56 y.o. male.     Chief Complaint   Patient presents with   • Diabetes   • Hypertension   • Pancreatitis   • Depression         Current Outpatient Medications:   •  atenolol (TENORMIN) 25 MG tablet, Take 1 tablet by mouth Daily., Disp: 90 tablet, Rfl: 0  •  atorvastatin (LIPITOR) 20 MG tablet, Take 20 mg by mouth Daily., Disp: , Rfl:   •  dicyclomine (BENTYL) 20 MG tablet, 1/2 tab - 1 po q6hrs prn Abd cramping/ bloating/ [id number], Disp: 30 tablet, Rfl: 0  •  DULoxetine (CYMBALTA) 30 MG capsule, Take 1 capsule by mouth Daily., Disp: 90 capsule, Rfl: 0  •  fenofibrate 160 MG tablet, Take 1 tablet by mouth Daily., Disp: 90 tablet, Rfl: 1  •  Glucagon, rDNA, (Glucagon Emergency) 1 MG kit, Inject 1 kit as directed As Needed (for low BS)., Disp: 1 each, Rfl: 2  •  glucose blood (OneTouch Verio) test strip, 1 each by Other route 4 (Four) Times a Day Before Meals & at Bedtime. Use as instructed, Disp: 360 each, Rfl: 3  •  HYDROcodone-acetaminophen (Norco) 7.5-325 MG per tablet, Take 1 tablet by mouth Every 8 (Eight) Hours As Needed for Severe Pain ., Disp: 60 tablet, Rfl: 0  •  Insulin Pen Needle 32G X 4 MM misc, 1 each every night at bedtime., Disp: 360 each, Rfl: 0  •  Isopropyl Alcohol (Alcohol Wipes) 70 % misc, Apply 1 each topically 4 (Four) Times a Day Before Meals & at Bedtime., Disp: 200 each, Rfl: 0  •  Lancets (OneTouch Delica Plus [id number]) misc, 1 each 4 (Four) Times a Day Before Meals & at Bedtime., Disp: 360 each, Rfl: 1  •  levETIRAcetam (KEPPRA) 500 MG tablet, Take 500 mg by mouth 2 (Two) Times a Day., Disp: , Rfl:   •  multivitamin with minerals (Centrum Silver) tablet tablet, Take 1 tablet by mouth Daily., Disp: , Rfl:   •  omeprazole (priLOSEC) 40 MG capsule, Take 1 capsule by mouth Daily., Disp: 90 capsule, Rfl: 0  •  pancrelipase, Lip-Prot-Amyl, (CREON) 84358-04014 units capsule delayed-release particles capsule, 3 tabs po TID W/ meals, Disp: , Rfl:   •   promethazine (PHENERGAN) 25 MG suppository, Insert 1 suppository into the rectum 2 (Two) Times a Day As Needed for Nausea or Vomiting., Disp: 40 suppository, Rfl: 0  •  promethazine (PHENERGAN) 25 MG tablet, Take 1 tablet by mouth Every 6 (Six) Hours As Needed for Nausea or Vomiting., Disp: 60 tablet, Rfl: 1  •  sucralfate (CARAFATE) 1 g tablet, TAKE 1 TABLET BY MOUTH 4 TIMES A DAY WITH MEALS & AT BEDTIME. (Patient taking differently: Take 1 g by mouth 2 (two) times a day.), Disp: 360 tablet, Rfl: 0  •  Continuous Blood Gluc  (Dexcom G6 ) device, 1 package Daily., Disp: 1 each, Rfl: 0  •  Continuous Blood Gluc Sensor (Dexcom G6 Sensor), Every 10 (Ten) Days., Disp: 3 each, Rfl: 3  •  Continuous Blood Gluc Transmit (Dexcom G6 Transmitter) misc, 1 package Daily., Disp: 1 each, Rfl: 3  •  Insulin Glargine, 2 Unit Dial, (Vita Cocoar) 300 UNIT/ML solution pen-injector injection, Inject 20 Units under the skin into the appropriate area as directed Every Morning., Disp: 3 pen, Rfl: 0    Past Medical History:   Diagnosis Date   • Chronic recurrent pancreatitis    • COVID-19    • Depression    • DMII    • GERD    • Hearing loss, right     85%   • HTN    • Seizures    • Solitary kidney        Past Surgical History:   Procedure Laterality Date   • APPENDECTOMY     • CHOLECYSTECTOMY     • COLONOSCOPY      NEG = 2016, rech 2026     GSI       Family History   Problem Relation Age of Onset   • Cerebral aneurysm Mother    • Hypertension Father    • Cancer Father 60        Bladder   • Diabetes Father    • Hyperlipidemia Father    • Hypertension Half-Brother    • Other Half-Brother         CHOL   • Stroke Half-Brother    • Hypertension Brother    • Hyperlipidemia Brother    • Heart disease Brother         CVA       Social History     Socioeconomic History   • Marital status:      Spouse name: Not on file   • Number of children: Not on file   • Years of education: Not on file   • Highest education level:  Not on file   Tobacco Use   • Smoking status: Never Smoker   • Smokeless tobacco: Never Used   Vaping Use   • Vaping Use: Never used   Substance and Sexual Activity   • Alcohol use: Not Currently   • Sexual activity: Defer       57 y/o C male here w/ his wife for f/u on DMII/ Depression/ HTN/ Recurrent Pancreatitis    VISION First ----Optho this week  Pt brought in his BS's from home and usu <170's    Pt feels current dose of cymbalta is working fine  Pt is working on diet choices----------not having as much pain in abd       The following portions of the patient's history were reviewed and updated as appropriate: allergies, current medications, past family history, past medical history, past social history, past surgical history and problem list.    Review of Systems   Constitutional: Negative for activity change, appetite change, fatigue, unexpected weight gain and unexpected weight loss.   Eyes: Negative for blurred vision, double vision, pain and visual disturbance.   Cardiovascular: Negative for leg swelling.   Gastrointestinal: Negative for abdominal distention, abdominal pain, constipation, diarrhea, nausea and vomiting.   Endocrine: Negative for polydipsia, polyphagia and polyuria.   Genitourinary: Negative for frequency.   Musculoskeletal: Negative for gait problem.   Skin: Negative for color change, dry skin, rash and skin lesions.   Neurological: Negative for weakness and numbness.       Vitals:    09/07/21 1523   BP: 118/79   Pulse: 80   Resp: 16   Temp: 98.4 °F (36.9 °C)   SpO2: 97%       Objective   Physical Exam  Vitals and nursing note reviewed.   Constitutional:       General: He is not in acute distress.     Appearance: He is not ill-appearing or toxic-appearing.   HENT:      Head: Normocephalic and atraumatic.   Cardiovascular:      Rate and Rhythm: Normal rate and regular rhythm.      Pulses: Normal pulses.           Dorsalis pedis pulses are 2+ on the right side and 2+ on the left side.    Pulmonary:      Effort: Pulmonary effort is normal. No respiratory distress.      Breath sounds: No stridor. No wheezing, rhonchi or rales.   Musculoskeletal:      Right foot: Normal range of motion. No deformity, bunion, Charcot foot, foot drop or prominent metatarsal heads.      Left foot: Normal range of motion. No deformity, bunion, Charcot foot, foot drop or prominent metatarsal heads.   Feet:      Right foot:      Skin integrity: Skin integrity normal. No ulcer, blister, skin breakdown, erythema, warmth, callus, dry skin or fissure.      Toenail Condition: Right toenails are normal.      Left foot:      Skin integrity: Skin integrity normal. No ulcer, blister, skin breakdown, erythema, warmth, callus, dry skin or fissure.      Toenail Condition: Left toenails are normal.   Neurological:      Mental Status: He is alert and oriented to person, place, and time.      Cranial Nerves: No cranial nerve deficit.   Psychiatric:         Mood and Affect: Mood normal.         Behavior: Behavior normal.         Thought Content: Thought content normal.         Judgment: Judgment normal.           Assessment/Plan   Diagnoses and all orders for this visit:    1. Uncontrolled type 2 diabetes mellitus with hyperglycemia (CMS/HCC) (Primary)  -     POC Glycosylated Hemoglobin (Hb A1C)    2. Chronic recurrent pancreatitis (CMS/HCC)  -     HYDROcodone-acetaminophen (Norco) 7.5-325 MG per tablet; Take 1 tablet by mouth Every 8 (Eight) Hours As Needed for Severe Pain .  Dispense: 60 tablet; Refill: 0    3. Benign essential hypertension    4. Reactive depression    Other orders  -     Insulin Glargine, 2 Unit Dial, (Toujeo Max SoloStar) 300 UNIT/ML solution pen-injector injection; Inject 20 Units under the skin into the appropriate area as directed Every Morning.  Dispense: 3 pen; Refill: 0  -     Discontinue: Continuous Blood Gluc Transmit (Dexcom G6 Transmitter) misc; 1 package Daily.  Dispense: 1 each; Refill: 3  -      Discontinue: Continuous Blood Gluc  (Dexcom G6 ) device; 1 package Daily.  Dispense: 1 each; Refill: 0  -     Discontinue: Continuous Blood Gluc Sensor (Dexcom G6 Sensor); Every 10 (Ten) Days.  Dispense: 3 each; Refill: 3  -     Continuous Blood Gluc  (Dexcom G6 ) device; 1 package Daily.  Dispense: 1 each; Refill: 0  -     Continuous Blood Gluc Sensor (Dexcom G6 Sensor); Every 10 (Ten) Days.  Dispense: 3 each; Refill: 3  -     Continuous Blood Gluc Transmit (Dexcom G6 Transmitter) misc; 1 package Daily.  Dispense: 1 each; Refill: 3  -     DULoxetine (CYMBALTA) 30 MG capsule; Take 1 capsule by mouth Daily.  Dispense: 90 capsule; Refill: 0  -     fenofibrate 160 MG tablet; Take 1 tablet by mouth Daily.  Dispense: 90 tablet; Refill: 1

## 2021-09-08 ENCOUNTER — TELEPHONE (OUTPATIENT)
Dept: FAMILY MEDICINE CLINIC | Facility: CLINIC | Age: 56
End: 2021-09-08

## 2021-09-20 RX ORDER — DULOXETIN HYDROCHLORIDE 30 MG/1
CAPSULE, DELAYED RELEASE ORAL
Qty: 90 CAPSULE | Refills: 0 | OUTPATIENT
Start: 2021-09-20

## 2021-09-20 NOTE — TELEPHONE ENCOUNTER
Rx Refill Note  Requested Prescriptions     Pending Prescriptions Disp Refills   • DULoxetine (CYMBALTA) 30 MG capsule [Pharmacy Med Name: DULOXETINE HCL DR 30 MG CAP] 90 capsule 0     Sig: TAKE 1 CAPSULE BY MOUTH EVERY DAY      Last office visit with prescribing clinician: 9/7/2021      Next office visit with prescribing clinician: Visit date not found            Dedra Jackson CMA  09/20/21, 10:13 EDT

## 2021-09-22 NOTE — TELEPHONE ENCOUNTER
HUB to read.  PA was resent for the DEXCOM G6 reciever and sensors manually to insurance.   Waiting on the outcome from insurance.

## 2021-10-22 RX ORDER — INSULIN GLARGINE 100 [IU]/ML
17 INJECTION, SOLUTION SUBCUTANEOUS NIGHTLY
OUTPATIENT
Start: 2021-10-22

## 2021-10-28 RX ORDER — DULOXETIN HYDROCHLORIDE 30 MG/1
CAPSULE, DELAYED RELEASE ORAL
Qty: 90 CAPSULE | Refills: 0 | Status: SHIPPED | OUTPATIENT
Start: 2021-10-28 | End: 2022-03-04 | Stop reason: SDUPTHER

## 2021-10-28 NOTE — TELEPHONE ENCOUNTER
Rx Refill Note  Requested Prescriptions     Pending Prescriptions Disp Refills   • DULoxetine (CYMBALTA) 30 MG capsule [Pharmacy Med Name: DULOXETINE HCL DR 30 MG CAP] 90 capsule 0     Sig: TAKE 1 CAPSULE BY MOUTH EVERY DAY      Last office visit with prescribing clinician: 9/7/2021      Next office visit with prescribing clinician: Visit date not found     POC Glycosylated Hemoglobin (Hb A1C) (09/07/2021 15:44)  Basic Metabolic Panel (06/11/2021 14:39)  Lipid Panel (09/08/2018 21:34)         Dedra Jackson CMA  10/28/21, 10:27 EDT

## 2021-10-29 DIAGNOSIS — K86.1 CHRONIC RECURRENT PANCREATITIS (HCC): ICD-10-CM

## 2021-10-29 RX ORDER — HYDROCODONE BITARTRATE AND ACETAMINOPHEN 7.5; 325 MG/1; MG/1
1 TABLET ORAL EVERY 8 HOURS PRN
Qty: 60 TABLET | Refills: 0 | Status: SHIPPED | OUTPATIENT
Start: 2021-10-29 | End: 2021-12-28 | Stop reason: SDUPTHER

## 2021-10-29 NOTE — TELEPHONE ENCOUNTER
Rx Refill Note  Requested Prescriptions     Pending Prescriptions Disp Refills   • HYDROcodone-acetaminophen (Norco) 7.5-325 MG per tablet 60 tablet 0     Sig: Take 1 tablet by mouth Every 8 (Eight) Hours As Needed for Severe Pain .      Last office visit with prescribing clinician: 9/7/2021      Next office visit with prescribing clinician: Visit date not found     POC Glycosylated Hemoglobin (Hb A1C) (09/07/2021 15:44)  Basic Metabolic Panel (06/11/2021 14:39)  SCANNED - LABS (05/06/2021)         Dedra Jackson CMA  10/29/21, 11:19 EDT

## 2021-11-02 ENCOUNTER — TELEPHONE (OUTPATIENT)
Dept: FAMILY MEDICINE CLINIC | Facility: CLINIC | Age: 56
End: 2021-11-02

## 2021-11-03 ENCOUNTER — TELEPHONE (OUTPATIENT)
Dept: FAMILY MEDICINE CLINIC | Facility: CLINIC | Age: 56
End: 2021-11-03

## 2021-11-03 NOTE — TELEPHONE ENCOUNTER
HUB to read.  PA was resent for the Dexcom G6 Reciever and sensor.   Waiting on the outcome from insurance.

## 2021-11-11 ENCOUNTER — OFFICE VISIT (OUTPATIENT)
Dept: FAMILY MEDICINE CLINIC | Facility: CLINIC | Age: 56
End: 2021-11-11

## 2021-11-11 VITALS
HEART RATE: 69 BPM | DIASTOLIC BLOOD PRESSURE: 76 MMHG | HEIGHT: 74 IN | RESPIRATION RATE: 16 BRPM | TEMPERATURE: 96.4 F | WEIGHT: 192 LBS | BODY MASS INDEX: 24.64 KG/M2 | OXYGEN SATURATION: 99 % | SYSTOLIC BLOOD PRESSURE: 135 MMHG

## 2021-11-11 DIAGNOSIS — R42 DIZZINESS OF UNKNOWN ETIOLOGY: ICD-10-CM

## 2021-11-11 DIAGNOSIS — H55.09 HORIZONTAL NYSTAGMUS: Primary | ICD-10-CM

## 2021-11-11 PROCEDURE — 99214 OFFICE O/P EST MOD 30 MIN: CPT | Performed by: FAMILY MEDICINE

## 2021-11-11 NOTE — PROGRESS NOTES
Subjective   Mitchell Jacome is a 56 y.o. male.     Chief Complaint   Patient presents with   • Dizziness     Eye twitching,dizziness,vomiting,sweating x1-2 months.          Current Outpatient Medications:   •  atenolol (TENORMIN) 25 MG tablet, Take 1 tablet by mouth Daily., Disp: 90 tablet, Rfl: 0  •  atorvastatin (LIPITOR) 20 MG tablet, Take 20 mg by mouth Daily., Disp: , Rfl:   •  dicyclomine (BENTYL) 20 MG tablet, 1/2 tab - 1 po q6hrs prn Abd cramping/ bloating/ [id number], Disp: 30 tablet, Rfl: 0  •  DULoxetine (CYMBALTA) 30 MG capsule, TAKE 1 CAPSULE BY MOUTH EVERY DAY, Disp: 90 capsule, Rfl: 0  •  fenofibrate 160 MG tablet, Take 1 tablet by mouth Daily., Disp: 90 tablet, Rfl: 1  •  Glucagon, rDNA, (Glucagon Emergency) 1 MG kit, Inject 1 kit as directed As Needed (for low BS)., Disp: 1 each, Rfl: 2  •  glucose blood (OneTouch Verio) test strip, 1 each by Other route 4 (Four) Times a Day Before Meals & at Bedtime. Use as instructed, Disp: 360 each, Rfl: 3  •  HYDROcodone-acetaminophen (Norco) 7.5-325 MG per tablet, Take 1 tablet by mouth Every 8 (Eight) Hours As Needed for Severe Pain ., Disp: 60 tablet, Rfl: 0  •  Insulin Glargine, 2 Unit Dial, (Toujeo Max SoloStar) 300 UNIT/ML solution pen-injector injection, Inject 20 Units under the skin into the appropriate area as directed Every Morning., Disp: 3 pen, Rfl: 0  •  Insulin Pen Needle 32G X 4 MM misc, 1 each every night at bedtime., Disp: 360 each, Rfl: 0  •  Isopropyl Alcohol (Alcohol Wipes) 70 % misc, Apply 1 each topically 4 (Four) Times a Day Before Meals & at Bedtime., Disp: 200 each, Rfl: 0  •  Lancets (OneTouch Delica Plus [id number]) misc, 1 each 4 (Four) Times a Day Before Meals & at Bedtime., Disp: 360 each, Rfl: 1  •  levETIRAcetam (KEPPRA) 500 MG tablet, Take 500 mg by mouth 2 (Two) Times a Day., Disp: , Rfl:   •  multivitamin with minerals (Centrum Silver) tablet tablet, Take 1 tablet by mouth Daily., Disp: , Rfl:   •  omeprazole (priLOSEC)  40 MG capsule, Take 1 capsule by mouth Daily., Disp: 90 capsule, Rfl: 0  •  pancrelipase, Lip-Prot-Amyl, (CREON) 64649-55042 units capsule delayed-release particles capsule, 3 tabs po TID W/ meals, Disp: , Rfl:   •  promethazine (PHENERGAN) 25 MG suppository, Insert 1 suppository into the rectum 2 (Two) Times a Day As Needed for Nausea or Vomiting., Disp: 40 suppository, Rfl: 0  •  promethazine (PHENERGAN) 25 MG tablet, Take 1 tablet by mouth Every 6 (Six) Hours As Needed for Nausea or Vomiting., Disp: 60 tablet, Rfl: 1  •  sucralfate (CARAFATE) 1 g tablet, TAKE 1 TABLET BY MOUTH 4 TIMES A DAY WITH MEALS & AT BEDTIME. (Patient taking differently: Take 1 g by mouth 2 (two) times a day.), Disp: 360 tablet, Rfl: 0    Past Medical History:   Diagnosis Date   • Chronic recurrent pancreatitis    • COVID-19    • Depression    • DMII    • GERD    • Hearing loss, right     85%   • HTN    • Seizures    • Solitary kidney        Past Surgical History:   Procedure Laterality Date   • APPENDECTOMY     • CHOLECYSTECTOMY     • COLONOSCOPY      NEG = 2016, rech 2026     GSI       Family History   Problem Relation Age of Onset   • Cerebral aneurysm Mother    • Hypertension Father    • Cancer Father 60        Bladder   • Diabetes Father    • Hyperlipidemia Father    • Hypertension Half-Brother    • Other Half-Brother         CHOL   • Stroke Half-Brother    • Hypertension Brother    • Hyperlipidemia Brother    • Heart disease Brother         CVA       Social History     Socioeconomic History   • Marital status:    Tobacco Use   • Smoking status: Never Smoker   • Smokeless tobacco: Never Used   Vaping Use   • Vaping Use: Never used   Substance and Sexual Activity   • Alcohol use: Not Currently   • Sexual activity: Defer       55 y/o C male here w/ his wife and c/o's eye twitching/ dizzyness/ sweating episodes x 1-2mos    BS's = 140-160 usu when the episode starts.....   Episodes last 1.5 hr- 4hrs----lying down or vomiting helps;  usu happens in the afternoon and started about 1.5 mos ago; pt states he has horizontal nystagmus w/ the episodes and feels drained afterwards; pt thinks it starts w/ eye twitching  Pt had 2 episodes last week ----seems to happen after drinking afternoon coffee    Pt states only taking qday Keppra qday -----not sure when he started taking it only once a day vs bid       The following portions of the patient's history were reviewed and updated as appropriate: allergies, current medications, past family history, past medical history, past social history, past surgical history and problem list.    Review of Systems   Constitutional: Negative for activity change, appetite change, unexpected weight gain and unexpected weight loss.   Eyes: Negative for photophobia and visual disturbance.   Neurological: Positive for dizziness. Negative for light-headedness.       Vitals:    11/11/21 0838   BP: 135/76   Pulse: 69   Resp: 16   Temp: 96.4 °F (35.8 °C)   SpO2: 99%       Objective   Physical Exam  Vitals and nursing note reviewed.   Constitutional:       General: He is not in acute distress.     Appearance: He is well-developed and normal weight. He is not toxic-appearing.   HENT:      Head: Normocephalic and atraumatic.      Right Ear: Tympanic membrane, ear canal and external ear normal. There is no impacted cerumen.      Left Ear: Tympanic membrane, ear canal and external ear normal. There is no impacted cerumen.   Eyes:      Extraocular Movements:      Right eye: Normal extraocular motion and no nystagmus.      Left eye: Normal extraocular motion and no nystagmus.   Neck:      Vascular: No carotid bruit.   Cardiovascular:      Rate and Rhythm: Normal rate and regular rhythm.      Heart sounds: Normal heart sounds. No murmur heard.      Pulmonary:      Effort: Pulmonary effort is normal. No respiratory distress.      Breath sounds: Normal breath sounds. No stridor. No wheezing, rhonchi or rales.   Abdominal:      General:  Abdomen is flat. There is no distension.      Palpations: There is no mass.      Tenderness: There is generalized abdominal tenderness. There is no guarding or rebound.      Hernia: No hernia is present.   Musculoskeletal:      Cervical back: Normal range of motion and neck supple.   Skin:     General: Skin is warm and dry.      Findings: No rash.   Neurological:      Mental Status: He is alert and oriented to person, place, and time.   Psychiatric:         Mood and Affect: Mood normal.         Behavior: Behavior normal.         Thought Content: Thought content normal.         Judgment: Judgment normal.           Assessment/Plan   Diagnoses and all orders for this visit:    1. Horizontal nystagmus (Primary)  -     Ambulatory Referral to ENT (Otolaryngology)    2. Dizziness of unknown etiology  -     Ambulatory Referral to ENT (Otolaryngology)    referral to ENT vs Neuro    Copy of last MRI  2015  Copy of neuro consult  Notes ?2018     restart keppra bid again  Hold pm coffee

## 2021-12-08 ENCOUNTER — CLINICAL SUPPORT (OUTPATIENT)
Dept: FAMILY MEDICINE CLINIC | Facility: CLINIC | Age: 56
End: 2021-12-08

## 2021-12-08 DIAGNOSIS — E11.65 UNCONTROLLED TYPE 2 DIABETES MELLITUS WITH HYPERGLYCEMIA (HCC): ICD-10-CM

## 2021-12-08 LAB
EXPIRATION DATE: ABNORMAL
HBA1C MFR BLD: 6.6 %
Lab: ABNORMAL

## 2021-12-08 PROCEDURE — 83036 HEMOGLOBIN GLYCOSYLATED A1C: CPT | Performed by: FAMILY MEDICINE

## 2021-12-09 ENCOUNTER — TELEPHONE (OUTPATIENT)
Dept: FAMILY MEDICINE CLINIC | Facility: CLINIC | Age: 56
End: 2021-12-09

## 2021-12-09 NOTE — TELEPHONE ENCOUNTER
----- Message from Krystyna Hardwick DO sent at 12/8/2021  7:20 PM EST -----      ----- Message -----  From: Dedra Jackson CMA  Sent: 12/8/2021   3:01 PM EST  To: Krystyna Hardwick DO

## 2021-12-22 RX ORDER — FENOFIBRATE 160 MG/1
TABLET ORAL
Qty: 90 TABLET | Refills: 1 | Status: SHIPPED | OUTPATIENT
Start: 2021-12-22 | End: 2022-10-18

## 2021-12-28 DIAGNOSIS — K86.1 CHRONIC RECURRENT PANCREATITIS (HCC): ICD-10-CM

## 2021-12-28 NOTE — TELEPHONE ENCOUNTER
Rx Refill Note  Requested Prescriptions     Pending Prescriptions Disp Refills   • HYDROcodone-acetaminophen (Norco) 7.5-325 MG per tablet 60 tablet 0     Sig: Take 1 tablet by mouth Every 8 (Eight) Hours As Needed for Severe Pain .      Last office visit with prescribing clinician: 11/11/2021      Next office visit with prescribing clinician: Visit date not found     POC Glycosylated Hemoglobin (Hb A1C) (12/08/2021 14:59)         Tyesha De Leon, RT  12/28/21, 11:31 EST

## 2021-12-29 RX ORDER — HYDROCODONE BITARTRATE AND ACETAMINOPHEN 7.5; 325 MG/1; MG/1
1 TABLET ORAL EVERY 8 HOURS PRN
Qty: 60 TABLET | Refills: 0 | Status: SHIPPED | OUTPATIENT
Start: 2021-12-29 | End: 2022-02-10 | Stop reason: SDUPTHER

## 2022-01-10 RX ORDER — PROMETHAZINE HYDROCHLORIDE 25 MG/1
25 TABLET ORAL EVERY 6 HOURS PRN
Qty: 60 TABLET | Refills: 1 | Status: SHIPPED | OUTPATIENT
Start: 2022-01-10 | End: 2022-07-12 | Stop reason: SDUPTHER

## 2022-01-10 NOTE — TELEPHONE ENCOUNTER
Rx Refill Note  Requested Prescriptions     Pending Prescriptions Disp Refills   • promethazine (PHENERGAN) 25 MG tablet [Pharmacy Med Name: PROMETHAZINE 25 MG TABLET] 60 tablet 1     Sig: TAKE 1 TABLET BY MOUTH EVERY 6 (SIX) HOURS AS NEEDED FOR NAUSEA OR VOMITING.      Last office visit with prescribing clinician: 11/11/2021      Next office visit with prescribing clinician: Visit date not found     POC Glycosylated Hemoglobin (Hb A1C) (12/08/2021 14:59)  Basic Metabolic Panel (06/11/2021 14:39)  Lipid Panel (02/20/2021 12:08)         Dedra Jackson CMA  01/10/22, 09:36 EST

## 2022-02-07 RX ORDER — INSULIN GLARGINE 300 U/ML
20 INJECTION, SOLUTION SUBCUTANEOUS EVERY MORNING
Qty: 3 PEN | Refills: 0 | Status: SHIPPED | OUTPATIENT
Start: 2022-02-07 | End: 2022-09-13 | Stop reason: SDUPTHER

## 2022-02-07 NOTE — TELEPHONE ENCOUNTER
Rx Refill Note  Requested Prescriptions     Pending Prescriptions Disp Refills   • Touesdraso Max SoloStar 300 UNIT/ML solution pen-injector injection [Pharmacy Med Name: TOUJEO MAX SOLOSTR 300 UNIT/ML]       Sig: INJECT 20 UNITS UNDER THE SKIN INTO THE APPROPRIATE AREA AS DIRECTED EVERY MORNING.      Last office visit with prescribing clinician: 11/11/2021      Next office visit with prescribing clinician: Visit date not found     POC Glycosylated Hemoglobin (Hb A1C) (12/08/2021 14:59)  Basic Metabolic Panel (06/11/2021 14:39)  Lipid Panel (02/20/2021 12:08)         Dedra Jackson, FELIPA  02/07/22, 08:46 EST

## 2022-02-10 DIAGNOSIS — K86.1 CHRONIC RECURRENT PANCREATITIS: ICD-10-CM

## 2022-02-10 RX ORDER — HYDROCODONE BITARTRATE AND ACETAMINOPHEN 7.5; 325 MG/1; MG/1
1 TABLET ORAL EVERY 8 HOURS PRN
Qty: 60 TABLET | Refills: 0 | Status: SHIPPED | OUTPATIENT
Start: 2022-02-10 | End: 2022-03-11 | Stop reason: SDUPTHER

## 2022-02-10 NOTE — TELEPHONE ENCOUNTER
Rx Refill Note  Requested Prescriptions     Pending Prescriptions Disp Refills   • HYDROcodone-acetaminophen (Norco) 7.5-325 MG per tablet 60 tablet 0     Sig: Take 1 tablet by mouth Every 8 (Eight) Hours As Needed for Severe Pain .      Last office visit with prescribing clinician: 11/11/2021      Next office visit with prescribing clinician: 3/4/2022     Basic Metabolic Panel (06/11/2021 14:39)         Tyesha De Leon, RT  02/10/22, 11:08 EST

## 2022-03-04 ENCOUNTER — OFFICE VISIT (OUTPATIENT)
Dept: FAMILY MEDICINE CLINIC | Facility: CLINIC | Age: 57
End: 2022-03-04

## 2022-03-04 VITALS
TEMPERATURE: 97.3 F | SYSTOLIC BLOOD PRESSURE: 137 MMHG | HEIGHT: 74 IN | DIASTOLIC BLOOD PRESSURE: 79 MMHG | HEART RATE: 72 BPM | WEIGHT: 193 LBS | RESPIRATION RATE: 14 BRPM | BODY MASS INDEX: 24.77 KG/M2 | OXYGEN SATURATION: 100 %

## 2022-03-04 DIAGNOSIS — N28.9 RENAL INSUFFICIENCY: ICD-10-CM

## 2022-03-04 DIAGNOSIS — I10 BENIGN ESSENTIAL HYPERTENSION: ICD-10-CM

## 2022-03-04 DIAGNOSIS — R56.9 SEIZURES: ICD-10-CM

## 2022-03-04 DIAGNOSIS — K86.1 CHRONIC RECURRENT PANCREATITIS: Primary | ICD-10-CM

## 2022-03-04 DIAGNOSIS — E11.649 CONTROLLED TYPE 2 DIABETES MELLITUS WITH HYPOGLYCEMIA, WITHOUT LONG-TERM CURRENT USE OF INSULIN: ICD-10-CM

## 2022-03-04 LAB
BASOPHILS # BLD AUTO: 0.07 10*3/MM3 (ref 0–0.2)
BASOPHILS NFR BLD AUTO: 1.2 % (ref 0–1.5)
DEPRECATED RDW RBC AUTO: 41.9 FL (ref 37–54)
EOSINOPHIL # BLD AUTO: 0.32 10*3/MM3 (ref 0–0.4)
EOSINOPHIL NFR BLD AUTO: 5.4 % (ref 0.3–6.2)
ERYTHROCYTE [DISTWIDTH] IN BLOOD BY AUTOMATED COUNT: 12.4 % (ref 12.3–15.4)
EXPIRATION DATE: ABNORMAL
HBA1C MFR BLD: 6.3 %
HCT VFR BLD AUTO: 47.3 % (ref 37.5–51)
HGB BLD-MCNC: 16 G/DL (ref 13–17.7)
IMM GRANULOCYTES # BLD AUTO: 0.02 10*3/MM3 (ref 0–0.05)
IMM GRANULOCYTES NFR BLD AUTO: 0.3 % (ref 0–0.5)
LYMPHOCYTES # BLD AUTO: 1.73 10*3/MM3 (ref 0.7–3.1)
LYMPHOCYTES NFR BLD AUTO: 28.9 % (ref 19.6–45.3)
Lab: ABNORMAL
MCH RBC QN AUTO: 31 PG (ref 26.6–33)
MCHC RBC AUTO-ENTMCNC: 33.8 G/DL (ref 31.5–35.7)
MCV RBC AUTO: 91.7 FL (ref 79–97)
MONOCYTES # BLD AUTO: 0.5 10*3/MM3 (ref 0.1–0.9)
MONOCYTES NFR BLD AUTO: 8.4 % (ref 5–12)
NEUTROPHILS NFR BLD AUTO: 3.34 10*3/MM3 (ref 1.7–7)
NEUTROPHILS NFR BLD AUTO: 55.8 % (ref 42.7–76)
NRBC BLD AUTO-RTO: 0.2 /100 WBC (ref 0–0.2)
PLATELET # BLD AUTO: 279 10*3/MM3 (ref 140–450)
PMV BLD AUTO: 10 FL (ref 6–12)
RBC # BLD AUTO: 5.16 10*6/MM3 (ref 4.14–5.8)
WBC NRBC COR # BLD: 5.98 10*3/MM3 (ref 3.4–10.8)

## 2022-03-04 PROCEDURE — 85025 COMPLETE CBC W/AUTO DIFF WBC: CPT | Performed by: FAMILY MEDICINE

## 2022-03-04 PROCEDURE — 83690 ASSAY OF LIPASE: CPT | Performed by: FAMILY MEDICINE

## 2022-03-04 PROCEDURE — 99214 OFFICE O/P EST MOD 30 MIN: CPT | Performed by: FAMILY MEDICINE

## 2022-03-04 PROCEDURE — 82150 ASSAY OF AMYLASE: CPT | Performed by: FAMILY MEDICINE

## 2022-03-04 PROCEDURE — 86140 C-REACTIVE PROTEIN: CPT | Performed by: FAMILY MEDICINE

## 2022-03-04 PROCEDURE — 80053 COMPREHEN METABOLIC PANEL: CPT | Performed by: FAMILY MEDICINE

## 2022-03-04 RX ORDER — DULOXETIN HYDROCHLORIDE 30 MG/1
30 CAPSULE, DELAYED RELEASE ORAL DAILY
Qty: 90 CAPSULE | Refills: 1 | Status: SHIPPED | OUTPATIENT
Start: 2022-03-04 | End: 2022-11-01 | Stop reason: SDUPTHER

## 2022-03-04 RX ORDER — ATORVASTATIN CALCIUM 20 MG/1
20 TABLET, FILM COATED ORAL DAILY
Qty: 90 TABLET | Refills: 1 | Status: SHIPPED | OUTPATIENT
Start: 2022-03-04

## 2022-03-04 NOTE — PROGRESS NOTES
Subjective   Mitchell Jacome is a 56 y.o. male.     Chief Complaint   Patient presents with   • Hypertension   • Diabetes   • Pancreatitis   • Depression         Current Outpatient Medications:   •  atenolol (TENORMIN) 25 MG tablet, Take 1 tablet by mouth Daily., Disp: 90 tablet, Rfl: 0  •  atorvastatin (LIPITOR) 20 MG tablet, Take 1 tablet by mouth Daily., Disp: 90 tablet, Rfl: 1  •  Continuous Blood Gluc Sensor (FreeStyle Curry 2 Sensor) misc, APPLY AND CHANGE EVERY 14 DAYS, Disp: , Rfl:   •  dicyclomine (BENTYL) 20 MG tablet, 1/2 tab - 1 po q6hrs prn Abd cramping/ bloating/ [id number], Disp: 30 tablet, Rfl: 0  •  DULoxetine (CYMBALTA) 30 MG capsule, Take 1 capsule by mouth Daily., Disp: 90 capsule, Rfl: 1  •  fenofibrate 160 MG tablet, TAKE 1 TABLET BY MOUTH EVERY DAY, Disp: 90 tablet, Rfl: 1  •  Glucagon, rDNA, (Glucagon Emergency) 1 MG kit, Inject 1 kit as directed As Needed (for low BS)., Disp: 1 each, Rfl: 2  •  glucose blood (OneTouch Verio) test strip, 1 each by Other route 4 (Four) Times a Day Before Meals & at Bedtime. Use as instructed, Disp: 360 each, Rfl: 3  •  HYDROcodone-acetaminophen (Norco) 7.5-325 MG per tablet, Take 1 tablet by mouth Every 8 (Eight) Hours As Needed for Severe Pain ., Disp: 60 tablet, Rfl: 0  •  Insulin Pen Needle 32G X 4 MM misc, 1 each every night at bedtime., Disp: 360 each, Rfl: 0  •  Isopropyl Alcohol (Alcohol Wipes) 70 % misc, Apply 1 each topically 4 (Four) Times a Day Before Meals & at Bedtime., Disp: 200 each, Rfl: 0  •  Lancets (OneTouch Delica Plus [id number]) misc, 1 each 4 (Four) Times a Day Before Meals & at Bedtime., Disp: 360 each, Rfl: 1  •  levETIRAcetam (KEPPRA) 500 MG tablet, Take 500 mg by mouth 2 (Two) Times a Day., Disp: , Rfl:   •  multivitamin with minerals (Centrum Silver) tablet tablet, Take 1 tablet by mouth Daily., Disp: , Rfl:   •  omeprazole (priLOSEC) 40 MG capsule, Take 1 capsule by mouth Daily., Disp: 90 capsule, Rfl: 0  •  pancrelipase,  Lip-Prot-Amyl, (CREON) 63514-61563 units capsule delayed-release particles capsule, 3 tabs po TID W/ meals, Disp: , Rfl:   •  promethazine (PHENERGAN) 25 MG suppository, Insert 1 suppository into the rectum 2 (Two) Times a Day As Needed for Nausea or Vomiting., Disp: 40 suppository, Rfl: 0  •  promethazine (PHENERGAN) 25 MG tablet, TAKE 1 TABLET BY MOUTH EVERY 6 (SIX) HOURS AS NEEDED FOR NAUSEA OR VOMITING., Disp: 60 tablet, Rfl: 1  •  sucralfate (CARAFATE) 1 g tablet, TAKE 1 TABLET BY MOUTH 4 TIMES A DAY WITH MEALS & AT BEDTIME. (Patient taking differently: Take 1 g by mouth 2 (two) times a day.), Disp: 360 tablet, Rfl: 0  •  Toujeo Max SoloStar 300 UNIT/ML solution pen-injector injection, INJECT 20 UNITS UNDER THE SKIN INTO THE APPROPRIATE AREA AS DIRECTED EVERY MORNING., Disp: 3 pen, Rfl: 0    Past Medical History:   Diagnosis Date   • Chronic recurrent pancreatitis    • COVID-19    • Depression    • DMII    • GERD    • Hearing loss, right     85%   • HTN    • PSA     2021=    • Seizures    • Solitary kidney        Past Surgical History:   Procedure Laterality Date   • APPENDECTOMY     • CHOLECYSTECTOMY     • COLONOSCOPY      NEG = 2016, rech 2026     GSI       Family History   Problem Relation Age of Onset   • Cerebral aneurysm Mother    • Hypertension Father    • Cancer Father 60        Bladder   • Diabetes Father    • Hyperlipidemia Father    • Hypertension Brother    • Hyperlipidemia Brother        Social History     Socioeconomic History   • Marital status:    Tobacco Use   • Smoking status: Never Smoker   • Smokeless tobacco: Never Used   Vaping Use   • Vaping Use: Never used   Substance and Sexual Activity   • Alcohol use: Not Currently   • Drug use: Yes     Types: Marijuana   • Sexual activity: Defer       57 y/o C male here for f/u on DMII/ HTN/ Sz w/ c/o's acute Pancreatitis symptoms    [facility] would [facility]    Pt states he went to ENT  for his vertigo.......frustrated w/ the appt, feeling they didn't figure out the etiology or explain what was going on w/ him; pt states he hasnt had anymore episodes but is afraid he could have an issue while driving.........the bid dosing of keppra has seemed to help though-----they offered him PTx but he declined    Pt states his stomach started w/ Nausea last Fri and then the epigastric pain started on Sunday w/ N/V and the watery brown diarrhea started Tues this week; no recent abx use; pt admits he hasnt tried an anti-diarrheal yet       The following portions of the patient's history were reviewed and updated as appropriate: allergies, current medications, past family history, past medical history, past social history, past surgical history and problem list.    Review of Systems   Constitutional: Positive for activity change and appetite change. Negative for fever, unexpected weight gain and unexpected weight loss.   Eyes: Negative for blurred vision, double vision, pain and visual disturbance.   Cardiovascular: Negative for leg swelling.   Gastrointestinal: Positive for abdominal pain, diarrhea, nausea and vomiting. Negative for abdominal distention, anal bleeding, blood in stool, constipation, rectal pain, GERD and indigestion.   Endocrine: Negative for polydipsia, polyphagia and polyuria.   Genitourinary: Negative for frequency.   Musculoskeletal: Negative for gait problem.   Skin: Negative for color change, dry skin, rash and skin lesions.   Neurological: Negative for weakness and numbness.   Psychiatric/Behavioral: Positive for sleep disturbance and stress.       Vitals:    03/04/22 0808   BP: 137/79   Pulse: 72   Resp: 14   Temp: 97.3 °F (36.3 °C)   SpO2: 100%       Objective   Physical Exam  Vitals and nursing note reviewed.   Constitutional:       General: He is not in acute distress.     Appearance: Normal appearance. He is well-developed. He is not ill-appearing or toxic-appearing.   HENT:       Head: Normocephalic and atraumatic.   Cardiovascular:      Rate and Rhythm: Normal rate and regular rhythm.      Heart sounds: Normal heart sounds. No murmur heard.      Pulmonary:      Effort: Pulmonary effort is normal. No respiratory distress.      Breath sounds: Normal breath sounds. No stridor. No wheezing, rhonchi or rales.   Abdominal:      General: Abdomen is flat. There is no distension.      Palpations: Abdomen is soft. There is no mass.      Tenderness: There is abdominal tenderness in the right upper quadrant and epigastric area. There is no guarding or rebound.   Musculoskeletal:         General: No tenderness or deformity.   Feet:      Right foot:      Skin integrity: No ulcer, blister or warmth.      Toenail Condition: ingrown     Left foot:      Skin integrity: No blister, warmth or callus.      Toenail Condition: ingrown  Skin:     General: Skin is warm and dry.      Capillary Refill: Capillary refill takes less than 2 seconds.      Findings: No erythema or rash.   Neurological:      Mental Status: He is alert and oriented to person, place, and time.      Cranial Nerves: No cranial nerve deficit.   Psychiatric:         Attention and Perception: Attention normal.         Mood and Affect: Mood and affect normal.         Speech: Speech normal.         Behavior: Behavior normal. Behavior is cooperative.         Thought Content: Thought content normal.         Cognition and Memory: Cognition and memory normal.         Judgment: Judgment normal.           Assessment/Plan   Diagnoses and all orders for this visit:    1. Chronic recurrent pancreatitis (HCC) (Primary)  -     CBC & Differential  -     Comprehensive Metabolic Panel  -     Lipase; Future  -     Amylase; Future  -     C-reactive Protein  -     Lipase  -     Amylase    2. Controlled type 2 diabetes mellitus with hypoglycemia, without long-term current use of insulin (HCC)  -     POC Glycosylated Hemoglobin (Hb A1C)    3. Benign essential  hypertension    4. Renal insufficiency  -     Comprehensive Metabolic Panel    5. Seizures (HCC)    Other orders  -     atorvastatin (LIPITOR) 20 MG tablet; Take 1 tablet by mouth Daily.  Dispense: 90 tablet; Refill: 1  -     DULoxetine (CYMBALTA) 30 MG capsule; Take 1 capsule by mouth Daily.  Dispense: 90 capsule; Refill: 1    pt to hold non-ess meds  Labs today for pancreatitis  Push fluids  Adjust insulin if needed to avoid low BS's  A1C= 6.3  Reviewed ENT note and disc'd w/ pt

## 2022-03-04 NOTE — PROGRESS NOTES
Venipuncture performed in left arm by Dedra Jackson CMA  with good hemostasis. Patient tolerated well. 03/04/22 Dedra Jackson CMA      Fingerstick performed in right middle by Dedra Jackson CMA  with good hemostasis. Patient tolerated well. 03/04/22 Dedra Jackson CMA

## 2022-03-05 LAB
ALBUMIN SERPL-MCNC: 5 G/DL (ref 3.5–5.2)
ALBUMIN/GLOB SERPL: 1.9 G/DL
ALP SERPL-CCNC: 52 U/L (ref 39–117)
ALT SERPL W P-5'-P-CCNC: 14 U/L (ref 1–41)
AMYLASE SERPL-CCNC: 63 U/L (ref 28–100)
ANION GAP SERPL CALCULATED.3IONS-SCNC: 10 MMOL/L (ref 5–15)
AST SERPL-CCNC: 20 U/L (ref 1–40)
BILIRUB SERPL-MCNC: 0.5 MG/DL (ref 0–1.2)
BUN SERPL-MCNC: 20 MG/DL (ref 6–20)
BUN/CREAT SERPL: 13.4 (ref 7–25)
CALCIUM SPEC-SCNC: 10.2 MG/DL (ref 8.6–10.5)
CHLORIDE SERPL-SCNC: 100 MMOL/L (ref 98–107)
CO2 SERPL-SCNC: 28 MMOL/L (ref 22–29)
CREAT SERPL-MCNC: 1.49 MG/DL (ref 0.76–1.27)
CRP SERPL-MCNC: <0.3 MG/DL (ref 0–0.5)
EGFRCR SERPLBLD CKD-EPI 2021: 54.7 ML/MIN/1.73
GLOBULIN UR ELPH-MCNC: 2.6 GM/DL
GLUCOSE SERPL-MCNC: 128 MG/DL (ref 65–99)
LIPASE SERPL-CCNC: 18 U/L (ref 13–60)
POTASSIUM SERPL-SCNC: 5.1 MMOL/L (ref 3.5–5.2)
PROT SERPL-MCNC: 7.6 G/DL (ref 6–8.5)
SODIUM SERPL-SCNC: 138 MMOL/L (ref 136–145)

## 2022-03-07 ENCOUNTER — TELEPHONE (OUTPATIENT)
Dept: FAMILY MEDICINE CLINIC | Facility: CLINIC | Age: 57
End: 2022-03-07

## 2022-03-07 NOTE — TELEPHONE ENCOUNTER
----- Message from Krystyna Hardwick, DO sent at 3/5/2022  1:05 PM EST -----  Pancrease labs ----NORMAL  Inflammatory labs -----NORMAL  CBC---NORMAL  CMP----Kidneys a little more dry so push more fluids------  Could the abd pain be due to gastritis??? Can try taking the carafate before each meal and bed for 1-2 mos

## 2022-03-07 NOTE — TELEPHONE ENCOUNTER
HUB to read  HUB to ask question   My chart message was sent to the patient     Pancrease labs ----NORMAL   Inflammatory labs -----NORMAL   CBC---NORMAL   CMP----Kidneys a little more dry so push more fluids------   Could the abdominal pain be due to gastritis? Can try taking the carafate before each meal and bed for 1-2 months

## 2022-03-11 DIAGNOSIS — K86.1 CHRONIC RECURRENT PANCREATITIS: ICD-10-CM

## 2022-03-11 RX ORDER — HYDROCODONE BITARTRATE AND ACETAMINOPHEN 7.5; 325 MG/1; MG/1
1 TABLET ORAL EVERY 8 HOURS PRN
Qty: 60 TABLET | Refills: 0 | Status: SHIPPED | OUTPATIENT
Start: 2022-03-11 | End: 2022-04-19 | Stop reason: SDUPTHER

## 2022-03-11 NOTE — TELEPHONE ENCOUNTER
Rx Refill Note  Requested Prescriptions     Pending Prescriptions Disp Refills   • HYDROcodone-acetaminophen (Norco) 7.5-325 MG per tablet 60 tablet 0     Sig: Take 1 tablet by mouth Every 8 (Eight) Hours As Needed for Severe Pain .      Last office visit with prescribing clinician: 3/4/2022      Next office visit with prescribing clinician: Visit date not found     CBC & Differential (03/04/2022 08:50)  Comprehensive Metabolic Panel (03/04/2022 08:50)  POC Glycosylated Hemoglobin (Hb A1C) (03/04/2022 08:20)  Lipid Panel (02/20/2021 12:08)         Dedra Jackson, FELIPA  03/11/22, 10:28 EST     INSPECT in chart

## 2022-04-19 DIAGNOSIS — K86.1 CHRONIC RECURRENT PANCREATITIS: ICD-10-CM

## 2022-04-19 RX ORDER — HYDROCODONE BITARTRATE AND ACETAMINOPHEN 7.5; 325 MG/1; MG/1
1 TABLET ORAL EVERY 8 HOURS PRN
Qty: 60 TABLET | Refills: 0 | Status: SHIPPED | OUTPATIENT
Start: 2022-04-19 | End: 2022-05-27 | Stop reason: SDUPTHER

## 2022-04-19 NOTE — TELEPHONE ENCOUNTER
Rx Refill Note  Requested Prescriptions     Pending Prescriptions Disp Refills   • HYDROcodone-acetaminophen (Norco) 7.5-325 MG per tablet 60 tablet 0     Sig: Take 1 tablet by mouth Every 8 (Eight) Hours As Needed for Severe Pain .      Last office visit with prescribing clinician: 3/4/2022      Next office visit with prescribing clinician: Visit date not found     Comprehensive Metabolic Panel (03/04/2022 08:50)         Tyesha De Leon, RT  04/19/22, 13:24 EDT

## 2022-05-27 DIAGNOSIS — K86.1 CHRONIC RECURRENT PANCREATITIS: ICD-10-CM

## 2022-05-27 RX ORDER — FLASH GLUCOSE SENSOR
1 KIT MISCELLANEOUS
Qty: 6 EACH | Refills: 3 | Status: SHIPPED | OUTPATIENT
Start: 2022-05-27 | End: 2022-10-03 | Stop reason: SDUPTHER

## 2022-05-27 RX ORDER — HYDROCODONE BITARTRATE AND ACETAMINOPHEN 7.5; 325 MG/1; MG/1
1 TABLET ORAL EVERY 8 HOURS PRN
Qty: 60 TABLET | Refills: 0 | Status: SHIPPED | OUTPATIENT
Start: 2022-05-27 | End: 2022-07-12 | Stop reason: SDUPTHER

## 2022-05-27 NOTE — TELEPHONE ENCOUNTER
Rx Refill Note  Requested Prescriptions     Pending Prescriptions Disp Refills   • Continuous Blood Gluc Sensor (FreeStyle Curry 2 Sensor) misc [Pharmacy Med Name: FREESTYLE CURRY 2 SENSOR]  3     Sig: APPLY AND CHANGE EVERY 14 DAYS      Last office visit with prescribing clinician: 3/4/2022      Next office visit with prescribing clinician: Visit date not found            Tyesha De Leon, RT  05/27/22, 10:31 EDT

## 2022-05-27 NOTE — TELEPHONE ENCOUNTER
Rx Refill Note  Requested Prescriptions     Pending Prescriptions Disp Refills   • Continuous Blood Gluc Sensor (FreeStyle Curry 2 Sensor) misc [Pharmacy Med Name: FREESTYLE CURRY 2 SENSOR]  3     Sig: APPLY AND CHANGE EVERY 14 DAYS      Last office visit with prescribing clinician: 3/4/2022      Next office visit with prescribing clinician: Visit date not found     Comprehensive Metabolic Panel (03/04/2022 08:50)  POC Glycosylated Hemoglobin (Hb A1C) (03/04/2022 08:20)  CBC & Differential (03/04/2022 08:50)         Dedra Jackson CMA  05/27/22, 10:56 EDT

## 2022-06-07 RX ORDER — IBUPROFEN 600 MG/1
1 TABLET ORAL AS NEEDED
Qty: 1 EACH | Refills: 2 | Status: SHIPPED | OUTPATIENT
Start: 2022-06-07

## 2022-07-12 DIAGNOSIS — K86.1 CHRONIC RECURRENT PANCREATITIS: ICD-10-CM

## 2022-07-12 NOTE — TELEPHONE ENCOUNTER
INSPECT RAN    Rx Refill Note  Requested Prescriptions     Pending Prescriptions Disp Refills   • promethazine (PHENERGAN) 25 MG tablet 60 tablet 1     Sig: Take 1 tablet by mouth Every 6 (Six) Hours As Needed for Nausea or Vomiting.   • HYDROcodone-acetaminophen (Norco) 7.5-325 MG per tablet 60 tablet 0     Sig: Take 1 tablet by mouth Every 8 (Eight) Hours As Needed for Severe Pain .      Last office visit with prescribing clinician: 3/4/2022      Next office visit with prescribing clinician: Visit date not found     Comprehensive Metabolic Panel (03/04/2022 08:50)         Tyesha De Leon, RT  07/12/22, 12:51 EDT

## 2022-07-13 RX ORDER — HYDROCODONE BITARTRATE AND ACETAMINOPHEN 7.5; 325 MG/1; MG/1
1 TABLET ORAL EVERY 8 HOURS PRN
Qty: 60 TABLET | Refills: 0 | Status: SHIPPED | OUTPATIENT
Start: 2022-07-13 | End: 2022-08-10 | Stop reason: SDUPTHER

## 2022-07-13 RX ORDER — PROMETHAZINE HYDROCHLORIDE 25 MG/1
25 TABLET ORAL EVERY 6 HOURS PRN
Qty: 60 TABLET | Refills: 1 | Status: SHIPPED | OUTPATIENT
Start: 2022-07-13

## 2022-07-26 RX ORDER — SUCRALFATE 1 G/1
TABLET ORAL
Qty: 360 TABLET | Refills: 0 | Status: SHIPPED | OUTPATIENT
Start: 2022-07-26

## 2022-08-10 ENCOUNTER — OFFICE VISIT (OUTPATIENT)
Dept: FAMILY MEDICINE CLINIC | Facility: CLINIC | Age: 57
End: 2022-08-10

## 2022-08-10 VITALS
HEIGHT: 74 IN | OXYGEN SATURATION: 99 % | SYSTOLIC BLOOD PRESSURE: 133 MMHG | DIASTOLIC BLOOD PRESSURE: 82 MMHG | HEART RATE: 86 BPM | RESPIRATION RATE: 16 BRPM | BODY MASS INDEX: 23.23 KG/M2 | TEMPERATURE: 98.2 F | WEIGHT: 181 LBS

## 2022-08-10 DIAGNOSIS — Z12.5 ENCOUNTER FOR SCREENING FOR MALIGNANT NEOPLASM OF PROSTATE: ICD-10-CM

## 2022-08-10 DIAGNOSIS — E11.65 UNCONTROLLED TYPE 2 DIABETES MELLITUS WITH HYPERGLYCEMIA: Primary | ICD-10-CM

## 2022-08-10 DIAGNOSIS — N28.9 RENAL INSUFFICIENCY: ICD-10-CM

## 2022-08-10 DIAGNOSIS — I10 BENIGN ESSENTIAL HYPERTENSION: ICD-10-CM

## 2022-08-10 DIAGNOSIS — E78.5 DYSLIPIDEMIA: ICD-10-CM

## 2022-08-10 DIAGNOSIS — K86.1 CHRONIC RECURRENT PANCREATITIS: ICD-10-CM

## 2022-08-10 LAB
EXPIRATION DATE: ABNORMAL
HBA1C MFR BLD: 7.6 %
Lab: ABNORMAL

## 2022-08-10 PROCEDURE — 80061 LIPID PANEL: CPT | Performed by: FAMILY MEDICINE

## 2022-08-10 PROCEDURE — 83036 HEMOGLOBIN GLYCOSYLATED A1C: CPT | Performed by: FAMILY MEDICINE

## 2022-08-10 PROCEDURE — 80053 COMPREHEN METABOLIC PANEL: CPT | Performed by: FAMILY MEDICINE

## 2022-08-10 PROCEDURE — G0103 PSA SCREENING: HCPCS | Performed by: FAMILY MEDICINE

## 2022-08-10 PROCEDURE — 99213 OFFICE O/P EST LOW 20 MIN: CPT | Performed by: FAMILY MEDICINE

## 2022-08-10 RX ORDER — HYDROCODONE BITARTRATE AND ACETAMINOPHEN 7.5; 325 MG/1; MG/1
1 TABLET ORAL EVERY 8 HOURS PRN
Qty: 60 TABLET | Refills: 0 | Status: SHIPPED | OUTPATIENT
Start: 2022-08-10 | End: 2022-09-13 | Stop reason: SDUPTHER

## 2022-08-10 NOTE — PROGRESS NOTES
Fingerstick performed in right middle finger by Dedra Jackson CMA  with good hemostasis. Patient tolerated well. 08/10/22 Dedra Jackson CMA      Venipuncture performed in L ARM by RT Raad  with good hemostasis. Patient tolerated well. 08/10/22 Tyesha De Leon, RT

## 2022-08-10 NOTE — PROGRESS NOTES
Subjective   Mitchell Jacome is a 57 y.o. male.     Chief Complaint   Patient presents with   • Diabetes     Patients HgbA1c was 7.6% in the office today.    • Hypertension   • Depression         Current Outpatient Medications:   •  atenolol (TENORMIN) 25 MG tablet, Take 1 tablet by mouth Daily., Disp: 90 tablet, Rfl: 0  •  atorvastatin (LIPITOR) 20 MG tablet, Take 1 tablet by mouth Daily., Disp: 90 tablet, Rfl: 1  •  Continuous Blood Gluc Sensor (FreeStyle Curry 14 Day Sensor) misc, 1 package Every 14 (Fourteen) Days., Disp: 6 each, Rfl: 3  •  Continuous Blood Gluc Sensor (FreeStyle Curry 2 Sensor) misc, APPLY AND CHANGE EVERY 14 DAYS, Disp: 6 each, Rfl: 3  •  dicyclomine (BENTYL) 20 MG tablet, 1/2 tab - 1 po q6hrs prn Abd cramping/ bloating/ [id number], Disp: 30 tablet, Rfl: 0  •  DULoxetine (CYMBALTA) 30 MG capsule, Take 1 capsule by mouth Daily., Disp: 90 capsule, Rfl: 1  •  fenofibrate 160 MG tablet, TAKE 1 TABLET BY MOUTH EVERY DAY, Disp: 90 tablet, Rfl: 1  •  Glucagon, rDNA, (Glucagon Emergency) 1 MG kit, Inject 1 kit as directed As Needed (for low BS)., Disp: 1 each, Rfl: 2  •  glucose blood (OneTouch Verio) test strip, 1 each by Other route 4 (Four) Times a Day Before Meals & at Bedtime. Use as instructed, Disp: 360 each, Rfl: 3  •  HYDROcodone-acetaminophen (Norco) 7.5-325 MG per tablet, Take 1 tablet by mouth Every 8 (Eight) Hours As Needed for Severe Pain ., Disp: 60 tablet, Rfl: 0  •  Insulin Pen Needle 32G X 4 MM misc, 1 each every night at bedtime., Disp: 360 each, Rfl: 0  •  Isopropyl Alcohol (Alcohol Wipes) 70 % misc, Apply 1 each topically 4 (Four) Times a Day Before Meals & at Bedtime., Disp: 200 each, Rfl: 0  •  Lancets (OneTouch Delica Plus [id number]) misc, 1 each 4 (Four) Times a Day Before Meals & at Bedtime., Disp: 360 each, Rfl: 1  •  levETIRAcetam (KEPPRA) 500 MG tablet, Take 500 mg by mouth 2 (Two) Times a Day., Disp: , Rfl:   •  multivitamin with minerals tablet tablet, Take 1  tablet by mouth Daily., Disp: , Rfl:   •  omeprazole (priLOSEC) 40 MG capsule, Take 1 capsule by mouth Daily., Disp: 90 capsule, Rfl: 0  •  pancrelipase, Lip-Prot-Amyl, (CREON) 82331-13471 units capsule delayed-release particles capsule, 3 tabs po TID W/ meals, Disp: , Rfl:   •  promethazine (PHENERGAN) 25 MG suppository, Insert 1 suppository into the rectum 2 (Two) Times a Day As Needed for Nausea or Vomiting., Disp: 40 suppository, Rfl: 0  •  promethazine (PHENERGAN) 25 MG tablet, Take 1 tablet by mouth Every 6 (Six) Hours As Needed for Nausea or Vomiting., Disp: 60 tablet, Rfl: 1  •  sucralfate (CARAFATE) 1 g tablet, TAKE 1 TABLET BY MOUTH 4 TIMES A DAY WITH MEALS & AT BEDTIME. (Patient taking differently: 1 po BID), Disp: 360 tablet, Rfl: 0  •  Toujeo Max SoloStar 300 UNIT/ML solution pen-injector injection, INJECT 20 UNITS UNDER THE SKIN INTO THE APPROPRIATE AREA AS DIRECTED EVERY MORNING., Disp: 3 pen, Rfl: 0    Past Medical History:   Diagnosis Date   • Chronic recurrent pancreatitis    • COVID-19    • Depression    • DMII    • GERD    • Hearing loss, right     85%   • HTN    • PSA     2021= 0.775/ 2022=   • Seizures    • Solitary kidney        Past Surgical History:   Procedure Laterality Date   • APPENDECTOMY     • CHOLECYSTECTOMY     • COLONOSCOPY      NEG = 2016, rech 2026     GSI       Family History   Problem Relation Age of Onset   • Cerebral aneurysm Mother    • Hypertension Father    • Cancer Father 60        Bladder   • Diabetes Father    • Hyperlipidemia Father    • Hypertension Brother    • Hyperlipidemia Brother        Social History     Socioeconomic History   • Marital status:    Tobacco Use   • Smoking status: Never Smoker   • Smokeless tobacco: Never Used   Vaping Use   • Vaping Use: Never used   Substance and Sexual Activity   • Alcohol use: Yes     Comment: rare   • Drug use: Yes     Types: Marijuana   • Sexual activity: Defer       58 y/o C male here for f/u on DMII/ HTN/  Depression/ pancreatitis    [facility]  Pt states they have chickens/ big garden, so he is eating healthier overall    Pt states he has had some low BS's <100 and would hold his insulin for a couple days and then it would spike to 300;        The following portions of the patient's history were reviewed and updated as appropriate: allergies, current medications, past family history, past medical history, past social history, past surgical history and problem list.    Review of Systems    Vitals:    08/10/22 0920   BP: 133/82   Pulse: 86   Resp: 16   Temp: 98.2 °F (36.8 °C)   SpO2: 99%       Objective   Physical Exam  Vitals and nursing note reviewed.   Constitutional:       General: He is not in acute distress.     Appearance: Normal appearance. He is well-developed. He is not ill-appearing or toxic-appearing.   Cardiovascular:      Rate and Rhythm: Normal rate and regular rhythm.      Pulses:           Dorsalis pedis pulses are 2+ on the right side and 2+ on the left side.      Heart sounds: Normal heart sounds. No murmur heard.  Pulmonary:      Effort: Pulmonary effort is normal. No respiratory distress.      Breath sounds: Normal breath sounds. No stridor. No wheezing, rhonchi or rales.   Musculoskeletal:         General: No tenderness or deformity.      Right foot: Normal range of motion. No deformity, bunion, Charcot foot, foot drop or prominent metatarsal heads.      Left foot: Normal range of motion. No deformity, bunion, Charcot foot, foot drop or prominent metatarsal heads.   Feet:      Right foot:      Skin integrity: Skin integrity normal. No ulcer, blister, skin breakdown, erythema, warmth, callus, dry skin or fissure.      Toenail Condition: Right toenails are normal. ingrown     Left foot:      Skin integrity: Skin integrity normal. No ulcer, blister, skin breakdown, erythema, warmth, callus, dry skin or fissure.      Toenail Condition: Left  toenails are normal. ingrown  Skin:     General: Skin is warm and dry.      Capillary Refill: Capillary refill takes less than 2 seconds.      Findings: No erythema or rash.   Neurological:      Mental Status: He is alert and oriented to person, place, and time.      Cranial Nerves: No cranial nerve deficit.   Psychiatric:         Attention and Perception: Attention and perception normal.         Mood and Affect: Mood and affect normal.         Speech: Speech normal.         Behavior: Behavior normal. Behavior is cooperative.         Thought Content: Thought content normal.         Cognition and Memory: Cognition and memory normal.         Judgment: Judgment normal.           Assessment & Plan   Diagnoses and all orders for this visit:    1. Controlled type 2 diabetes mellitus with hypoglycemia, without long-term current use of insulin (HCC) (Primary)  -     POC Glycosylated Hemoglobin (Hb A1C)  -     Cancel: MicroAlbumin, Urine, Random - Urine, Clean Catch    2. Benign essential hypertension    3. Chronic recurrent pancreatitis (HCC)  -     HYDROcodone-acetaminophen (Norco) 7.5-325 MG per tablet; Take 1 tablet by mouth Every 8 (Eight) Hours As Needed for Severe Pain .  Dispense: 60 tablet; Refill: 0    4. Dyslipidemia  -     Lipid Panel    5. Renal insufficiency  -     Comprehensive Metabolic Panel    6. Encounter for screening for malignant neoplasm of prostate  -     PSA Screen    Disc'd w/ pt that he cant skip his insulin but can decrease his dose if having low BS's   A1C =7.6  Fasting labs today  Med refills  Disc'd COVID vaccine options

## 2022-08-11 LAB
ALBUMIN SERPL-MCNC: 4.6 G/DL (ref 3.5–5.2)
ALBUMIN/GLOB SERPL: 2.1 G/DL
ALP SERPL-CCNC: 52 U/L (ref 39–117)
ALT SERPL W P-5'-P-CCNC: 13 U/L (ref 1–41)
ANION GAP SERPL CALCULATED.3IONS-SCNC: 13 MMOL/L (ref 5–15)
AST SERPL-CCNC: 22 U/L (ref 1–40)
BILIRUB SERPL-MCNC: 0.7 MG/DL (ref 0–1.2)
BUN SERPL-MCNC: 20 MG/DL (ref 6–20)
BUN/CREAT SERPL: 14 (ref 7–25)
CALCIUM SPEC-SCNC: 9.8 MG/DL (ref 8.6–10.5)
CHLORIDE SERPL-SCNC: 101 MMOL/L (ref 98–107)
CHOLEST SERPL-MCNC: 173 MG/DL (ref 0–200)
CO2 SERPL-SCNC: 24 MMOL/L (ref 22–29)
CREAT SERPL-MCNC: 1.43 MG/DL (ref 0.76–1.27)
EGFRCR SERPLBLD CKD-EPI 2021: 57.2 ML/MIN/1.73
GLOBULIN UR ELPH-MCNC: 2.2 GM/DL
GLUCOSE SERPL-MCNC: 166 MG/DL (ref 65–99)
HDLC SERPL-MCNC: 58 MG/DL (ref 40–60)
LDLC SERPL CALC-MCNC: 89 MG/DL (ref 0–100)
LDLC/HDLC SERPL: 1.45 {RATIO}
POTASSIUM SERPL-SCNC: 4.5 MMOL/L (ref 3.5–5.2)
PROT SERPL-MCNC: 6.8 G/DL (ref 6–8.5)
PSA SERPL-MCNC: 0.75 NG/ML (ref 0–4)
SODIUM SERPL-SCNC: 138 MMOL/L (ref 136–145)
TRIGL SERPL-MCNC: 154 MG/DL (ref 0–150)
VLDLC SERPL-MCNC: 26 MG/DL (ref 5–40)

## 2022-09-13 DIAGNOSIS — K86.1 CHRONIC RECURRENT PANCREATITIS: ICD-10-CM

## 2022-09-13 RX ORDER — HYDROCODONE BITARTRATE AND ACETAMINOPHEN 7.5; 325 MG/1; MG/1
1 TABLET ORAL EVERY 8 HOURS PRN
Qty: 60 TABLET | Refills: 0 | Status: SHIPPED | OUTPATIENT
Start: 2022-09-13 | End: 2022-10-14 | Stop reason: SDUPTHER

## 2022-09-13 RX ORDER — INSULIN GLARGINE 300 U/ML
20 INJECTION, SOLUTION SUBCUTANEOUS EVERY MORNING
Qty: 9 ML | Refills: 0 | Status: SHIPPED | OUTPATIENT
Start: 2022-09-13 | End: 2023-01-25

## 2022-09-13 NOTE — TELEPHONE ENCOUNTER
INSPECT RAN    Rx Refill Note  Requested Prescriptions     Pending Prescriptions Disp Refills   • HYDROcodone-acetaminophen (Norco) 7.5-325 MG per tablet 60 tablet 0     Sig: Take 1 tablet by mouth Every 8 (Eight) Hours As Needed for Severe Pain.      Last office visit with prescribing clinician: 8/10/2022      Next office visit with prescribing clinician: Visit date not found     Lipid Panel (08/10/2022 09:55)         Tyesha De Leon, RT  09/13/22, 11:27 EDT

## 2022-09-13 NOTE — TELEPHONE ENCOUNTER
Rx Refill Note  Requested Prescriptions     Pending Prescriptions Disp Refills   • Insulin Glargine, 2 Unit Dial, (Toujeo [place] SoloStar) 300 UNIT/ML solution pen-injector injection       Sig: Inject 20 Units under the skin into the appropriate area as directed Every Morning.      Last office visit with prescribing clinician: 8/10/2022      Next office visit with prescribing clinician: Visit date not found     POC Glycosylated Hemoglobin (Hb A1C) (08/10/2022 09:46)         Tyesha De Leon, RT  09/13/22, 11:28 EDT

## 2022-10-03 RX ORDER — FLASH GLUCOSE SENSOR
1 KIT MISCELLANEOUS
Qty: 6 EACH | Refills: 3 | Status: SHIPPED | OUTPATIENT
Start: 2022-10-03 | End: 2022-12-12 | Stop reason: SDUPTHER

## 2022-10-14 DIAGNOSIS — K86.1 CHRONIC RECURRENT PANCREATITIS: ICD-10-CM

## 2022-10-14 RX ORDER — HYDROCODONE BITARTRATE AND ACETAMINOPHEN 7.5; 325 MG/1; MG/1
1 TABLET ORAL EVERY 8 HOURS PRN
Qty: 60 TABLET | Refills: 0 | Status: SHIPPED | OUTPATIENT
Start: 2022-10-14 | End: 2022-11-14 | Stop reason: SDUPTHER

## 2022-10-14 NOTE — TELEPHONE ENCOUNTER
INSPECT RAN    Rx Refill Note  Requested Prescriptions     Pending Prescriptions Disp Refills   • HYDROcodone-acetaminophen (Norco) 7.5-325 MG per tablet 60 tablet 0     Sig: Take 1 tablet by mouth Every 8 (Eight) Hours As Needed for Severe Pain.      Last office visit with prescribing clinician: 8/10/2022      Next office visit with prescribing clinician: Visit date not found            Tyesha De Leon, RT  10/14/22, 10:25 EDT

## 2022-10-18 RX ORDER — FENOFIBRATE 160 MG/1
TABLET ORAL
Qty: 90 TABLET | Refills: 1 | Status: SHIPPED | OUTPATIENT
Start: 2022-10-18 | End: 2022-12-27 | Stop reason: SDUPTHER

## 2022-10-18 NOTE — TELEPHONE ENCOUNTER
Rx Refill Note  Requested Prescriptions     Pending Prescriptions Disp Refills   • fenofibrate 160 MG tablet [Pharmacy Med Name: FENOFIBRATE 160 MG TABLET] 90 tablet 1     Sig: TAKE 1 TABLET BY MOUTH EVERY DAY      Last office visit with prescribing clinician: 8/10/2022      Next office visit with prescribing clinician: Visit date not found     Lipid Panel (08/10/2022 09:55)  Comprehensive Metabolic Panel (08/10/2022 09:55)  POC Glycosylated Hemoglobin (Hb A1C) (08/10/2022 09:46)         Dedra Jackson CMA  10/18/22, 08:17 EDT

## 2022-11-01 RX ORDER — DULOXETIN HYDROCHLORIDE 30 MG/1
30 CAPSULE, DELAYED RELEASE ORAL DAILY
Qty: 90 CAPSULE | Refills: 0 | Status: SHIPPED | OUTPATIENT
Start: 2022-11-01 | End: 2023-02-10 | Stop reason: SDUPTHER

## 2022-11-01 NOTE — TELEPHONE ENCOUNTER
Rx Refill Note  Requested Prescriptions     Pending Prescriptions Disp Refills   • DULoxetine (CYMBALTA) 30 MG capsule 90 capsule 1     Sig: Take 1 capsule by mouth Daily.      Last office visit with prescribing clinician: 8/10/2022      Next office visit with prescribing clinician: Visit date not found     Comprehensive Metabolic Panel (08/10/2022 09:55)  Lipid Panel (08/10/2022 09:55)  POC Glycosylated Hemoglobin (Hb A1C) (08/10/2022 09:46)         Dedra Jackson CMA  11/01/22, 13:03 EDT

## 2022-11-11 ENCOUNTER — TELEPHONE (OUTPATIENT)
Dept: FAMILY MEDICINE CLINIC | Facility: CLINIC | Age: 57
End: 2022-11-11

## 2022-11-11 ENCOUNTER — CLINICAL SUPPORT (OUTPATIENT)
Dept: FAMILY MEDICINE CLINIC | Facility: CLINIC | Age: 57
End: 2022-11-11

## 2022-11-11 DIAGNOSIS — E11.65 UNCONTROLLED TYPE 2 DIABETES MELLITUS WITH HYPERGLYCEMIA: Primary | Chronic | ICD-10-CM

## 2022-11-11 LAB
EXPIRATION DATE: ABNORMAL
HBA1C MFR BLD: 7.3 %
Lab: ABNORMAL

## 2022-11-11 PROCEDURE — 83036 HEMOGLOBIN GLYCOSYLATED A1C: CPT | Performed by: FAMILY MEDICINE

## 2022-11-11 NOTE — TELEPHONE ENCOUNTER
HUB TO READ    Make appt with Dr. Hardwick since A1C >7 and bring home blood sugar readings to appt per Dr. Hardwick.    Left msg for patient to call back to schedule appointment with Dr. Hardwick.

## 2022-11-11 NOTE — TELEPHONE ENCOUNTER
----- Message from RT Raad sent at 11/11/2022  1:18 PM EST -----    ----- Message -----  From: Krystyna Hardwick DO  Sent: 11/11/2022  12:52 PM EST  To: Jayjay Noble [facility]    Make appt since A1C>7 ----bring readings to appt

## 2022-11-11 NOTE — PROGRESS NOTES
Fingerstick performed in L -3rd finger by RT Raad  with good hemostasis. Patient tolerated well. 11/11/22 Tyesha De Leon, RT

## 2022-11-14 DIAGNOSIS — K86.1 CHRONIC RECURRENT PANCREATITIS: ICD-10-CM

## 2022-11-14 RX ORDER — HYDROCODONE BITARTRATE AND ACETAMINOPHEN 7.5; 325 MG/1; MG/1
1 TABLET ORAL EVERY 8 HOURS PRN
Qty: 60 TABLET | Refills: 0 | Status: SHIPPED | OUTPATIENT
Start: 2022-11-14 | End: 2023-01-09 | Stop reason: SDUPTHER

## 2022-11-14 NOTE — TELEPHONE ENCOUNTER
INSPECT RAN    Rx Refill Note  Requested Prescriptions     Pending Prescriptions Disp Refills   • HYDROcodone-acetaminophen (Norco) 7.5-325 MG per tablet 60 tablet 0     Sig: Take 1 tablet by mouth Every 8 (Eight) Hours As Needed for Severe Pain.      Last office visit with prescribing clinician: 8/10/2022      Next office visit with prescribing clinician: 12/9/2022     Lipid Panel (08/10/2022 09:55)         Tyesha De Leon, RT  11/14/22, 11:12 EST

## 2022-11-25 NOTE — TELEPHONE ENCOUNTER
Wife, Corina, called and stated she received prescription for Lantus pen but did not receive rx for pen needles or test strips and lancets for One Touch Verio meter. Reviewed pt's EMR and rxs were not sent to Pharmacy. [facility] in [place] contacted and called in for pen needles, box of 100 with 2 refills; One Touch Verio test strips, to check ac and hs, #150 with 2 refills; and One Touch Delica Plus lancets, #100 with 2 refills. Called wife back and notified her that rxs were called in. She is to  insulin today. Encouraged wife to call back if has problems getting the lantus pen. Wife with no additional questions/concerns.   Face Mask

## 2022-12-12 RX ORDER — FLASH GLUCOSE SENSOR
1 KIT MISCELLANEOUS
Qty: 6 EACH | Refills: 3 | Status: SHIPPED | OUTPATIENT
Start: 2022-12-12 | End: 2023-03-08 | Stop reason: SDUPTHER

## 2022-12-15 DIAGNOSIS — K86.1 CHRONIC RECURRENT PANCREATITIS: ICD-10-CM

## 2022-12-15 NOTE — TELEPHONE ENCOUNTER
INSPECT RAN    Rx Refill Note  Requested Prescriptions     Pending Prescriptions Disp Refills   • HYDROcodone-acetaminophen (Norco) 7.5-325 MG per tablet 60 tablet 0     Sig: Take 1 tablet by mouth Every 8 (Eight) Hours As Needed for Severe Pain.   • Continuous Blood Gluc Sensor (FreeStyle Curry 14 Day Sensor) misc 6 each 3     Si package Every 14 (Fourteen) Days.      Last office visit with prescribing clinician: 8/10/2022   Last telemedicine visit with prescribing clinician: Visit date not found   Next office visit with prescribing clinician: Visit date not found     POC Glycosylated Hemoglobin (Hb A1C) (2022 09:50)                  {TIP  Is Refill Pharmacy correct?:23}    Would you like a call back once the refill request has been completed: [] Yes [] No    If the office needs to give you a call back, can they leave a voicemail: [] Yes [] No    Tyesha De Leon, RT  12/15/22, 15:17 EST

## 2022-12-16 RX ORDER — FLASH GLUCOSE SENSOR
1 KIT MISCELLANEOUS
Qty: 6 EACH | Refills: 3 | OUTPATIENT
Start: 2022-12-16

## 2022-12-16 RX ORDER — HYDROCODONE BITARTRATE AND ACETAMINOPHEN 7.5; 325 MG/1; MG/1
1 TABLET ORAL EVERY 8 HOURS PRN
Qty: 60 TABLET | Refills: 0 | OUTPATIENT
Start: 2022-12-16

## 2022-12-27 RX ORDER — FENOFIBRATE 160 MG/1
160 TABLET ORAL DAILY
Qty: 90 TABLET | Refills: 1 | Status: SHIPPED | OUTPATIENT
Start: 2022-12-27

## 2023-01-09 DIAGNOSIS — K86.1 CHRONIC RECURRENT PANCREATITIS: ICD-10-CM

## 2023-01-09 RX ORDER — HYDROCODONE BITARTRATE AND ACETAMINOPHEN 7.5; 325 MG/1; MG/1
1 TABLET ORAL EVERY 8 HOURS PRN
Qty: 60 TABLET | Refills: 0 | Status: SHIPPED | OUTPATIENT
Start: 2023-01-09 | End: 2023-02-10 | Stop reason: SDUPTHER

## 2023-01-09 NOTE — TELEPHONE ENCOUNTER
INSPECT RAN    Rx Refill Note  Requested Prescriptions     Pending Prescriptions Disp Refills   • HYDROcodone-acetaminophen (Norco) 7.5-325 MG per tablet 60 tablet 0     Sig: Take 1 tablet by mouth Every 8 (Eight) Hours As Needed for Severe Pain.      Last office visit with prescribing clinician: 8/10/2022   Last telemedicine visit with prescribing clinician: 1/25/2023   Next office visit with prescribing clinician: 1/25/2023                       Lipid Panel (08/10/2022 09:55)    Would you like a call back once the refill request has been completed: [] Yes [] No    If the office needs to give you a call back, can they leave a voicemail: [] Yes [] No    Tyesha De Leon, RT  01/09/23, 12:29 EST

## 2023-01-25 ENCOUNTER — OFFICE VISIT (OUTPATIENT)
Dept: FAMILY MEDICINE CLINIC | Facility: CLINIC | Age: 58
End: 2023-01-25
Payer: COMMERCIAL

## 2023-01-25 VITALS
WEIGHT: 181 LBS | TEMPERATURE: 97.1 F | SYSTOLIC BLOOD PRESSURE: 109 MMHG | RESPIRATION RATE: 12 BRPM | DIASTOLIC BLOOD PRESSURE: 71 MMHG | HEIGHT: 74 IN | OXYGEN SATURATION: 98 % | HEART RATE: 71 BPM | BODY MASS INDEX: 23.23 KG/M2

## 2023-01-25 DIAGNOSIS — E11.65 TYPE 2 DIABETES MELLITUS WITH HYPERGLYCEMIA, WITHOUT LONG-TERM CURRENT USE OF INSULIN: Primary | ICD-10-CM

## 2023-01-25 DIAGNOSIS — I10 BENIGN ESSENTIAL HYPERTENSION: ICD-10-CM

## 2023-01-25 PROCEDURE — 99213 OFFICE O/P EST LOW 20 MIN: CPT | Performed by: FAMILY MEDICINE

## 2023-01-25 RX ORDER — INSULIN GLARGINE 300 U/ML
10 INJECTION, SOLUTION SUBCUTANEOUS EVERY MORNING
Qty: 9 ML | Refills: 0 | Status: SHIPPED
Start: 2023-01-25 | End: 2023-04-05 | Stop reason: SDUPTHER

## 2023-02-10 DIAGNOSIS — K86.1 CHRONIC RECURRENT PANCREATITIS: ICD-10-CM

## 2023-02-10 RX ORDER — DULOXETIN HYDROCHLORIDE 30 MG/1
30 CAPSULE, DELAYED RELEASE ORAL DAILY
Qty: 90 CAPSULE | Refills: 0 | Status: SHIPPED | OUTPATIENT
Start: 2023-02-10

## 2023-02-10 RX ORDER — HYDROCODONE BITARTRATE AND ACETAMINOPHEN 7.5; 325 MG/1; MG/1
1 TABLET ORAL EVERY 8 HOURS PRN
Qty: 60 TABLET | Refills: 0 | Status: SHIPPED | OUTPATIENT
Start: 2023-02-10 | End: 2023-03-08 | Stop reason: SDUPTHER

## 2023-02-10 NOTE — TELEPHONE ENCOUNTER
INSPECT RAN    Rx Refill Note  Requested Prescriptions     Pending Prescriptions Disp Refills   • DULoxetine (CYMBALTA) 30 MG capsule 90 capsule 0     Sig: Take 1 capsule by mouth Daily.   • HYDROcodone-acetaminophen (Norco) 7.5-325 MG per tablet 60 tablet 0     Sig: Take 1 tablet by mouth Every 8 (Eight) Hours As Needed for Severe Pain.      Last office visit with prescribing clinician: 1/25/2023   Last telemedicine visit with prescribing clinician: 4/20/2023   Next office visit with prescribing clinician: Visit date not found                       Lipid Panel (08/10/2022 09:55)    Would you like a call back once the refill request has been completed: [] Yes [] No    If the office needs to give you a call back, can they leave a voicemail: [] Yes [] No    Tyesha De Leon, RT  02/10/23, 10:47 EST

## 2023-03-08 DIAGNOSIS — K86.1 CHRONIC RECURRENT PANCREATITIS: ICD-10-CM

## 2023-03-08 NOTE — TELEPHONE ENCOUNTER
INSPECT RAN    Rx Refill Note  Requested Prescriptions     Pending Prescriptions Disp Refills   • dicyclomine (BENTYL) 20 MG tablet 30 tablet 0     Si/2 tab - 1 po q6hrs prn Abd cramping/ bloating/ [id number]   • glucose blood (OneTouch Verio) test strip 360 each 3     Si each by Other route 4 (Four) Times a Day Before Meals & at Bedtime. Use as instructed   • Continuous Blood Gluc Sensor ([employer]Style Curry 14 Day Sensor) misc 6 each 3     Si package Every 14 (Fourteen) Days.   • HYDROcodone-acetaminophen (Norco) 7.5-325 MG per tablet 60 tablet 0     Sig: Take 1 tablet by mouth Every 8 (Eight) Hours As Needed for Severe Pain.      Last office visit with prescribing clinician: 2023   Last telemedicine visit with prescribing clinician: 2023   Next office visit with prescribing clinician: Visit date not found                         Would you like a call back once the refill request has been completed: [] Yes [] No    If the office needs to give you a call back, can they leave a voicemail: [] Yes [] No    Tyesha De Leon, RT  23, 15:16 EST

## 2023-03-09 RX ORDER — DICYCLOMINE HCL 20 MG
TABLET ORAL
Qty: 30 TABLET | Refills: 0 | Status: SHIPPED | OUTPATIENT
Start: 2023-03-09

## 2023-03-09 RX ORDER — HYDROCODONE BITARTRATE AND ACETAMINOPHEN 7.5; 325 MG/1; MG/1
1 TABLET ORAL EVERY 8 HOURS PRN
Qty: 60 TABLET | Refills: 0 | Status: SHIPPED | OUTPATIENT
Start: 2023-03-09 | End: 2023-04-05 | Stop reason: SDUPTHER

## 2023-03-09 RX ORDER — FLASH GLUCOSE SENSOR
1 KIT MISCELLANEOUS
Qty: 6 EACH | Refills: 3 | Status: SHIPPED | OUTPATIENT
Start: 2023-03-09 | End: 2023-03-23

## 2023-03-23 ENCOUNTER — TELEPHONE (OUTPATIENT)
Dept: FAMILY MEDICINE CLINIC | Facility: CLINIC | Age: 58
End: 2023-03-23

## 2023-03-23 NOTE — TELEPHONE ENCOUNTER
Caller: ZAINAB CRUZ    Relationship: Emergency Contact    Best call back number: [phone]    What is the best time to reach you: ANY TIME    Who are you requesting to speak with (clinical staff, provider,  specific staff member): CLINICAL STAFF    What was the call regarding: PATIENT'S WIFE CALLED TO LET US KNOW HIS Continuous Blood Gluc Sensor (FreeStyle Curry 14 Day Sensor) misc PRESCRIPTION IS NOT VALID AND HE NEEDS THE FREESTYLE CURRY 2 SENSORS CALLED IN FROM NOW ON. WOULD LIKE THIS ONE REMOVED FROM HIS CURRENT MED LIST AND ONLY KEEP THE CURRY 2 SENSORS ON HIS CURRENT LIST.    Do you require a callback: IF ANY QUESTIONS

## 2023-04-05 DIAGNOSIS — K86.1 CHRONIC RECURRENT PANCREATITIS: ICD-10-CM

## 2023-04-05 RX ORDER — INSULIN GLARGINE 300 U/ML
10 INJECTION, SOLUTION SUBCUTANEOUS EVERY MORNING
Qty: 9 ML | Refills: 0
Start: 2023-04-05 | End: 2023-04-20 | Stop reason: SDUPTHER

## 2023-04-05 RX ORDER — HYDROCODONE BITARTRATE AND ACETAMINOPHEN 7.5; 325 MG/1; MG/1
1 TABLET ORAL EVERY 8 HOURS PRN
Qty: 60 TABLET | Refills: 0 | Status: SHIPPED | OUTPATIENT
Start: 2023-04-05

## 2023-04-05 NOTE — TELEPHONE ENCOUNTER
INSPECT RAN    Rx Refill Note  Requested Prescriptions     Pending Prescriptions Disp Refills   • Insulin Glargine, 2 Unit Dial, (Toujeo [place] SoloStar) 300 UNIT/ML solution pen-injector injection 9 mL 0     Sig: Inject 10 Units under the skin into the appropriate area as directed Every Morning.   • HYDROcodone-acetaminophen (Norco) 7.5-325 MG per tablet 60 tablet 0     Sig: Take 1 tablet by mouth Every 8 (Eight) Hours As Needed for Severe Pain.      Last office visit with prescribing clinician: 1/25/2023   Last telemedicine visit with prescribing clinician: 4/20/2023   Next office visit with prescribing clinician: Visit date not found                       POC Glycosylated Hemoglobin (Hb A1C) (11/11/2022 09:50)    Would you like a call back once the refill request has been completed: [] Yes [] No    If the office needs to give you a call back, can they leave a voicemail: [] Yes [] No    Tyesha De Leon, RT  04/05/23, 09:55 EDT

## 2023-04-20 ENCOUNTER — CLINICAL SUPPORT (OUTPATIENT)
Dept: FAMILY MEDICINE CLINIC | Facility: CLINIC | Age: 58
End: 2023-04-20
Payer: COMMERCIAL

## 2023-04-20 DIAGNOSIS — E11.65 UNCONTROLLED TYPE 2 DIABETES MELLITUS WITH HYPERGLYCEMIA: Primary | Chronic | ICD-10-CM

## 2023-04-20 LAB
EXPIRATION DATE: ABNORMAL
HBA1C MFR BLD: 8.1 %
Lab: ABNORMAL

## 2023-04-20 RX ORDER — INSULIN GLARGINE 300 U/ML
10 INJECTION, SOLUTION SUBCUTANEOUS EVERY MORNING
Qty: 9 ML | Refills: 1 | Status: SHIPPED | OUTPATIENT
Start: 2023-04-20

## 2023-04-20 NOTE — PROGRESS NOTES
Fingerstick performed in L -3rd finger by RT Raad  with good hemostasis. Patient tolerated well. 04/20/23 Tyseha De Leon, RT

## 2023-04-24 ENCOUNTER — TELEPHONE (OUTPATIENT)
Dept: FAMILY MEDICINE CLINIC | Facility: CLINIC | Age: 58
End: 2023-04-24
Payer: COMMERCIAL

## 2023-04-24 RX ORDER — DULOXETIN HYDROCHLORIDE 30 MG/1
CAPSULE, DELAYED RELEASE ORAL
Qty: 90 CAPSULE | Refills: 0 | OUTPATIENT
Start: 2023-04-24

## 2023-04-24 RX ORDER — SUCRALFATE 1 G/1
TABLET ORAL
Qty: 360 TABLET | Refills: 0 | OUTPATIENT
Start: 2023-04-24

## 2023-04-24 NOTE — TELEPHONE ENCOUNTER
HUB to read  My chart message was sent to the patient     Sugar too high---appointment to discuss and  bring readings

## 2023-04-24 NOTE — TELEPHONE ENCOUNTER
Rx Refill Note  Requested Prescriptions     Pending Prescriptions Disp Refills   • DULoxetine (CYMBALTA) 30 MG capsule [Pharmacy Med Name: DULOXETINE HCL DR 30 MG CAP] 90 capsule 0     Sig: TAKE 1 CAPSULE BY MOUTH EVERY DAY   • sucralfate (CARAFATE) 1 g tablet [Pharmacy Med Name: SUCRALFATE 1 GM TABLET] 360 tablet 0     Sig: TAKE 1 TABLET BY MOUTH 4 TIMES A DAY WITH MEALS & AT BEDTIME.      Last office visit with prescribing clinician: 1/25/2023   Last telemedicine visit with prescribing clinician: Visit date not found   Next office visit with prescribing clinician: Visit date not found     POC Glycosylated Hemoglobin (Hb A1C) (04/20/2023 09:56)  Lipid Panel (08/10/2022 09:55)                      Would you like a call back once the refill request has been completed: [] Yes [] No    If the office needs to give you a call back, can they leave a voicemail: [] Yes [] No    Dedra Machado CMA  04/24/23, 11:24 EDT

## 2023-04-24 NOTE — TELEPHONE ENCOUNTER
----- Message from Krystyna Hardwick DO sent at 4/21/2023  6:21 PM EDT -----  Sugar too high---appt to disc and  bring readings

## 2023-04-24 NOTE — TELEPHONE ENCOUNTER
HUB to share    Refused by: Krystyna Hardwick DO     Refusal reason: Patient should contact provider first

## 2023-05-01 ENCOUNTER — TELEPHONE (OUTPATIENT)
Dept: FAMILY MEDICINE CLINIC | Facility: CLINIC | Age: 58
End: 2023-05-01

## 2023-05-01 RX ORDER — DICYCLOMINE HCL 20 MG
TABLET ORAL
Qty: 30 TABLET | Refills: 0 | Status: SHIPPED | OUTPATIENT
Start: 2023-05-01

## 2023-05-01 NOTE — TELEPHONE ENCOUNTER
Returned call and advised her patient would need to be evaluated.  She states he is having sharp abdominal pains going around his side and into his back.  Has also had some nausea and vomiting.  Going to check with patient to see if he wants to schedule to see the NP tomorrow.  Will call us back if he wants to schedule an appt.

## 2023-05-01 NOTE — TELEPHONE ENCOUNTER
Caller: ZAINAB CRUZ    Relationship: Emergency Contact    Best call back number:     What orders are you requesting (i.e. lab or imaging): LABS    In what timeframe would the patient need to come in:     Where will you receive your lab/imaging services: OFFICE LAB    Additional notes: PATIENTS WIFE ZAINAB SI CALLING IN STATING THAT THE PATIENT IS HAVING ISSUES WITH HIS PANCREAS AGAIN AND WANTS TO KNOW IF HE CAN GET ORDERS TO COME IN TO GET HIS LABS DRAWN.  ZAINAB WANTS TO BE CALLED BACK TO DISCUSS THIS.

## 2023-05-02 ENCOUNTER — OFFICE VISIT (OUTPATIENT)
Dept: FAMILY MEDICINE CLINIC | Facility: CLINIC | Age: 58
End: 2023-05-02
Payer: COMMERCIAL

## 2023-05-02 ENCOUNTER — APPOINTMENT (OUTPATIENT)
Dept: CT IMAGING | Facility: HOSPITAL | Age: 58
End: 2023-05-02
Payer: COMMERCIAL

## 2023-05-02 ENCOUNTER — APPOINTMENT (OUTPATIENT)
Dept: ULTRASOUND IMAGING | Facility: HOSPITAL | Age: 58
End: 2023-05-02
Payer: COMMERCIAL

## 2023-05-02 ENCOUNTER — HOSPITAL ENCOUNTER (OUTPATIENT)
Facility: HOSPITAL | Age: 58
Setting detail: OBSERVATION
Discharge: HOME | End: 2023-05-03
Attending: EMERGENCY MEDICINE | Admitting: EMERGENCY MEDICINE
Payer: COMMERCIAL

## 2023-05-02 VITALS
HEIGHT: 74 IN | TEMPERATURE: 97.8 F | HEART RATE: 124 BPM | OXYGEN SATURATION: 95 % | DIASTOLIC BLOOD PRESSURE: 84 MMHG | SYSTOLIC BLOOD PRESSURE: 118 MMHG | BODY MASS INDEX: 22.2 KG/M2 | WEIGHT: 173 LBS

## 2023-05-02 DIAGNOSIS — R10.12 ABDOMINAL PAIN, ACUTE, LEFT UPPER QUADRANT: ICD-10-CM

## 2023-05-02 DIAGNOSIS — K86.1 ACUTE ON CHRONIC PANCREATITIS: Primary | ICD-10-CM

## 2023-05-02 DIAGNOSIS — E86.0 DEHYDRATION: ICD-10-CM

## 2023-05-02 DIAGNOSIS — K86.1 CHRONIC RECURRENT PANCREATITIS: Primary | ICD-10-CM

## 2023-05-02 DIAGNOSIS — K85.90 ACUTE ON CHRONIC PANCREATITIS: Primary | ICD-10-CM

## 2023-05-02 LAB
ALBUMIN SERPL-MCNC: 4.7 G/DL (ref 3.5–5.2)
ALBUMIN/GLOB SERPL: 1.2 G/DL
ALP SERPL-CCNC: 72 U/L (ref 39–117)
ALT SERPL W P-5'-P-CCNC: <5 U/L (ref 1–41)
ANION GAP SERPL CALCULATED.3IONS-SCNC: 15 MMOL/L (ref 5–15)
AST SERPL-CCNC: 23 U/L (ref 1–40)
BASOPHILS # BLD AUTO: 0 10*3/MM3 (ref 0–0.2)
BASOPHILS NFR BLD AUTO: 0.4 % (ref 0–1.5)
BILIRUB SERPL-MCNC: 0.4 MG/DL (ref 0–1.2)
BUN SERPL-MCNC: 18 MG/DL (ref 6–20)
BUN/CREAT SERPL: 11 (ref 7–25)
CALCIUM SPEC-SCNC: 10.3 MG/DL (ref 8.6–10.5)
CHLORIDE SERPL-SCNC: 92 MMOL/L (ref 98–107)
CO2 SERPL-SCNC: 24 MMOL/L (ref 22–29)
CREAT SERPL-MCNC: 1.64 MG/DL (ref 0.76–1.27)
D-LACTATE SERPL-SCNC: 1.8 MMOL/L (ref 0.3–2)
DEPRECATED RDW RBC AUTO: 44.2 FL (ref 37–54)
EGFRCR SERPLBLD CKD-EPI 2021: 48.5 ML/MIN/1.73
EOSINOPHIL # BLD AUTO: 0.1 10*3/MM3 (ref 0–0.4)
EOSINOPHIL NFR BLD AUTO: 1.3 % (ref 0.3–6.2)
ERYTHROCYTE [DISTWIDTH] IN BLOOD BY AUTOMATED COUNT: 13 % (ref 12.3–15.4)
GLOBULIN UR ELPH-MCNC: 3.9 GM/DL
GLUCOSE SERPL-MCNC: 406 MG/DL (ref 65–99)
HCT VFR BLD AUTO: 47.1 % (ref 37.5–51)
HGB BLD-MCNC: 16.4 G/DL (ref 13–17.7)
LDH SERPL-CCNC: 175 U/L (ref 135–225)
LIPASE SERPL-CCNC: 107 U/L (ref 13–60)
LYMPHOCYTES # BLD AUTO: 1.5 10*3/MM3 (ref 0.7–3.1)
LYMPHOCYTES NFR BLD AUTO: 22.6 % (ref 19.6–45.3)
MAGNESIUM SERPL-MCNC: 2 MG/DL (ref 1.6–2.6)
MCH RBC QN AUTO: 32.1 PG (ref 26.6–33)
MCHC RBC AUTO-ENTMCNC: 34.7 G/DL (ref 31.5–35.7)
MCV RBC AUTO: 92.3 FL (ref 79–97)
MONOCYTES # BLD AUTO: 0.5 10*3/MM3 (ref 0.1–0.9)
MONOCYTES NFR BLD AUTO: 6.9 % (ref 5–12)
NEUTROPHILS NFR BLD AUTO: 4.7 10*3/MM3 (ref 1.7–7)
NEUTROPHILS NFR BLD AUTO: 68.8 % (ref 42.7–76)
NRBC BLD AUTO-RTO: 0.1 /100 WBC (ref 0–0.2)
PLATELET # BLD AUTO: 371 10*3/MM3 (ref 140–450)
PMV BLD AUTO: 8.1 FL (ref 6–12)
POTASSIUM SERPL-SCNC: 4.5 MMOL/L (ref 3.5–5.2)
PROT SERPL-MCNC: 8.6 G/DL (ref 6–8.5)
RBC # BLD AUTO: 5.11 10*6/MM3 (ref 4.14–5.8)
SODIUM SERPL-SCNC: 131 MMOL/L (ref 136–145)
WBC NRBC COR # BLD: 6.9 10*3/MM3 (ref 3.4–10.8)

## 2023-05-02 PROCEDURE — 96361 HYDRATE IV INFUSION ADD-ON: CPT

## 2023-05-02 PROCEDURE — 0 POTASSIUM CHLORIDE PER 2 MEQ: Performed by: EMERGENCY MEDICINE

## 2023-05-02 PROCEDURE — 99284 EMERGENCY DEPT VISIT MOD MDM: CPT

## 2023-05-02 PROCEDURE — 36415 COLL VENOUS BLD VENIPUNCTURE: CPT

## 2023-05-02 PROCEDURE — 25010000002 MORPHINE PER 10 MG: Performed by: NURSE PRACTITIONER

## 2023-05-02 PROCEDURE — 83735 ASSAY OF MAGNESIUM: CPT | Performed by: NURSE PRACTITIONER

## 2023-05-02 PROCEDURE — 80053 COMPREHEN METABOLIC PANEL: CPT | Performed by: NURSE PRACTITIONER

## 2023-05-02 PROCEDURE — 76705 ECHO EXAM OF ABDOMEN: CPT

## 2023-05-02 PROCEDURE — 96374 THER/PROPH/DIAG INJ IV PUSH: CPT

## 2023-05-02 PROCEDURE — 74177 CT ABD & PELVIS W/CONTRAST: CPT

## 2023-05-02 PROCEDURE — 83605 ASSAY OF LACTIC ACID: CPT

## 2023-05-02 PROCEDURE — 99213 OFFICE O/P EST LOW 20 MIN: CPT | Performed by: NURSE PRACTITIONER

## 2023-05-02 PROCEDURE — 25010000002 ONDANSETRON PER 1 MG: Performed by: NURSE PRACTITIONER

## 2023-05-02 PROCEDURE — 25510000001 IOPAMIDOL PER 1 ML: Performed by: EMERGENCY MEDICINE

## 2023-05-02 PROCEDURE — 83690 ASSAY OF LIPASE: CPT | Performed by: NURSE PRACTITIONER

## 2023-05-02 PROCEDURE — 85025 COMPLETE CBC W/AUTO DIFF WBC: CPT | Performed by: NURSE PRACTITIONER

## 2023-05-02 PROCEDURE — 83615 LACTATE (LD) (LDH) ENZYME: CPT | Performed by: NURSE PRACTITIONER

## 2023-05-02 PROCEDURE — 25010000002 HYDROMORPHONE 1 MG/ML SOLUTION: Performed by: EMERGENCY MEDICINE

## 2023-05-02 PROCEDURE — G0378 HOSPITAL OBSERVATION PER HR: HCPCS

## 2023-05-02 PROCEDURE — 87040 BLOOD CULTURE FOR BACTERIA: CPT | Performed by: NURSE PRACTITIONER

## 2023-05-02 PROCEDURE — 25010000002 ONDANSETRON PER 1 MG: Performed by: EMERGENCY MEDICINE

## 2023-05-02 PROCEDURE — 96376 TX/PRO/DX INJ SAME DRUG ADON: CPT

## 2023-05-02 PROCEDURE — 96375 TX/PRO/DX INJ NEW DRUG ADDON: CPT

## 2023-05-02 RX ORDER — ACETAMINOPHEN 325 MG/1
650 TABLET ORAL EVERY 4 HOURS PRN
Status: DISCONTINUED | OUTPATIENT
Start: 2023-05-02 | End: 2023-05-03 | Stop reason: HOSPADM

## 2023-05-02 RX ORDER — SODIUM CHLORIDE 0.9 % (FLUSH) 0.9 %
10 SYRINGE (ML) INJECTION AS NEEDED
Status: DISCONTINUED | OUTPATIENT
Start: 2023-05-02 | End: 2023-05-03 | Stop reason: HOSPADM

## 2023-05-02 RX ORDER — ONDANSETRON 2 MG/ML
4 INJECTION INTRAMUSCULAR; INTRAVENOUS ONCE
Status: COMPLETED | OUTPATIENT
Start: 2023-05-02 | End: 2023-05-02

## 2023-05-02 RX ORDER — FAMOTIDINE 10 MG/ML
20 INJECTION, SOLUTION INTRAVENOUS ONCE
Status: COMPLETED | OUTPATIENT
Start: 2023-05-02 | End: 2023-05-02

## 2023-05-02 RX ORDER — ONDANSETRON 2 MG/ML
4 INJECTION INTRAMUSCULAR; INTRAVENOUS EVERY 6 HOURS PRN
Status: DISCONTINUED | OUTPATIENT
Start: 2023-05-02 | End: 2023-05-03 | Stop reason: HOSPADM

## 2023-05-02 RX ORDER — SODIUM CHLORIDE 0.9 % (FLUSH) 0.9 %
10 SYRINGE (ML) INJECTION EVERY 12 HOURS SCHEDULED
Status: DISCONTINUED | OUTPATIENT
Start: 2023-05-02 | End: 2023-05-03 | Stop reason: HOSPADM

## 2023-05-02 RX ORDER — PANTOPRAZOLE SODIUM 40 MG/10ML
40 INJECTION, POWDER, LYOPHILIZED, FOR SOLUTION INTRAVENOUS
Status: DISCONTINUED | OUTPATIENT
Start: 2023-05-03 | End: 2023-05-03

## 2023-05-02 RX ORDER — CHOLECALCIFEROL (VITAMIN D3) 125 MCG
5 CAPSULE ORAL NIGHTLY PRN
Status: DISCONTINUED | OUTPATIENT
Start: 2023-05-02 | End: 2023-05-03 | Stop reason: HOSPADM

## 2023-05-02 RX ORDER — SODIUM CHLORIDE AND POTASSIUM CHLORIDE 150; 450 MG/100ML; MG/100ML
100 INJECTION, SOLUTION INTRAVENOUS CONTINUOUS
Status: DISCONTINUED | OUTPATIENT
Start: 2023-05-02 | End: 2023-05-03 | Stop reason: HOSPADM

## 2023-05-02 RX ORDER — SODIUM CHLORIDE 9 MG/ML
40 INJECTION, SOLUTION INTRAVENOUS AS NEEDED
Status: DISCONTINUED | OUTPATIENT
Start: 2023-05-02 | End: 2023-05-03 | Stop reason: HOSPADM

## 2023-05-02 RX ORDER — HYDROCODONE BITARTRATE AND ACETAMINOPHEN 7.5; 325 MG/1; MG/1
1 TABLET ORAL EVERY 6 HOURS PRN
Qty: 12 TABLET | Refills: 0 | Status: SHIPPED | OUTPATIENT
Start: 2023-05-02 | End: 2023-05-02

## 2023-05-02 RX ADMIN — HYDROMORPHONE HYDROCHLORIDE 0.5 MG: 1 INJECTION, SOLUTION INTRAMUSCULAR; INTRAVENOUS; SUBCUTANEOUS at 19:39

## 2023-05-02 RX ADMIN — IOPAMIDOL 100 ML: 755 INJECTION, SOLUTION INTRAVENOUS at 17:11

## 2023-05-02 RX ADMIN — HYDROMORPHONE HYDROCHLORIDE 0.25 MG: 1 INJECTION, SOLUTION INTRAMUSCULAR; INTRAVENOUS; SUBCUTANEOUS at 16:03

## 2023-05-02 RX ADMIN — HYDROMORPHONE HYDROCHLORIDE 0.5 MG: 1 INJECTION, SOLUTION INTRAMUSCULAR; INTRAVENOUS; SUBCUTANEOUS at 21:39

## 2023-05-02 RX ADMIN — MORPHINE SULFATE 4 MG: 4 INJECTION, SOLUTION INTRAMUSCULAR; INTRAVENOUS at 15:50

## 2023-05-02 RX ADMIN — SODIUM CHLORIDE, POTASSIUM CHLORIDE, SODIUM LACTATE AND CALCIUM CHLORIDE 1000 ML: 600; 310; 30; 20 INJECTION, SOLUTION INTRAVENOUS at 16:03

## 2023-05-02 RX ADMIN — ONDANSETRON 4 MG: 2 INJECTION INTRAMUSCULAR; INTRAVENOUS at 16:02

## 2023-05-02 RX ADMIN — POTASSIUM CHLORIDE AND SODIUM CHLORIDE 100 ML/HR: 450; 150 INJECTION, SOLUTION INTRAVENOUS at 21:57

## 2023-05-02 RX ADMIN — Medication 10 ML: at 21:57

## 2023-05-02 RX ADMIN — FAMOTIDINE 20 MG: 10 INJECTION INTRAVENOUS at 16:02

## 2023-05-02 RX ADMIN — ONDANSETRON 4 MG: 2 INJECTION INTRAMUSCULAR; INTRAVENOUS at 15:50

## 2023-05-02 RX ADMIN — SODIUM CHLORIDE, POTASSIUM CHLORIDE, SODIUM LACTATE AND CALCIUM CHLORIDE 500 ML: 600; 310; 30; 20 INJECTION, SOLUTION INTRAVENOUS at 16:03

## 2023-05-02 RX ADMIN — SODIUM CHLORIDE 1000 ML: 9 INJECTION, SOLUTION INTRAVENOUS at 15:50

## 2023-05-02 NOTE — ED NOTES
Spoke w/ Dr. Ruggiero about new orders put in after nurse administered current med orders.  Dr. Ruggiero wants all med orders given w/ fluids.

## 2023-05-02 NOTE — PROGRESS NOTES
"Chief Complaint  Chief Complaint   Patient presents with   • Pancreatitis     Pt stated hot burning pain started Friday 4-28-23  Chronic pancreatitis        Subjective          Mitchell Jacome is a 57-year-old male who presents to the office today with pancreas issues.    Pancreas issues- Has history of chronic pancreatitis. He reports he has been short of breath. Last Friday at work her started to have abdominal pain. Saturday evening it persisted. He has been trying gell-o, pudding, popsiscles and it and not getting better. Abdominal pain upper middle abdomen radiating to his back. Abdominal pain currently 9/10. Pain medication helps some, however he is out of pain medication. Denies aggravating factors. Describes the pain as sharp. He has been hospitalized a few times for pancreatitis.        Review of Systems   Constitutional: Negative for chills and fever.   HENT: Negative for congestion.    Respiratory: Positive for shortness of breath.    Cardiovascular: Positive for chest pain.   Gastrointestinal: Positive for abdominal pain, diarrhea, nausea and vomiting.   Genitourinary: Negative for difficulty urinating.   Musculoskeletal: Negative for arthralgias.   Skin: Negative.    Neurological: Negative for dizziness, light-headedness and headache.        Objective   Vital Signs:   Vitals:    05/02/23 1253   BP:    Pulse: (!) 124   Temp:    SpO2:       Estimated body mass index is 22.2 kg/m² as calculated from the following:    Height as of this encounter: 188 cm (74.02\").    Weight as of this encounter: 78.5 kg (173 lb).          Physical Exam  Vitals reviewed.   Constitutional:       Appearance: He is normal weight. He is ill-appearing.   HENT:      Head: Normocephalic and atraumatic.      Nose: Nose normal.   Eyes:      Extraocular Movements: Extraocular movements intact.      Conjunctiva/sclera: Conjunctivae normal.   Cardiovascular:      Rate and Rhythm: Regular rhythm. Tachycardia present.      Pulses: " Normal pulses.      Heart sounds: Normal heart sounds.      Comments: S1, S2 audible  Pulmonary:      Effort: Pulmonary effort is normal.      Breath sounds: Normal breath sounds.      Comments: On room air   Abdominal:      General: Abdomen is flat.      Palpations: Abdomen is soft.      Tenderness: There is abdominal tenderness.      Comments: Hyperactive bowel sounds, patient in distress due to pain    Musculoskeletal:         General: Normal range of motion.      Cervical back: Normal range of motion.   Skin:     General: Skin is warm and dry.   Neurological:      General: No focal deficit present.      Mental Status: He is alert and oriented to person, place, and time. Mental status is at baseline.   Psychiatric:         Mood and Affect: Mood normal.         Behavior: Behavior normal.         Thought Content: Thought content normal.         Judgment: Judgment normal.                Physical Exam   Result Review :             Procedures       Assessment and Plan     Diagnoses and all orders for this visit:    1. Chronic recurrent pancreatitis (Primary)  Assessment & Plan:  Has history of chronic pancreatitis. He reports he has been short of breath. Last Friday at work her started to have abdominal pain. Saturday evening it persisted. He has been trying gell-o, pudding, popsiscles and it and not getting better. Abdominal pain upper middle abdomen radiating to his back. Abdominal pain currently 9/10. Pain medication helps some, however he is out of pain medication. Denies aggravating factors. Describes the pain as sharp. He has been hospitalized a few times for pancreatitis.     Patient instructed to go to the ED. Patient HR elevated and body under a lot of stress likely secondary to acute on chronic pancreatitis.     Patient prefers to drive to the ED, ambulance was offered.     Called [facility] ED and report given to Triage in ED.       Orders:  -     Cancel: CBC & Differential  -     Cancel: Comprehensive metabolic  panel  -     Cancel: Lipase  -     Cancel: Amylase  -     Cancel: Lipase; Future  -     Cancel: Amylase; Future  -     Cancel: C-reactive Protein  -     Discontinue: HYDROcodone-acetaminophen (NORCO) 7.5-325 MG per tablet; Take 1 tablet by mouth Every 6 (Six) Hours As Needed for Moderate Pain.  Dispense: 12 tablet; Refill: 0            Follow Up   Return if symptoms worsen or fail to improve.   Patient was given instructions and counseling regarding her condition or for health maintenance advice. Please see specific information pulled into the AVS if appropriate.

## 2023-05-02 NOTE — ASSESSMENT & PLAN NOTE
Has history of chronic pancreatitis. He reports he has been short of breath. Last Friday at work her started to have abdominal pain. Saturday evening it persisted. He has been trying gell-o, pudding, popsiscles and it and not getting better. Abdominal pain upper middle abdomen radiating to his back. Abdominal pain currently 9/10. Pain medication helps some, however he is out of pain medication. Denies aggravating factors. Describes the pain as sharp. He has been hospitalized a few times for pancreatitis.     Patient instructed to go to the ED. Patient HR elevated and body under a lot of stress likely secondary to acute on chronic pancreatitis.     Patient prefers to drive to the ED, ambulance was offered.     Called [facility] ED and report given to Triage in ED.

## 2023-05-02 NOTE — ED PROVIDER NOTES
Subjective    Provider in Triage Note  Epigastric pain x 4 days, nausea and vomiting. Subjective fever. Sent after being seen by ANDRE HIGGINS in the office today. No urinary complaints. No etoh use.  Hx pancreatitis x 5 years; s/p brody    Gi: dr martinez      History of Present Illness  Pit note reviewed agree with content    57-year-old male followed by GI has had the recent onset of left upper quadrant pain in the last 24 hours.  He states he has had severe nausea and has vomited several times he denies melena hematemesis hematochezia.  He states that he has been following his diet but ate more than usual over the Easter holidays.  He reports no recent weight changes.  He denies dysuria or hematuria.    Orts no recent weight loss    Review of Systems   Constitutional: Positive for diaphoresis. Negative for unexpected weight change.   HENT: Negative for trouble swallowing.    Eyes: Negative for discharge.   Respiratory: Negative for chest tightness and shortness of breath.    Gastrointestinal: Positive for abdominal pain, nausea and vomiting. Negative for abdominal distention, constipation and diarrhea.   Hematological: Does not bruise/bleed easily.   All other systems reviewed and are negative.      Past Medical History:   Diagnosis Date   • Chronic recurrent pancreatitis    • COVID-19    • Depression    • DMII    • GERD    • Hearing loss, right     85%   • HTN    • PSA     2021= 0.775/ 2022=0.747   • Seizures    • Solitary kidney        Allergies   Allergen Reactions   • Basaglar Kwikpen [Insulin Glargine] Other (See Comments)     Injection bruising   • Gemfibrozil Rash       Past Surgical History:   Procedure Laterality Date   • APPENDECTOMY     • CHOLECYSTECTOMY     • COLONOSCOPY      NEG = 2016, rech 2026     GSI       Family History   Problem Relation Age of Onset   • Cerebral aneurysm Mother    • Hypertension Father    • Cancer Father 60        Bladder   • Diabetes Father    • Hyperlipidemia Father    •  Hypertension Brother    • Hyperlipidemia Brother        Social History     Socioeconomic History   • Marital status:    Tobacco Use   • Smoking status: Never   • Smokeless tobacco: Never   Vaping Use   • Vaping Use: Never used   Substance and Sexual Activity   • Alcohol use: Yes     Comment: rare   • Drug use: Yes     Types: Marijuana   • Sexual activity: Defer           Objective   Physical Exam   Initially evaluated by pit provider in triage  Vital signs and triage nurse note reviewed.   Constitutional: Awake, alert; uncomfortable  HEENT: Normocephalic, atraumatic;   with intact EOM; oropharynx is pink and moist without exudate or erythema.   Neck: Supple, full range of motion without pain;    Cardiovascular tachycardic: Regular rate and rhythm, normal S1-S2.   Pulmonary: Respiratory effort regular nonlabored, breath sounds clear to auscultation all fields.   Abdomen: Soft, tender in the epigastrium nondistended with normoactive bowel sounds; no peritonitis  Musculoskeletal: Independent range of motion of all extremities with no palpable tenderness or edema.   Neuro: Alert oriented x3, speech is clear and appropriate, GCS 15   Skin:  Fleshtone diaphoretic    Procedures           ED Course  ED Course as of 05/02/23 1955 Tue May 02, 2023   1934 Protracted ER stay in overflow conditions with secondary radiologic test ordered at the request of the radiology [TH]      ED Course User Index  [TH] Colton Ruggiero MD           Labs Reviewed   COMPREHENSIVE METABOLIC PANEL - Abnormal; Notable for the following components:       Result Value    Glucose 406 (*)     Creatinine 1.64 (*)     Sodium 131 (*)     Chloride 92 (*)     Total Protein 8.6 (*)     eGFR 48.5 (*)     All other components within normal limits    Narrative:     GFR Normal >60  Chronic Kidney Disease <60  Kidney Failure <15     LIPASE - Abnormal; Notable for the following components:    Lipase 107 (*)     All other components within normal limits    LACTATE DEHYDROGENASE - Normal   MAGNESIUM - Normal   CBC WITH AUTO DIFFERENTIAL - Normal   POC LACTATE - Normal   BLOOD CULTURE   BLOOD CULTURE   POC LACTATE   CBC AND DIFFERENTIAL    Narrative:     The following orders were created for panel order CBC & Differential.  Procedure                               Abnormality         Status                     ---------                               -----------         ------                     CBC Auto Differential[[id number]]        Normal              Final result                 Please view results for these tests on the individual orders.     Medications   sodium chloride 0.9 % flush 10 mL (has no administration in time range)   sodium chloride 0.9 % bolus 1,000 mL (0 mL Intravenous Stopped 5/2/23 1936)   ondansetron (ZOFRAN) injection 4 mg (4 mg Intravenous Given 5/2/23 1550)   morphine injection 4 mg (4 mg Intravenous Given 5/2/23 1550)   lactated ringers bolus 500 mL (0 mL Intravenous Stopped 5/2/23 1936)   lactated ringers bolus 1,000 mL (0 mL Intravenous Stopped 5/2/23 1936)   famotidine (PEPCID) injection 20 mg (20 mg Intravenous Given 5/2/23 1602)   ondansetron (ZOFRAN) injection 4 mg (4 mg Intravenous Given 5/2/23 1602)   HYDROmorphone (DILAUDID) injection 0.25 mg (0.25 mg Intravenous Given 5/2/23 1603)   iopamidol (ISOVUE-370) 76 % injection 100 mL (100 mL Intravenous Given 5/2/23 1711)   HYDROmorphone (DILAUDID) injection 0.5 mg (0.5 mg Intravenous Given 5/2/23 1939)     CT Abdomen Pelvis With Contrast    Result Date: 5/2/2023  *There is some questionable mild fat stranding at the pancreatic head which could be seen with mild pancreatitis. Appearance is significantly improved compared to the 2/20/2021 CT. *There is chronic dilation of the pancreatic duct and bile ducts. Patient status post cholecystectomy. *Status post right nephrectomy. *Nondistended stomach. I can't exclude gastric wall thickening gastritis. Electronically Signed: Yessi Singh  5/2/2023  5:19 PM EDT  Workstation ID: [id number]    US Pancreas    Result Date: 5/2/2023  Impression: Limited evaluation of pancreas on ultrasound. Pancreatic duct measures up to 6 mm. Common bile duct is nondilated and its visualized portion. Limited evaluation due to bowel. Concern for pancreatitis or complications of pancreatitis CT of the abdomen and pelvis with oral and IV contrast is recommended. Electronically Signed: Yessi Singh  5/2/2023 3:24 PM EDT  Workstation ID: [id number]                                    Medical Decision Making  Pain and nausea were controlled in the emergency department.  The patient will be placed in observation overnight to facilitate hydration and pain control.  We obtain consultation from GI.  The patient was agreeable to this plan of treatment    Amount and/or Complexity of Data Reviewed  Independent Historian: spouse  External Data Reviewed: notes.  Labs: ordered. Decision-making details documented in ED Course.  Radiology: ordered and independent interpretation performed.     Details: Possible changes on ultrasound confirmed on CT without evidence of acute pseudocyst  Discussion of management or test interpretation with external provider(s): GI consultation    Risk  Prescription drug management.  Parenteral controlled substances.    Risk Details: Risk of progression of symptomatology        Final diagnoses:   Acute on chronic pancreatitis   Abdominal pain, acute, left upper quadrant   Dehydration       ED Disposition  ED Disposition     ED Disposition   Decision to Admit    Condition   --    Comment   --             No follow-up provider specified.       Medication List      No changes were made to your prescriptions during this visit.          Colton Ruggiero MD  05/02/23 1955

## 2023-05-03 ENCOUNTER — READMISSION MANAGEMENT (OUTPATIENT)
Dept: CALL CENTER | Facility: HOSPITAL | Age: 58
End: 2023-05-03
Payer: COMMERCIAL

## 2023-05-03 VITALS
RESPIRATION RATE: 16 BRPM | BODY MASS INDEX: 21.94 KG/M2 | DIASTOLIC BLOOD PRESSURE: 84 MMHG | TEMPERATURE: 98 F | WEIGHT: 171 LBS | OXYGEN SATURATION: 96 % | HEART RATE: 79 BPM | SYSTOLIC BLOOD PRESSURE: 137 MMHG | HEIGHT: 74 IN

## 2023-05-03 LAB
ANION GAP SERPL CALCULATED.3IONS-SCNC: 8 MMOL/L (ref 5–15)
BASOPHILS # BLD AUTO: 0 10*3/MM3 (ref 0–0.2)
BASOPHILS NFR BLD AUTO: 0.6 % (ref 0–1.5)
BUN SERPL-MCNC: 16 MG/DL (ref 6–20)
BUN/CREAT SERPL: 12 (ref 7–25)
CALCIUM SPEC-SCNC: 8.9 MG/DL (ref 8.6–10.5)
CHLORIDE SERPL-SCNC: 102 MMOL/L (ref 98–107)
CHOLEST SERPL-MCNC: 146 MG/DL (ref 0–200)
CO2 SERPL-SCNC: 26 MMOL/L (ref 22–29)
CREAT SERPL-MCNC: 1.33 MG/DL (ref 0.76–1.27)
DEPRECATED RDW RBC AUTO: 43.3 FL (ref 37–54)
EGFRCR SERPLBLD CKD-EPI 2021: 62.3 ML/MIN/1.73
EOSINOPHIL # BLD AUTO: 0.1 10*3/MM3 (ref 0–0.4)
EOSINOPHIL NFR BLD AUTO: 2.6 % (ref 0.3–6.2)
ERYTHROCYTE [DISTWIDTH] IN BLOOD BY AUTOMATED COUNT: 12.7 % (ref 12.3–15.4)
GLUCOSE BLDC GLUCOMTR-MCNC: 162 MG/DL (ref 70–105)
GLUCOSE BLDC GLUCOMTR-MCNC: 178 MG/DL (ref 70–105)
GLUCOSE SERPL-MCNC: 152 MG/DL (ref 65–99)
HBA1C MFR BLD: 9 % (ref 4.8–5.6)
HCT VFR BLD AUTO: 39.2 % (ref 37.5–51)
HDLC SERPL-MCNC: 37 MG/DL (ref 40–60)
HGB BLD-MCNC: 13.5 G/DL (ref 13–17.7)
LDLC SERPL CALC-MCNC: 69 MG/DL (ref 0–100)
LDLC/HDLC SERPL: 1.63 {RATIO}
LIPASE SERPL-CCNC: 75 U/L (ref 13–60)
LYMPHOCYTES # BLD AUTO: 2 10*3/MM3 (ref 0.7–3.1)
LYMPHOCYTES NFR BLD AUTO: 38 % (ref 19.6–45.3)
MCH RBC QN AUTO: 31.9 PG (ref 26.6–33)
MCHC RBC AUTO-ENTMCNC: 34.3 G/DL (ref 31.5–35.7)
MCV RBC AUTO: 93 FL (ref 79–97)
MONOCYTES # BLD AUTO: 0.4 10*3/MM3 (ref 0.1–0.9)
MONOCYTES NFR BLD AUTO: 8.2 % (ref 5–12)
NEUTROPHILS NFR BLD AUTO: 2.7 10*3/MM3 (ref 1.7–7)
NEUTROPHILS NFR BLD AUTO: 50.6 % (ref 42.7–76)
NRBC BLD AUTO-RTO: 0.3 /100 WBC (ref 0–0.2)
PLATELET # BLD AUTO: 268 10*3/MM3 (ref 140–450)
PMV BLD AUTO: 7.7 FL (ref 6–12)
POTASSIUM SERPL-SCNC: 4.2 MMOL/L (ref 3.5–5.2)
RBC # BLD AUTO: 4.22 10*6/MM3 (ref 4.14–5.8)
SODIUM SERPL-SCNC: 136 MMOL/L (ref 136–145)
TRIGL SERPL-MCNC: 243 MG/DL (ref 0–150)
VLDLC SERPL-MCNC: 40 MG/DL (ref 5–40)
WBC NRBC COR # BLD: 5.4 10*3/MM3 (ref 3.4–10.8)

## 2023-05-03 PROCEDURE — 80048 BASIC METABOLIC PNL TOTAL CA: CPT | Performed by: EMERGENCY MEDICINE

## 2023-05-03 PROCEDURE — G0378 HOSPITAL OBSERVATION PER HR: HCPCS

## 2023-05-03 PROCEDURE — 96376 TX/PRO/DX INJ SAME DRUG ADON: CPT

## 2023-05-03 PROCEDURE — 0 POTASSIUM CHLORIDE PER 2 MEQ: Performed by: EMERGENCY MEDICINE

## 2023-05-03 PROCEDURE — 83690 ASSAY OF LIPASE: CPT | Performed by: EMERGENCY MEDICINE

## 2023-05-03 PROCEDURE — 63710000001 INSULIN LISPRO (HUMAN) PER 5 UNITS: Performed by: PHYSICIAN ASSISTANT

## 2023-05-03 PROCEDURE — 96361 HYDRATE IV INFUSION ADD-ON: CPT

## 2023-05-03 PROCEDURE — 85025 COMPLETE CBC W/AUTO DIFF WBC: CPT | Performed by: EMERGENCY MEDICINE

## 2023-05-03 PROCEDURE — 96375 TX/PRO/DX INJ NEW DRUG ADDON: CPT

## 2023-05-03 PROCEDURE — 83036 HEMOGLOBIN GLYCOSYLATED A1C: CPT | Performed by: PHYSICIAN ASSISTANT

## 2023-05-03 PROCEDURE — 80061 LIPID PANEL: CPT | Performed by: PHYSICIAN ASSISTANT

## 2023-05-03 PROCEDURE — 25010000002 HYDROMORPHONE 1 MG/ML SOLUTION: Performed by: EMERGENCY MEDICINE

## 2023-05-03 PROCEDURE — 25010000002 METOCLOPRAMIDE PER 10 MG: Performed by: PHYSICIAN ASSISTANT

## 2023-05-03 PROCEDURE — 25010000002 ONDANSETRON PER 1 MG: Performed by: EMERGENCY MEDICINE

## 2023-05-03 PROCEDURE — 82948 REAGENT STRIP/BLOOD GLUCOSE: CPT

## 2023-05-03 PROCEDURE — 63710000001 INSULIN GLARGINE PER 5 UNITS: Performed by: PHYSICIAN ASSISTANT

## 2023-05-03 RX ORDER — IBUPROFEN 600 MG/1
1 TABLET ORAL
Status: DISCONTINUED | OUTPATIENT
Start: 2023-05-03 | End: 2023-05-03 | Stop reason: HOSPADM

## 2023-05-03 RX ORDER — ATENOLOL 25 MG/1
25 TABLET ORAL DAILY
Status: DISCONTINUED | OUTPATIENT
Start: 2023-05-03 | End: 2023-05-03 | Stop reason: HOSPADM

## 2023-05-03 RX ORDER — DEXTROSE MONOHYDRATE 25 G/50ML
25 INJECTION, SOLUTION INTRAVENOUS
Status: DISCONTINUED | OUTPATIENT
Start: 2023-05-03 | End: 2023-05-03 | Stop reason: HOSPADM

## 2023-05-03 RX ORDER — DULOXETIN HYDROCHLORIDE 30 MG/1
30 CAPSULE, DELAYED RELEASE ORAL DAILY
Status: DISCONTINUED | OUTPATIENT
Start: 2023-05-03 | End: 2023-05-03 | Stop reason: HOSPADM

## 2023-05-03 RX ORDER — HYDROCODONE BITARTRATE AND ACETAMINOPHEN 7.5; 325 MG/1; MG/1
1 TABLET ORAL EVERY 8 HOURS PRN
Status: DISCONTINUED | OUTPATIENT
Start: 2023-05-03 | End: 2023-05-03 | Stop reason: HOSPADM

## 2023-05-03 RX ORDER — INSULIN LISPRO 100 [IU]/ML
3-14 INJECTION, SOLUTION INTRAVENOUS; SUBCUTANEOUS
Status: DISCONTINUED | OUTPATIENT
Start: 2023-05-03 | End: 2023-05-03 | Stop reason: HOSPADM

## 2023-05-03 RX ORDER — PANTOPRAZOLE SODIUM 40 MG/1
40 TABLET, DELAYED RELEASE ORAL
Status: DISCONTINUED | OUTPATIENT
Start: 2023-05-03 | End: 2023-05-03 | Stop reason: HOSPADM

## 2023-05-03 RX ORDER — LEVETIRACETAM 500 MG/1
500 TABLET ORAL 2 TIMES DAILY
Status: DISCONTINUED | OUTPATIENT
Start: 2023-05-03 | End: 2023-05-03 | Stop reason: HOSPADM

## 2023-05-03 RX ORDER — NICOTINE POLACRILEX 4 MG
15 LOZENGE BUCCAL
Status: DISCONTINUED | OUTPATIENT
Start: 2023-05-03 | End: 2023-05-03 | Stop reason: HOSPADM

## 2023-05-03 RX ORDER — SUCRALFATE 1 G/1
1 TABLET ORAL
Status: DISCONTINUED | OUTPATIENT
Start: 2023-05-03 | End: 2023-05-03 | Stop reason: HOSPADM

## 2023-05-03 RX ORDER — METOCLOPRAMIDE HYDROCHLORIDE 5 MG/ML
10 INJECTION INTRAMUSCULAR; INTRAVENOUS EVERY 6 HOURS
Status: DISCONTINUED | OUTPATIENT
Start: 2023-05-03 | End: 2023-05-03 | Stop reason: HOSPADM

## 2023-05-03 RX ORDER — ONDANSETRON 4 MG/1
4 TABLET, ORALLY DISINTEGRATING ORAL EVERY 8 HOURS PRN
Qty: 15 TABLET | Refills: 0 | Status: SHIPPED | OUTPATIENT
Start: 2023-05-03

## 2023-05-03 RX ORDER — ATORVASTATIN CALCIUM 20 MG/1
20 TABLET, FILM COATED ORAL NIGHTLY
Status: DISCONTINUED | OUTPATIENT
Start: 2023-05-03 | End: 2023-05-03 | Stop reason: HOSPADM

## 2023-05-03 RX ORDER — FENOFIBRATE 145 MG/1
145 TABLET, COATED ORAL DAILY
Status: DISCONTINUED | OUTPATIENT
Start: 2023-05-03 | End: 2023-05-03 | Stop reason: HOSPADM

## 2023-05-03 RX ADMIN — SUCRALFATE 1 G: 1 TABLET ORAL at 08:51

## 2023-05-03 RX ADMIN — FENOFIBRATE 145 MG: 145 TABLET ORAL at 08:51

## 2023-05-03 RX ADMIN — METOCLOPRAMIDE 10 MG: 5 INJECTION, SOLUTION INTRAMUSCULAR; INTRAVENOUS at 08:51

## 2023-05-03 RX ADMIN — INSULIN LISPRO 3 UNITS: 100 INJECTION, SOLUTION INTRAVENOUS; SUBCUTANEOUS at 08:52

## 2023-05-03 RX ADMIN — HYDROMORPHONE HYDROCHLORIDE 0.5 MG: 1 INJECTION, SOLUTION INTRAMUSCULAR; INTRAVENOUS; SUBCUTANEOUS at 01:31

## 2023-05-03 RX ADMIN — Medication 10 ML: at 12:51

## 2023-05-03 RX ADMIN — ATENOLOL 25 MG: 25 TABLET ORAL at 08:51

## 2023-05-03 RX ADMIN — PANCRELIPASE 72000 UNITS OF LIPASE: 36000; 180000; 114000 CAPSULE, DELAYED RELEASE PELLETS ORAL at 13:02

## 2023-05-03 RX ADMIN — PANTOPRAZOLE SODIUM 40 MG: 40 INJECTION, POWDER, LYOPHILIZED, FOR SOLUTION INTRAVENOUS at 05:16

## 2023-05-03 RX ADMIN — HYDROCODONE BITARTRATE AND ACETAMINOPHEN 1 TABLET: 7.5; 325 TABLET ORAL at 08:51

## 2023-05-03 RX ADMIN — POTASSIUM CHLORIDE AND SODIUM CHLORIDE 100 ML/HR: 450; 150 INJECTION, SOLUTION INTRAVENOUS at 11:36

## 2023-05-03 RX ADMIN — SUCRALFATE 1 G: 1 TABLET ORAL at 12:51

## 2023-05-03 RX ADMIN — Medication 10 ML: at 08:53

## 2023-05-03 RX ADMIN — ONDANSETRON 4 MG: 2 INJECTION INTRAMUSCULAR; INTRAVENOUS at 01:31

## 2023-05-03 RX ADMIN — INSULIN LISPRO 3 UNITS: 100 INJECTION, SOLUTION INTRAVENOUS; SUBCUTANEOUS at 12:51

## 2023-05-03 RX ADMIN — LEVETIRACETAM 500 MG: 500 TABLET, FILM COATED ORAL at 08:51

## 2023-05-03 RX ADMIN — PANCRELIPASE 72000 UNITS OF LIPASE: 36000; 180000; 114000 CAPSULE, DELAYED RELEASE PELLETS ORAL at 08:51

## 2023-05-03 RX ADMIN — METOCLOPRAMIDE 10 MG: 5 INJECTION, SOLUTION INTRAMUSCULAR; INTRAVENOUS at 12:51

## 2023-05-03 RX ADMIN — DULOXETINE HYDROCHLORIDE 30 MG: 30 CAPSULE, DELAYED RELEASE ORAL at 08:51

## 2023-05-03 RX ADMIN — PANTOPRAZOLE SODIUM 40 MG: 40 TABLET, DELAYED RELEASE ORAL at 08:51

## 2023-05-03 RX ADMIN — ONDANSETRON 4 MG: 2 INJECTION INTRAMUSCULAR; INTRAVENOUS at 07:03

## 2023-05-03 RX ADMIN — INSULIN GLARGINE 10 UNITS: 100 INJECTION, SOLUTION SUBCUTANEOUS at 08:52

## 2023-05-03 RX ADMIN — HYDROMORPHONE HYDROCHLORIDE 0.5 MG: 1 INJECTION, SOLUTION INTRAMUSCULAR; INTRAVENOUS; SUBCUTANEOUS at 05:17

## 2023-05-03 NOTE — DISCHARGE SUMMARY
"[facility]    Krystyna Hardwick,     CHIEF COMPLAINT:     Abdominal pain with nausea and vomiting    HISTORY OF PRESENT ILLNESS:    Obtained from admitting physician HPI on 5/2/2023:  Provider in Triage Note  Epigastric pain x 4 days, nausea and vomiting. Subjective fever. Sent after being seen by ANDRE HIGGINS in the office today. No urinary complaints. No etoh use.  Hx pancreatitis x 5 years; s/p brody    Gi: dr martinez        History of Present Illness  Pit note reviewed agree with content     57-year-old male followed by GI has had the recent onset of left upper quadrant pain in the last 24 hours.  He states he has had severe nausea and has vomited several times he denies melena hematemesis hematochezia.  He states that he has been following his diet but ate more than usual over the Easter holidays.  He reports no recent weight changes.  He denies dysuria or hematuria.    05/03/23:  Patient confirms the HPI noted above including a several day history of left upper quadrant pain described as a severe \"burning\" pain made worse with attempted p.o. intake.  He is experiencing nausea as well as nonbloody vomiting and notes a history of pancreatitis.  No alcohol intake is reported he denies any changes to his medications.  He does not however his diet has been increased recently especially following the Easter holiday.  No fever, dyspnea, cough or changes in bowel or bladder habits are noted.  Some polydipsia is also been present.  Following treatment in the ED he reports his symptoms are improving and he does have a desire to initiate a liquid diet.          Past Medical History:   Diagnosis Date   • Chronic recurrent pancreatitis    • COVID-19    • Depression    • DMII    • GERD    • Hearing loss, right     85%   • HTN    • PSA     2021= 0.775/ 2022=0.747   • Seizures    • Solitary kidney      Past Surgical History:   Procedure Laterality Date   • APPENDECTOMY     • CHOLECYSTECTOMY     • " COLONOSCOPY      NEG = 2016, rech 2026     GSI     Family History   Problem Relation Age of Onset   • Cerebral aneurysm Mother    • Hypertension Father    • Cancer Father 60        Bladder   • Diabetes Father    • Hyperlipidemia Father    • Hypertension Brother    • Hyperlipidemia Brother      Social History     Tobacco Use   • Smoking status: Never   • Smokeless tobacco: Never   Vaping Use   • Vaping Use: Never used   Substance Use Topics   • Alcohol use: Never     Comment: rare   • Drug use: Yes     Types: Marijuana     Medications Prior to Admission   Medication Sig Dispense Refill Last Dose   • atenolol (TENORMIN) 25 MG tablet Take 1 tablet by mouth Daily. 90 tablet 0 5/2/2023   • atorvastatin (LIPITOR) 20 MG tablet Take 1 tablet by mouth Daily. 90 tablet 1 5/2/2023   • DULoxetine (CYMBALTA) 30 MG capsule Take 1 capsule by mouth Daily. 90 capsule 0 5/2/2023   • fenofibrate 160 MG tablet Take 1 tablet by mouth Daily. 90 tablet 1 5/2/2023   • levETIRAcetam (KEPPRA) 500 MG tablet Take 1 tablet by mouth 2 (Two) Times a Day.   5/2/2023   • multivitamin with minerals tablet tablet Take 1 tablet by mouth Daily.   5/3/2023   • omeprazole (priLOSEC) 40 MG capsule Take 1 capsule by mouth Daily. 90 capsule 0 5/3/2023   • pancrelipase, Lip-Prot-Amyl, (CREON) 17545-00607 units capsule delayed-release particles capsule 3 tabs po TID W/ meals   5/2/2023   • sucralfate (CARAFATE) 1 g tablet TAKE 1 TABLET BY MOUTH 4 TIMES A DAY WITH MEALS & AT BEDTIME. (Patient taking differently: 1 po BID) 360 tablet 0 5/2/2023   • Continuous Blood Gluc Sensor (FreeStyle Curry 2 Sensor) misc Apply 1 package topically Every 14 (Fourteen) Days. 6 each 3    • dicyclomine (BENTYL) 20 MG tablet TAKE 1/2 TO 1 TABLET BY MOUTH EVERY 6 HOURS AS NEEDED FOR ABDOMINAL CRAMPING/BLOATING/DIARRHEA 30 tablet 0 Unknown   • Glucagon, rDNA, (Glucagon Emergency) 1 MG kit Inject 1 kit as directed As Needed (for low BS). 1 each 2    • glucose blood (OneTouch Verio)  test strip 1 each by Other route 4 (Four) Times a Day Before Meals & at Bedtime. Use as instructed 360 each 3    • HYDROcodone-acetaminophen (Norco) 7.5-325 MG per tablet Take 1 tablet by mouth Every 8 (Eight) Hours As Needed for Severe Pain. 60 tablet 0 Unknown   • Insulin Glargine, 2 Unit Dial, (Toujeo Max SoloStar) 300 UNIT/ML solution pen-injector injection Inject 10 Units under the skin into the appropriate area as directed Every Morning. 9 mL 1    • Insulin Pen Needle 32G X 4 MM misc 1 each every night at bedtime. 360 each 0    • Isopropyl Alcohol (Alcohol Wipes) 70 % misc Apply 1 each topically 4 (Four) Times a Day Before Meals & at Bedtime. 200 each 0    • Lancets (OneTouch Delica Plus [id number]) misc 1 each 4 (Four) Times a Day Before Meals & at Bedtime. 360 each 1    • promethazine (PHENERGAN) 25 MG suppository Insert 1 suppository into the rectum 2 (Two) Times a Day As Needed for Nausea or Vomiting. 40 suppository 0    • promethazine (PHENERGAN) 25 MG tablet Take 1 tablet by mouth Every 6 (Six) Hours As Needed for Nausea or Vomiting. 60 tablet 1      Allergies:  Basaglar kwikpen [insulin glargine] and Gemfibrozil    Immunization History   Administered Date(s) Administered   • COVID-19 ([employer]) 04/27/2021   • Flu Vaccine Intradermal Quad 18-64YR 10/31/2015, 10/13/2021, 11/01/2022           REVIEW OF SYSTEMS:    Review of Systems   Constitutional: Negative.   HENT: Negative.    Eyes: Negative.    Cardiovascular: Negative.    Respiratory: Negative.    Skin: Negative.    Musculoskeletal: Negative.    Gastrointestinal: Positive for abdominal pain, nausea and vomiting. Negative for hematemesis, hematochezia and melena.   Genitourinary: Negative.    Neurological: Negative.    Psychiatric/Behavioral: Negative.          Vital Signs  Temp:  [97.7 °F (36.5 °C)-98 °F (36.7 °C)] 98 °F (36.7 °C)  Heart Rate:  [] 63  Resp:  [16-18] 16  BP: (113-152)/(75-90) 121/75          Physical Exam:  Physical Exam  Vitals  reviewed.   Constitutional:       General: He is not in acute distress.     Appearance: Normal appearance. He is normal weight. He is not ill-appearing, toxic-appearing or diaphoretic.   HENT:      Head: Normocephalic.      Right Ear: External ear normal.      Left Ear: External ear normal.      Nose: Nose normal.      Mouth/Throat:      Mouth: Mucous membranes are moist.   Eyes:      Extraocular Movements: Extraocular movements intact.   Cardiovascular:      Rate and Rhythm: Normal rate and regular rhythm.      Pulses: Normal pulses.      Heart sounds: Normal heart sounds.   Pulmonary:      Effort: Pulmonary effort is normal.      Breath sounds: Normal breath sounds.   Abdominal:      Palpations: Abdomen is soft.      Tenderness: There is abdominal tenderness.      Comments: Hyperactive bowel sounds   Musculoskeletal:         General: Normal range of motion.      Cervical back: Normal range of motion.      Right lower leg: No edema.      Left lower leg: No edema.   Skin:     General: Skin is warm and dry.      Capillary Refill: Capillary refill takes less than 2 seconds.   Neurological:      General: No focal deficit present.      Mental Status: He is alert and oriented to person, place, and time.   Psychiatric:         Mood and Affect: Mood normal.         Behavior: Behavior normal.         Thought Content: Thought content normal.         Judgment: Judgment normal.           Emotional Behavior:   Normal   Debilities:  None  Results Review:    I reviewed the patient's new clinical results.  Lab Results (most recent)     Procedure Component Value Units Date/Time    POC Glucose Once [[id number]]  (Abnormal) Collected: 05/03/23 0716    Specimen: Blood Updated: 05/03/23 0718     Glucose 178 mg/dL      Comment: Serial Number: [id number]:  [id number]       Hemoglobin A1c [[id number]]  (Abnormal) Collected: 05/03/23 0526    Specimen: Blood from Arm, Right Updated: 05/03/23 0700     Hemoglobin A1C 9.00 %     Lipid  Panel [[id number]]  (Abnormal) Collected: 05/03/23 0526    Specimen: Blood from Arm, Right Updated: 05/03/23 0700     Total Cholesterol 146 mg/dL      Triglycerides 243 mg/dL      HDL Cholesterol 37 mg/dL      LDL Cholesterol  69 mg/dL      VLDL Cholesterol 40 mg/dL      LDL/HDL Ratio 1.63    Narrative:      Cholesterol Reference Ranges  (U.S. Department of Health and Human Services ATP III Classifications)    Desirable          <200 mg/dL  Borderline High    200-239 mg/dL  High Risk          >240 mg/dL      Triglyceride Reference Ranges  (U.S. Department of Health and Human Services ATP III Classifications)    Normal           <150 mg/dL  Borderline High  150-199 mg/dL  High             200-499 mg/dL  Very High        >500 mg/dL    HDL Reference Ranges  (U.S. Department of Health and Human Services ATP III Classifications)    Low     <40 mg/dl (major risk factor for CHD)  High    >60 mg/dl ('negative' risk factor for CHD)        LDL Reference Ranges  (U.S. Department of Health and Human Services ATP III Classifications)    Optimal          <100 mg/dL  Near Optimal     100-129 mg/dL  Borderline High  130-159 mg/dL  High             160-189 mg/dL  Very High        >189 mg/dL    Basic Metabolic Panel [[id number]]  (Abnormal) Collected: 05/03/23 0526    Specimen: Blood from Arm, Right Updated: 05/03/23 0617     Glucose 152 mg/dL      BUN 16 mg/dL      Creatinine 1.33 mg/dL      Sodium 136 mmol/L      Potassium 4.2 mmol/L      Chloride 102 mmol/L      CO2 26.0 mmol/L      Calcium 8.9 mg/dL      BUN/Creatinine Ratio 12.0     Anion Gap 8.0 mmol/L      eGFR 62.3 mL/min/1.73     Narrative:      GFR Normal >60  Chronic Kidney Disease <60  Kidney Failure <15      Lipase [[id number]]  (Abnormal) Collected: 05/03/23 0526    Specimen: Blood from Arm, Right Updated: 05/03/23 0613     Lipase 75 U/L     CBC Auto Differential [[id number]]  (Abnormal) Collected: 05/03/23 0526    Specimen: Blood from Arm, Right Updated: 05/03/23 0605      WBC 5.40 10*3/mm3      RBC 4.22 10*6/mm3      Hemoglobin 13.5 g/dL      Comment: Result checked          Hematocrit 39.2 %      MCV 93.0 fL      MCH 31.9 pg      MCHC 34.3 g/dL      RDW 12.7 %      RDW-SD 43.3 fl      MPV 7.7 fL      Platelets 268 10*3/mm3      Neutrophil % 50.6 %      Lymphocyte % 38.0 %      Monocyte % 8.2 %      Eosinophil % 2.6 %      Basophil % 0.6 %      Neutrophils, Absolute 2.70 10*3/mm3      Lymphocytes, Absolute 2.00 10*3/mm3      Monocytes, Absolute 0.40 10*3/mm3      Eosinophils, Absolute 0.10 10*3/mm3      Basophils, Absolute 0.00 10*3/mm3      nRBC 0.3 /100 WBC     Comprehensive Metabolic Panel [[id number]]  (Abnormal) Collected: 05/02/23 1512    Specimen: Blood Updated: 05/02/23 1600     Glucose 406 mg/dL      BUN 18 mg/dL      Creatinine 1.64 mg/dL      Sodium 131 mmol/L      Potassium 4.5 mmol/L      Chloride 92 mmol/L      CO2 24.0 mmol/L      Calcium 10.3 mg/dL      Total Protein 8.6 g/dL      Albumin 4.7 g/dL      ALT (SGPT) <5 U/L      AST (SGOT) 23 U/L      Alkaline Phosphatase 72 U/L      Total Bilirubin 0.4 mg/dL      Globulin 3.9 gm/dL      A/G Ratio 1.2 g/dL      BUN/Creatinine Ratio 11.0     Anion Gap 15.0 mmol/L      eGFR 48.5 mL/min/1.73     Narrative:      GFR Normal >60  Chronic Kidney Disease <60  Kidney Failure <15      Magnesium [[id number]]  (Normal) Collected: 05/02/23 1512    Specimen: Blood Updated: 05/02/23 1600     Magnesium 2.0 mg/dL     Lipase [[id number]]  (Abnormal) Collected: 05/02/23 1512    Specimen: Blood Updated: 05/02/23 1549     Lipase 107 U/L     Lactate Dehydrogenase [[id number]]  (Normal) Collected: 05/02/23 1512    Specimen: Blood Updated: 05/02/23 1549      U/L     CBC & Differential [[id number]]  (Normal) Collected: 05/02/23 1512    Specimen: Blood Updated: 05/02/23 1524    Narrative:      The following orders were created for panel order CBC & Differential.  Procedure                               Abnormality         Status                      ---------                               -----------         ------                     CBC Auto Differential[[id number]]        Normal              Final result                 Please view results for these tests on the individual orders.    CBC Auto Differential [[id number]]  (Normal) Collected: 05/02/23 1512    Specimen: Blood Updated: 05/02/23 1524     WBC 6.90 10*3/mm3      RBC 5.11 10*6/mm3      Hemoglobin 16.4 g/dL      Hematocrit 47.1 %      MCV 92.3 fL      MCH 32.1 pg      MCHC 34.7 g/dL      RDW 13.0 %      RDW-SD 44.2 fl      MPV 8.1 fL      Platelets 371 10*3/mm3      Neutrophil % 68.8 %      Lymphocyte % 22.6 %      Monocyte % 6.9 %      Eosinophil % 1.3 %      Basophil % 0.4 %      Neutrophils, Absolute 4.70 10*3/mm3      Lymphocytes, Absolute 1.50 10*3/mm3      Monocytes, Absolute 0.50 10*3/mm3      Eosinophils, Absolute 0.10 10*3/mm3      Basophils, Absolute 0.00 10*3/mm3      nRBC 0.1 /100 WBC     Blood Culture - Blood, Arm, Right [[id number]] Collected: 05/02/23 1512    Specimen: Blood from Arm, Right Updated: 05/02/23 1518    Blood Culture - Blood, Arm, Left [[id number]] Collected: 05/02/23 1512    Specimen: Blood from Arm, Left Updated: 05/02/23 1518    POC Lactate [[id number]]  (Normal) Collected: 05/02/23 1517    Specimen: Blood Updated: 05/02/23 1518     Lactate 1.8 mmol/L      Comment: Serial Number: [id number]:  [id number]             Imaging Results (Most Recent)     Procedure Component Value Units Date/Time    CT Abdomen Pelvis With Contrast [[id number]] Collected: 05/02/23 1713     Updated: 05/02/23 1721    Narrative:      CT ABDOMEN PELVIS W CONTRAST    Date of Exam: 5/2/2023 5:06 PM EDT    Indication: Upper quadrant pain history of chronic pancreatitis.    Comparison: CT abdomen pelvis 2/20/2021    Technique: Axial CT images were obtained of the abdomen and pelvis following the uneventful intravenous administration of iodinated contrast. Sagittal and coronal reconstructions were  performed.  Automated exposure control and iterative reconstruction   methods were used.    Findings:    Lower Thorax: Lung bases are clear.    Peritoneum: No free air or free fluid.     Appendix: Appendix is well seen and is normal.    Kidneys: No hydronephrosis. Absent right kidney. No renal calculi. No focal renal lesions.    Ureters: No obstructing calculi or mass.    Urinary bladder: Urinary bladder has normal appearance on CT given degree of distention.    Liver: No focal hepatic lesions. Normal size liver.    Gallbladder and bile ducts: Status post cholecystectomy. Intrahepatic and expect dilatation. Extrahepatic bile duct measures up to 1.9 cm. This is similar to previous exam.    Spleen: Spleen is normal size.  No focal splenic lesions.    Adrenal glands: Unremarkable.    Pancreas: There is truncation of the pancreatic tail. There is a mild stranding in the fat at the pancreatic head, less than on previous exam.     Abdominal aorta and Vascular Structures: No aneurysmal dilation. No significant atherosclerotic disease.    Stomach and Bowel: No abnormally dilated loops of bowel.  No significant hiatal hernia.  No significant bowel wall thickening.  Ligament of Treitz has normal anatomic position. Limited evaluation of stomach. Cannot exclude gastric wall thickening.    Reproductive Organs: Within normal limits.    Lymph nodes: No pathologically enlarged lymph nodes.    Soft tissues: Unremarkable.    Osseous structures: No aggressive focal lytic or sclerotic osseous lesions. Osteopenia. Bilateral pars defects L5 without anterolisthesis.      Impression:      *There is some questionable mild fat stranding at the pancreatic head which could be seen with mild pancreatitis. Appearance is significantly improved compared to the 2/20/2021 CT.  *There is chronic dilation of the pancreatic duct and bile ducts. Patient status post cholecystectomy.  *Status post right nephrectomy.  *Nondistended stomach. I can't exclude  gastric wall thickening gastritis.    Electronically Signed: Yessi Francisco    5/2/2023 5:19 PM EDT    Workstation ID: [id number]    US Pancreas [[id number]] Collected: 05/02/23 1522     Updated: 05/02/23 1526    Narrative:      US PANCREAS    Date of Exam: 5/2/2023 3:09 PM EDT    Indication: epigastric pain, hx pancreatitis.    Comparison: CT of the abdomen and pelvis 2/20/2021    Technique: Grayscale and color Doppler ultrasound evaluation of the pancreas was performed.      Findings:  Exam is limited as patient was drinking at time of study. Abdominal gas limits evaluation. The pancreatic duct measures 6 mm at the pancreatic head. This is dilated. The distal body and tail are not seen. The common bile duct measures 2 mm maximally. The   common bile duct is not well seen at the pancreatic head. No focal fluid collections are seen on this exam.      Impression:      Impression:  Limited evaluation of pancreas on ultrasound.  Pancreatic duct measures up to 6 mm. Common bile duct is nondilated and its visualized portion. Limited evaluation due to bowel. Concern for pancreatitis or complications of pancreatitis CT of the abdomen and pelvis with oral and IV contrast is   recommended.      Electronically Signed: Yessi Francisco    5/2/2023 3:24 PM EDT    Workstation ID: [id number]        reviewed    ECG/EMG Results (most recent)     Procedure Component Value Units Date/Time    SCANNED - TELEMETRY   [[id number]] Resulted: 05/02/23     Updated: 05/03/23 0705        not reviewed            Microbiology Results (last 10 days)     ** No results found for the last 240 hours. **          Assessment & Plan     Acute pancreatitis       Pancreatitis  Lab Results   Component Value Date    LIPASE 75 (H) 05/03/2023    ALT <5 05/02/2023    AST 23 05/02/2023    ALKPHOS 72 05/02/2023     05/02/2023    TRIG 243 (H) 05/03/2023    CALCIUM 8.9 05/03/2023   -Initial lipase: 107  -Lipid panel showed triglycerides of 243  -Henderson criteria: 0 on  admission   -Patient received 1.5 L of LR in the ED  -saline with 20 of potassium at 100 mL/h mL/hour  -Norco and Dilaudid for pain  -clear liquid diet advance as tolerated  -Continue Creon          I discussed the patients findings and my recommendations with patient and nursing staff.     Discharge Diagnosis:      Acute pancreatitis      Hospital Course  Patient is a 57 y.o. male presented with abdominal pain with nausea and vomiting in HPI noted above. Lipase was elevated at 107 and improved to 75 on the morning following admission with triglycerides down to be 243.  Breana criteria was 0 and patient received 1.5 L of LR initially with saline along with 20 mEq of potassium at 100 mL/h following his admission.  He was continued on Norco and Dilaudid with symptoms improving and clear liquid diet initiated and advanced which patient tolerated well.  Patient reports significant improvement in symptoms compared to the day of presentation.  At this time patient is felt to be in good condition for discharge with close follow-up with his PCP on an outpatient basis.  His full testing/results and plan were discussed with patient and with concerning/alarm symptoms for which to call 911/return to the ED.  He is encouraged to continue to ensure adequate hydration and monitor urine output is a general metric of adequacy while advancing diet slowly as tolerated.  Patient has recently received refill of [place] by outpatient provider and Zofran will be added as well.  All questions were answered and he verbalizes his understanding and agreement.    Past Medical History:     Past Medical History:   Diagnosis Date   • Chronic recurrent pancreatitis    • COVID-19    • Depression    • DMII    • GERD    • Hearing loss, right     85%   • HTN    • PSA     2021= 0.775/ 2022=0.747   • Seizures    • Solitary kidney        Past Surgical History:     Past Surgical History:   Procedure Laterality Date   • APPENDECTOMY     • CHOLECYSTECTOMY      • COLONOSCOPY      NEG = 2016, rech 2026     GSI       Social History:   Social History     Socioeconomic History   • Marital status:    Tobacco Use   • Smoking status: Never   • Smokeless tobacco: Never   Vaping Use   • Vaping Use: Never used   Substance and Sexual Activity   • Alcohol use: Never     Comment: rare   • Drug use: Yes     Types: Marijuana   • Sexual activity: Defer       Procedures Performed         Consults:   Consults     No orders found for last 30 day(s).          Condition on Discharge:     Stable    Discharge Disposition  Home or Self Care    Discharge Medications     Discharge Medications      New Medications      Instructions Start Date   ondansetron ODT 4 MG disintegrating tablet  Commonly known as: ZOFRAN-ODT   4 mg, Translingual, Every 8 Hours PRN         Changes to Medications      Instructions Start Date   sucralfate 1 g tablet  Commonly known as: CARAFATE  What changed: See the new instructions.   TAKE 1 TABLET BY MOUTH 4 TIMES A DAY WITH MEALS & AT BEDTIME.         Continue These Medications      Instructions Start Date   Alcohol Wipes 70 % misc   1 each, Apply externally, 4 Times Daily Before Meals & Nightly      atenolol 25 MG tablet  Commonly known as: TENORMIN   25 mg, Oral, Daily      atorvastatin 20 MG tablet  Commonly known as: LIPITOR   20 mg, Oral, Daily      dicyclomine 20 MG tablet  Commonly known as: BENTYL   TAKE 1/2 TO 1 TABLET BY MOUTH EVERY 6 HOURS AS NEEDED FOR ABDOMINAL CRAMPING/BLOATING/DIARRHEA      DULoxetine 30 MG capsule  Commonly known as: CYMBALTA   30 mg, Oral, Daily      fenofibrate 160 MG tablet   160 mg, Oral, Daily      FreeStyle Curry 2 Sensor misc   1 package, Apply externally, Every 14 Days      glucagon 1 MG injection  Commonly known as: GLUCAGEN   1 kit, Injection, As Needed      glucose blood test strip  Commonly known as: OneTouch Verio   1 each, Other, 4 Times Daily Before Meals & Nightly, Use as instructed      HYDROcodone-acetaminophen  7.5-325 MG per tablet  Commonly known as: Norco   1 tablet, Oral, Every 8 Hours PRN      Insulin Pen Needle 32G X 4 MM misc   1 each, Does not apply, Every Night at Bedtime      levETIRAcetam 500 MG tablet  Commonly known as: KEPPRA   500 mg, Oral, 2 Times Daily      multivitamin with minerals tablet tablet   1 tablet, Oral, Daily      omeprazole 40 MG capsule  Commonly known as: priLOSEC   40 mg, Oral, Every 24 Hours      OneTouch Delica Plus [id number] misc   1 each, Does not apply, 4 Times Daily Before Meals & Nightly      pancrelipase (Lip-Prot-Amyl) 68003-78852 units capsule delayed-release particles capsule  Commonly known as: Creon   3 tabs po TID W/ meals      promethazine 25 MG suppository  Commonly known as: PHENERGAN   25 mg, Rectal, 2 Times Daily PRN      promethazine 25 MG tablet  Commonly known as: PHENERGAN   25 mg, Oral, Every 6 Hours PRN      Toujeo Max SoloStar 300 UNIT/ML solution pen-injector injection  Generic drug: Insulin Glargine (2 Unit Dial)   10 Units, Subcutaneous, Every Morning             Discharge Diet:     Activity at Discharge:     Follow-up Appointments  No future appointments.      Test Results Pending at Discharge  Pending Labs     Order Current Status    Blood Culture - Blood, Arm, Left In process    Blood Culture - Blood, Arm, Right In process           Risk for Readmission (LACE) Score: 2 (5/3/2023  6:00 AM)      Greater than 30 minutes spent in discharge activities for this patient    Salomón Jones PA-C  05/03/23  14:22 EDT

## 2023-05-03 NOTE — PLAN OF CARE
Problem: Adult Inpatient Plan of Care  Goal: Plan of Care Review  Outcome: Ongoing, Progressing  Goal: Patient-Specific Goal (Individualized)  Outcome: Ongoing, Progressing  Goal: Absence of Hospital-Acquired Illness or Injury  Outcome: Ongoing, Progressing  Goal: Optimal Comfort and Wellbeing  Outcome: Ongoing, Progressing  Goal: Readiness for Transition of Care  Outcome: Ongoing, Progressing  Intervention: Mutually Develop Transition Plan  Recent Flowsheet Documentation  Taken 5/3/2023 0135 by Naila Dela Cruz, RN  Transportation Anticipated: car, drives self  Patient/Family Anticipated Services at Transition: none  Patient/Family Anticipates Transition to: home with family  Taken 5/3/2023 0134 by Naila Dela Cruz, RN  Equipment Currently Used at Home: none     Problem: Fluid Imbalance (Pancreatitis)  Goal: Fluid Balance  Outcome: Ongoing, Progressing     Problem: Infection (Pancreatitis)  Goal: Infection Symptom Resolution  Outcome: Ongoing, Progressing     Problem: Nutrition Impaired (Pancreatitis)  Goal: Optimal Nutrition Intake  Outcome: Ongoing, Progressing     Problem: Pain (Pancreatitis)  Goal: Acceptable Pain Control  Outcome: Ongoing, Progressing     Problem: Respiratory Compromise (Pancreatitis)  Goal: Effective Oxygenation and Ventilation  Outcome: Ongoing, Progressing   Goal Outcome Evaluation:

## 2023-05-03 NOTE — CONSULTS
"Diabetes Education  Assessment/Teaching    Patient Name:  Mitchell Jacome  YOB: 1965  MRN: [id number]  Admit Date:  5/2/2023      Assessment Date:  5/3/2023  Flowsheet Row Most Recent Value   General Information     Referral From: A1c, Blood glucose  [On 5/3/2023 A1c was 9.0% and admission blood sugar was 406.]   Height 188 cm (74\")   Height Method Stated   Weight 77.6 kg (171 lb)   Weight Method Standing scale   Pregnancy Assessment    Diabetes History    What type of diabetes do you have? Type 2   Length of Diabetes Diagnosis 1 - 5 years  [Diagnosed February 21, 2021]   Current DM knowledge fair   Have you had diabetes education/teaching in the past? yes   When and where was your diabetes education? Inpatient hotpitalization at [facility] on 2/22/2021   Do you test your blood sugar at home? yes   Frequency of checks as often as needed   Meter type Freestyle Curry 2   Who performs the test? patient   Typical readings has been reading high recently   Have you had high blood sugar? (>140mg/dl) yes   How often do you have high blood sugar? frequently   When was your last high blood sugar? Admission blood sugar 406   Education Preferences    What areas of diabetes would you like to learn about? avoiding high blood sugar, diabetes complications, medications for diabetes, avoiding low blood sugar   Nutrition Information    Assessment Topics    Taking Medication - Assessment Needs education   Problem Solving - Assessment Needs education   Reducing Risk - Assessment Needs education   DM Goals    Taking Medication - Goal Today   Problem Solving - Goal Today   Reducing Risk - Goal Today          Flowsheet Row Most Recent Value   DM Education Needs    Meter Has own   Meter Type Freestyle   Medication Insulin, Pen  [At home patient stated that he takes Toujeo 10 units every morning]   Problem Solving Hypoglycemia, Hyperglycemia, Signs, Symptoms, Treatment   Reducing Risks A1C testing  [On 5/3/2023 A1c was " 9.0%.]   Discharge Plan Home   Motivation Moderate   Teaching Method Discussion, Handouts   Patient Response Verbalized understanding            Other Comments:  A1c info sheet given with discussion on A1c target and healthy blood sugar range. Discussed admission blood sugar of 406 and patient stated he thinks it was because he was dehydrated but wasn't sure why he was dehydrated. Patient stated that his daughter along with her  and 3 kids under the age of 4 moved in with him about a month and a half ago and it has been stressful. Discussed coping mechanisms with patient and patient stated he takes walks in the woods for some quiet time. Patient stated when he is with his grandkids he has been eating some of the things they eat and he knows that is not good. Discussed with patient that there might be possibility that he will be discharged on meal time insulin. Explained to patient that meal time insulin is rapid acting and the importance of eating when taking. Handouts given with discussion on hypoglycemia signs, symptoms, and treatment. Patient has no further questions or concerns related to diabetes at this time.        Electronically signed by:  Amber Cooper RN  05/03/23 14:29 EDT

## 2023-05-03 NOTE — CASE MANAGEMENT/SOCIAL WORK
Discharge Planning Assessment   [facility]     Patient Name: Mitchell Jacome  MRN: [id number]  Today's Date: 5/3/2023    Admit Date: 5/2/2023    Plan: Home with family.   Discharge Needs Assessment     Row Name 05/03/23 1500       Living Environment    People in Home spouse;child(jonathan), adult;grandchild(jonathan)    Current Living Arrangements home    Primary Care Provided by self    Provides Primary Care For no one    Family Caregiver if Needed spouse    Quality of Family Relationships helpful;involved;supportive    Able to Return to Prior Arrangements yes       Resource/Environmental Concerns    Resource/Environmental Concerns none    Transportation Concerns none       Transition Planning    Patient/Family Anticipates Transition to home with family    Patient/Family Anticipated Services at Transition none    Transportation Anticipated car, drives self       Discharge Needs Assessment    Readmission Within the Last 30 Days no previous admission in last 30 days    Equipment Currently Used at Home none    Concerns to be Addressed no discharge needs identified;denies needs/concerns at this time    Anticipated Changes Related to Illness none    Equipment Needed After Discharge none               Discharge Plan     Row Name 05/03/23 1509       Plan    Plan Home with family.    Patient/Family in Agreement with Plan yes    Plan Comments Patient lives at home with spouse, adult children, and grandkids. Patient drives, patient will drive self home. Patient performs ADL. PCP and pharmacy confirmed. Denies financial assistance needs for medication and/or food. Denies HH and/or rehab needs.            Demographic Summary     Row Name 05/03/23 1500       General Information    Admission Type observation    Arrived From emergency department    Referral Source admission list    Reason for Consult discharge planning    Preferred Language English               Functional Status     Row Name 05/03/23 1500       Functional Status     Usual Activity Tolerance good    Current Activity Tolerance good       Functional Status, IADL    Medications independent    Meal Preparation independent    Housekeeping independent    Laundry independent    Shopping independent              Met with patient in room.     Maintained distance greater than six feet and spent less than 15 minutes in the room.      Lexie Mckay RN, BSN  Obs/3C/Float   [address] [address]  Phone: [phone]  Fax: [phone]

## 2023-05-03 NOTE — CASE MANAGEMENT/SOCIAL WORK
Case Management Discharge Note      Final Note: Home       Transportation Services  Private: Car    Final Discharge Disposition Code: 01 - home or self-care

## 2023-05-03 NOTE — OUTREACH NOTE
Prep Survey    Flowsheet Row Responses   [facility] patient discharged from? [facility]   Is LACE score < 7 ? Yes   Eligibility [facility]   Date of Admission 05/02/23   Date of Discharge 05/03/23   Discharge Disposition Home or Self Care   Discharge diagnosis Acute pancreatitis   Does the patient have one of the following disease processes/diagnoses(primary or secondary)? Other   Does the patient have Home health ordered? No   Is there a DME ordered? No   Prep survey completed? Yes          Anne MITCHELL - Registered Nurse

## 2023-05-03 NOTE — PLAN OF CARE
Goal Outcome Evaluation:  Plan of Care Reviewed With: patient        Progress: improving  Outcome Evaluation: Patient is now on clear liquids and tolerating them. Zofran for nausea and Norco 7.5mg po for pain control. Pt may be discharged home as long as he continues to tolerate clears. continue to monitor.

## 2023-05-03 NOTE — ED NOTES
Nursing report ED to floor  Mitchell Jacome  57 y.o.  male    HPI:   Chief Complaint   Patient presents with    Abdominal Pain     Pt reports upper abdominal pain since Friday and states was seen at [facility] and sent here for rapid heart rate.  Pt denies any fever.          Admitting doctor:   Colton Ruggiero MD    Admitting diagnosis:   The primary encounter diagnosis was Acute on chronic pancreatitis. Diagnoses of Abdominal pain, acute, left upper quadrant and Dehydration were also pertinent to this visit.    Code status:   Current Code Status       Date Active Code Status Order ID Comments User Context       5/2/2023 1954 CPR (Attempt to Resuscitate) [id number]  Colton Ruggiero MD ED        Question Answer    Code Status (Patient has no pulse and is not breathing) CPR (Attempt to Resuscitate)    Medical Interventions (Patient has pulse or is breathing) Full Support                    Allergies:   Basaglar kwikpen [insulin glargine] and Gemfibrozil    Isolation:  No active isolations     Fall Risk:  Fall Risk Assessment was completed, and patient is at low risk for falls.   Predictive Model Details         6 (Low) Factor Value    Calculated 5/2/2023 18:07 Age 57    Risk of Fall Model Musculoskeletal Assessment WDL     Active Peripheral IV Present     Imaging order in this encounter Present     Skin Assessment WDL     Number of Distinct Medication Classes administered 5     Magnesium 2 mg/dL     Chloride 92 mmol/L     Justin Scale not on file     Creatinine 1.64 mg/dL     Financial Class Private Insurance     Number of administrations of Analgesic Narcotics 2     Peripheral Vascular Assessment WDL     Clinically Relevant Sex Not Female     Number of administrations of Ulcer Drugs 1     Gastrointestinal Assessment WDL     Diastolic BP 86     Cardiac Assessment WDL     Respiratory Rate 16     ALT <5 U/L     Potassium 4.5 mmol/L     Total Bilirubin 0.4 mg/dL     Days after Admission 0.104     Calcium 10.3 mg/dL      Total Protein 8.6 g/dL     Albumin 4.7 g/dL         Weight:       05/02/23  1416   Weight: 78.4 kg (172 lb 13.5 oz)       Intake and Output    Intake/Output Summary (Last 24 hours) at 5/2/2023 2049  Last data filed at 5/2/2023 1936  Gross per 24 hour   Intake 2500 ml   Output --   Net 2500 ml       Diet:        Most recent vitals:   Vitals:    05/02/23 2002 05/02/23 2017 05/02/23 2032 05/02/23 2047   BP: 141/84 122/83 131/90 127/83   BP Location:       Patient Position:       Pulse: 86 88 81 82   Resp:       Temp:       TempSrc:       SpO2: 95% 97% 96% 93%   Weight:       Height:           Active LDAs/IV Access:   Lines, Drains & Airways       Active LDAs       Name Placement date Placement time Site Days    Peripheral IV 05/02/23 1512 Left Antecubital 05/02/23  1512  Antecubital  less than 1                    Skin Condition:   Skin Assessments (last day)       None             Labs (abnormal labs have a star):   Labs Reviewed   COMPREHENSIVE METABOLIC PANEL - Abnormal; Notable for the following components:       Result Value    Glucose 406 (*)     Creatinine 1.64 (*)     Sodium 131 (*)     Chloride 92 (*)     Total Protein 8.6 (*)     eGFR 48.5 (*)     All other components within normal limits    Narrative:     GFR Normal >60  Chronic Kidney Disease <60  Kidney Failure <15     LIPASE - Abnormal; Notable for the following components:    Lipase 107 (*)     All other components within normal limits   LACTATE DEHYDROGENASE - Normal   MAGNESIUM - Normal   CBC WITH AUTO DIFFERENTIAL - Normal   POC LACTATE - Normal   BLOOD CULTURE   BLOOD CULTURE   POC LACTATE   CBC AND DIFFERENTIAL    Narrative:     The following orders were created for panel order CBC & Differential.  Procedure                               Abnormality         Status                     ---------                               -----------         ------                     CBC Auto Differential[[id number]]        Normal              Final result                  Please view results for these tests on the individual orders.       LOC: Person, Place, Time, and Situation    Telemetry:  Observation Unit    Cardiac Monitoring Ordered: no    EKG:   No orders to display       Medications Given in the ED:   Medications   sodium chloride 0.9 % flush 10 mL (has no administration in time range)   sodium chloride 0.9 % bolus 1,000 mL (0 mL Intravenous Stopped 5/2/23 1936)   ondansetron (ZOFRAN) injection 4 mg (4 mg Intravenous Given 5/2/23 1550)   morphine injection 4 mg (4 mg Intravenous Given 5/2/23 1550)   lactated ringers bolus 500 mL (0 mL Intravenous Stopped 5/2/23 1936)   lactated ringers bolus 1,000 mL (0 mL Intravenous Stopped 5/2/23 1936)   famotidine (PEPCID) injection 20 mg (20 mg Intravenous Given 5/2/23 1602)   ondansetron (ZOFRAN) injection 4 mg (4 mg Intravenous Given 5/2/23 1602)   HYDROmorphone (DILAUDID) injection 0.25 mg (0.25 mg Intravenous Given 5/2/23 1603)   iopamidol (ISOVUE-370) 76 % injection 100 mL (100 mL Intravenous Given 5/2/23 1711)   HYDROmorphone (DILAUDID) injection 0.5 mg (0.5 mg Intravenous Given 5/2/23 1939)       Imaging results:  CT Abdomen Pelvis With Contrast    Result Date: 5/2/2023  *There is some questionable mild fat stranding at the pancreatic head which could be seen with mild pancreatitis. Appearance is significantly improved compared to the 2/20/2021 CT. *There is chronic dilation of the pancreatic duct and bile ducts. Patient status post cholecystectomy. *Status post right nephrectomy. *Nondistended stomach. I can't exclude gastric wall thickening gastritis. Electronically Signed: Yessi Singh  5/2/2023 5:19 PM EDT  Workstation ID: [id number]    US Pancreas    Result Date: 5/2/2023  Impression: Limited evaluation of pancreas on ultrasound. Pancreatic duct measures up to 6 mm. Common bile duct is nondilated and its visualized portion. Limited evaluation due to bowel. Concern for pancreatitis or complications of pancreatitis CT of  the abdomen and pelvis with oral and IV contrast is recommended. Electronically Signed: Yessi Singh  5/2/2023 3:24 PM EDT  Workstation ID: [id number]     Social issues:   Social History     Socioeconomic History    Marital status:    Tobacco Use    Smoking status: Never    Smokeless tobacco: Never   Vaping Use    Vaping Use: Never used   Substance and Sexual Activity    Alcohol use: Yes     Comment: rare    Drug use: Yes     Types: Marijuana    Sexual activity: Defer       NIH Stroke Scale:  Interval: (not recorded)  1a. Level of Consciousness: (not recorded)  1b. LOC Questions: (not recorded)  1c. LOC Commands: (not recorded)  2. Best Gaze: (not recorded)  3. Visual: (not recorded)  4. Facial Palsy: (not recorded)  5a. Motor Arm, Left: (not recorded)  5b. Motor Arm, Right: (not recorded)  6a. Motor Leg, Left: (not recorded)  6b. Motor Leg, Right: (not recorded)  7. Limb Ataxia: (not recorded)  8. Sensory: (not recorded)  9. Best Language: (not recorded)  10. Dysarthria: (not recorded)  11. Extinction and Inattention (formerly Neglect): (not recorded)    Total (NIH Stroke Scale): (not recorded)     Additional notable assessment information:NA     Nursing report ED to floor:  RENATA Colin RN   05/02/23 20:49 EDT

## 2023-05-04 ENCOUNTER — TRANSITIONAL CARE MANAGEMENT TELEPHONE ENCOUNTER (OUTPATIENT)
Dept: CALL CENTER | Facility: HOSPITAL | Age: 58
End: 2023-05-04
Payer: COMMERCIAL

## 2023-05-04 NOTE — OUTREACH NOTE
Call Center TCM Note    Flowsheet Row Responses   [facility] patient discharged from? [facility]   Does the patient have one of the following disease processes/diagnoses(primary or secondary)? Other   TCM attempt successful? Yes  [Corina, spouse,  bernie Castro]   Call start time 0901   Call end time 0903   Discharge diagnosis Acute pancreatitis   Person spoke with today (if not patient) and relationship Corina, spouse   Meds reviewed with patient/caregiver? Yes   Is the patient having any side effects they believe may be caused by any medication additions or changes? No   Does the patient have all medications ordered at discharge? No   Nursing Interventions No intervention needed  [Picking up Zofran today]   Is the patient taking all medications as directed (includes completed medication regime)? N/A   Does the patient have an appointment with their PCP within 7 days of discharge? No appointments available   Nursing Interventions Routed TCM call to PCP office   Psychosocial issues? No   Did the patient receive a copy of their discharge instructions? Yes   Nursing interventions Reviewed instructions with patient   What is the patient's perception of their health status since discharge? Improving   Is the patient/caregiver able to teach back signs and symptoms related to disease process for when to call PCP? Yes   Is the patient/caregiver able to teach back signs and symptoms related to disease process for when to call 911? Yes   Is the patient/caregiver able to teach back the hierarchy of who to call/visit for symptoms/problems? PCP, Specialist, Home health nurse, Urgent Care, ED, 911 Yes   If the patient is a current smoker, are they able to teach back resources for cessation? Not a smoker   TCM call completed? Yes   Wrap up additional comments Spouse states pt's abdominal pain is better,  pt has rx for zofran that spouse is picking up today.  Reviewed AVS/medications with spouse. Spouse advised to make a PCP hospital  fu appt.   Call end time 0903   Would this patient benefit from a Referral to [facility] Social Work? No   Is the patient interested in additional calls from an ambulatory ?  NOTE:  applies to high risk patients requiring additional follow-up. No          Cara Spivey RN    5/4/2023, 09:05 EDT

## 2023-05-05 ENCOUNTER — TELEPHONE (OUTPATIENT)
Dept: FAMILY MEDICINE CLINIC | Facility: CLINIC | Age: 58
End: 2023-05-05
Payer: COMMERCIAL

## 2023-05-05 RX ORDER — INSULIN DEGLUDEC INJECTION 100 U/ML
10 INJECTION, SOLUTION SUBCUTANEOUS DAILY
Qty: 15 ML | Refills: 0 | Status: SHIPPED | OUTPATIENT
Start: 2023-05-05

## 2023-05-05 RX ORDER — INSULIN GLARGINE 300 U/ML
10 INJECTION, SOLUTION SUBCUTANEOUS EVERY MORNING
Qty: 9 ML | Refills: 1 | Status: CANCELLED | OUTPATIENT
Start: 2023-05-05

## 2023-05-05 NOTE — TELEPHONE ENCOUNTER
Rx Refill Note  Requested Prescriptions     Pending Prescriptions Disp Refills   • Insulin Glargine, 2 Unit Dial, (Toujeo [place] SoloStar) 300 UNIT/ML solution pen-injector injection 9 mL 1     Sig: Inject 10 Units under the skin into the appropriate area as directed Every Morning.      Last office visit with prescribing clinician: 1/25/2023   Last telemedicine visit with prescribing clinician: Visit date not found   Next office visit with prescribing clinician: Visit date not found                       Lipid Panel (05/03/2023 05:26)    Would you like a call back once the refill request has been completed: [] Yes [] No    If the office needs to give you a call back, can they leave a voicemail: [] Yes [] No    Tyesha De Leon, RT  05/05/23, 08:41 EDT

## 2023-05-05 NOTE — TELEPHONE ENCOUNTER
Incoming Refill Request      Medication requested (name and dose):   Insulin Glargine, 2 Unit Dial, (Tomariano Cabrera) 300 UNIT/ML solution pen-injector injection  10 Units, Every Morning         Pharmacy where request should be sent: [facility]/pharmacy #[id number] - [place], [place] - [address] - [phone]  - [phone]   [phone]    Additional details provided by patient: PATIENT WILL BE OUT OVER THE WEEKEND     Best call back number: [phone]    Does the patient have less than a 3 day supply:  [x] Yes  [] No    Adrianna Villegas Rep  05/05/23, 08:35 EDT

## 2023-05-05 NOTE — TELEPHONE ENCOUNTER
Pharmacy states that toujeo is not on pt's formulary.  He is down to 20units, and is currently taking 20 units per day.  He will run out tomorrow.    Also, the pharmacy states the hydrocodone is on back order, and they are unable to fill it.

## 2023-05-07 LAB
BACTERIA SPEC AEROBE CULT: NORMAL
BACTERIA SPEC AEROBE CULT: NORMAL

## 2023-05-11 DIAGNOSIS — K86.1 CHRONIC RECURRENT PANCREATITIS: ICD-10-CM

## 2023-05-11 RX ORDER — HYDROCODONE BITARTRATE AND ACETAMINOPHEN 7.5; 325 MG/1; MG/1
1 TABLET ORAL EVERY 8 HOURS PRN
Qty: 60 TABLET | Refills: 0 | Status: SHIPPED | OUTPATIENT
Start: 2023-05-11

## 2023-05-11 RX ORDER — SUCRALFATE 1 G/1
1 TABLET ORAL
Qty: 360 TABLET | Refills: 0 | Status: SHIPPED | OUTPATIENT
Start: 2023-05-11 | End: 2023-05-17

## 2023-05-17 ENCOUNTER — OFFICE VISIT (OUTPATIENT)
Dept: FAMILY MEDICINE CLINIC | Facility: CLINIC | Age: 58
End: 2023-05-17
Payer: COMMERCIAL

## 2023-05-17 VITALS
WEIGHT: 186 LBS | HEART RATE: 72 BPM | OXYGEN SATURATION: 100 % | BODY MASS INDEX: 23.87 KG/M2 | HEIGHT: 74 IN | DIASTOLIC BLOOD PRESSURE: 72 MMHG | SYSTOLIC BLOOD PRESSURE: 127 MMHG | RESPIRATION RATE: 16 BRPM | TEMPERATURE: 98.2 F

## 2023-05-17 DIAGNOSIS — K86.1 CHRONIC RECURRENT PANCREATITIS: Primary | ICD-10-CM

## 2023-05-17 DIAGNOSIS — I10 BENIGN ESSENTIAL HYPERTENSION: ICD-10-CM

## 2023-05-17 DIAGNOSIS — E11.65 TYPE 2 DIABETES MELLITUS WITH HYPERGLYCEMIA, WITHOUT LONG-TERM CURRENT USE OF INSULIN: ICD-10-CM

## 2023-05-17 RX ORDER — FENOFIBRATE 160 MG/1
160 TABLET ORAL DAILY
Qty: 90 TABLET | Refills: 1 | Status: SHIPPED | OUTPATIENT
Start: 2023-05-17

## 2023-05-17 RX ORDER — DAPAGLIFLOZIN 10 MG/1
1 TABLET, FILM COATED ORAL
Qty: 30 TABLET | Refills: 0 | Status: SHIPPED | OUTPATIENT
Start: 2023-05-17

## 2023-05-17 RX ORDER — INSULIN DEGLUDEC INJECTION 100 U/ML
40 INJECTION, SOLUTION SUBCUTANEOUS DAILY
Qty: 15 ML | Refills: 0 | Status: SHIPPED
Start: 2023-05-17

## 2023-05-17 RX ORDER — ATORVASTATIN CALCIUM 20 MG/1
20 TABLET, FILM COATED ORAL DAILY
Qty: 90 TABLET | Refills: 1 | Status: SHIPPED | OUTPATIENT
Start: 2023-05-17

## 2023-05-17 RX ORDER — DULOXETIN HYDROCHLORIDE 30 MG/1
30 CAPSULE, DELAYED RELEASE ORAL DAILY
Qty: 90 CAPSULE | Refills: 1 | Status: SHIPPED | OUTPATIENT
Start: 2023-05-17

## 2023-05-17 RX ORDER — INSULIN ASPART 100 [IU]/ML
INJECTION, SOLUTION INTRAVENOUS; SUBCUTANEOUS
Qty: 10 ML | Refills: 0 | COMMUNITY
Start: 2023-05-17

## 2023-05-17 NOTE — PROGRESS NOTES
Subjective   Mitchell Jacome is a 57 y.o. male.     Chief Complaint   Patient presents with   • Transitional Care Management     Hospital follow up         Current Outpatient Medications:   •  atenolol (TENORMIN) 25 MG tablet, Take 1 tablet by mouth Daily., Disp: 90 tablet, Rfl: 0  •  atorvastatin (LIPITOR) 20 MG tablet, Take 1 tablet by mouth Daily., Disp: 90 tablet, Rfl: 1  •  Continuous Blood Gluc Sensor (FreeStyle Curry 2 Sensor) misc, Apply 1 package topically Every 14 (Fourteen) Days., Disp: 6 each, Rfl: 3  •  dicyclomine (BENTYL) 20 MG tablet, TAKE 1/2 TO 1 TABLET BY MOUTH EVERY 6 HOURS AS NEEDED FOR ABDOMINAL CRAMPING/BLOATING/DIARRHEA, Disp: 30 tablet, Rfl: 0  •  DULoxetine (CYMBALTA) 30 MG capsule, Take 1 capsule by mouth Daily., Disp: 90 capsule, Rfl: 1  •  fenofibrate 160 MG tablet, Take 1 tablet by mouth Daily., Disp: 90 tablet, Rfl: 1  •  Glucagon, rDNA, (Glucagon Emergency) 1 MG kit, Inject 1 kit as directed As Needed (for low BS)., Disp: 1 each, Rfl: 2  •  glucose blood (OneTouch Verio) test strip, 1 each by Other route 4 (Four) Times a Day Before Meals & at Bedtime. Use as instructed, Disp: 360 each, Rfl: 3  •  HYDROcodone-acetaminophen (Norco) 7.5-325 MG per tablet, Take 1 tablet by mouth Every 8 (Eight) Hours As Needed for Severe Pain., Disp: 60 tablet, Rfl: 0  •  insulin degludec (Tresiba FlexTouch) 100 UNIT/ML solution pen-injector injection, Inject 40 Units under the skin into the appropriate area as directed Daily., Disp: 15 mL, Rfl: 0  •  Insulin Pen Needle 32G X 4 MM misc, 1 each every night at bedtime., Disp: 360 each, Rfl: 0  •  Isopropyl Alcohol (Alcohol Wipes) 70 % misc, Apply 1 each topically 4 (Four) Times a Day Before Meals & at Bedtime., Disp: 200 each, Rfl: 0  •  Lancets (OneTouch Delica Plus [id number]) misc, 1 each 4 (Four) Times a Day Before Meals & at Bedtime., Disp: 360 each, Rfl: 1  •  levETIRAcetam (KEPPRA) 500 MG tablet, Take 1 tablet by mouth 2 (Two) Times a Day.,  Disp: , Rfl:   •  multivitamin with minerals tablet tablet, Take 1 tablet by mouth Daily., Disp: , Rfl:   •  omeprazole (priLOSEC) 40 MG capsule, Take 1 capsule by mouth Daily., Disp: 90 capsule, Rfl: 0  •  ondansetron ODT (ZOFRAN-ODT) 4 MG disintegrating tablet, Place 1 tablet on the tongue Every 8 (Eight) Hours As Needed for Nausea or Vomiting., Disp: 15 tablet, Rfl: 0  •  pancrelipase, Lip-Prot-Amyl, (CREON) 21404-12804 units capsule delayed-release particles capsule, 3 tabs po TID W/ meals, Disp: , Rfl:   •  promethazine (PHENERGAN) 25 MG suppository, Insert 1 suppository into the rectum 2 (Two) Times a Day As Needed for Nausea or Vomiting., Disp: 40 suppository, Rfl: 0  •  promethazine (PHENERGAN) 25 MG tablet, Take 1 tablet by mouth Every 6 (Six) Hours As Needed for Nausea or Vomiting., Disp: 60 tablet, Rfl: 1  •  dapagliflozin Propanediol (Farxiga) 10 MG tablet, Take 10 mg by mouth Daily With Breakfast., Disp: 30 tablet, Rfl: 0  •  Insulin Aspart (novoLOG) 100 UNIT/ML injection, Sliding scale QAC------BS<130, No insulin; BS= 131-150, give 3units; BS= 151-200, give 6units; BS= 201-250, give 9 units; 251-300, give 12 units, Disp: 10 mL, Rfl: 0    Past Medical History:   Diagnosis Date   • Chronic recurrent pancreatitis    • COVID-19    • Depression    • DMII    • GERD    • Hearing loss, right     85%   • HTN    • PSA     2021= 0.775/ 2022=0.747   • Seizures    • Solitary kidney        Past Surgical History:   Procedure Laterality Date   • APPENDECTOMY     • CHOLECYSTECTOMY     • COLONOSCOPY      NEG = 2016, rech 2026     GSI       Family History   Problem Relation Age of Onset   • Cerebral aneurysm Mother    • Hypertension Father    • Cancer Father         Bladder   • Diabetes Father    • Hyperlipidemia Father    • Hypertension Brother    • Hyperlipidemia Brother        Social History     Socioeconomic History   • Marital status:    Tobacco Use   • Smoking status: Never     Passive exposure: Never   •  Smokeless tobacco: Never   Vaping Use   • Vaping Use: Never used   Substance and Sexual Activity   • Alcohol use: Not Currently     Comment: rare   • Drug use: Yes     Types: Hydrocodone, Marijuana   • Sexual activity: Yes     Partners: Female       History of Present Illness  58 y/o C male here for f/u from hosp for acute on chronic pancreatitis    Pt states he isnt sure what set off his pancreas but ended up in the ER and needing tx; pt feels like it is much better now and did get pain meds from the hosp when his local pharm didn't have any         The patient's glucose levels have not been good. He was having a challenging time getting insulin and the insulin he has is long acting. The patient did get his Tresiba, and it cost him 500 dollars. [facility]. He has been checking his glucose levels and they have been elevated. It has been difficult to keep it under 300 mg/dL. He is taking almost 40 units of Toujeo a day. The patient states that he has 3 grandchildren under the age of 4 living in his house w/ their parents and they have been there for 2-1/2 to 3 years. He denies issues with bowel movements or urinary issues.     The following portions of the patient's history were reviewed and updated as appropriate: allergies, current medications, past family history, past medical history, past social history, past surgical history and problem list.    Review of Systems   Constitutional: Positive for activity change and appetite change. Negative for fever, unexpected weight gain and unexpected weight loss.   Gastrointestinal: Positive for abdominal pain. Negative for abdominal distention, constipation, nausea, vomiting and GERD.   Endocrine: Negative for polydipsia, polyphagia and polyuria.       Vitals:    05/17/23 1107   BP: 127/72   Pulse: 72   Resp: 16   Temp: 98.2 °F (36.8 °C)   SpO2: 100%       Objective   Physical Exam  Vitals and nursing note reviewed.    Constitutional:       General: He is not in acute distress.     Appearance: Normal appearance. He is well-developed. He is not ill-appearing or toxic-appearing.   Cardiovascular:      Rate and Rhythm: Normal rate and regular rhythm.      Heart sounds: Normal heart sounds. No murmur heard.  Pulmonary:      Effort: Pulmonary effort is normal. No respiratory distress.      Breath sounds: Normal breath sounds. No stridor. No wheezing, rhonchi or rales.   Abdominal:      General: Abdomen is flat.      Palpations: Abdomen is soft.      Tenderness: There is abdominal tenderness in the right upper quadrant, epigastric area and left upper quadrant. There is no rebound. Negative signs include Rodriguez's sign.   Musculoskeletal:         General: No tenderness or deformity.   Feet:      Right foot:      Skin integrity: No ulcer, blister or warmth.      Toenail Condition: ingrown     Left foot:      Skin integrity: No blister, warmth or callus.      Toenail Condition: ingrown  Skin:     General: Skin is warm and dry.      Capillary Refill: Capillary refill takes less than 2 seconds.      Findings: No erythema or rash.   Neurological:      Mental Status: He is alert and oriented to person, place, and time.      Cranial Nerves: No cranial nerve deficit.      Gait: Gait normal.   Psychiatric:         Attention and Perception: Attention normal.         Mood and Affect: Mood and affect normal.         Speech: Speech normal.         Behavior: Behavior normal. Behavior is cooperative.         Thought Content: Thought content normal.         Cognition and Memory: Cognition and memory normal.         Judgment: Judgment normal.           Assessment & Plan    Diagnoses and all orders for this visit:    1. Chronic recurrent pancreatitis (Primary)    2. Type 2 diabetes mellitus with hyperglycemia, without long-term current use of insulin    3. Benign essential hypertension    Other orders  -     insulin degludec (Tresiba FlexTouch) 100  UNIT/ML solution pen-injector injection; Inject 40 Units under the skin into the appropriate area as directed Daily.  Dispense: 15 mL; Refill: 0  -     Insulin Aspart (novoLOG) 100 UNIT/ML injection; Sliding scale QAC------BS<130, No insulin; BS= 131-150, give 3units; BS= 151-200, give 6units; BS= 201-250, give 9 units; 251-300, give 12 units  Dispense: 10 mL; Refill: 0  -     dapagliflozin Propanediol (Farxiga) 10 MG tablet; Take 10 mg by mouth Daily With Breakfast.  Dispense: 30 tablet; Refill: 0  -     atorvastatin (LIPITOR) 20 MG tablet; Take 1 tablet by mouth Daily.  Dispense: 90 tablet; Refill: 1  -     fenofibrate 160 MG tablet; Take 1 tablet by mouth Daily.  Dispense: 90 tablet; Refill: 1  -     DULoxetine (CYMBALTA) 30 MG capsule; Take 1 capsule by mouth Daily.  Dispense: 90 capsule; Refill: 1    If BS< 130 mg/dL--- no insulin, just eat. If BS= 131 to 150 mg/dL, take 3 units. BS= 150 to 200, take 6 units andBS=  201 to 250, take 9 units.  Samples provided but [facility]   Rx--- Faxiga to protect his kidneys. Samples/ coupon given.  Reviewed hosp stay w/ pt  Med refills    Transcribed from ambient dictation for Krystyna Hardwick DO by Felicity Colin.  05/17/23   15:16 EDT    Patient or patient representative verbalized consent to the visit recording.  I have personally performed the services described in this document as transcribed by the above individual, and it is both accurate and complete.

## 2023-05-31 RX ORDER — INSULIN DEGLUDEC INJECTION 100 U/ML
20 INJECTION, SOLUTION SUBCUTANEOUS DAILY
Qty: 15 ML | Refills: 0
Start: 2023-05-31

## 2023-06-01 RX ORDER — FENOFIBRATE 160 MG/1
160 TABLET ORAL DAILY
Qty: 90 TABLET | Refills: 1 | Status: SHIPPED | OUTPATIENT
Start: 2023-06-01

## 2023-06-05 DIAGNOSIS — K86.1 CHRONIC RECURRENT PANCREATITIS: ICD-10-CM

## 2023-06-05 RX ORDER — HYDROCODONE BITARTRATE AND ACETAMINOPHEN 7.5; 325 MG/1; MG/1
1 TABLET ORAL EVERY 8 HOURS PRN
Qty: 60 TABLET | Refills: 0 | Status: SHIPPED | OUTPATIENT
Start: 2023-06-05

## 2023-07-31 DIAGNOSIS — K86.1 CHRONIC RECURRENT PANCREATITIS: ICD-10-CM

## 2023-08-02 RX ORDER — HYDROCODONE BITARTRATE AND ACETAMINOPHEN 7.5; 325 MG/1; MG/1
1 TABLET ORAL EVERY 8 HOURS PRN
Qty: 60 TABLET | Refills: 0 | Status: SHIPPED | OUTPATIENT
Start: 2023-08-02

## 2023-08-04 RX ORDER — FENOFIBRATE 160 MG/1
160 TABLET ORAL DAILY
Qty: 90 TABLET | Refills: 1 | Status: SHIPPED | OUTPATIENT
Start: 2023-08-04

## 2023-08-17 ENCOUNTER — CLINICAL SUPPORT (OUTPATIENT)
Dept: FAMILY MEDICINE CLINIC | Facility: CLINIC | Age: 58
End: 2023-08-17
Payer: COMMERCIAL

## 2023-08-17 DIAGNOSIS — E11.65 UNCONTROLLED TYPE 2 DIABETES MELLITUS WITH HYPERGLYCEMIA: Primary | Chronic | ICD-10-CM

## 2023-08-17 LAB
EXPIRATION DATE: ABNORMAL
HBA1C MFR BLD: 7.8 %
Lab: ABNORMAL

## 2023-08-17 NOTE — PROGRESS NOTES
Fingerstick performed in L -3rd finger by RT Raad  with good hemostasis. Patient tolerated well. 08/17/23 Tyesha De Leon, RT

## 2023-08-23 RX ORDER — DICYCLOMINE HCL 20 MG
TABLET ORAL
Qty: 30 TABLET | Refills: 0 | Status: SHIPPED | OUTPATIENT
Start: 2023-08-23

## 2023-09-05 DIAGNOSIS — K86.1 CHRONIC RECURRENT PANCREATITIS: ICD-10-CM

## 2023-09-05 RX ORDER — HYDROCODONE BITARTRATE AND ACETAMINOPHEN 7.5; 325 MG/1; MG/1
1 TABLET ORAL EVERY 8 HOURS PRN
Qty: 60 TABLET | Refills: 0 | Status: SHIPPED | OUTPATIENT
Start: 2023-09-05

## 2023-10-03 DIAGNOSIS — K86.1 CHRONIC RECURRENT PANCREATITIS: ICD-10-CM

## 2023-10-03 NOTE — TELEPHONE ENCOUNTER
INSPECT RAN    Rx Refill Note  Requested Prescriptions     Pending Prescriptions Disp Refills    insulin degludec (Tresiba FlexTouch) 100 UNIT/ML solution pen-injector injection 15 mL 0     Sig: Inject 20 Units under the skin into the appropriate area as directed Daily.    HYDROcodone-acetaminophen (Norco) 7.5-325 MG per tablet 60 tablet 0     Sig: Take 1 tablet by mouth Every 8 (Eight) Hours As Needed for Severe Pain.      Last office visit with prescribing clinician: 5/17/2023   Last telemedicine visit with prescribing clinician: Visit date not found   Next office visit with prescribing clinician: Visit date not found                         Would you like a call back once the refill request has been completed: [] Yes [] No    If the office needs to give you a call back, can they leave a voicemail: [] Yes [] No    Tyesha De Leon, RT  10/03/23, 09:15 EDT

## 2023-10-03 NOTE — TELEPHONE ENCOUNTER
Relay    Appt due in nov----get it on the books       Left msg for patient to call back to schedule appt in November.

## 2023-10-04 RX ORDER — HYDROCODONE BITARTRATE AND ACETAMINOPHEN 7.5; 325 MG/1; MG/1
1 TABLET ORAL EVERY 8 HOURS PRN
Qty: 60 TABLET | Refills: 0 | OUTPATIENT
Start: 2023-10-04

## 2023-10-04 RX ORDER — INSULIN DEGLUDEC INJECTION 100 U/ML
20 INJECTION, SOLUTION SUBCUTANEOUS DAILY
Qty: 15 ML | Refills: 0
Start: 2023-10-04

## 2023-10-06 ENCOUNTER — OFFICE VISIT (OUTPATIENT)
Dept: FAMILY MEDICINE CLINIC | Facility: CLINIC | Age: 58
End: 2023-10-06
Payer: COMMERCIAL

## 2023-10-06 VITALS
TEMPERATURE: 97.8 F | DIASTOLIC BLOOD PRESSURE: 79 MMHG | OXYGEN SATURATION: 99 % | HEART RATE: 85 BPM | RESPIRATION RATE: 16 BRPM | HEIGHT: 74 IN | WEIGHT: 174 LBS | BODY MASS INDEX: 22.33 KG/M2 | SYSTOLIC BLOOD PRESSURE: 136 MMHG

## 2023-10-06 DIAGNOSIS — K86.1 CHRONIC RECURRENT PANCREATITIS: ICD-10-CM

## 2023-10-06 DIAGNOSIS — I10 BENIGN ESSENTIAL HYPERTENSION: ICD-10-CM

## 2023-10-06 DIAGNOSIS — E11.65 TYPE 2 DIABETES MELLITUS WITH HYPERGLYCEMIA, WITHOUT LONG-TERM CURRENT USE OF INSULIN: Primary | ICD-10-CM

## 2023-10-06 PROCEDURE — 80053 COMPREHEN METABOLIC PANEL: CPT | Performed by: FAMILY MEDICINE

## 2023-10-06 PROCEDURE — 83690 ASSAY OF LIPASE: CPT | Performed by: FAMILY MEDICINE

## 2023-10-06 RX ORDER — HYDROCODONE BITARTRATE AND ACETAMINOPHEN 7.5; 325 MG/1; MG/1
1 TABLET ORAL EVERY 8 HOURS PRN
Qty: 60 TABLET | Refills: 0 | Status: SHIPPED | OUTPATIENT
Start: 2023-10-06

## 2023-10-06 NOTE — PROGRESS NOTES
Venipuncture performed in left arm by Dedra Machado CMA  with good hemostasis. Patient tolerated well. 10/06/23 Dedra Machado CMA

## 2023-10-06 NOTE — PROGRESS NOTES
Subjective   Mitchell Jacome is a 58 y.o. male.     Chief Complaint   Patient presents with    Diabetes    Hypertension    Pancreatitis         Current Outpatient Medications:     atenolol (TENORMIN) 25 MG tablet, Take 1 tablet by mouth Daily., Disp: 90 tablet, Rfl: 0    atorvastatin (LIPITOR) 20 MG tablet, Take 1 tablet by mouth Daily., Disp: 90 tablet, Rfl: 1    Continuous Blood Gluc Sensor (FreeStyle Curry 2 Sensor) misc, Apply 1 package topically Every 14 (Fourteen) Days., Disp: 6 each, Rfl: 0    dicyclomine (BENTYL) 20 MG tablet, TAKE 1/2 TO 1 TABLET BY MOUTH EVERY 6 HOURS AS NEEDED FOR ABDOMINAL CRAMPING/BLOATING/DIARRHEA, Disp: 30 tablet, Rfl: 0    DULoxetine (CYMBALTA) 30 MG capsule, Take 1 capsule by mouth Daily., Disp: 90 capsule, Rfl: 1    fenofibrate 160 MG tablet, Take 1 tablet by mouth Daily., Disp: 90 tablet, Rfl: 1    Glucagon, rDNA, (Glucagon Emergency) 1 MG kit, Inject 1 kit as directed As Needed (for low BS)., Disp: 1 each, Rfl: 2    glucose blood (OneTouch Verio) test strip, 1 each by Other route 4 (Four) Times a Day Before Meals & at Bedtime. Use as instructed, Disp: 360 each, Rfl: 3    HYDROcodone-acetaminophen (Norco) 7.5-325 MG per tablet, Take 1 tablet by mouth Every 8 (Eight) Hours As Needed for Severe Pain., Disp: 60 tablet, Rfl: 0    Insulin Aspart (novoLOG) 100 UNIT/ML injection, Sliding scale QAC------BS<130, No insulin; BS= 131-150, give 3units; BS= 151-200, give 6units; BS= 201-250, give 9 units; 251-300, give 12 units, Disp: 10 mL, Rfl: 0    insulin degludec (Tresiba FlexTouch) 100 UNIT/ML solution pen-injector injection, Inject 20 Units under the skin into the appropriate area as directed Daily., Disp: 15 mL, Rfl: 0    Insulin Pen Needle 32G X 4 MM misc, 1 each every night at bedtime., Disp: 360 each, Rfl: 0    Isopropyl Alcohol (Alcohol Wipes) 70 % misc, Apply 1 each topically 4 (Four) Times a Day Before Meals & at Bedtime., Disp: 200 each, Rfl: 0    Lancets (OneTouch Delica  Plus [id number]) misc, 1 each 4 (Four) Times a Day Before Meals & at Bedtime., Disp: 360 each, Rfl: 1    levETIRAcetam (KEPPRA) 500 MG tablet, Take 1 tablet by mouth 2 (Two) Times a Day., Disp: , Rfl:     multivitamin with minerals tablet tablet, Take 1 tablet by mouth Daily., Disp: , Rfl:     omeprazole (priLOSEC) 40 MG capsule, Take 1 capsule by mouth Daily., Disp: 90 capsule, Rfl: 0    ondansetron ODT (ZOFRAN-ODT) 4 MG disintegrating tablet, Place 1 tablet on the tongue Every 8 (Eight) Hours As Needed for Nausea or Vomiting., Disp: 15 tablet, Rfl: 0    pancrelipase, Lip-Prot-Amyl, (CREON) 54815-29594 units capsule delayed-release particles capsule, 3 tabs po TID W/ meals, Disp: , Rfl:     promethazine (PHENERGAN) 25 MG suppository, Insert 1 suppository into the rectum 2 (Two) Times a Day As Needed for Nausea or Vomiting., Disp: 40 suppository, Rfl: 0    promethazine (PHENERGAN) 25 MG tablet, Take 1 tablet by mouth Every 6 (Six) Hours As Needed for Nausea or Vomiting., Disp: 60 tablet, Rfl: 1    Past Medical History:   Diagnosis Date    Chronic recurrent pancreatitis     COVID-19     Depression     DMII     GERD     Hearing loss, right     85%    HTN     PSA     2021= 0.775/ 2022=0.747    Seizures     Solitary kidney        Past Surgical History:   Procedure Laterality Date    APPENDECTOMY      CHOLECYSTECTOMY      COLONOSCOPY      NEG = 2016, rech 2026     GSI       Family History   Problem Relation Age of Onset    Cerebral aneurysm Mother     Hypertension Father     Cancer Father 60        Bladder    Diabetes Father     Hyperlipidemia Father     Hypertension Brother     Hyperlipidemia Brother        Social History     Socioeconomic History    Marital status:    Tobacco Use    Smoking status: Never     Passive exposure: Never    Smokeless tobacco: Never   Vaping Use    Vaping Use: Never used   Substance and Sexual Activity    Alcohol use: Not Currently     Comment: rare    Drug use: Yes     Types:  Hydrocodone, Marijuana    Sexual activity: Yes     Partners: Female       Diabetes  Pertinent negatives for diabetes include no blurred vision, no polydipsia, no polyphagia, no polyuria, no weakness and no weight loss.   Hypertension  Pertinent negatives include no blurred vision or shortness of breath.   Pancreatitis  Pertinent negatives include no abdominal pain, coughing, nausea, numbness, rash, vomiting or weakness.        The patient presents for follow-up on diabetes, hypertension, and chronic pancreatitis.     The patient complains of hip pain that he believes derives from years of martial arts. He performs stretching exercises.     The patient's last eye exam was at [facility] in 2022. He will recieve his influenza vaccine at work. His blood pressure is elevated due to increased pain in his pancreas. He admits to not eating like he should. His blood glucose levels are running around 200 mg/dL before meals. He reports that he plummets when he increases the Tresiba dose. He usually takes 18 units of Tresiba at a time. He uses a FreeStyle Curry. He checks his glucose levels 6 to 7 times daily. He has received his second shingles vaccine/Toño and Toño COVID-19 vaccine and a Moderna COVID-19 vaccine. [facility] is [facility] Keppra [facility].     The following portions of the patient's history were reviewed and updated as appropriate: allergies, current medications, past family history, past medical history, past social history, past surgical history and problem list.    Review of Systems   Constitutional:  Negative for activity change, appetite change, unexpected weight gain and unexpected weight loss.   Eyes:  Negative for blurred vision, double vision, pain and visual disturbance.   Respiratory:  Negative for cough, shortness of breath and wheezing.    Cardiovascular:  Negative for leg swelling.   Gastrointestinal:  Negative for abdominal distention, abdominal pain, constipation, diarrhea,  nausea and vomiting.   Endocrine: Negative for polydipsia, polyphagia and polyuria.   Genitourinary:  Negative for frequency.   Musculoskeletal:  Negative for gait problem.   Skin:  Negative for color change, dry skin, rash and skin lesions.   Neurological:  Negative for weakness and numbness.   Psychiatric/Behavioral:  Positive for stress. Negative for sleep disturbance.        Vitals:    10/06/23 0825   BP: 136/79   Pulse: 85   Resp: 16   Temp: 97.8 °F (36.6 °C)   SpO2: 99%       Objective   Physical Exam  Vitals and nursing note reviewed.   Constitutional:       General: He is not in acute distress.     Appearance: Normal appearance. He is well-developed. He is not ill-appearing or toxic-appearing.   Cardiovascular:      Rate and Rhythm: Normal rate and regular rhythm.      Pulses:           Dorsalis pedis pulses are 2+ on the right side and 2+ on the left side.        Posterior tibial pulses are 1+ on the right side and 1+ on the left side.      Heart sounds: Normal heart sounds. No murmur heard.  Pulmonary:      Effort: Pulmonary effort is normal. No respiratory distress.      Breath sounds: Normal breath sounds. No stridor. No wheezing, rhonchi or rales.   Musculoskeletal:         General: No tenderness or deformity.      Right lower leg: No edema.      Left lower leg: No edema.      Right foot: Normal range of motion. No deformity, bunion, Charcot foot, foot drop or prominent metatarsal heads.      Left foot: Normal range of motion. No deformity, bunion, Charcot foot, foot drop or prominent metatarsal heads.   Feet:      Right foot:      Skin integrity: Skin integrity normal. No ulcer, blister, skin breakdown, erythema, warmth, callus, dry skin or fissure.      Toenail Condition: Right toenails are normal. ingrown     Left foot:      Skin integrity: Skin integrity normal. No ulcer, blister, skin breakdown, erythema, warmth, callus, dry skin or fissure.      Toenail Condition: Left toenails are normal.  ingrown  Skin:     General: Skin is warm and dry.      Capillary Refill: Capillary refill takes less than 2 seconds.      Findings: No erythema or rash.   Neurological:      Mental Status: He is alert and oriented to person, place, and time.      Cranial Nerves: No cranial nerve deficit.   Psychiatric:         Attention and Perception: Attention normal.         Mood and Affect: Mood and affect normal.         Speech: Speech normal.         Behavior: Behavior normal. Behavior is cooperative.         Thought Content: Thought content normal.         Cognition and Memory: Cognition and memory normal.         Judgment: Judgment normal.       BMI is within normal parameters. No other follow-up for BMI required.      Assessment & Plan   Diagnoses and all orders for this visit:    1. Type 2 diabetes mellitus with hyperglycemia, without long-term current use of insulin (Primary)  -     Cancel: MicroAlbumin, Urine, Random - Urine, Clean Catch    2. Benign essential hypertension    3. Chronic recurrent pancreatitis  -     Lipase; Future  -     Comprehensive Metabolic Panel  -     HYDROcodone-acetaminophen (Norco) 7.5-325 MG per tablet; Take 1 tablet by mouth Every 8 (Eight) Hours As Needed for Severe Pain.  Dispense: 60 tablet; Refill: 0  -     Lipase    CMP/ Lipase/ Microalb  Med refills     Transcribed from ambient dictation for Krystyna Hardwick DO by Naima Campos.  10/06/23   09:18 EDT    Patient or patient representative verbalized consent to the visit recording.  I have personally performed the services described in this document as transcribed by the above individual, and it is both accurate and complete.

## 2023-10-07 DIAGNOSIS — K86.1 CHRONIC RECURRENT PANCREATITIS: Primary | ICD-10-CM

## 2023-10-07 LAB
ALBUMIN SERPL-MCNC: 4.3 G/DL (ref 3.5–5.2)
ALBUMIN/GLOB SERPL: 1.4 G/DL
ALP SERPL-CCNC: 54 U/L (ref 39–117)
ALT SERPL W P-5'-P-CCNC: 15 U/L (ref 1–41)
ANION GAP SERPL CALCULATED.3IONS-SCNC: 11.7 MMOL/L (ref 5–15)
AST SERPL-CCNC: 19 U/L (ref 1–40)
BILIRUB SERPL-MCNC: 0.4 MG/DL (ref 0–1.2)
BUN SERPL-MCNC: 13 MG/DL (ref 6–20)
BUN/CREAT SERPL: 9.8 (ref 7–25)
CALCIUM SPEC-SCNC: 9.8 MG/DL (ref 8.6–10.5)
CHLORIDE SERPL-SCNC: 99 MMOL/L (ref 98–107)
CO2 SERPL-SCNC: 26.3 MMOL/L (ref 22–29)
CREAT SERPL-MCNC: 1.32 MG/DL (ref 0.76–1.27)
EGFRCR SERPLBLD CKD-EPI 2021: 62.5 ML/MIN/1.73
GLOBULIN UR ELPH-MCNC: 3 GM/DL
GLUCOSE SERPL-MCNC: 287 MG/DL (ref 65–99)
LIPASE SERPL-CCNC: 129 U/L (ref 13–60)
POTASSIUM SERPL-SCNC: 4.9 MMOL/L (ref 3.5–5.2)
PROT SERPL-MCNC: 7.3 G/DL (ref 6–8.5)
SODIUM SERPL-SCNC: 137 MMOL/L (ref 136–145)

## 2023-10-12 ENCOUNTER — TELEPHONE (OUTPATIENT)
Dept: FAMILY MEDICINE CLINIC | Facility: CLINIC | Age: 58
End: 2023-10-12
Payer: COMMERCIAL

## 2023-10-12 NOTE — TELEPHONE ENCOUNTER
Regarding: Lab results  ----- Message from Tyesha De Leon, RT sent at 10/12/2023  9:01 AM EDT -----       ----- Message sent from Tyesha De Leon, RT to Mitchell Jacome at 10/9/2023  9:27 AM -----   Krystyna Hardwick DO  10/7/2023  8:36 AM EDT       CMP----[id number]!!! Kidneys stable but elevated; liver ok  Lipase elevated some----rech next week

## 2023-10-19 ENCOUNTER — CLINICAL SUPPORT (OUTPATIENT)
Dept: FAMILY MEDICINE CLINIC | Facility: CLINIC | Age: 58
End: 2023-10-19
Payer: COMMERCIAL

## 2023-10-19 DIAGNOSIS — K86.1 CHRONIC RECURRENT PANCREATITIS: ICD-10-CM

## 2023-10-19 PROCEDURE — 80053 COMPREHEN METABOLIC PANEL: CPT | Performed by: FAMILY MEDICINE

## 2023-10-19 PROCEDURE — 83690 ASSAY OF LIPASE: CPT | Performed by: FAMILY MEDICINE

## 2023-10-19 PROCEDURE — 36415 COLL VENOUS BLD VENIPUNCTURE: CPT | Performed by: FAMILY MEDICINE

## 2023-10-19 NOTE — PROGRESS NOTES
Venipuncture performed in L ARM by RT Raad  with good hemostasis. Patient tolerated well. 10/19/23 Tyesha De Leon, RT

## 2023-10-20 LAB
ALBUMIN SERPL-MCNC: 4.2 G/DL (ref 3.5–5.2)
ALBUMIN/GLOB SERPL: 1.7 G/DL
ALP SERPL-CCNC: 51 U/L (ref 39–117)
ALT SERPL W P-5'-P-CCNC: 15 U/L (ref 1–41)
ANION GAP SERPL CALCULATED.3IONS-SCNC: 10 MMOL/L (ref 5–15)
AST SERPL-CCNC: 24 U/L (ref 1–40)
BILIRUB SERPL-MCNC: 0.4 MG/DL (ref 0–1.2)
BUN SERPL-MCNC: 15 MG/DL (ref 6–20)
BUN/CREAT SERPL: 11.3 (ref 7–25)
CALCIUM SPEC-SCNC: 9.9 MG/DL (ref 8.6–10.5)
CHLORIDE SERPL-SCNC: 102 MMOL/L (ref 98–107)
CO2 SERPL-SCNC: 26 MMOL/L (ref 22–29)
CREAT SERPL-MCNC: 1.33 MG/DL (ref 0.76–1.27)
EGFRCR SERPLBLD CKD-EPI 2021: 62 ML/MIN/1.73
GLOBULIN UR ELPH-MCNC: 2.5 GM/DL
GLUCOSE SERPL-MCNC: 185 MG/DL (ref 65–99)
LIPASE SERPL-CCNC: 38 U/L (ref 13–60)
POTASSIUM SERPL-SCNC: 4.7 MMOL/L (ref 3.5–5.2)
PROT SERPL-MCNC: 6.7 G/DL (ref 6–8.5)
SODIUM SERPL-SCNC: 138 MMOL/L (ref 136–145)

## 2023-11-06 DIAGNOSIS — K86.1 CHRONIC RECURRENT PANCREATITIS: ICD-10-CM

## 2023-11-06 RX ORDER — HYDROCODONE BITARTRATE AND ACETAMINOPHEN 7.5; 325 MG/1; MG/1
1 TABLET ORAL EVERY 8 HOURS PRN
Qty: 60 TABLET | Refills: 0 | Status: SHIPPED | OUTPATIENT
Start: 2023-11-06

## 2023-11-06 NOTE — TELEPHONE ENCOUNTER
INSPECT RAN    Rx Refill Note  Requested Prescriptions     Pending Prescriptions Disp Refills    Continuous Blood Gluc Sensor (FreeStyle Curry 2 Sensor) misc 6 each 0     Sig: Apply 1 package topically Every 14 (Fourteen) Days.    HYDROcodone-acetaminophen (Norco) 7.5-325 MG per tablet 60 tablet 0     Sig: Take 1 tablet by mouth Every 8 (Eight) Hours As Needed for Severe Pain.      Last office visit with prescribing clinician: 10/6/2023   Last telemedicine visit with prescribing clinician: Visit date not found   Next office visit with prescribing clinician: Visit date not found        POC Glycosylated Hemoglobin (Hb A1C) (08/17/2023 09:04)                  Would you like a call back once the refill request has been completed: [] Yes [] No    If the office needs to give you a call back, can they leave a voicemail: [] Yes [] No    Tyesha De Leon, RT  11/06/23, 11:37 EST

## 2023-11-08 DIAGNOSIS — M25.551 CHRONIC PAIN OF RIGHT HIP: Primary | ICD-10-CM

## 2023-11-08 DIAGNOSIS — G89.29 CHRONIC PAIN OF RIGHT HIP: Primary | ICD-10-CM

## 2023-11-10 ENCOUNTER — CLINICAL SUPPORT (OUTPATIENT)
Dept: FAMILY MEDICINE CLINIC | Facility: CLINIC | Age: 58
End: 2023-11-10
Payer: COMMERCIAL

## 2023-11-13 ENCOUNTER — TELEPHONE (OUTPATIENT)
Dept: FAMILY MEDICINE CLINIC | Facility: CLINIC | Age: 58
End: 2023-11-13

## 2023-11-13 NOTE — TELEPHONE ENCOUNTER
Caller: Mitchell Jacome    Relationship: Self    Best call back number:[phone]     What is the best time to reach you: ANY    Who are you requesting to speak with (clinical staff, provider,  specific staff member): PCP    Do you know the name of the person who called:     What was the call regarding: X RAY RESULTS    Is it okay if the provider responds through MyChart:

## 2023-11-15 DIAGNOSIS — M25.551 CHRONIC PAIN OF RIGHT HIP: Primary | ICD-10-CM

## 2023-11-15 DIAGNOSIS — G89.29 CHRONIC PAIN OF RIGHT HIP: Primary | ICD-10-CM

## 2023-11-20 ENCOUNTER — TELEPHONE (OUTPATIENT)
Dept: FAMILY MEDICINE CLINIC | Facility: CLINIC | Age: 58
End: 2023-11-20

## 2023-11-20 DIAGNOSIS — E11.65 TYPE 2 DIABETES MELLITUS WITH HYPERGLYCEMIA, WITHOUT LONG-TERM CURRENT USE OF INSULIN: Primary | ICD-10-CM

## 2023-11-20 NOTE — TELEPHONE ENCOUNTER
Pt overdue for urine microalb.  Please add order.    Pt due now for A1c, we will call pt and have pt schedule lab appt for both.

## 2023-11-30 RX ORDER — DULOXETIN HYDROCHLORIDE 30 MG/1
30 CAPSULE, DELAYED RELEASE ORAL DAILY
Qty: 90 CAPSULE | Refills: 0 | Status: SHIPPED | OUTPATIENT
Start: 2023-11-30

## 2023-11-30 RX ORDER — DICYCLOMINE HCL 20 MG
TABLET ORAL
Qty: 30 TABLET | Refills: 0 | Status: SHIPPED | OUTPATIENT
Start: 2023-11-30

## 2023-12-06 DIAGNOSIS — K86.1 CHRONIC RECURRENT PANCREATITIS: ICD-10-CM

## 2023-12-06 RX ORDER — HYDROCODONE BITARTRATE AND ACETAMINOPHEN 7.5; 325 MG/1; MG/1
1 TABLET ORAL EVERY 8 HOURS PRN
Qty: 60 TABLET | Refills: 0 | Status: SHIPPED | OUTPATIENT
Start: 2023-12-06

## 2023-12-29 ENCOUNTER — APPOINTMENT (OUTPATIENT)
Dept: CT IMAGING | Facility: HOSPITAL | Age: 58
End: 2023-12-29
Payer: COMMERCIAL

## 2023-12-29 ENCOUNTER — APPOINTMENT (OUTPATIENT)
Dept: GENERAL RADIOLOGY | Facility: HOSPITAL | Age: 58
End: 2023-12-29
Payer: COMMERCIAL

## 2023-12-29 ENCOUNTER — HOSPITAL ENCOUNTER (INPATIENT)
Facility: HOSPITAL | Age: 58
LOS: 3 days | Discharge: HOME | End: 2024-01-03
Attending: EMERGENCY MEDICINE | Admitting: INTERNAL MEDICINE
Payer: COMMERCIAL

## 2023-12-29 DIAGNOSIS — I10 HYPERTENSION, UNSPECIFIED TYPE: ICD-10-CM

## 2023-12-29 DIAGNOSIS — K85.90 ACUTE PANCREATITIS WITHOUT INFECTION OR NECROSIS, UNSPECIFIED PANCREATITIS TYPE: Primary | ICD-10-CM

## 2023-12-29 DIAGNOSIS — R11.2 NAUSEA AND VOMITING, UNSPECIFIED VOMITING TYPE: ICD-10-CM

## 2023-12-29 DIAGNOSIS — R10.10 PAIN OF UPPER ABDOMEN: ICD-10-CM

## 2023-12-29 PROBLEM — R73.9 HYPERGLYCEMIA: Status: ACTIVE | Noted: 2023-12-29

## 2023-12-29 LAB
ACETONE BLD QL: ABNORMAL
ALBUMIN SERPL-MCNC: 4.4 G/DL (ref 3.5–5.2)
ALBUMIN/GLOB SERPL: 1.2 G/DL
ALP SERPL-CCNC: 79 U/L (ref 39–117)
ALT SERPL W P-5'-P-CCNC: 14 U/L (ref 1–41)
ANION GAP SERPL CALCULATED.3IONS-SCNC: 18 MMOL/L (ref 5–15)
ARTERIAL PATENCY WRIST A: POSITIVE
AST SERPL-CCNC: 25 U/L (ref 1–40)
ATMOSPHERIC PRESS: ABNORMAL MM[HG]
BACTERIA UR QL AUTO: NORMAL /HPF
BASE EXCESS BLDA CALC-SCNC: 9.9 MMOL/L (ref 0–3)
BASOPHILS # BLD AUTO: 0 10*3/MM3 (ref 0–0.2)
BASOPHILS NFR BLD AUTO: 0.3 % (ref 0–1.5)
BDY SITE: ABNORMAL
BILIRUB SERPL-MCNC: 0.7 MG/DL (ref 0–1.2)
BILIRUB UR QL STRIP: NEGATIVE
BUN SERPL-MCNC: 31 MG/DL (ref 6–20)
BUN/CREAT SERPL: 16.1 (ref 7–25)
CALCIUM SPEC-SCNC: 12.9 MG/DL (ref 8.6–10.5)
CHLORIDE SERPL-SCNC: 75 MMOL/L (ref 98–107)
CLARITY UR: CLEAR
CO2 BLDA-SCNC: 33.4 MMOL/L (ref 22–29)
CO2 SERPL-SCNC: 31 MMOL/L (ref 22–29)
COLOR UR: YELLOW
CREAT SERPL-MCNC: 1.93 MG/DL (ref 0.76–1.27)
D-LACTATE SERPL-SCNC: 2.7 MMOL/L (ref 0.3–2)
DEPRECATED RDW RBC AUTO: 41.6 FL (ref 37–54)
EGFRCR SERPLBLD CKD-EPI 2021: 39.6 ML/MIN/1.73
EOSINOPHIL # BLD AUTO: 0 10*3/MM3 (ref 0–0.4)
EOSINOPHIL NFR BLD AUTO: 0 % (ref 0.3–6.2)
ERYTHROCYTE [DISTWIDTH] IN BLOOD BY AUTOMATED COUNT: 12.5 % (ref 12.3–15.4)
GLOBULIN UR ELPH-MCNC: 3.7 GM/DL
GLUCOSE BLDC GLUCOMTR-MCNC: 351 MG/DL (ref 70–105)
GLUCOSE BLDC GLUCOMTR-MCNC: 372 MG/DL (ref 70–105)
GLUCOSE BLDC GLUCOMTR-MCNC: 376 MG/DL (ref 70–105)
GLUCOSE BLDC GLUCOMTR-MCNC: 595 MG/DL (ref 70–105)
GLUCOSE BLDC GLUCOMTR-MCNC: >600 MG/DL (ref 70–105)
GLUCOSE SERPL-MCNC: 622 MG/DL (ref 65–99)
GLUCOSE UR STRIP-MCNC: ABNORMAL MG/DL
HCO3 BLDA-SCNC: 32.3 MMOL/L (ref 21–28)
HCT VFR BLD AUTO: 44.4 % (ref 37.5–51)
HEMODILUTION: NO
HGB BLD-MCNC: 15 G/DL (ref 13–17.7)
HGB UR QL STRIP.AUTO: ABNORMAL
HOLD SPECIMEN: NORMAL
HOLD SPECIMEN: NORMAL
HYALINE CASTS UR QL AUTO: NORMAL /LPF
INHALED O2 CONCENTRATION: 21 %
KETONES UR QL STRIP: ABNORMAL
LEUKOCYTE ESTERASE UR QL STRIP.AUTO: NEGATIVE
LIPASE SERPL-CCNC: 575 U/L (ref 13–60)
LYMPHOCYTES # BLD AUTO: 0.7 10*3/MM3 (ref 0.7–3.1)
LYMPHOCYTES NFR BLD AUTO: 5.5 % (ref 19.6–45.3)
MAGNESIUM SERPL-MCNC: 1.8 MG/DL (ref 1.6–2.6)
MCH RBC QN AUTO: 32.7 PG (ref 26.6–33)
MCHC RBC AUTO-ENTMCNC: 33.9 G/DL (ref 31.5–35.7)
MCV RBC AUTO: 96.6 FL (ref 79–97)
MODALITY: ABNORMAL
MONOCYTES # BLD AUTO: 0.8 10*3/MM3 (ref 0.1–0.9)
MONOCYTES NFR BLD AUTO: 6.8 % (ref 5–12)
NEUTROPHILS NFR BLD AUTO: 10.6 10*3/MM3 (ref 1.7–7)
NEUTROPHILS NFR BLD AUTO: 87.4 % (ref 42.7–76)
NITRITE UR QL STRIP: NEGATIVE
NRBC BLD AUTO-RTO: 0 /100 WBC (ref 0–0.2)
PCO2 BLDA: 35.1 MM HG (ref 35–48)
PH BLDA: 7.57 PH UNITS (ref 7.35–7.45)
PH UR STRIP.AUTO: 7.5 [PH] (ref 5–8)
PHOSPHATE SERPL-MCNC: 4.1 MG/DL (ref 2.5–4.5)
PLATELET # BLD AUTO: 301 10*3/MM3 (ref 140–450)
PMV BLD AUTO: 8.3 FL (ref 6–12)
PO2 BLDA: 52.7 MM HG (ref 83–108)
POTASSIUM SERPL-SCNC: 4.4 MMOL/L (ref 3.5–5.2)
PROT SERPL-MCNC: 8.1 G/DL (ref 6–8.5)
PROT UR QL STRIP: ABNORMAL
RBC # BLD AUTO: 4.59 10*6/MM3 (ref 4.14–5.8)
RBC # UR STRIP: NORMAL /HPF
REF LAB TEST METHOD: NORMAL
SAO2 % BLDCOA: 91.5 % (ref 94–98)
SODIUM SERPL-SCNC: 124 MMOL/L (ref 136–145)
SP GR UR STRIP: 1.03 (ref 1–1.03)
SQUAMOUS #/AREA URNS HPF: NORMAL /HPF
UROBILINOGEN UR QL STRIP: ABNORMAL
WBC # UR STRIP: NORMAL /HPF
WBC NRBC COR # BLD AUTO: 12.2 10*3/MM3 (ref 3.4–10.8)
WHOLE BLOOD HOLD COAG: NORMAL
WHOLE BLOOD HOLD SPECIMEN: NORMAL

## 2023-12-29 PROCEDURE — 36600 WITHDRAWAL OF ARTERIAL BLOOD: CPT

## 2023-12-29 PROCEDURE — G0378 HOSPITAL OBSERVATION PER HR: HCPCS

## 2023-12-29 PROCEDURE — 63710000001 INSULIN REGULAR HUMAN PER 5 UNITS

## 2023-12-29 PROCEDURE — 93005 ELECTROCARDIOGRAM TRACING: CPT

## 2023-12-29 PROCEDURE — 25510000001 IOPAMIDOL PER 1 ML: Performed by: EMERGENCY MEDICINE

## 2023-12-29 PROCEDURE — 84100 ASSAY OF PHOSPHORUS: CPT

## 2023-12-29 PROCEDURE — 82009 KETONE BODYS QUAL: CPT

## 2023-12-29 PROCEDURE — 83690 ASSAY OF LIPASE: CPT

## 2023-12-29 PROCEDURE — 99285 EMERGENCY DEPT VISIT HI MDM: CPT

## 2023-12-29 PROCEDURE — 82803 BLOOD GASES ANY COMBINATION: CPT

## 2023-12-29 PROCEDURE — 74177 CT ABD & PELVIS W/CONTRAST: CPT

## 2023-12-29 PROCEDURE — 83735 ASSAY OF MAGNESIUM: CPT

## 2023-12-29 PROCEDURE — 25010000002 ONDANSETRON PER 1 MG

## 2023-12-29 PROCEDURE — 25010000002 HYDROMORPHONE 1 MG/ML SOLUTION: Performed by: STUDENT IN AN ORGANIZED HEALTH CARE EDUCATION/TRAINING PROGRAM

## 2023-12-29 PROCEDURE — 85025 COMPLETE CBC W/AUTO DIFF WBC: CPT

## 2023-12-29 PROCEDURE — 25810000003 LACTATED RINGERS PER 1000 ML: Performed by: STUDENT IN AN ORGANIZED HEALTH CARE EDUCATION/TRAINING PROGRAM

## 2023-12-29 PROCEDURE — 25010000002 HYDROMORPHONE 1 MG/ML SOLUTION

## 2023-12-29 PROCEDURE — 82948 REAGENT STRIP/BLOOD GLUCOSE: CPT

## 2023-12-29 PROCEDURE — 80053 COMPREHEN METABOLIC PANEL: CPT

## 2023-12-29 PROCEDURE — 83605 ASSAY OF LACTIC ACID: CPT

## 2023-12-29 PROCEDURE — 87040 BLOOD CULTURE FOR BACTERIA: CPT

## 2023-12-29 PROCEDURE — 36415 COLL VENOUS BLD VENIPUNCTURE: CPT

## 2023-12-29 PROCEDURE — 25810000003 SODIUM CHLORIDE 0.9 % SOLUTION

## 2023-12-29 PROCEDURE — 81001 URINALYSIS AUTO W/SCOPE: CPT

## 2023-12-29 PROCEDURE — 71045 X-RAY EXAM CHEST 1 VIEW: CPT

## 2023-12-29 RX ORDER — SODIUM CHLORIDE 0.9 % (FLUSH) 0.9 %
10 SYRINGE (ML) INJECTION EVERY 12 HOURS SCHEDULED
Status: DISCONTINUED | OUTPATIENT
Start: 2023-12-29 | End: 2024-01-03 | Stop reason: HOSPADM

## 2023-12-29 RX ORDER — NICOTINE POLACRILEX 4 MG
15 LOZENGE BUCCAL
Status: DISCONTINUED | OUTPATIENT
Start: 2023-12-29 | End: 2023-12-30 | Stop reason: SDUPTHER

## 2023-12-29 RX ORDER — SODIUM CHLORIDE 9 MG/ML
40 INJECTION, SOLUTION INTRAVENOUS AS NEEDED
Status: DISCONTINUED | OUTPATIENT
Start: 2023-12-29 | End: 2024-01-03 | Stop reason: HOSPADM

## 2023-12-29 RX ORDER — LABETALOL HYDROCHLORIDE 5 MG/ML
20 INJECTION, SOLUTION INTRAVENOUS ONCE
Status: COMPLETED | OUTPATIENT
Start: 2023-12-29 | End: 2023-12-29

## 2023-12-29 RX ORDER — ONDANSETRON 4 MG/1
4 TABLET, ORALLY DISINTEGRATING ORAL EVERY 6 HOURS PRN
Status: DISCONTINUED | OUTPATIENT
Start: 2023-12-29 | End: 2024-01-03 | Stop reason: HOSPADM

## 2023-12-29 RX ORDER — SODIUM CHLORIDE, SODIUM LACTATE, POTASSIUM CHLORIDE, CALCIUM CHLORIDE 600; 310; 30; 20 MG/100ML; MG/100ML; MG/100ML; MG/100ML
150 INJECTION, SOLUTION INTRAVENOUS CONTINUOUS
Status: DISCONTINUED | OUTPATIENT
Start: 2023-12-29 | End: 2024-01-03 | Stop reason: HOSPADM

## 2023-12-29 RX ORDER — BISACODYL 5 MG/1
5 TABLET, DELAYED RELEASE ORAL DAILY PRN
Status: DISCONTINUED | OUTPATIENT
Start: 2023-12-29 | End: 2024-01-03 | Stop reason: HOSPADM

## 2023-12-29 RX ORDER — BISACODYL 10 MG
10 SUPPOSITORY, RECTAL RECTAL DAILY PRN
Status: DISCONTINUED | OUTPATIENT
Start: 2023-12-29 | End: 2024-01-03 | Stop reason: HOSPADM

## 2023-12-29 RX ORDER — IBUPROFEN 600 MG/1
1 TABLET ORAL
Status: DISCONTINUED | OUTPATIENT
Start: 2023-12-29 | End: 2023-12-30 | Stop reason: SDUPTHER

## 2023-12-29 RX ORDER — TEMAZEPAM 15 MG/1
15 CAPSULE ORAL NIGHTLY PRN
Status: DISCONTINUED | OUTPATIENT
Start: 2023-12-29 | End: 2024-01-03 | Stop reason: HOSPADM

## 2023-12-29 RX ORDER — ATORVASTATIN CALCIUM 20 MG/1
20 TABLET, FILM COATED ORAL DAILY
Status: DISCONTINUED | OUTPATIENT
Start: 2023-12-30 | End: 2024-01-03 | Stop reason: HOSPADM

## 2023-12-29 RX ORDER — LEVETIRACETAM 500 MG/1
500 TABLET ORAL 2 TIMES DAILY
Status: DISCONTINUED | OUTPATIENT
Start: 2023-12-30 | End: 2024-01-03 | Stop reason: HOSPADM

## 2023-12-29 RX ORDER — FENOFIBRATE 145 MG/1
145 TABLET, COATED ORAL DAILY
Status: DISCONTINUED | OUTPATIENT
Start: 2023-12-30 | End: 2024-01-03 | Stop reason: HOSPADM

## 2023-12-29 RX ORDER — SODIUM CHLORIDE 0.9 % (FLUSH) 0.9 %
10 SYRINGE (ML) INJECTION AS NEEDED
Status: DISCONTINUED | OUTPATIENT
Start: 2023-12-29 | End: 2024-01-03 | Stop reason: HOSPADM

## 2023-12-29 RX ORDER — ACETAMINOPHEN 325 MG/1
650 TABLET ORAL EVERY 4 HOURS PRN
Status: DISCONTINUED | OUTPATIENT
Start: 2023-12-29 | End: 2024-01-03 | Stop reason: HOSPADM

## 2023-12-29 RX ORDER — ONDANSETRON 2 MG/ML
4 INJECTION INTRAMUSCULAR; INTRAVENOUS ONCE
Status: COMPLETED | OUTPATIENT
Start: 2023-12-29 | End: 2023-12-29

## 2023-12-29 RX ORDER — ONDANSETRON 2 MG/ML
4 INJECTION INTRAMUSCULAR; INTRAVENOUS EVERY 6 HOURS PRN
Status: DISCONTINUED | OUTPATIENT
Start: 2023-12-29 | End: 2024-01-03 | Stop reason: HOSPADM

## 2023-12-29 RX ORDER — DEXTROSE MONOHYDRATE 25 G/50ML
10-50 INJECTION, SOLUTION INTRAVENOUS
Status: DISCONTINUED | OUTPATIENT
Start: 2023-12-29 | End: 2024-01-03 | Stop reason: HOSPADM

## 2023-12-29 RX ORDER — ENOXAPARIN SODIUM 100 MG/ML
40 INJECTION SUBCUTANEOUS DAILY
Status: DISCONTINUED | OUTPATIENT
Start: 2023-12-30 | End: 2024-01-03 | Stop reason: HOSPADM

## 2023-12-29 RX ORDER — AMOXICILLIN 250 MG
2 CAPSULE ORAL 2 TIMES DAILY
Status: DISCONTINUED | OUTPATIENT
Start: 2023-12-30 | End: 2024-01-03 | Stop reason: HOSPADM

## 2023-12-29 RX ORDER — DULOXETIN HYDROCHLORIDE 30 MG/1
30 CAPSULE, DELAYED RELEASE ORAL DAILY
Status: DISCONTINUED | OUTPATIENT
Start: 2023-12-30 | End: 2024-01-03 | Stop reason: HOSPADM

## 2023-12-29 RX ORDER — PANTOPRAZOLE SODIUM 40 MG/1
40 TABLET, DELAYED RELEASE ORAL
Status: DISCONTINUED | OUTPATIENT
Start: 2023-12-30 | End: 2024-01-03 | Stop reason: HOSPADM

## 2023-12-29 RX ORDER — OXYCODONE HYDROCHLORIDE 5 MG/1
7.5 TABLET ORAL EVERY 4 HOURS PRN
Status: DISCONTINUED | OUTPATIENT
Start: 2023-12-29 | End: 2024-01-03 | Stop reason: HOSPADM

## 2023-12-29 RX ORDER — POLYETHYLENE GLYCOL 3350 17 G/17G
17 POWDER, FOR SOLUTION ORAL DAILY PRN
Status: DISCONTINUED | OUTPATIENT
Start: 2023-12-29 | End: 2024-01-03 | Stop reason: HOSPADM

## 2023-12-29 RX ADMIN — INSULIN HUMAN 7.6 UNITS: 100 INJECTION, SOLUTION PARENTERAL at 18:17

## 2023-12-29 RX ADMIN — Medication 10 ML: at 23:53

## 2023-12-29 RX ADMIN — Medication 20 MG: at 21:31

## 2023-12-29 RX ADMIN — INSULIN HUMAN 5.8 UNITS/HR: 1 INJECTION, SOLUTION INTRAVENOUS at 23:50

## 2023-12-29 RX ADMIN — SODIUM CHLORIDE 1000 ML: 9 INJECTION, SOLUTION INTRAVENOUS at 18:19

## 2023-12-29 RX ADMIN — SODIUM CHLORIDE, POTASSIUM CHLORIDE, SODIUM LACTATE AND CALCIUM CHLORIDE 125 ML/HR: 600; 310; 30; 20 INJECTION, SOLUTION INTRAVENOUS at 23:18

## 2023-12-29 RX ADMIN — HYDROMORPHONE HYDROCHLORIDE 0.5 MG: 1 INJECTION, SOLUTION INTRAMUSCULAR; INTRAVENOUS; SUBCUTANEOUS at 18:17

## 2023-12-29 RX ADMIN — IOPAMIDOL 100 ML: 755 INJECTION, SOLUTION INTRAVENOUS at 20:15

## 2023-12-29 RX ADMIN — HYDROMORPHONE HYDROCHLORIDE 0.5 MG: 1 INJECTION, SOLUTION INTRAMUSCULAR; INTRAVENOUS; SUBCUTANEOUS at 20:17

## 2023-12-29 RX ADMIN — HYDROMORPHONE HYDROCHLORIDE 1 MG: 1 INJECTION, SOLUTION INTRAMUSCULAR; INTRAVENOUS; SUBCUTANEOUS at 23:18

## 2023-12-29 RX ADMIN — ONDANSETRON 4 MG: 2 INJECTION INTRAMUSCULAR; INTRAVENOUS at 18:16

## 2023-12-29 NOTE — ED PROVIDER NOTES
Subjective   History of Present Illness  Patient is a pleasant 58-year-old  male with a history of diabetes and chronic pancreatitis who presents to the emergency room with complaints of upper abdominal pain, nausea and vomiting for the past 4 days.  Patient states he is having trouble keeping food down and believes that his pancreas has been problematic.  He denies fever, shortness of breath, chest pain, dizziness and diarrhea.  He has had no recent changes in medications.  He denies use of drugs, alcohol and tobacco.  He has allergies to gemfibrozil.      Review of Systems   Constitutional:  Negative for appetite change and fever.   HENT:  Negative for congestion and rhinorrhea.    Respiratory:  Negative for shortness of breath.    Cardiovascular:  Negative for chest pain.   Gastrointestinal:  Positive for abdominal pain, nausea and vomiting. Negative for diarrhea.   Genitourinary:  Negative for dysuria.   Neurological:  Negative for dizziness.   Psychiatric/Behavioral:  Negative for confusion. The patient is not nervous/anxious.    All other systems reviewed and are negative.      Past Medical History:   Diagnosis Date    Chronic recurrent pancreatitis     COVID-19     Depression     DMII     GERD     Hearing loss, right     85%    HTN     PSA     2021= 0.775/ 2022=0.747    Seizures     Solitary kidney        Allergies   Allergen Reactions    Basaglar Kwikpen [Insulin Glargine] Other (See Comments)     Injection bruising    Gemfibrozil Rash       Past Surgical History:   Procedure Laterality Date    APPENDECTOMY      CHOLECYSTECTOMY      COLONOSCOPY      NEG = 2016, rech 2026     GSI       Family History   Problem Relation Age of Onset    Cerebral aneurysm Mother     Hypertension Father     Cancer Father 60        Bladder    Diabetes Father     Hyperlipidemia Father     Hypertension Brother     Hyperlipidemia Brother        Social History     Socioeconomic History    Marital status:    Tobacco Use  "   Smoking status: Never     Passive exposure: Never    Smokeless tobacco: Never   Vaping Use    Vaping Use: Never used   Substance and Sexual Activity    Alcohol use: Not Currently     Comment: rare    Drug use: Yes     Types: Hydrocodone, Marijuana    Sexual activity: Yes     Partners: Female           Objective   Physical Exam  Vitals and nursing note reviewed.   Constitutional:       Appearance: He is well-developed and normal weight.   HENT:      Head: Normocephalic and atraumatic.   Cardiovascular:      Rate and Rhythm: Regular rhythm. Tachycardia present.      Heart sounds: Normal heart sounds. No murmur heard.  Pulmonary:      Effort: Pulmonary effort is normal.      Breath sounds: Normal breath sounds.   Abdominal:      General: Abdomen is flat. Bowel sounds are decreased.      Palpations: Abdomen is soft.      Tenderness: There is abdominal tenderness in the epigastric area.   Neurological:      Mental Status: He is alert.         Procedures           ED Course      /94   Pulse 94   Temp 98.3 °F (36.8 °C) (Oral)   Resp 13   Ht 188 cm (74\")   Wt 75.6 kg (166 lb 10.7 oz)   SpO2 95%   BMI 21.40 kg/m²   .edlabs  Medications   sodium chloride 0.9 % bolus 1,000 mL (0 mL Intravenous Stopped 12/29/23 2019)   insulin regular (humuLIN R,novoLIN R) injection 7.6 Units (7.6 Units Intravenous Given 12/29/23 1817)   ondansetron (ZOFRAN) injection 4 mg (4 mg Intravenous Given 12/29/23 1816)   HYDROmorphone (DILAUDID) injection 0.5 mg (0.5 mg Intravenous Given 12/29/23 1817)   HYDROmorphone (DILAUDID) injection 0.5 mg (0.5 mg Intravenous Given 12/29/23 2017)   iopamidol (ISOVUE-370) 76 % injection 100 mL (100 mL Intravenous Given 12/29/23 2015)   labetalol (NORMODYNE,TRANDATE) injection 20 mg (20 mg Intravenous Given 12/29/23 2131)     XR Chest 1 View    Result Date: 12/29/2023  Impression: No acute process. Electronically Signed: Vikas Bertrand MD  12/29/2023 8:47 PM EST  Workstation ID: [id number]    CT Abdomen " Pelvis With Contrast    Result Date: 12/29/2023  Impression: 1. Acute interstitial pancreatitis. No organized peripancreatic fluid collection. 2. Chronic pancreatic ductal dilatation and irregularity related to sequela of chronic pancreatitis, similar to prior study. 3. New narrowing of splenic vein and portal vein related to pancreatitis. No portal venous or splenic vein thrombus. 4. Fluid-filled distention of stomach may relate to delayed transit secondary to inflammation involving the duodenum. 4. Additional chronic findings above. Electronically Signed: Vikas Bertrand MD  12/29/2023 8:38 PM EST  Workstation ID: [id number]                                          Medical Decision Making  Amount and/or Complexity of Data Reviewed  Labs: ordered.  Radiology: ordered.  ECG/medicine tests: ordered.    Risk  OTC drugs.  Prescription drug management.    Patient is a pleasant 58-year-old  male with history of diabetes, chronic pancreatitis and hypertension who presents the emergency room with complaints of upper abdominal pain, nausea and vomiting has been ongoing for 2 days.  On exam, abdomen is found to be soft with upper abdominal tenderness.  Hypoactive bowel sounds throughout.  Normal S1 suggest without concerns.  No JVD.  Lungs are clear to auscultation in all fields.  Initial differentials include acute pancreatitis, cholecystitis, peptic ulcer, bowel obstruction.  This is not a complete list.    Due to overwhelming hospital census and boarding inpatients in the emergency room, patient received above examination in hallway bed.  Examination was made to the best of provider's ability with respect to patient privacy and confidentiality.  IV was established labs were obtained.  Symptoms were managed with Dilaudid and Zofran.  Patient received a fluid bolus.  CBC with leukocytosis of 12.2.  CBC is otherwise unremarkable.  CMP reveals elevated kidney function, which is new finding for patient as he had normal  kidney function 2 months ago.  Lipase noted to be elevated at 575 which is another new finding.  Patient is not acidotic.  My interpretation of CT reveals no ureteral stones, bowel obstruction or free air in the abdomen.  This is concurrent with radiologist, who also notes imaging consistent with acute interstitial pancreatitis with chronic pancreatic ductal dilation.  Upon reassessment, patient reports improvement in pain.  Results were discussed and he was offered  admission for treatment of acute on chronic pancreatitis with pain management.  Patient is agreeable to hospitalization at this time.  I discussed patient with Dr. Niño, who is agreeable to plan of care and accepted patient for admission.  Patient has remained hemodynamically stable and is in no acute distress.      Final diagnoses:   Acute pancreatitis without infection or necrosis, unspecified pancreatitis type   Pain of upper abdomen   Nausea and vomiting, unspecified vomiting type   Hypertension, unspecified type       ED Disposition  ED Disposition       ED Disposition   Decision to Admit    Condition   --    Comment   Level of Care: Med/Surg [1]   Admitting Physician: JUAN MIGUEL NIÑO [[id number]]   Attending Physician: JUAN MIGUEL NIÑO [[id number]]                 No follow-up provider specified.       Medication List      No changes were made to your prescriptions during this visit.            Margarita Berger, APRN  12/29/23 [id number]

## 2023-12-29 NOTE — Clinical Note
Level of Care: Med/Surg [1]   Admitting Physician: JUAN MIGUEL HUTCHINS [[id number]]   Attending Physician: JUAN MIGUEL HUTCHINS [[id number]]

## 2023-12-30 PROBLEM — K85.90 ACUTE PANCREATITIS: Status: ACTIVE | Noted: 2023-12-30

## 2023-12-30 LAB
ALBUMIN SERPL-MCNC: 4 G/DL (ref 3.5–5.2)
ALBUMIN/GLOB SERPL: 1.1 G/DL
ALP SERPL-CCNC: 83 U/L (ref 39–117)
ALT SERPL W P-5'-P-CCNC: 25 U/L (ref 1–41)
ANION GAP SERPL CALCULATED.3IONS-SCNC: 15 MMOL/L (ref 5–15)
AST SERPL-CCNC: 70 U/L (ref 1–40)
BASOPHILS # BLD AUTO: 0 10*3/MM3 (ref 0–0.2)
BASOPHILS NFR BLD AUTO: 0.2 % (ref 0–1.5)
BILIRUB SERPL-MCNC: 0.8 MG/DL (ref 0–1.2)
BUN SERPL-MCNC: 31 MG/DL (ref 6–20)
BUN/CREAT SERPL: 18.5 (ref 7–25)
CALCIUM SPEC-SCNC: 11.6 MG/DL (ref 8.6–10.5)
CHLORIDE SERPL-SCNC: 84 MMOL/L (ref 98–107)
CO2 SERPL-SCNC: 31 MMOL/L (ref 22–29)
CREAT SERPL-MCNC: 1.68 MG/DL (ref 0.76–1.27)
D-LACTATE SERPL-SCNC: 3.5 MMOL/L (ref 0.5–2)
DEPRECATED RDW RBC AUTO: 42 FL (ref 37–54)
EGFRCR SERPLBLD CKD-EPI 2021: 46.8 ML/MIN/1.73
EOSINOPHIL # BLD AUTO: 0 10*3/MM3 (ref 0–0.4)
EOSINOPHIL NFR BLD AUTO: 0.2 % (ref 0.3–6.2)
ERYTHROCYTE [DISTWIDTH] IN BLOOD BY AUTOMATED COUNT: 12.8 % (ref 12.3–15.4)
GLOBULIN UR ELPH-MCNC: 3.6 GM/DL
GLUCOSE BLDC GLUCOMTR-MCNC: 112 MG/DL (ref 70–105)
GLUCOSE BLDC GLUCOMTR-MCNC: 118 MG/DL (ref 70–105)
GLUCOSE BLDC GLUCOMTR-MCNC: 129 MG/DL (ref 70–105)
GLUCOSE BLDC GLUCOMTR-MCNC: 159 MG/DL (ref 70–105)
GLUCOSE BLDC GLUCOMTR-MCNC: 189 MG/DL (ref 70–105)
GLUCOSE BLDC GLUCOMTR-MCNC: 203 MG/DL (ref 70–105)
GLUCOSE BLDC GLUCOMTR-MCNC: 304 MG/DL (ref 70–105)
GLUCOSE SERPL-MCNC: 186 MG/DL (ref 65–99)
HCT VFR BLD AUTO: 41.8 % (ref 37.5–51)
HGB BLD-MCNC: 14.5 G/DL (ref 13–17.7)
LIPASE SERPL-CCNC: 285 U/L (ref 13–60)
LYMPHOCYTES # BLD AUTO: 1.1 10*3/MM3 (ref 0.7–3.1)
LYMPHOCYTES NFR BLD AUTO: 10 % (ref 19.6–45.3)
MCH RBC QN AUTO: 33 PG (ref 26.6–33)
MCHC RBC AUTO-ENTMCNC: 34.6 G/DL (ref 31.5–35.7)
MCV RBC AUTO: 95.4 FL (ref 79–97)
MONOCYTES # BLD AUTO: 0.6 10*3/MM3 (ref 0.1–0.9)
MONOCYTES NFR BLD AUTO: 5.3 % (ref 5–12)
NEUTROPHILS NFR BLD AUTO: 84.3 % (ref 42.7–76)
NEUTROPHILS NFR BLD AUTO: 9.1 10*3/MM3 (ref 1.7–7)
NRBC BLD AUTO-RTO: 0 /100 WBC (ref 0–0.2)
PLATELET # BLD AUTO: 287 10*3/MM3 (ref 140–450)
PMV BLD AUTO: 8.3 FL (ref 6–12)
POTASSIUM SERPL-SCNC: 3.8 MMOL/L (ref 3.5–5.2)
PROT SERPL-MCNC: 7.6 G/DL (ref 6–8.5)
QT INTERVAL: 292 MS
QTC INTERVAL: 421 MS
RBC # BLD AUTO: 4.38 10*6/MM3 (ref 4.14–5.8)
SODIUM SERPL-SCNC: 130 MMOL/L (ref 136–145)
WBC NRBC COR # BLD AUTO: 10.8 10*3/MM3 (ref 3.4–10.8)

## 2023-12-30 PROCEDURE — 83690 ASSAY OF LIPASE: CPT | Performed by: STUDENT IN AN ORGANIZED HEALTH CARE EDUCATION/TRAINING PROGRAM

## 2023-12-30 PROCEDURE — 82948 REAGENT STRIP/BLOOD GLUCOSE: CPT

## 2023-12-30 PROCEDURE — 83605 ASSAY OF LACTIC ACID: CPT

## 2023-12-30 PROCEDURE — 25010000002 HYDROMORPHONE 1 MG/ML SOLUTION: Performed by: STUDENT IN AN ORGANIZED HEALTH CARE EDUCATION/TRAINING PROGRAM

## 2023-12-30 PROCEDURE — 25010000002 ONDANSETRON PER 1 MG: Performed by: STUDENT IN AN ORGANIZED HEALTH CARE EDUCATION/TRAINING PROGRAM

## 2023-12-30 PROCEDURE — 63710000001 INSULIN LISPRO (HUMAN) PER 5 UNITS: Performed by: HOSPITALIST

## 2023-12-30 PROCEDURE — 63710000001 INSULIN GLARGINE PER 5 UNITS: Performed by: STUDENT IN AN ORGANIZED HEALTH CARE EDUCATION/TRAINING PROGRAM

## 2023-12-30 PROCEDURE — 80053 COMPREHEN METABOLIC PANEL: CPT | Performed by: STUDENT IN AN ORGANIZED HEALTH CARE EDUCATION/TRAINING PROGRAM

## 2023-12-30 PROCEDURE — 85025 COMPLETE CBC W/AUTO DIFF WBC: CPT | Performed by: STUDENT IN AN ORGANIZED HEALTH CARE EDUCATION/TRAINING PROGRAM

## 2023-12-30 PROCEDURE — 36415 COLL VENOUS BLD VENIPUNCTURE: CPT | Performed by: STUDENT IN AN ORGANIZED HEALTH CARE EDUCATION/TRAINING PROGRAM

## 2023-12-30 PROCEDURE — G0378 HOSPITAL OBSERVATION PER HR: HCPCS

## 2023-12-30 PROCEDURE — 25010000002 ENOXAPARIN PER 10 MG: Performed by: STUDENT IN AN ORGANIZED HEALTH CARE EDUCATION/TRAINING PROGRAM

## 2023-12-30 RX ORDER — DEXTROSE MONOHYDRATE 25 G/50ML
25 INJECTION, SOLUTION INTRAVENOUS
Status: DISCONTINUED | OUTPATIENT
Start: 2023-12-30 | End: 2024-01-03 | Stop reason: HOSPADM

## 2023-12-30 RX ORDER — INSULIN LISPRO 100 [IU]/ML
2-7 INJECTION, SOLUTION INTRAVENOUS; SUBCUTANEOUS
Status: DISCONTINUED | OUTPATIENT
Start: 2023-12-30 | End: 2024-01-03 | Stop reason: HOSPADM

## 2023-12-30 RX ORDER — NICOTINE POLACRILEX 4 MG
15 LOZENGE BUCCAL
Status: DISCONTINUED | OUTPATIENT
Start: 2023-12-30 | End: 2024-01-03 | Stop reason: HOSPADM

## 2023-12-30 RX ORDER — IBUPROFEN 600 MG/1
1 TABLET ORAL
Status: DISCONTINUED | OUTPATIENT
Start: 2023-12-30 | End: 2024-01-03 | Stop reason: HOSPADM

## 2023-12-30 RX ADMIN — PANCRELIPASE 72000 UNITS OF LIPASE: 36000; 180000; 114000 CAPSULE, DELAYED RELEASE PELLETS ORAL at 18:05

## 2023-12-30 RX ADMIN — HYDROMORPHONE HYDROCHLORIDE 1 MG: 1 INJECTION, SOLUTION INTRAMUSCULAR; INTRAVENOUS; SUBCUTANEOUS at 03:54

## 2023-12-30 RX ADMIN — DULOXETINE HYDROCHLORIDE 30 MG: 30 CAPSULE, DELAYED RELEASE ORAL at 08:25

## 2023-12-30 RX ADMIN — SENNOSIDES AND DOCUSATE SODIUM 2 TABLET: 50; 8.6 TABLET ORAL at 08:25

## 2023-12-30 RX ADMIN — ATORVASTATIN CALCIUM 20 MG: 20 TABLET, FILM COATED ORAL at 08:24

## 2023-12-30 RX ADMIN — ENOXAPARIN SODIUM 40 MG: 100 INJECTION SUBCUTANEOUS at 01:44

## 2023-12-30 RX ADMIN — LEVETIRACETAM 500 MG: 500 TABLET, FILM COATED ORAL at 01:44

## 2023-12-30 RX ADMIN — LEVETIRACETAM 500 MG: 500 TABLET, FILM COATED ORAL at 21:20

## 2023-12-30 RX ADMIN — ENOXAPARIN SODIUM 40 MG: 100 INJECTION SUBCUTANEOUS at 18:05

## 2023-12-30 RX ADMIN — FENOFIBRATE 145 MG: 145 TABLET ORAL at 08:24

## 2023-12-30 RX ADMIN — HYDROMORPHONE HYDROCHLORIDE 1 MG: 1 INJECTION, SOLUTION INTRAMUSCULAR; INTRAVENOUS; SUBCUTANEOUS at 08:25

## 2023-12-30 RX ADMIN — ONDANSETRON 4 MG: 2 INJECTION INTRAMUSCULAR; INTRAVENOUS at 03:54

## 2023-12-30 RX ADMIN — PANCRELIPASE 72000 UNITS OF LIPASE: 36000; 180000; 114000 CAPSULE, DELAYED RELEASE PELLETS ORAL at 12:05

## 2023-12-30 RX ADMIN — HYDROMORPHONE HYDROCHLORIDE 1 MG: 1 INJECTION, SOLUTION INTRAMUSCULAR; INTRAVENOUS; SUBCUTANEOUS at 21:20

## 2023-12-30 RX ADMIN — PANTOPRAZOLE SODIUM 40 MG: 40 TABLET, DELAYED RELEASE ORAL at 06:03

## 2023-12-30 RX ADMIN — Medication 10 ML: at 08:25

## 2023-12-30 RX ADMIN — INSULIN LISPRO 3 UNITS: 100 INJECTION, SOLUTION INTRAVENOUS; SUBCUTANEOUS at 18:05

## 2023-12-30 RX ADMIN — Medication 10 ML: at 21:20

## 2023-12-30 RX ADMIN — INSULIN GLARGINE 20 UNITS: 100 INJECTION, SOLUTION SUBCUTANEOUS at 03:13

## 2023-12-30 RX ADMIN — LEVETIRACETAM 500 MG: 500 TABLET, FILM COATED ORAL at 08:28

## 2023-12-30 RX ADMIN — HYDROMORPHONE HYDROCHLORIDE 1 MG: 1 INJECTION, SOLUTION INTRAMUSCULAR; INTRAVENOUS; SUBCUTANEOUS at 18:05

## 2023-12-30 RX ADMIN — INSULIN LISPRO 2 UNITS: 100 INJECTION, SOLUTION INTRAVENOUS; SUBCUTANEOUS at 12:06

## 2023-12-30 RX ADMIN — PANCRELIPASE 72000 UNITS OF LIPASE: 36000; 180000; 114000 CAPSULE, DELAYED RELEASE PELLETS ORAL at 08:24

## 2023-12-30 NOTE — ED NOTES
Chief Complaint   Patient presents with    Abdominal Pain     Vomiting for 4 days, history of pancreatitis. Diarrhea.       Pt states that he has a hx of pancreatitis. Pt states that he thinks he's having a flare up r/t food and drink consumption over the holiday. Pt states that he has abdominal pain, vomiting and diarrhea.

## 2023-12-30 NOTE — PLAN OF CARE
Goal Outcome Evaluation:            No new concerns. Wife at bedside. Insulin drip started. Call light in reach.

## 2023-12-30 NOTE — H&P
[facility] Medicine Services  History & Physical    Patient Name: Mitchell Jacome  : 1965  MRN: [id number]  Primary Care Physician:  Krystyna Hardwick DO  Date of admission: 2023  Date and Time of Service: 2023 at 2139    Subjective      Chief Complaint: abdominal pain    History of Present Illness: Mitchell Jacome is a 58 y.o. male with a CMH of DMT2, chornic pancreatitis, HTN, HLD, GERD who presented to [facility] on 2023 with abdominal pain.    Patient states that over the last 4 days he has been having progressively worsening abdominal pain, nausea, emesis.  He states he has a history of chronic pancreatitis, and feels like this is likely a flare.  Patient could not identify any precipitating factors.  ED found that the patient had a glucose of 622 and an elevated lipase.      Review of Systems   Constitutional:  Negative for chills and fever.   HENT:  Negative for congestion, rhinorrhea and sore throat.    Eyes:  Negative for visual disturbance.   Respiratory:  Negative for chest tightness and shortness of breath.    Cardiovascular:  Negative for chest pain and palpitations.   Gastrointestinal:  Positive for abdominal pain, nausea and vomiting. Negative for diarrhea.   Endocrine: Negative for polyuria.   Genitourinary:  Negative for difficulty urinating and dysuria.   Musculoskeletal:  Negative for back pain and myalgias.   Skin:  Negative for rash and wound.   Neurological:  Negative for dizziness, weakness and numbness.   Hematological:  Does not bruise/bleed easily.   Psychiatric/Behavioral:  Negative for agitation, behavioral problems and confusion.        Personal History     Past Medical History:   Diagnosis Date    Chronic recurrent pancreatitis     COVID-19     Depression     DMII     GERD     Hearing loss, right     85%    HTN     PSA     = 0.775/ =0.747    Seizures     Solitary kidney        Past Surgical History:   Procedure Laterality  Date    APPENDECTOMY      CHOLECYSTECTOMY      COLONOSCOPY      NEG = 2016, rech 2026     GSI       Family History: family history includes Cancer (age of onset: 60) in his father; Cerebral aneurysm in his mother; Diabetes in his father; Hyperlipidemia in his brother and father; Hypertension in his brother and father. Otherwise pertinent FHx was reviewed and not pertinent to current issue.    Social History:  reports that he has never smoked. He has never been exposed to tobacco smoke. He has never used smokeless tobacco. He reports that he does not currently use alcohol. He reports current drug use. Drugs: Hydrocodone and Marijuana.    Home Medications:  Prior to Admission Medications       Prescriptions Last Dose Informant Patient Reported? Taking?    atorvastatin (LIPITOR) 20 MG tablet 12/28/2023  No Yes    Take 1 tablet by mouth Daily.    DULoxetine (CYMBALTA) 30 MG capsule 12/29/2023  No Yes    Take 1 capsule by mouth Daily.    fenofibrate 160 MG tablet 12/29/2023  No Yes    Take 1 tablet by mouth Daily.    HYDROcodone-acetaminophen (Norco) 7.5-325 MG per tablet 12/29/2023  No Yes    Take 1 tablet by mouth Every 8 (Eight) Hours As Needed for Severe Pain.    Insulin Aspart (novoLOG) 100 UNIT/ML injection 12/29/2023  No Yes    Sliding scale QAC------BS<130, No insulin; BS= 131-150, give 3units; BS= 151-200, give 6units; BS= 201-250, give 9 units; 251-300, give 12 units    insulin degludec (Tresiba FlexTouch) 100 UNIT/ML solution pen-injector injection 12/28/2023  No Yes    Inject 20 Units under the skin into the appropriate area as directed Daily.    levETIRAcetam (KEPPRA) 500 MG tablet 12/29/2023  Yes Yes    Take 1 tablet by mouth 2 (Two) Times a Day.    multivitamin with minerals tablet tablet   Yes Yes    Take 1 tablet by mouth Daily.    omeprazole (priLOSEC) 40 MG capsule 12/29/2023  No Yes    Take 1 capsule by mouth Daily.    pancrelipase, Lip-Prot-Amyl, (CREON) 65627-95851 units capsule delayed-release  particles capsule 12/29/2023  No Yes    3 tabs po TID W/ meals    Glucagon, rDNA, (Glucagon Emergency) 1 MG kit   No No    Inject 1 kit as directed As Needed (for low BS).              Allergies:  Allergies   Allergen Reactions    Basaglar Kwikpen [Insulin Glargine] Other (See Comments)     Injection bruising    Gemfibrozil Rash       Objective      Vitals:   Temp:  [98.3 °F (36.8 °C)-98.7 °F (37.1 °C)] 98.7 °F (37.1 °C)  Heart Rate:  [] 103  Resp:  [13-20] 16  BP: (138-184)/() 158/98  Body mass index is 21.4 kg/m².  Physical Exam  Constitutional:       General: He is not in acute distress.  HENT:      Head: Normocephalic and atraumatic.      Right Ear: External ear normal.      Left Ear: External ear normal.   Cardiovascular:      Rate and Rhythm: Normal rate and regular rhythm.      Heart sounds: Normal heart sounds.   Pulmonary:      Effort: Pulmonary effort is normal. No respiratory distress.      Breath sounds: Normal breath sounds.   Abdominal:      General: Bowel sounds are normal.      Palpations: Abdomen is soft.      Tenderness: There is abdominal tenderness.   Musculoskeletal:      Right lower leg: No edema.      Left lower leg: No edema.   Skin:     General: Skin is warm and dry.   Neurological:      Mental Status: He is alert.      Comments: Moving all extremities   Psychiatric:         Mood and Affect: Mood normal.         Behavior: Behavior normal.         Diagnostic Data:  Lab Results (last 24 hours)       Procedure Component Value Units Date/Time    STAT Lactic Acid, Reflex [[id number]]  (Abnormal) Collected: 12/30/23 0115    Specimen: Blood Updated: 12/30/23 0258     Lactate 3.5 mmol/L     POC Glucose Once [[id number]]  (Abnormal) Collected: 12/30/23 0256    Specimen: Blood Updated: 12/30/23 0258     Glucose 112 mg/dL      Comment: Serial Number: [id number]:  [id number]       Comprehensive Metabolic Panel [[id number]]  (Abnormal) Collected: 12/30/23 0115    Specimen: Blood Updated:  12/30/23 0241     Glucose 186 mg/dL      BUN 31 mg/dL      Creatinine 1.68 mg/dL      Sodium 130 mmol/L      Potassium 3.8 mmol/L      Chloride 84 mmol/L      CO2 31.0 mmol/L      Calcium 11.6 mg/dL      Total Protein 7.6 g/dL      Albumin 4.0 g/dL      ALT (SGPT) 25 U/L      AST (SGOT) 70 U/L      Alkaline Phosphatase 83 U/L      Total Bilirubin 0.8 mg/dL      Globulin 3.6 gm/dL      A/G Ratio 1.1 g/dL      BUN/Creatinine Ratio 18.5     Anion Gap 15.0 mmol/L      eGFR 46.8 mL/min/1.73     Narrative:      GFR Normal >60  Chronic Kidney Disease <60  Kidney Failure <15      Lipase [[id number]]  (Abnormal) Collected: 12/30/23 0115    Specimen: Blood Updated: 12/30/23 0239     Lipase 285 U/L     CBC Auto Differential [[id number]]  (Abnormal) Collected: 12/30/23 0115    Specimen: Blood Updated: 12/30/23 0206     WBC 10.80 10*3/mm3      RBC 4.38 10*6/mm3      Hemoglobin 14.5 g/dL      Hematocrit 41.8 %      MCV 95.4 fL      MCH 33.0 pg      MCHC 34.6 g/dL      RDW 12.8 %      RDW-SD 42.0 fl      MPV 8.3 fL      Platelets 287 10*3/mm3      Neutrophil % 84.3 %      Lymphocyte % 10.0 %      Monocyte % 5.3 %      Eosinophil % 0.2 %      Basophil % 0.2 %      Neutrophils, Absolute 9.10 10*3/mm3      Lymphocytes, Absolute 1.10 10*3/mm3      Monocytes, Absolute 0.60 10*3/mm3      Eosinophils, Absolute 0.00 10*3/mm3      Basophils, Absolute 0.00 10*3/mm3      nRBC 0.0 /100 WBC     POC Glucose Once [[id number]]  (Abnormal) Collected: 12/30/23 0143    Specimen: Blood Updated: 12/30/23 0144     Glucose 129 mg/dL      Comment: Serial Number: [id number]:  [id number]       POC Glucose Once [[id number]]  (Abnormal) Collected: 12/30/23 0044    Specimen: Blood Updated: 12/30/23 0045     Glucose 304 mg/dL      Comment: Serial Number: [id number]:  [id number]       POC Glucose Once [[id number]]  (Abnormal) Collected: 12/29/23 2321    Specimen: Blood Updated: 12/29/23 2322     Glucose 351 mg/dL      Comment: Serial Number:  [id number]:  [id number]       Blood Culture - Blood, Arm, Left [[id number]] Collected: 12/29/23 2055    Specimen: Blood from Arm, Left Updated: 12/29/23 2101    POC Lactate [[id number]]  (Abnormal) Collected: 12/29/23 2025    Specimen: Blood Updated: 12/29/23 2027     Lactate 2.7 mmol/L      Comment: Serial Number: [id number]:  [id number]       Blood Culture - Blood, Arm, Left [[id number]] Collected: 12/29/23 2019    Specimen: Blood from Arm, Left Updated: 12/29/23 2024    POC Glucose Once [[id number]]  (Abnormal) Collected: 12/29/23 2015    Specimen: Blood Updated: 12/29/23 2018     Glucose 372 mg/dL      Comment: Serial Number: [id number]:  [id number]       Ketone Bodies, Serum (Not performed at [place]) [[id number]]  (Abnormal) Collected: 12/29/23 1815    Specimen: Blood Updated: 12/29/23 1921    Narrative:      The following orders were created for panel order Ketone Bodies, Serum (Not performed at [place]).  Procedure                               Abnormality         Status                     ---------                               -----------         ------                     Acetone[[id number]]                      Abnormal            Final result                 Please view results for these tests on the individual orders.    Acetone [[id number]]  (Abnormal) Collected: 12/29/23 1815    Specimen: Blood Updated: 12/29/23 1921     Acetone Small    [place] Draw [[id number]] Collected: 12/29/23 1815    Specimen: Blood Updated: 12/29/23 1916    Narrative:      The following orders were created for panel order [place] Draw.  Procedure                               Abnormality         Status                     ---------                               -----------         ------                     Green Top (Gel)[[id number]]                                  Final result               Lavender Top[[id number]]                                     Final result               Gold Top - SST[[id number]]                                    Final result               Light Blue Top[[id number]]                                   Final result                 Please view results for these tests on the individual orders.    Lavender Top [[id number]] Collected: 12/29/23 1815    Specimen: Blood Updated: 12/29/23 1916     Extra Tube hold for add-on     Comment: Auto resulted       Gold Top - SST [[id number]] Collected: 12/29/23 1815    Specimen: Blood Updated: 12/29/23 1916     Extra Tube Hold for add-ons.     Comment: Auto resulted.       Light Blue Top [[id number]] Collected: 12/29/23 1815    Specimen: Blood Updated: 12/29/23 1916     Extra Tube Hold for add-ons.     Comment: Auto resulted       Green Top (Gel) [[id number]] Collected: 12/29/23 1815    Specimen: Blood Updated: 12/29/23 1915     Extra Tube --    POC Glucose Once [[id number]]  (Abnormal) Collected: 12/29/23 1903    Specimen: Blood Updated: 12/29/23 1905     Glucose 376 mg/dL      Comment: Serial Number: [id number]:  [id number]       Lipase [[id number]]  (Abnormal) Collected: 12/29/23 1815    Specimen: Blood Updated: 12/29/23 1852     Lipase 575 U/L     Comprehensive Metabolic Panel [[id number]]  (Abnormal) Collected: 12/29/23 1815    Specimen: Blood Updated: 12/29/23 1849     Glucose 622 mg/dL      BUN 31 mg/dL      Creatinine 1.93 mg/dL      Sodium 124 mmol/L      Potassium 4.4 mmol/L      Chloride 75 mmol/L      CO2 31.0 mmol/L      Calcium 12.9 mg/dL      Total Protein 8.1 g/dL      Albumin 4.4 g/dL      ALT (SGPT) 14 U/L      AST (SGOT) 25 U/L      Alkaline Phosphatase 79 U/L      Total Bilirubin 0.7 mg/dL      Globulin 3.7 gm/dL      A/G Ratio 1.2 g/dL      BUN/Creatinine Ratio 16.1     Anion Gap 18.0 mmol/L      eGFR 39.6 mL/min/1.73     Narrative:      GFR Normal >60  Chronic Kidney Disease <60  Kidney Failure <15      Magnesium [[id number]]  (Normal) Collected: 12/29/23 1815    Specimen: Blood Updated: 12/29/23 1849     Magnesium 1.8 mg/dL     Phosphorus [[id number]]   (Normal) Collected: 12/29/23 1815    Specimen: Blood Updated: 12/29/23 1846     Phosphorus 4.1 mg/dL     Urinalysis With Microscopic If Indicated (No Culture) - Urine, Clean Catch [[id number]]  (Abnormal) Collected: 12/29/23 1833    Specimen: Urine, Clean Catch Updated: 12/29/23 1843     Color, UA Yellow     Appearance, UA Clear     pH, UA 7.5     Specific Gravity, UA 1.029     Glucose, UA >=1000 mg/dL (3+)     Ketones, UA 15 mg/dL (1+)     Bilirubin, UA Negative     Blood, UA Moderate (2+)     Protein, UA 30 mg/dL (1+)     Leuk Esterase, UA Negative     Nitrite, UA Negative     Urobilinogen, UA 0.2 E.U./dL    Urinalysis, Microscopic Only - Urine, Clean Catch [[id number]] Collected: 12/29/23 1833    Specimen: Urine, Clean Catch Updated: 12/29/23 1843     RBC, UA 0-2 /HPF      WBC, UA 0-2 /HPF      Bacteria, UA None Seen /HPF      Squamous Epithelial Cells, UA 0-2 /HPF      Hyaline Casts, UA None Seen /LPF      Methodology Automated Microscopy    CBC & Differential [[id number]]  (Abnormal) Collected: 12/29/23 1815    Specimen: Blood Updated: 12/29/23 1832    Narrative:      The following orders were created for panel order CBC & Differential.  Procedure                               Abnormality         Status                     ---------                               -----------         ------                     CBC Auto Differential[[id number]]        Abnormal            Final result                 Please view results for these tests on the individual orders.    CBC Auto Differential [[id number]]  (Abnormal) Collected: 12/29/23 1815    Specimen: Blood Updated: 12/29/23 1832     WBC 12.20 10*3/mm3      RBC 4.59 10*6/mm3      Hemoglobin 15.0 g/dL      Hematocrit 44.4 %      MCV 96.6 fL      MCH 32.7 pg      MCHC 33.9 g/dL      RDW 12.5 %      RDW-SD 41.6 fl      MPV 8.3 fL      Platelets 301 10*3/mm3      Neutrophil % 87.4 %      Lymphocyte % 5.5 %      Monocyte % 6.8 %      Eosinophil % 0.0 %      Basophil % 0.3 %       Neutrophils, Absolute 10.60 10*3/mm3      Lymphocytes, Absolute 0.70 10*3/mm3      Monocytes, Absolute 0.80 10*3/mm3      Eosinophils, Absolute 0.00 10*3/mm3      Basophils, Absolute 0.00 10*3/mm3      nRBC 0.0 /100 WBC     POC Glucose Once [[id number]]  (Abnormal) Collected: 12/29/23 1814    Specimen: Blood Updated: 12/29/23 1816     Glucose 595 mg/dL      Comment: Serial Number: [id number]:  [id number]       Blood Gas, Arterial - [[id number]]  (Abnormal) Collected: 12/29/23 1752    Specimen: Arterial Blood Updated: 12/29/23 1756     Site Left Radial     Matt's Test Positive     pH, Arterial 7.572 pH units      pCO2, Arterial 35.1 mm Hg      pO2, Arterial 52.7 mm Hg      HCO3, Arterial 32.3 mmol/L      Base Excess, Arterial 9.9 mmol/L      Comment: Serial Number: [id number]:  [id number]        O2 Saturation, Arterial 91.5 %      CO2 Content 33.4 mmol/L      Barometric Pressure for Blood Gas --     Comment: N/A        Modality Room Air     FIO2 21 %      Hemodilution No    POC Glucose Once [[id number]]  (Abnormal) Collected: 12/29/23 1706    Specimen: Blood Updated: 12/29/23 1709     Glucose >600 mg/dL      Comment: Serial Number: [id number]:  [id number]                Imaging Results (Last 24 Hours)       Procedure Component Value Units Date/Time    XR Chest 1 View [[id number]] Collected: 12/29/23 2047     Updated: 12/29/23 2049    Narrative:      XR CHEST 1 VW    Date of Exam: 12/29/2023 8:23 PM EST    Indication: tachycardia    Comparison: 2/20/2021    Findings:  The cardiomediastinal silhouette is within normal limits. Lungs are clear. No focal consolidation, pneumothorax, or significant pleural effusion. Osseous structures grossly intact.      Impression:      Impression:  No acute process.            Electronically Signed: Vikas Bertrand MD    12/29/2023 8:47 PM EST    Workstation ID: [id number]    CT Abdomen Pelvis With Contrast [[id number]] Collected: 12/29/23 2021     Updated: 12/29/23 2040     Narrative:      CT ABDOMEN PELVIS W CONTRAST    Date of Exam: 12/29/2023 7:20 PM EST    Indication: upper abd pain, n/v.    Comparison: CT abdomen and pelvis 5/2/2023    Technique: Axial CT images were obtained of the abdomen and pelvis following the uneventful intravenous administration of iodinated contrast. Sagittal and coronal reconstructions were performed.  Automated exposure control and iterative reconstruction   methods were used.      Findings:  Lung bases are without consolidation. Heart size normal. Negative for pericardial or pleural effusion. Mild wall thickening of the visualized distal esophagus.    The liver and spleen are normal in size and contour. Normal adrenal glands. Gallbladder absent. Dilatation of the common bile duct similar to prior study. There is inflammatory stranding surrounding the pancreas consistent with acute interstitial   pancreatitis. There are areas of chronic pancreatic ductal dilatation and irregularity related to sequela of chronic pancreatitis which are similar to the prior study for example the main pancreatic duct is dilated up to 8 mm at the pancreatic neck   (3/45). No organized peripancreatic fluid collection. Inflammation surrounding the pancreas results in new narrowing of the splenic vein without complete thrombosis (3/49). Mild narrowing of the portal vein at the portal splenic confluence. The portal   vein and SMV are patent.    Right kidney absent. Normal left renal enhancement. No hydronephrosis on the left. Urinary bladder thin-walled. Negative for pneumoperitoneum. No bowel obstruction. No findings of appendicitis.    Inflammatory stranding surrounding the second and third portion of the duodenum related to pancreatitis. The celiac axis and superior mesenteric artery are patent. Abdominal aortic branch vasculature patent. No organized or drainable fluid collection in   the abdomen or pelvis. Fluid-filled distention of the stomach. 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  No aggressive osseous lesion or acute fracture.      Impression:      Impression:  1. Acute interstitial pancreatitis. No organized peripancreatic fluid collection.  2. Chronic pancreatic ductal dilatation and irregularity related to sequela of chronic pancreatitis, similar to prior study.  3. New narrowing of splenic vein and portal vein related to pancreatitis. No portal venous or splenic vein thrombus.  4. Fluid-filled distention of stomach may relate to delayed transit secondary to inflammation involving the duodenum.  4. Additional chronic findings above.        Electronically Signed: Vikas Bertrand MD    12/29/2023 8:38 PM EST    Workstation ID: [id number]              Assessment & Plan        This is a 58 y.o. male with:    Active and Resolved Problems  Active Hospital Problems    Diagnosis  POA    **Uncontrolled type 2 diabetes mellitus with hyperglycemia [E11.65]  Yes    Acute on chronic pancreatitis [K85.90, K86.1]  Yes    Seizures [R56.9]  Yes    Dyslipidemia [E78.5]  Yes    Benign essential hypertension [I10]  Yes      Resolved Hospital Problems   No resolved problems to display.     #DMT2 with hyperglycemia  Patient likely had disruption in endogenous insulin production from pancreatitis, leading to this episode of hyperglycemia.  Also, holiday diet likely did not help as well.  - Insulin GGT  - LR @ 150cc/hr  - Transition to 20U glargine QHS and SSI afterwards    #Acute on chronic pancreatitis  Overall, pretty mild, but still present.  - LR @ 150cc/hr as above  - Pain control mild, mod, sev -> monitor for toxicity  - Nausea control  - CLD -> advance to bland    #Seizures  Remission  - Continue home meds    #HTN  #HLD  HDS.  - Continue home medications    DVT prophylaxis:  Medical DVT prophylaxis orders are present.    The patient desires to be as follows:    CODE STATUS:    Level Of Support Discussed With: Patient  Code Status (Patient has no pulse and is not breathing): CPR (Attempt to  Resuscitate)  Medical Interventions (Patient has pulse or is breathing): Full Support        Corina Romeo, who can be contacted at [phone], is the designated person to make medical decisions on the patient's behalf if He is incapable of doing so. This was clarified with patient and/or next of kin on 12/29/2023 during the course of this H&P.    Admission Status:  I believe this patient meets observation status.    Expected Length of Stay: 1-2 nights    PDMP and Medication Dispenses via Sidebar reviewed and consistent with patient reported medications.    I discussed the patient's findings and my recommendations with patient.      Signature:     This document has been electronically signed by Juan Niño MD on December 30, 2023 04:40 [facility] Hospitalist Team

## 2023-12-31 PROBLEM — R73.9 HYPERGLYCEMIA: Status: ACTIVE | Noted: 2023-12-31

## 2023-12-31 LAB
ALBUMIN SERPL-MCNC: 3.2 G/DL (ref 3.5–5.2)
ALBUMIN/GLOB SERPL: 1.1 G/DL
ALP SERPL-CCNC: 65 U/L (ref 39–117)
ALT SERPL W P-5'-P-CCNC: 24 U/L (ref 1–41)
ANION GAP SERPL CALCULATED.3IONS-SCNC: 9 MMOL/L (ref 5–15)
AST SERPL-CCNC: 54 U/L (ref 1–40)
BASOPHILS # BLD AUTO: 0 10*3/MM3 (ref 0–0.2)
BASOPHILS NFR BLD AUTO: 0.2 % (ref 0–1.5)
BILIRUB SERPL-MCNC: 0.4 MG/DL (ref 0–1.2)
BUN SERPL-MCNC: 19 MG/DL (ref 6–20)
BUN/CREAT SERPL: 16.1 (ref 7–25)
CALCIUM SPEC-SCNC: 9.5 MG/DL (ref 8.6–10.5)
CHLORIDE SERPL-SCNC: 90 MMOL/L (ref 98–107)
CO2 SERPL-SCNC: 30 MMOL/L (ref 22–29)
CREAT SERPL-MCNC: 1.18 MG/DL (ref 0.76–1.27)
DEPRECATED RDW RBC AUTO: 43.8 FL (ref 37–54)
EGFRCR SERPLBLD CKD-EPI 2021: 71.5 ML/MIN/1.73
EOSINOPHIL # BLD AUTO: 0.1 10*3/MM3 (ref 0–0.4)
EOSINOPHIL NFR BLD AUTO: 2 % (ref 0.3–6.2)
ERYTHROCYTE [DISTWIDTH] IN BLOOD BY AUTOMATED COUNT: 12.5 % (ref 12.3–15.4)
GLOBULIN UR ELPH-MCNC: 2.9 GM/DL
GLUCOSE BLDC GLUCOMTR-MCNC: 158 MG/DL (ref 70–105)
GLUCOSE BLDC GLUCOMTR-MCNC: 158 MG/DL (ref 70–105)
GLUCOSE BLDC GLUCOMTR-MCNC: 167 MG/DL (ref 70–105)
GLUCOSE BLDC GLUCOMTR-MCNC: 173 MG/DL (ref 70–105)
GLUCOSE BLDC GLUCOMTR-MCNC: 216 MG/DL (ref 70–105)
GLUCOSE SERPL-MCNC: 111 MG/DL (ref 65–99)
HBA1C MFR BLD: 8.5 % (ref 4.8–5.6)
HCT VFR BLD AUTO: 34.5 % (ref 37.5–51)
HGB BLD-MCNC: 11.9 G/DL (ref 13–17.7)
LIPASE SERPL-CCNC: 52 U/L (ref 13–60)
LYMPHOCYTES # BLD AUTO: 1.3 10*3/MM3 (ref 0.7–3.1)
LYMPHOCYTES NFR BLD AUTO: 28.7 % (ref 19.6–45.3)
MCH RBC QN AUTO: 32.8 PG (ref 26.6–33)
MCHC RBC AUTO-ENTMCNC: 34.7 G/DL (ref 31.5–35.7)
MCV RBC AUTO: 94.6 FL (ref 79–97)
MONOCYTES # BLD AUTO: 0.4 10*3/MM3 (ref 0.1–0.9)
MONOCYTES NFR BLD AUTO: 8.9 % (ref 5–12)
NEUTROPHILS NFR BLD AUTO: 2.6 10*3/MM3 (ref 1.7–7)
NEUTROPHILS NFR BLD AUTO: 60.2 % (ref 42.7–76)
NRBC BLD AUTO-RTO: 0.1 /100 WBC (ref 0–0.2)
PLATELET # BLD AUTO: 215 10*3/MM3 (ref 140–450)
PMV BLD AUTO: 7.9 FL (ref 6–12)
POTASSIUM SERPL-SCNC: 3.7 MMOL/L (ref 3.5–5.2)
PROT SERPL-MCNC: 6.1 G/DL (ref 6–8.5)
RBC # BLD AUTO: 3.64 10*6/MM3 (ref 4.14–5.8)
SODIUM SERPL-SCNC: 129 MMOL/L (ref 136–145)
WBC NRBC COR # BLD AUTO: 4.4 10*3/MM3 (ref 3.4–10.8)

## 2023-12-31 PROCEDURE — 25010000002 ENOXAPARIN PER 10 MG: Performed by: STUDENT IN AN ORGANIZED HEALTH CARE EDUCATION/TRAINING PROGRAM

## 2023-12-31 PROCEDURE — 25810000003 LACTATED RINGERS PER 1000 ML: Performed by: STUDENT IN AN ORGANIZED HEALTH CARE EDUCATION/TRAINING PROGRAM

## 2023-12-31 PROCEDURE — 85025 COMPLETE CBC W/AUTO DIFF WBC: CPT | Performed by: STUDENT IN AN ORGANIZED HEALTH CARE EDUCATION/TRAINING PROGRAM

## 2023-12-31 PROCEDURE — 83036 HEMOGLOBIN GLYCOSYLATED A1C: CPT | Performed by: INTERNAL MEDICINE

## 2023-12-31 PROCEDURE — 36415 COLL VENOUS BLD VENIPUNCTURE: CPT | Performed by: STUDENT IN AN ORGANIZED HEALTH CARE EDUCATION/TRAINING PROGRAM

## 2023-12-31 PROCEDURE — 63710000001 INSULIN GLARGINE PER 5 UNITS: Performed by: STUDENT IN AN ORGANIZED HEALTH CARE EDUCATION/TRAINING PROGRAM

## 2023-12-31 PROCEDURE — 99222 1ST HOSP IP/OBS MODERATE 55: CPT | Performed by: INTERNAL MEDICINE

## 2023-12-31 PROCEDURE — 80053 COMPREHEN METABOLIC PANEL: CPT | Performed by: STUDENT IN AN ORGANIZED HEALTH CARE EDUCATION/TRAINING PROGRAM

## 2023-12-31 PROCEDURE — 82948 REAGENT STRIP/BLOOD GLUCOSE: CPT

## 2023-12-31 PROCEDURE — 83690 ASSAY OF LIPASE: CPT | Performed by: STUDENT IN AN ORGANIZED HEALTH CARE EDUCATION/TRAINING PROGRAM

## 2023-12-31 PROCEDURE — 63710000001 INSULIN LISPRO (HUMAN) PER 5 UNITS: Performed by: HOSPITALIST

## 2023-12-31 PROCEDURE — 25010000002 HYDROMORPHONE 1 MG/ML SOLUTION: Performed by: STUDENT IN AN ORGANIZED HEALTH CARE EDUCATION/TRAINING PROGRAM

## 2023-12-31 RX ADMIN — FENOFIBRATE 145 MG: 145 TABLET ORAL at 08:33

## 2023-12-31 RX ADMIN — SODIUM CHLORIDE, POTASSIUM CHLORIDE, SODIUM LACTATE AND CALCIUM CHLORIDE 150 ML/HR: 600; 310; 30; 20 INJECTION, SOLUTION INTRAVENOUS at 00:56

## 2023-12-31 RX ADMIN — PANCRELIPASE 72000 UNITS OF LIPASE: 36000; 180000; 114000 CAPSULE, DELAYED RELEASE PELLETS ORAL at 08:33

## 2023-12-31 RX ADMIN — PANTOPRAZOLE SODIUM 40 MG: 40 TABLET, DELAYED RELEASE ORAL at 07:21

## 2023-12-31 RX ADMIN — LEVETIRACETAM 500 MG: 500 TABLET, FILM COATED ORAL at 21:42

## 2023-12-31 RX ADMIN — HYDROMORPHONE HYDROCHLORIDE 1 MG: 1 INJECTION, SOLUTION INTRAMUSCULAR; INTRAVENOUS; SUBCUTANEOUS at 21:42

## 2023-12-31 RX ADMIN — HYDROMORPHONE HYDROCHLORIDE 1 MG: 1 INJECTION, SOLUTION INTRAMUSCULAR; INTRAVENOUS; SUBCUTANEOUS at 00:56

## 2023-12-31 RX ADMIN — INSULIN GLARGINE 20 UNITS: 100 INJECTION, SOLUTION SUBCUTANEOUS at 08:32

## 2023-12-31 RX ADMIN — PANCRELIPASE 72000 UNITS OF LIPASE: 36000; 180000; 114000 CAPSULE, DELAYED RELEASE PELLETS ORAL at 19:31

## 2023-12-31 RX ADMIN — INSULIN LISPRO 2 UNITS: 100 INJECTION, SOLUTION INTRAVENOUS; SUBCUTANEOUS at 08:32

## 2023-12-31 RX ADMIN — INSULIN LISPRO 3 UNITS: 100 INJECTION, SOLUTION INTRAVENOUS; SUBCUTANEOUS at 21:42

## 2023-12-31 RX ADMIN — Medication 10 ML: at 08:35

## 2023-12-31 RX ADMIN — SENNOSIDES AND DOCUSATE SODIUM 2 TABLET: 50; 8.6 TABLET ORAL at 08:33

## 2023-12-31 RX ADMIN — DULOXETINE HYDROCHLORIDE 30 MG: 30 CAPSULE, DELAYED RELEASE ORAL at 08:33

## 2023-12-31 RX ADMIN — Medication 10 ML: at 21:00

## 2023-12-31 RX ADMIN — ENOXAPARIN SODIUM 40 MG: 100 INJECTION SUBCUTANEOUS at 18:03

## 2023-12-31 RX ADMIN — HYDROMORPHONE HYDROCHLORIDE 1 MG: 1 INJECTION, SOLUTION INTRAMUSCULAR; INTRAVENOUS; SUBCUTANEOUS at 19:31

## 2023-12-31 RX ADMIN — ATORVASTATIN CALCIUM 20 MG: 20 TABLET, FILM COATED ORAL at 08:33

## 2023-12-31 RX ADMIN — INSULIN LISPRO 2 UNITS: 100 INJECTION, SOLUTION INTRAVENOUS; SUBCUTANEOUS at 12:52

## 2023-12-31 RX ADMIN — INSULIN LISPRO 2 UNITS: 100 INJECTION, SOLUTION INTRAVENOUS; SUBCUTANEOUS at 18:03

## 2023-12-31 RX ADMIN — LEVETIRACETAM 500 MG: 500 TABLET, FILM COATED ORAL at 08:33

## 2023-12-31 RX ADMIN — Medication 10 ML: at 21:43

## 2023-12-31 RX ADMIN — SODIUM CHLORIDE, POTASSIUM CHLORIDE, SODIUM LACTATE AND CALCIUM CHLORIDE 150 ML/HR: 600; 310; 30; 20 INJECTION, SOLUTION INTRAVENOUS at 19:31

## 2023-12-31 RX ADMIN — PANCRELIPASE 72000 UNITS OF LIPASE: 36000; 180000; 114000 CAPSULE, DELAYED RELEASE PELLETS ORAL at 12:52

## 2023-12-31 NOTE — CONSULTS
ENDOCRINE INITIAL CONSULT NOTE  DATE OF SERVICE: 23  Patient Care Team:  Krystyna Hardwick DO as PCP - General        PATIENT NAME: Mitchell Jacome  PATIENT : 1965 AGE: 58 y.o.  MRN NUMBER: [id number]  PRIMARY CARE: Krystyna Hardwick DO    ==========================================================================    REASON FOR CONSULT: Diabetes management    HPI / SUBJECTIVE  58 y.o. male with history of chronic recurrent pancreatitis presented to the hospital with abdominal pain and currently being managed for acute on chronic pancreatitis.  Endocrine team is consulted for diabetes management.  Patient seen and examined.  Patient reported that he was diagnosed with diabetes around 6 to 8 months ago and currently following PCP.  Patient is currently maintained on insulin Lantus 20 units nightly and checking blood sugar through freestyle dwight at home.  Denies any use of mealtime insulin at home.  No hypoglycemia at home.  Currently only taking clear fluids and tolerating.    ==========================================================================                                                PAST MEDICAL HISTORY    Past Medical History:   Diagnosis Date    Chronic recurrent pancreatitis     COVID-19     Depression     DMII     GERD     Hearing loss, right     85%    HTN     PSA     = 0.775/ =0.747    Seizures     Solitary kidney        ==========================================================================    PAST SURGICAL HISTORY    Past Surgical History:   Procedure Laterality Date    APPENDECTOMY      CHOLECYSTECTOMY      COLONOSCOPY      NEG = , rech      GSI       ==========================================================================    FAMILY HISTORY    Family History   Problem Relation Age of Onset    Cerebral aneurysm Mother     Hypertension Father     Cancer Father 60        Bladder    Diabetes Father     Hyperlipidemia Father     Hypertension Brother      Hyperlipidemia Brother        ==========================================================================    SOCIAL HISTORY    Social History     Socioeconomic History    Marital status:    Tobacco Use    Smoking status: Never     Passive exposure: Never    Smokeless tobacco: Never   Vaping Use    Vaping Use: Never used   Substance and Sexual Activity    Alcohol use: Not Currently     Comment: rare    Drug use: Yes     Types: Hydrocodone, Marijuana    Sexual activity: Yes     Partners: Female       ==========================================================================    HOME MEDICATIONS REPORTED ON ADMISSION      Current Facility-Administered Medications:     acetaminophen (TYLENOL) tablet 650 mg, 650 mg, Oral, Q4H PRN, Juna Niño MD    atorvastatin (LIPITOR) tablet 20 mg, 20 mg, Oral, Daily, Juan Niño MD, 20 mg at 12/31/23 0833    sennosides-docusate (PERICOLACE) 8.6-50 MG per tablet 2 tablet, 2 tablet, Oral, BID, 2 tablet at 12/31/23 0833 **AND** polyethylene glycol (MIRALAX) packet 17 g, 17 g, Oral, Daily PRN **AND** bisacodyl (DULCOLAX) EC tablet 5 mg, 5 mg, Oral, Daily PRN **AND** bisacodyl (DULCOLAX) suppository 10 mg, 10 mg, Rectal, Daily PRN, Juan Niño MD    Calcium Replacement - Follow Nurse / BPA Driven Protocol, , Does not apply, PRN, Juan Niño MD    dextrose (D50W) (25 g/50 mL) IV injection 10-50 mL, 10-50 mL, Intravenous, Q15 Min PRN, Juan Niño MD    dextrose (D50W) (25 g/50 mL) IV injection 25 g, 25 g, Intravenous, Q15 Min PRN, Miles Johnson MD    dextrose (GLUTOSE) oral gel 15 g, 15 g, Oral, Q15 Min PRN, Miles Johnson MD    DULoxetine (CYMBALTA) DR capsule 30 mg, 30 mg, Oral, Daily, Juan Niño MD, 30 mg at 12/31/23 0833    Enoxaparin Sodium (LOVENOX) syringe 40 mg, 40 mg, Subcutaneous, Daily, Juan Niño MD, 40 mg at 12/30/23 1805    fenofibrate (TRICOR) tablet 145 mg, 145 mg, Oral, Daily, Juan Niño MD, 145 mg at 12/31/23 0833    glucagon (GLUCAGEN) injection 1 mg, 1 mg,  Intramuscular, Q15 Min PRN, Miles Johnson MD    HYDROmorphone (DILAUDID) injection 1 mg, 1 mg, Intravenous, Q2H PRN, Juan Niño MD, 1 mg at 12/31/23 0056    insulin glargine (LANTUS, SEMGLEE) injection 20 Units, 20 Units, Subcutaneous, Nightly, Juan Niño MD, 20 Units at 12/31/23 0832    insulin lispro (HUMALOG/ADMELOG) injection 2-7 Units, 2-7 Units, Subcutaneous, 4x Daily AC & at Bedtime, Miles Johnson MD, 2 Units at 12/31/23 1252    lactated ringers infusion, 150 mL/hr, Intravenous, Continuous, Juan Niño MD, Last Rate: 150 mL/hr at 12/31/23 0721, 150 mL/hr at 12/31/23 0721    levETIRAcetam (KEPPRA) tablet 500 mg, 500 mg, Oral, BID, Juan Niño MD, 500 mg at 12/31/23 0833    Magnesium Standard Dose Replacement - Follow Nurse / BPA Driven Protocol, , Does not apply, PRN, Juan Niño MD    ondansetron ODT (ZOFRAN-ODT) disintegrating tablet 4 mg, 4 mg, Oral, Q6H PRN **OR** ondansetron (ZOFRAN) injection 4 mg, 4 mg, Intravenous, Q6H PRN, Juan Niño MD, 4 mg at 12/30/23 0354    oxyCODONE (ROXICODONE) immediate release tablet 7.5 mg, 7.5 mg, Oral, Q4H PRN, Juan Niño MD    Pancrelipase (Lip-Prot-Amyl) (CREON) capsule 72,000 units of lipase, 72,000 units of lipase, Oral, TID With Meals, Juan Niño MD, 72,000 units of lipase at 12/31/23 1252    pantoprazole (PROTONIX) EC tablet 40 mg, 40 mg, Oral, Q AM, Juan Niño MD, 40 mg at 12/31/23 0721    Phosphorus Replacement - Follow Nurse / BPA Driven Protocol, , Does not apply, PRNRenay Stuart, MD    Potassium Replacement - Follow Nurse / BPA Driven Protocol, , Does not apply, PRN, Juan Niño MD    sodium chloride 0.9 % flush 10 mL, 10 mL, Intravenous, Q12H, Juan Niño MD, 10 mL at 12/31/23 0835    sodium chloride 0.9 % flush 10 mL, 10 mL, Intravenous, PRN, Juan Niño MD    sodium chloride 0.9 % flush 10 mL, 10 mL, Intravenous, Q12H, Juan Niño MD, 10 mL at 12/30/23 2120    sodium chloride 0.9 % flush 10 mL, 10 mL, Intravenous, PRN, Juan Niño MD    sodium  chloride 0.9 % infusion 40 mL, 40 mL, Intravenous, PRN, Juan Niño MD    sodium chloride 0.9 % infusion 40 mL, 40 mL, Intravenous, PRN, Juan Niño MD    temazepam (RESTORIL) capsule 15 mg, 15 mg, Oral, Nightly PRN, Juan Niño MD    ==========================================================================    ALLERGIES    Allergies   Allergen Reactions    Basaglar Kwikpen [Insulin Glargine] Other (See Comments)     Injection bruising    Gemfibrozil Rash       ==========================================================================    ACTIVE HOSPITAL MEDICATIONS    Scheduled Medications:  atorvastatin, 20 mg, Oral, Daily  DULoxetine, 30 mg, Oral, Daily  enoxaparin, 40 mg, Subcutaneous, Daily  fenofibrate, 145 mg, Oral, Daily  insulin glargine, 20 Units, Subcutaneous, Nightly  insulin lispro, 2-7 Units, Subcutaneous, 4x Daily AC & at Bedtime  levETIRAcetam, 500 mg, Oral, BID  Pancrelipase (Lip-Prot-Amyl), 72,000 units of lipase, Oral, TID With Meals  pantoprazole, 40 mg, Oral, Q AM  senna-docusate sodium, 2 tablet, Oral, BID  sodium chloride, 10 mL, Intravenous, Q12H  sodium chloride, 10 mL, Intravenous, Q12H         PRN Medications:    acetaminophen    senna-docusate sodium **AND** polyethylene glycol **AND** bisacodyl **AND** bisacodyl    Calcium Replacement - Follow Nurse / BPA Driven Protocol    dextrose    dextrose    dextrose    glucagon (human recombinant)    HYDROmorphone    Magnesium Standard Dose Replacement - Follow Nurse / BPA Driven Protocol    ondansetron ODT **OR** ondansetron    oxyCODONE    Phosphorus Replacement - Follow Nurse / BPA Driven Protocol    Potassium Replacement - Follow Nurse / BPA Driven Protocol    sodium chloride    sodium chloride    sodium chloride    sodium chloride    temazepam     ==========================================================================    OBJECTIVE    Vitals:    12/31/23 1300   BP: 130/72   Pulse: 90   Resp: 18   Temp: 97.9 °F (36.6 °C)   SpO2: 97%      Body  mass index is 21.4 kg/m².     General: Alert, cooperative, no acute distress    ==========================================================================    LABORATORY DATA    Lab Results   Component Value Date    GLUCOSE 111 (H) 12/31/2023    BUN 19 12/31/2023    CREATININE 1.18 12/31/2023    EGFRIFNONA 56 (L) 06/11/2021    EGFRIFAFRI 51 (L) 10/19/2018    BCR 16.1 12/31/2023    K 3.7 12/31/2023    CO2 30.0 (H) 12/31/2023    CALCIUM 9.5 12/31/2023    ALBUMIN 3.2 (L) 12/31/2023    LABIL2 1.6 (calc) 10/19/2018    AST 54 (H) 12/31/2023    ALT 24 12/31/2023       Lab Results   Component Value Date    HGBA1C 8.50 (H) 12/31/2023    HGBA1C 7.8 08/17/2023    HGBA1C 9.00 (H) 05/03/2023     Lab Results   Component Value Date    CREATININE 1.18 12/31/2023     Results from last 7 days   Lab Units 12/31/23  1629 12/31/23  1122 12/31/23  0734 12/31/23  0450 12/30/23  2226 12/30/23  1742   GLUCOSE mg/dL 158* 167* 173* 158* 118* 203*     ==========================================================================    ASSESSMENT AND PLAN    # Type 1 diabetes mellitus with hyperglycemia  - Currently will treat patient has type 1 diabetes though most likely you have type IIIc diabetes which is secondary to recurrent chronic pancreatitis which is treated as type 1 diabetes  - Patient will benefit from outpatient endocrinology evaluation  - Currently taking clear liquid diet  - Blood sugars within acceptable limit, target is to maintain blood sugar between 140 and 180  - Given very limited oral intake will continue basal insulin with mealtime coverage only as correction scale  - Continue insulin therapy as follows:  Insulin Lantus 20 units nightly  Insulin lispro as correction scale 2 to 7 units  - Will review blood sugar for next 24 hours and further therapy accordingly  - Patient may need mealtime scheduled insulin once he started tolerating more food    # Hyperlipidemia, statin therapy  # Acute on chronic pancreatitis, history of  "alcohol abuse now currently sober    Thank you for courtesy of consultation.  Will follow with you.  Rest as per primary care.    This note was created using voice recognition software and is inherently subject to errors including those of syntax and \"sound-alike\" substitutions which may escape proofreading.  In such instances, original meaning may be extrapolated by contextual derivation.    =======================================  Jadon Abad MD  Department of Endocrine, Diabetes and Metabolism  [place], [place]  =======================================    "

## 2023-12-31 NOTE — PROGRESS NOTES
[facility] Medicine Services  Daily progress note  Patient Name: Mitchell Jacome  : 1965  MRN: [id number]  Primary Care Physician:  Krystyna Hardwick DO  Date of admission: 2023       Subjective       Chief Complaint: abdominal pain     History of Present Illness: Mitchell Jacome is a 58 y.o. male with a CMH of DMT2, chornic pancreatitis, HTN, HLD, GERD who presented to [facility] on 2023 with abdominal pain.     Patient states that over the last 4 days he has been having progressively worsening abdominal pain, nausea, emesis.  He states he has a history of chronic pancreatitis, and feels like this is likely a flare.  Patient could not identify any precipitating factors.  ED found that the patient had a glucose of 622 and an elevated lipase.        Review of Systems   Constitutional:  Negative for chills and fever.   HENT:  Negative for congestion, rhinorrhea and sore throat.    Eyes:  Negative for visual disturbance.   Respiratory:  Negative for chest tightness and shortness of breath.    Cardiovascular:  Negative for chest pain and palpitations.   Gastrointestinal:  Positive for abdominal pain, nausea and vomiting. Negative for diarrhea.   Endocrine: Negative for polyuria.   Genitourinary:  Negative for difficulty urinating and dysuria.   Musculoskeletal:  Negative for back pain and myalgias.   Skin:  Negative for rash and wound.   Neurological:  Negative for dizziness, weakness and numbness.   Hematological:  Does not bruise/bleed easily.   Psychiatric/Behavioral:  Negative for agitation, behavioral problems and confusion.    Hospital course.  2023; hemodynamically stable, tolerating clear liquid diet but still with abdominal pain, spouse at the bedside     Personal History      Medical History        Past Medical History:   Diagnosis Date    Chronic recurrent pancreatitis      COVID-19      Depression      DMII      GERD      Hearing loss, right        85%    HTN      PSA       2021= 0.775/ 2022=0.747    Seizures      Solitary kidney              Surgical History         Past Surgical History:   Procedure Laterality Date    APPENDECTOMY        CHOLECYSTECTOMY        COLONOSCOPY         NEG = 2016, rech 2026     GSI            Family History: family history includes Cancer (age of onset: 60) in his father; Cerebral aneurysm in his mother; Diabetes in his father; Hyperlipidemia in his brother and father; Hypertension in his brother and father. Otherwise pertinent FHx was reviewed and not pertinent to current issue.     Social History:  reports that he has never smoked. He has never been exposed to tobacco smoke. He has never used smokeless tobacco. He reports that he does not currently use alcohol. He reports current drug use. Drugs: Hydrocodone and Marijuana.     Home Medications:  Prior to Admission Medications         Prescriptions Last Dose Informant Patient Reported? Taking?     atorvastatin (LIPITOR) 20 MG tablet 12/28/2023   No Yes     Take 1 tablet by mouth Daily.     DULoxetine (CYMBALTA) 30 MG capsule 12/29/2023   No Yes     Take 1 capsule by mouth Daily.     fenofibrate 160 MG tablet 12/29/2023   No Yes     Take 1 tablet by mouth Daily.     HYDROcodone-acetaminophen (Norco) 7.5-325 MG per tablet 12/29/2023   No Yes     Take 1 tablet by mouth Every 8 (Eight) Hours As Needed for Severe Pain.     Insulin Aspart (novoLOG) 100 UNIT/ML injection 12/29/2023   No Yes     Sliding scale QAC------BS<130, No insulin; BS= 131-150, give 3units; BS= 151-200, give 6units; BS= 201-250, give 9 units; 251-300, give 12 units     insulin degludec (Tresiba FlexTouch) 100 UNIT/ML solution pen-injector injection 12/28/2023   No Yes     Inject 20 Units under the skin into the appropriate area as directed Daily.     levETIRAcetam (KEPPRA) 500 MG tablet 12/29/2023   Yes Yes     Take 1 tablet by mouth 2 (Two) Times a Day.     multivitamin with minerals tablet tablet     Yes Yes      Take 1 tablet by mouth Daily.     omeprazole (priLOSEC) 40 MG capsule 12/29/2023   No Yes     Take 1 capsule by mouth Daily.     pancrelipase, Lip-Prot-Amyl, (CREON) 66711-05472 units capsule delayed-release particles capsule 12/29/2023   No Yes     3 tabs po TID W/ meals     Glucagon, rDNA, (Glucagon Emergency) 1 MG kit     No No     Inject 1 kit as directed As Needed (for low BS).                   Allergies:        Allergies   Allergen Reactions    Basaglar Kwikpen [Insulin Glargine] Other (See Comments)       Injection bruising    Gemfibrozil Rash         Objective       Vitals:   Temp:  [97.7 °F (36.5 °C)-98.2 °F (36.8 °C)] 98.1 °F (36.7 °C)  Heart Rate:  [] 89  Resp:  [12-27] 27  BP: (140-160)/(83-94) 140/83   Physical Exam  Constitutional:       General: He is not in acute distress.  HENT:      Head: Normocephalic and atraumatic.      Right Ear: External ear normal.      Left Ear: External ear normal.   Cardiovascular:      Rate and Rhythm: Normal rate and regular rhythm.      Heart sounds: Normal heart sounds.   Pulmonary:      Effort: Pulmonary effort is normal. No respiratory distress.      Breath sounds: Normal breath sounds.   Abdominal:      General: Bowel sounds are normal.      Palpations: Abdomen is soft.      Tenderness: There is abdominal tenderness.   Musculoskeletal:      Right lower leg: No edema.      Left lower leg: No edema.   Skin:     General: Skin is warm and dry.   Neurological:      Mental Status: He is alert.      Comments: Moving all extremities   Psychiatric:         Mood and Affect: Mood normal.         Behavior: Behavior normal.            Diagnostic Data:  Lab Results (last 24 hours)         Procedure Component Value Units Date/Time     STAT Lactic Acid, Reflex [[id number]]  (Abnormal) Collected: 12/30/23 0115     Specimen: Blood Updated: 12/30/23 0258       Lactate 3.5 mmol/L       POC Glucose Once [[id number]]  (Abnormal) Collected: 12/30/23 0256     Specimen: Blood Updated:  12/30/23 0258       Glucose 112 mg/dL         Comment: Serial Number: [id number]:  [id number]        Comprehensive Metabolic Panel [[id number]]  (Abnormal) Collected: 12/30/23 0115     Specimen: Blood Updated: 12/30/23 0241       Glucose 186 mg/dL         BUN 31 mg/dL         Creatinine 1.68 mg/dL         Sodium 130 mmol/L         Potassium 3.8 mmol/L         Chloride 84 mmol/L         CO2 31.0 mmol/L         Calcium 11.6 mg/dL         Total Protein 7.6 g/dL         Albumin 4.0 g/dL         ALT (SGPT) 25 U/L         AST (SGOT) 70 U/L         Alkaline Phosphatase 83 U/L         Total Bilirubin 0.8 mg/dL         Globulin 3.6 gm/dL         A/G Ratio 1.1 g/dL         BUN/Creatinine Ratio 18.5       Anion Gap 15.0 mmol/L         eGFR 46.8 mL/min/1.73       Narrative:       GFR Normal >60  Chronic Kidney Disease <60  Kidney Failure <15        Lipase [[id number]]  (Abnormal) Collected: 12/30/23 0115     Specimen: Blood Updated: 12/30/23 0239       Lipase 285 U/L       CBC Auto Differential [[id number]]  (Abnormal) Collected: 12/30/23 0115     Specimen: Blood Updated: 12/30/23 0206       WBC 10.80 10*3/mm3         RBC 4.38 10*6/mm3         Hemoglobin 14.5 g/dL         Hematocrit 41.8 %         MCV 95.4 fL         MCH 33.0 pg         MCHC 34.6 g/dL         RDW 12.8 %         RDW-SD 42.0 fl         MPV 8.3 fL         Platelets 287 10*3/mm3         Neutrophil % 84.3 %         Lymphocyte % 10.0 %         Monocyte % 5.3 %         Eosinophil % 0.2 %         Basophil % 0.2 %         Neutrophils, Absolute 9.10 10*3/mm3         Lymphocytes, Absolute 1.10 10*3/mm3         Monocytes, Absolute 0.60 10*3/mm3         Eosinophils, Absolute 0.00 10*3/mm3         Basophils, Absolute 0.00 10*3/mm3         nRBC 0.0 /100 WBC       POC Glucose Once [[id number]]  (Abnormal) Collected: 12/30/23 0143     Specimen: Blood Updated: 12/30/23 0144       Glucose 129 mg/dL         Comment: Serial Number: [id number]:  [id number]        POC Glucose  Once [[id number]]  (Abnormal) Collected: 12/30/23 0044     Specimen: Blood Updated: 12/30/23 0045       Glucose 304 mg/dL         Comment: Serial Number: [id number]:  [id number]        POC Glucose Once [[id number]]  (Abnormal) Collected: 12/29/23 2321     Specimen: Blood Updated: 12/29/23 2322       Glucose 351 mg/dL         Comment: Serial Number: [id number]:  [id number]        Blood Culture - Blood, Arm, Left [[id number]] Collected: 12/29/23 2055     Specimen: Blood from Arm, Left Updated: 12/29/23 2101     POC Lactate [[id number]]  (Abnormal) Collected: 12/29/23 2025     Specimen: Blood Updated: 12/29/23 2027       Lactate 2.7 mmol/L         Comment: Serial Number: [id number]:  [id number]        Blood Culture - Blood, Arm, Left [[id number]] Collected: 12/29/23 2019     Specimen: Blood from Arm, Left Updated: 12/29/23 2024     POC Glucose Once [[id number]]  (Abnormal) Collected: 12/29/23 2015     Specimen: Blood Updated: 12/29/23 2018       Glucose 372 mg/dL         Comment: Serial Number: [id number]:  [id number]        Ketone Bodies, Serum (Not performed at [facility]) [[id number]]  (Abnormal) Collected: 12/29/23 1815     Specimen: Blood Updated: 12/29/23 1921     Narrative:       The following orders were created for panel order Ketone Bodies, Serum (Not performed at [facility]).  Procedure                               Abnormality         Status                     ---------                               -----------         ------                     Acetone[[id number]]                      Abnormal            Final result                  Please view results for these tests on the individual orders.     Acetone [[id number]]  (Abnormal) Collected: 12/29/23 1815     Specimen: Blood Updated: 12/29/23 1921       Acetone Small     [place] Draw [[id number]] Collected: 12/29/23 1815     Specimen: Blood Updated: 12/29/23 1916     Narrative:       The following orders were created for panel order [place]  Draw.  Procedure                               Abnormality         Status                     ---------                               -----------         ------                     Green Top (Gel)[[id number]]                                  Final result               Lavender Top[[id number]]                                     Final result               Gold Top - SST[[id number]]                                   Final result               Light Blue Top[[id number]]                                   Final result                  Please view results for these tests on the individual orders.     Lavender Top [[id number]] Collected: 12/29/23 1815     Specimen: Blood Updated: 12/29/23 1916       Extra Tube hold for add-on       Comment: Auto resulted        Gold Top - SST [[id number]] Collected: 12/29/23 1815     Specimen: Blood Updated: 12/29/23 1916       Extra Tube Hold for add-ons.       Comment: Auto resulted.        Light Blue Top [[id number]] Collected: 12/29/23 1815     Specimen: Blood Updated: 12/29/23 1916       Extra Tube Hold for add-ons.       Comment: Auto resulted        Green Top (Gel) [[id number]] Collected: 12/29/23 1815     Specimen: Blood Updated: 12/29/23 1915       Extra Tube --     POC Glucose Once [[id number]]  (Abnormal) Collected: 12/29/23 1903     Specimen: Blood Updated: 12/29/23 1905       Glucose 376 mg/dL         Comment: Serial Number: [id number]:  [id number]        Lipase [[id number]]  (Abnormal) Collected: 12/29/23 1815     Specimen: Blood Updated: 12/29/23 1852       Lipase 575 U/L       Comprehensive Metabolic Panel [[id number]]  (Abnormal) Collected: 12/29/23 1815     Specimen: Blood Updated: 12/29/23 1849       Glucose 622 mg/dL         BUN 31 mg/dL         Creatinine 1.93 mg/dL         Sodium 124 mmol/L         Potassium 4.4 mmol/L         Chloride 75 mmol/L         CO2 31.0 mmol/L         Calcium 12.9 mg/dL         Total Protein 8.1 g/dL         Albumin 4.4 g/dL         ALT (SGPT) 14  U/L         AST (SGOT) 25 U/L         Alkaline Phosphatase 79 U/L         Total Bilirubin 0.7 mg/dL         Globulin 3.7 gm/dL         A/G Ratio 1.2 g/dL         BUN/Creatinine Ratio 16.1       Anion Gap 18.0 mmol/L         eGFR 39.6 mL/min/1.73       Narrative:       GFR Normal >60  Chronic Kidney Disease <60  Kidney Failure <15        Magnesium [[id number]]  (Normal) Collected: 12/29/23 1815     Specimen: Blood Updated: 12/29/23 1849       Magnesium 1.8 mg/dL       Phosphorus [[id number]]  (Normal) Collected: 12/29/23 1815     Specimen: Blood Updated: 12/29/23 1846       Phosphorus 4.1 mg/dL       Urinalysis With Microscopic If Indicated (No Culture) - Urine, Clean Catch [[id number]]  (Abnormal) Collected: 12/29/23 1833     Specimen: Urine, Clean Catch Updated: 12/29/23 1843       Color, UA Yellow       Appearance, UA Clear       pH, UA 7.5       Specific Gravity, UA 1.029       Glucose, UA >=1000 mg/dL (3+)       Ketones, UA 15 mg/dL (1+)       Bilirubin, UA Negative       Blood, UA Moderate (2+)       Protein, UA 30 mg/dL (1+)       Leuk Esterase, UA Negative       Nitrite, UA Negative       Urobilinogen, UA 0.2 E.U./dL     Urinalysis, Microscopic Only - Urine, Clean Catch [[id number]] Collected: 12/29/23 1833     Specimen: Urine, Clean Catch Updated: 12/29/23 1843       RBC, UA 0-2 /HPF         WBC, UA 0-2 /HPF         Bacteria, UA None Seen /HPF         Squamous Epithelial Cells, UA 0-2 /HPF         Hyaline Casts, UA None Seen /LPF         Methodology Automated Microscopy     CBC & Differential [[id number]]  (Abnormal) Collected: 12/29/23 1815     Specimen: Blood Updated: 12/29/23 1832     Narrative:       The following orders were created for panel order CBC & Differential.  Procedure                               Abnormality         Status                     ---------                               -----------         ------                     CBC Auto Differential[[id number]]        Abnormal            Final  result                  Please view results for these tests on the individual orders.     CBC Auto Differential [[id number]]  (Abnormal) Collected: 12/29/23 1815     Specimen: Blood Updated: 12/29/23 1832       WBC 12.20 10*3/mm3         RBC 4.59 10*6/mm3         Hemoglobin 15.0 g/dL         Hematocrit 44.4 %         MCV 96.6 fL         MCH 32.7 pg         MCHC 33.9 g/dL         RDW 12.5 %         RDW-SD 41.6 fl         MPV 8.3 fL         Platelets 301 10*3/mm3         Neutrophil % 87.4 %         Lymphocyte % 5.5 %         Monocyte % 6.8 %         Eosinophil % 0.0 %         Basophil % 0.3 %         Neutrophils, Absolute 10.60 10*3/mm3         Lymphocytes, Absolute 0.70 10*3/mm3         Monocytes, Absolute 0.80 10*3/mm3         Eosinophils, Absolute 0.00 10*3/mm3         Basophils, Absolute 0.00 10*3/mm3         nRBC 0.0 /100 WBC       POC Glucose Once [[id number]]  (Abnormal) Collected: 12/29/23 1814     Specimen: Blood Updated: 12/29/23 1816       Glucose 595 mg/dL         Comment: Serial Number: [id number]:  [id number]        Blood Gas, Arterial - [[id number]]  (Abnormal) Collected: 12/29/23 1752     Specimen: Arterial Blood Updated: 12/29/23 1756       Site Left Radial       Matt's Test Positive       pH, Arterial 7.572 pH units         pCO2, Arterial 35.1 mm Hg         pO2, Arterial 52.7 mm Hg         HCO3, Arterial 32.3 mmol/L         Base Excess, Arterial 9.9 mmol/L         Comment: Serial Number: [id number]:  [id number]          O2 Saturation, Arterial 91.5 %         CO2 Content 33.4 mmol/L         Barometric Pressure for Blood Gas --       Comment: N/A          Modality Room Air       FIO2 21 %         Hemodilution No     POC Glucose Once [[id number]]  (Abnormal) Collected: 12/29/23 1706     Specimen: Blood Updated: 12/29/23 1709       Glucose >600 mg/dL         Comment: Serial Number: [id number]:  [id number]                              Imaging Results (Last 24 Hours)         Procedure Component  Value Units Date/Time     XR Chest 1 View [[id number]] Collected: 12/29/23 2047       Updated: 12/29/23 2049     Narrative:       XR CHEST 1 VW     Date of Exam: 12/29/2023 8:23 PM EST     Indication: tachycardia     Comparison: 2/20/2021     Findings:  The cardiomediastinal silhouette is within normal limits. Lungs are clear. No focal consolidation, pneumothorax, or significant pleural effusion. Osseous structures grossly intact.        Impression:       Impression:  No acute process.                 Electronically Signed: Vikas Bertrand MD    12/29/2023 8:47 PM EST    Workstation ID: [id number]     CT Abdomen Pelvis With Contrast [[id number]] Collected: 12/29/23 2021       Updated: 12/29/23 2040     Narrative:       CT ABDOMEN PELVIS W CONTRAST     Date of Exam: 12/29/2023 7:20 PM EST     Indication: upper abd pain, n/v.     Comparison: CT abdomen and pelvis 5/2/2023     Technique: Axial CT images were obtained of the abdomen and pelvis following the uneventful intravenous administration of iodinated contrast. Sagittal and coronal reconstructions were performed.  Automated exposure control and iterative reconstruction   methods were used.        Findings:  Lung bases are without consolidation. Heart size normal. Negative for pericardial or pleural effusion. Mild wall thickening of the visualized distal esophagus.     The liver and spleen are normal in size and contour. Normal adrenal glands. Gallbladder absent. Dilatation of the common bile duct similar to prior study. There is inflammatory stranding surrounding the pancreas consistent with acute interstitial   pancreatitis. There are areas of chronic pancreatic ductal dilatation and irregularity related to sequela of chronic pancreatitis which are similar to the prior study for example the main pancreatic duct is dilated up to 8 mm at the pancreatic neck   (3/45). No organized peripancreatic fluid collection. Inflammation surrounding the pancreas results in new  narrowing of the splenic vein without complete thrombosis (3/49). Mild narrowing of the portal vein at the portal splenic confluence. The portal   vein and SMV are patent.     Right kidney absent. Normal left renal enhancement. No hydronephrosis on the left. Urinary bladder thin-walled. Negative for pneumoperitoneum. No bowel obstruction. No findings of appendicitis.     Inflammatory stranding surrounding the second and third portion of the duodenum related to pancreatitis. The celiac axis and superior mesenteric artery are patent. Abdominal aortic branch vasculature patent. No organized or drainable fluid collection in   the abdomen or pelvis. Fluid-filled distention of the stomach. 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 No aggressive osseous lesion or acute fracture.        Impression:       Impression:  1. Acute interstitial pancreatitis. No organized peripancreatic fluid collection.  2. Chronic pancreatic ductal dilatation and irregularity related to sequela of chronic pancreatitis, similar to prior study.  3. New narrowing of splenic vein and portal vein related to pancreatitis. No portal venous or splenic vein thrombus.  4. Fluid-filled distention of stomach may relate to delayed transit secondary to inflammation involving the duodenum.  4. Additional chronic findings above.           Electronically Signed: Vikas Bertrand MD    12/29/2023 8:38 PM EST    Workstation ID: [id number]                   Assessment & Plan          This is a 58 y.o. male with:     Active and Resolved Problems        Active Hospital Problems     Diagnosis   POA    **Uncontrolled type 2 diabetes mellitus with hyperglycemia [E11.65]   Yes    Acute on chronic pancreatitis [K85.90, K86.1]   Yes    Seizures [R56.9]   Yes    Dyslipidemia [E78.5]   Yes    Benign essential hypertension [I10]   Yes       Resolved Hospital Problems   No resolved problems to display.      #DMT2 with hyperglycemia  Patient likely had disruption in endogenous insulin production  from pancreatitis, leading to this episode of hyperglycemia.  Also, holiday diet likely did not help as well.  - Insulin GGT  - LR @ 150cc/hr  - Transition to 20U glargine QHS and SSI afterwards  --Blood sugar seems to be stable, endocrinology consulted     #Acute on chronic pancreatitis  Overall, pretty mild, but still present.  - LR @ 150cc/hr as above  - Pain control mild, mod, sev -> monitor for toxicity  - Nausea control  - CLD -tolerating still with abdominal pain likely advance in a.m.     #Seizures  Remission  - Continue home meds     #HTN  #HLD  HDS.  - Continue home medications     DVT prophylaxis:  Medical DVT prophylaxis orders are present.     The patient desires to be as follows:     CODE STATUS:    Level Of Support Discussed With: Patient  Code Status (Patient has no pulse and is not breathing): CPR (Attempt to Resuscitate)  Medical Interventions (Patient has pulse or is breathing): Full Support     Disposition; continue with clear liquid diet for 1 more day and transition to soft diet tomorrow, likely discharge in 24 to 48 hours.     Corina Romeo, who can be contacted at [phone], is the designated person to make medical decisions on the patient's behalf if He is incapable of doing so. This was clarified with patient and/or next of kin on 12/29/2023 during the course of this H&P.     Admission Status:  I believe this patient meets observation status.     Expected Length of Stay: 1-2 nights     PDMP and Medication Dispenses via Sidebar reviewed and consistent with patient reported medications.     I discussed the patient's findings and my recommendations with patient.        Signature:   Electronically signed by Miles Johnson MD, 12/31/23, 11:42 AM BETY.       [facility] Hospitalist Team

## 2023-12-31 NOTE — PROGRESS NOTES
[facility] Medicine Services  Daily progress note     Patient Name: Mitchell Jacome  : 1965  MRN: [id number]  Primary Care Physician:  Krystyna Hardwick DO  Date of admission: 2023     Subjective       Chief Complaint: abdominal pain     History of Present Illness: Mitchell Jacome is a 58 y.o. male with a CMH of DMT2, chornic pancreatitis, HTN, HLD, GERD who presented to [facility] on 2023 with abdominal pain.     Patient states that over the last 4 days he has been having progressively worsening abdominal pain, nausea, emesis.  He states he has a history of chronic pancreatitis, and feels like this is likely a flare.  Patient could not identify any precipitating factors.  ED found that the patient had a glucose of 622 and an elevated lipase.     Hospital course;  2023; patient is still endorsing abdominal pain, tolerating sips of clears,.  Seeing the patient, extensive chart notes reviewed, consult endocrinology for uncontrolled hyperglycemia./Diabetes mellitus.  Spoke with the spouse at the bedside.  Review of Systems   Constitutional:  Negative for chills and fever.   HENT:  Negative for congestion, rhinorrhea and sore throat.    Eyes:  Negative for visual disturbance.   Respiratory:  Negative for chest tightness and shortness of breath.    Cardiovascular:  Negative for chest pain and palpitations.   Gastrointestinal:  Positive for abdominal pain, nausea and vomiting. Negative for diarrhea.   Endocrine: Negative for polyuria.   Genitourinary:  Negative for difficulty urinating and dysuria.   Musculoskeletal:  Negative for back pain and myalgias.   Skin:  Negative for rash and wound.   Neurological:  Negative for dizziness, weakness and numbness.   Hematological:  Does not bruise/bleed easily.   Psychiatric/Behavioral:  Negative for agitation, behavioral problems and confusion.          Personal History      Medical History        Past Medical History:    Diagnosis Date    Chronic recurrent pancreatitis      COVID-19      Depression      DMII      GERD      Hearing loss, right       85%    HTN      PSA       2021= 0.775/ 2022=0.747    Seizures      Solitary kidney              Surgical History         Past Surgical History:   Procedure Laterality Date    APPENDECTOMY        CHOLECYSTECTOMY        COLONOSCOPY         NEG = 2016, rech 2026     GSI            Family History: family history includes Cancer (age of onset: 60) in his father; Cerebral aneurysm in his mother; Diabetes in his father; Hyperlipidemia in his brother and father; Hypertension in his brother and father. Otherwise pertinent FHx was reviewed and not pertinent to current issue.     Social History:  reports that he has never smoked. He has never been exposed to tobacco smoke. He has never used smokeless tobacco. He reports that he does not currently use alcohol. He reports current drug use. Drugs: Hydrocodone and Marijuana.     Home Medications:  Prior to Admission Medications         Prescriptions Last Dose Informant Patient Reported? Taking?     atorvastatin (LIPITOR) 20 MG tablet 12/28/2023   No Yes     Take 1 tablet by mouth Daily.     DULoxetine (CYMBALTA) 30 MG capsule 12/29/2023   No Yes     Take 1 capsule by mouth Daily.     fenofibrate 160 MG tablet 12/29/2023   No Yes     Take 1 tablet by mouth Daily.     HYDROcodone-acetaminophen (Norco) 7.5-325 MG per tablet 12/29/2023   No Yes     Take 1 tablet by mouth Every 8 (Eight) Hours As Needed for Severe Pain.     Insulin Aspart (novoLOG) 100 UNIT/ML injection 12/29/2023   No Yes     Sliding scale QAC------BS<130, No insulin; BS= 131-150, give 3units; BS= 151-200, give 6units; BS= 201-250, give 9 units; 251-300, give 12 units     insulin degludec (Tresiba FlexTouch) 100 UNIT/ML solution pen-injector injection 12/28/2023   No Yes     Inject 20 Units under the skin into the appropriate area as directed Daily.     levETIRAcetam (KEPPRA) 500 MG  tablet 12/29/2023   Yes Yes     Take 1 tablet by mouth 2 (Two) Times a Day.     multivitamin with minerals tablet tablet     Yes Yes     Take 1 tablet by mouth Daily.     omeprazole (priLOSEC) 40 MG capsule 12/29/2023   No Yes     Take 1 capsule by mouth Daily.     pancrelipase, Lip-Prot-Amyl, (CREON) 40955-74155 units capsule delayed-release particles capsule 12/29/2023   No Yes     3 tabs po TID W/ meals     Glucagon, rDNA, (Glucagon Emergency) 1 MG kit     No No     Inject 1 kit as directed As Needed (for low BS).                   Allergies:        Allergies   Allergen Reactions    Basaglar Kwikpen [Insulin Glargine] Other (See Comments)       Injection bruising    Gemfibrozil Rash         Objective       Vitals:   Temp:  [98.3 °F (36.8 °C)-98.7 °F (37.1 °C)] 98.7 °F (37.1 °C)  Heart Rate:  [] 103  Resp:  [13-20] 16  BP: (138-184)/() 158/98  Body mass index is 21.4 kg/m².  Physical Exam  Constitutional:       General: He is not in acute distress.  HENT:      Head: Normocephalic and atraumatic.      Right Ear: External ear normal.      Left Ear: External ear normal.   Cardiovascular:      Rate and Rhythm: Normal rate and regular rhythm.      Heart sounds: Normal heart sounds.   Pulmonary:      Effort: Pulmonary effort is normal. No respiratory distress.      Breath sounds: Normal breath sounds.   Abdominal:      General: Bowel sounds are normal.      Palpations: Abdomen is soft.      Tenderness: There is abdominal tenderness.   Musculoskeletal:      Right lower leg: No edema.      Left lower leg: No edema.   Skin:     General: Skin is warm and dry.   Neurological:      Mental Status: He is alert.      Comments: Moving all extremities   Psychiatric:         Mood and Affect: Mood normal.         Behavior: Behavior normal.            Diagnostic Data:  Lab Results (last 24 hours)         Procedure Component Value Units Date/Time     STAT Lactic Acid, Reflex [[id number]]  (Abnormal) Collected: 12/30/23 0115      Specimen: Blood Updated: 12/30/23 0258       Lactate 3.5 mmol/L       POC Glucose Once [[id number]]  (Abnormal) Collected: 12/30/23 0256     Specimen: Blood Updated: 12/30/23 0258       Glucose 112 mg/dL         Comment: Serial Number: [id number]:  [id number]        Comprehensive Metabolic Panel [[id number]]  (Abnormal) Collected: 12/30/23 0115     Specimen: Blood Updated: 12/30/23 0241       Glucose 186 mg/dL         BUN 31 mg/dL         Creatinine 1.68 mg/dL         Sodium 130 mmol/L         Potassium 3.8 mmol/L         Chloride 84 mmol/L         CO2 31.0 mmol/L         Calcium 11.6 mg/dL         Total Protein 7.6 g/dL         Albumin 4.0 g/dL         ALT (SGPT) 25 U/L         AST (SGOT) 70 U/L         Alkaline Phosphatase 83 U/L         Total Bilirubin 0.8 mg/dL         Globulin 3.6 gm/dL         A/G Ratio 1.1 g/dL         BUN/Creatinine Ratio 18.5       Anion Gap 15.0 mmol/L         eGFR 46.8 mL/min/1.73       Narrative:       GFR Normal >60  Chronic Kidney Disease <60  Kidney Failure <15        Lipase [[id number]]  (Abnormal) Collected: 12/30/23 0115     Specimen: Blood Updated: 12/30/23 0239       Lipase 285 U/L       CBC Auto Differential [[id number]]  (Abnormal) Collected: 12/30/23 0115     Specimen: Blood Updated: 12/30/23 0206       WBC 10.80 10*3/mm3         RBC 4.38 10*6/mm3         Hemoglobin 14.5 g/dL         Hematocrit 41.8 %         MCV 95.4 fL         MCH 33.0 pg         MCHC 34.6 g/dL         RDW 12.8 %         RDW-SD 42.0 fl         MPV 8.3 fL         Platelets 287 10*3/mm3         Neutrophil % 84.3 %         Lymphocyte % 10.0 %         Monocyte % 5.3 %         Eosinophil % 0.2 %         Basophil % 0.2 %         Neutrophils, Absolute 9.10 10*3/mm3         Lymphocytes, Absolute 1.10 10*3/mm3         Monocytes, Absolute 0.60 10*3/mm3         Eosinophils, Absolute 0.00 10*3/mm3         Basophils, Absolute 0.00 10*3/mm3         nRBC 0.0 /100 WBC       POC Glucose Once [[id number]]  (Abnormal)  Collected: 12/30/23 0143     Specimen: Blood Updated: 12/30/23 0144       Glucose 129 mg/dL         Comment: Serial Number: [id number]:  [id number]        POC Glucose Once [[id number]]  (Abnormal) Collected: 12/30/23 0044     Specimen: Blood Updated: 12/30/23 0045       Glucose 304 mg/dL         Comment: Serial Number: [id number]:  [id number]        POC Glucose Once [[id number]]  (Abnormal) Collected: 12/29/23 2321     Specimen: Blood Updated: 12/29/23 2322       Glucose 351 mg/dL         Comment: Serial Number: [id number]:  [id number]        Blood Culture - Blood, Arm, Left [[id number]] Collected: 12/29/23 2055     Specimen: Blood from Arm, Left Updated: 12/29/23 2101     POC Lactate [[id number]]  (Abnormal) Collected: 12/29/23 2025     Specimen: Blood Updated: 12/29/23 2027       Lactate 2.7 mmol/L         Comment: Serial Number: [id number]:  [id number]        Blood Culture - Blood, Arm, Left [[id number]] Collected: 12/29/23 2019     Specimen: Blood from Arm, Left Updated: 12/29/23 2024     POC Glucose Once [[id number]]  (Abnormal) Collected: 12/29/23 2015     Specimen: Blood Updated: 12/29/23 2018       Glucose 372 mg/dL         Comment: Serial Number: [id number]:  [id number]        Ketone Bodies, Serum (Not performed at [place]) [[id number]]  (Abnormal) Collected: 12/29/23 1815     Specimen: Blood Updated: 12/29/23 1921     Narrative:       The following orders were created for panel order Ketone Bodies, Serum (Not performed at [place]).  Procedure                               Abnormality         Status                     ---------                               -----------         ------                     Acetone[[id number]]                      Abnormal            Final result                  Please view results for these tests on the individual orders.     Acetone [[id number]]  (Abnormal) Collected: 12/29/23 1815     Specimen: Blood Updated: 12/29/23 1921       Acetone Small      [place] Draw [[id number]] Collected: 12/29/23 1815     Specimen: Blood Updated: 12/29/23 1916     Narrative:       The following orders were created for panel order [place] Draw.  Procedure                               Abnormality         Status                     ---------                               -----------         ------                     Green Top (Gel)[[id number]]                                  Final result               Lavender Top[[id number]]                                     Final result               Gold Top - SST[[id number]]                                   Final result               Light Blue Top[[id number]]                                   Final result                  Please view results for these tests on the individual orders.     Lavender Top [[id number]] Collected: 12/29/23 1815     Specimen: Blood Updated: 12/29/23 1916       Extra Tube hold for add-on       Comment: Auto resulted        Gold Top - SST [[id number]] Collected: 12/29/23 1815     Specimen: Blood Updated: 12/29/23 1916       Extra Tube Hold for add-ons.       Comment: Auto resulted.        Light Blue Top [[id number]] Collected: 12/29/23 1815     Specimen: Blood Updated: 12/29/23 1916       Extra Tube Hold for add-ons.       Comment: Auto resulted        Green Top (Gel) [[id number]] Collected: 12/29/23 1815     Specimen: Blood Updated: 12/29/23 1915       Extra Tube --     POC Glucose Once [[id number]]  (Abnormal) Collected: 12/29/23 1903     Specimen: Blood Updated: 12/29/23 1905       Glucose 376 mg/dL         Comment: Serial Number: [id number]:  [id number]        Lipase [[id number]]  (Abnormal) Collected: 12/29/23 1815     Specimen: Blood Updated: 12/29/23 1852       Lipase 575 U/L       Comprehensive Metabolic Panel [[id number]]  (Abnormal) Collected: 12/29/23 1815     Specimen: Blood Updated: 12/29/23 1849       Glucose 622 mg/dL         BUN 31 mg/dL         Creatinine 1.93 mg/dL         Sodium 124 mmol/L          Potassium 4.4 mmol/L         Chloride 75 mmol/L         CO2 31.0 mmol/L         Calcium 12.9 mg/dL         Total Protein 8.1 g/dL         Albumin 4.4 g/dL         ALT (SGPT) 14 U/L         AST (SGOT) 25 U/L         Alkaline Phosphatase 79 U/L         Total Bilirubin 0.7 mg/dL         Globulin 3.7 gm/dL         A/G Ratio 1.2 g/dL         BUN/Creatinine Ratio 16.1       Anion Gap 18.0 mmol/L         eGFR 39.6 mL/min/1.73       Narrative:       GFR Normal >60  Chronic Kidney Disease <60  Kidney Failure <15        Magnesium [[id number]]  (Normal) Collected: 12/29/23 1815     Specimen: Blood Updated: 12/29/23 1849       Magnesium 1.8 mg/dL       Phosphorus [[id number]]  (Normal) Collected: 12/29/23 1815     Specimen: Blood Updated: 12/29/23 1846       Phosphorus 4.1 mg/dL       Urinalysis With Microscopic If Indicated (No Culture) - Urine, Clean Catch [[id number]]  (Abnormal) Collected: 12/29/23 1833     Specimen: Urine, Clean Catch Updated: 12/29/23 1843       Color, UA Yellow       Appearance, UA Clear       pH, UA 7.5       Specific Gravity, UA 1.029       Glucose, UA >=1000 mg/dL (3+)       Ketones, UA 15 mg/dL (1+)       Bilirubin, UA Negative       Blood, UA Moderate (2+)       Protein, UA 30 mg/dL (1+)       Leuk Esterase, UA Negative       Nitrite, UA Negative       Urobilinogen, UA 0.2 E.U./dL     Urinalysis, Microscopic Only - Urine, Clean Catch [[id number]] Collected: 12/29/23 1833     Specimen: Urine, Clean Catch Updated: 12/29/23 1843       RBC, UA 0-2 /HPF         WBC, UA 0-2 /HPF         Bacteria, UA None Seen /HPF         Squamous Epithelial Cells, UA 0-2 /HPF         Hyaline Casts, UA None Seen /LPF         Methodology Automated Microscopy     CBC & Differential [[id number]]  (Abnormal) Collected: 12/29/23 1815     Specimen: Blood Updated: 12/29/23 1832     Narrative:       The following orders were created for panel order CBC & Differential.  Procedure                               Abnormality         Status                      ---------                               -----------         ------                     CBC Auto Differential[[id number]]        Abnormal            Final result                  Please view results for these tests on the individual orders.     CBC Auto Differential [[id number]]  (Abnormal) Collected: 12/29/23 1815     Specimen: Blood Updated: 12/29/23 1832       WBC 12.20 10*3/mm3         RBC 4.59 10*6/mm3         Hemoglobin 15.0 g/dL         Hematocrit 44.4 %         MCV 96.6 fL         MCH 32.7 pg         MCHC 33.9 g/dL         RDW 12.5 %         RDW-SD 41.6 fl         MPV 8.3 fL         Platelets 301 10*3/mm3         Neutrophil % 87.4 %         Lymphocyte % 5.5 %         Monocyte % 6.8 %         Eosinophil % 0.0 %         Basophil % 0.3 %         Neutrophils, Absolute 10.60 10*3/mm3         Lymphocytes, Absolute 0.70 10*3/mm3         Monocytes, Absolute 0.80 10*3/mm3         Eosinophils, Absolute 0.00 10*3/mm3         Basophils, Absolute 0.00 10*3/mm3         nRBC 0.0 /100 WBC       POC Glucose Once [[id number]]  (Abnormal) Collected: 12/29/23 1814     Specimen: Blood Updated: 12/29/23 1816       Glucose 595 mg/dL         Comment: Serial Number: [id number]:  [id number]        Blood Gas, Arterial - [[id number]]  (Abnormal) Collected: 12/29/23 1752     Specimen: Arterial Blood Updated: 12/29/23 1756       Site Left Radial       Matt's Test Positive       pH, Arterial 7.572 pH units         pCO2, Arterial 35.1 mm Hg         pO2, Arterial 52.7 mm Hg         HCO3, Arterial 32.3 mmol/L         Base Excess, Arterial 9.9 mmol/L         Comment: Serial Number: [id number]:  [id number]          O2 Saturation, Arterial 91.5 %         CO2 Content 33.4 mmol/L         Barometric Pressure for Blood Gas --       Comment: N/A          Modality Room Air       FIO2 21 %         Hemodilution No     POC Glucose Once [[id number]]  (Abnormal) Collected: 12/29/23 1706     Specimen: Blood Updated: 12/29/23 1709        Glucose >600 mg/dL         Comment: Serial Number: [id number]:  [id number]                              Imaging Results (Last 24 Hours)         Procedure Component Value Units Date/Time     XR Chest 1 View [[id number]] Collected: 12/29/23 2047       Updated: 12/29/23 2049     Narrative:       XR CHEST 1 VW     Date of Exam: 12/29/2023 8:23 PM EST     Indication: tachycardia     Comparison: 2/20/2021     Findings:  The cardiomediastinal silhouette is within normal limits. Lungs are clear. No focal consolidation, pneumothorax, or significant pleural effusion. Osseous structures grossly intact.        Impression:       Impression:  No acute process.                 Electronically Signed: Vikas Bertrand MD    12/29/2023 8:47 PM EST    Workstation ID: [id number]     CT Abdomen Pelvis With Contrast [[id number]] Collected: 12/29/23 2021       Updated: 12/29/23 2040     Narrative:       CT ABDOMEN PELVIS W CONTRAST     Date of Exam: 12/29/2023 7:20 PM EST     Indication: upper abd pain, n/v.     Comparison: CT abdomen and pelvis 5/2/2023     Technique: Axial CT images were obtained of the abdomen and pelvis following the uneventful intravenous administration of iodinated contrast. Sagittal and coronal reconstructions were performed.  Automated exposure control and iterative reconstruction   methods were used.        Findings:  Lung bases are without consolidation. Heart size normal. Negative for pericardial or pleural effusion. Mild wall thickening of the visualized distal esophagus.     The liver and spleen are normal in size and contour. Normal adrenal glands. Gallbladder absent. Dilatation of the common bile duct similar to prior study. There is inflammatory stranding surrounding the pancreas consistent with acute interstitial   pancreatitis. There are areas of chronic pancreatic ductal dilatation and irregularity related to sequela of chronic pancreatitis which are similar to the prior study for example the main  pancreatic duct is dilated up to 8 mm at the pancreatic neck   (3/45). No organized peripancreatic fluid collection. Inflammation surrounding the pancreas results in new narrowing of the splenic vein without complete thrombosis (3/49). Mild narrowing of the portal vein at the portal splenic confluence. The portal   vein and SMV are patent.     Right kidney absent. Normal left renal enhancement. No hydronephrosis on the left. Urinary bladder thin-walled. Negative for pneumoperitoneum. No bowel obstruction. No findings of appendicitis.     Inflammatory stranding surrounding the second and third portion of the duodenum related to pancreatitis. The celiac axis and superior mesenteric artery are patent. Abdominal aortic branch vasculature patent. No organized or drainable fluid collection in   the abdomen or pelvis. Fluid-filled distention of the stomach. 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 No aggressive osseous lesion or acute fracture.        Impression:       Impression:  1. Acute interstitial pancreatitis. No organized peripancreatic fluid collection.  2. Chronic pancreatic ductal dilatation and irregularity related to sequela of chronic pancreatitis, similar to prior study.  3. New narrowing of splenic vein and portal vein related to pancreatitis. No portal venous or splenic vein thrombus.  4. Fluid-filled distention of stomach may relate to delayed transit secondary to inflammation involving the duodenum.  4. Additional chronic findings above.           Electronically Signed: Vikas Bertrand MD    12/29/2023 8:38 PM EST    Workstation ID: [id number]                   Assessment & Plan          This is a 58 y.o. male with:     Active and Resolved Problems        Active Hospital Problems     Diagnosis   POA    **Uncontrolled type 2 diabetes mellitus with hyperglycemia [E11.65]   Yes    Acute on chronic pancreatitis [K85.90, K86.1]   Yes    Seizures [R56.9]   Yes    Dyslipidemia [E78.5]   Yes    Benign essential hypertension [I10]    Yes       Resolved Hospital Problems   No resolved problems to display.      #DMT2 with hyperglycemia  Patient likely had disruption in endogenous insulin production from pancreatitis, leading to this episode of hyperglycemia.  Also, holiday diet likely did not help as well.  - Insulin GGT  - LR @ 150cc/hr  - Transition to 20U glargine QHS and SSI afterwards, endocrinology consulted     #Acute on chronic pancreatitis  Overall, pretty mild, but still present.  - LR @ 150cc/hr as above  - Pain control mild, mod, sev -> monitor for toxicity  - Nausea control  - CLD -> advance to bland     #Seizures  Remission  - Continue home meds     #HTN  #HLD  HDS.  - Continue home medications     DVT prophylaxis:  Medical DVT prophylaxis orders are present.     The patient desires to be as follows:     CODE STATUS:    Level Of Support Discussed With: Patient  Code Status (Patient has no pulse and is not breathing): CPR (Attempt to Resuscitate)  Medical Interventions (Patient has pulse or is breathing): Full Support           Corina Romeo, who can be contacted at [phone], is the designated person to make medical decisions on the patient's behalf if He is incapable of doing so. This was clarified with patient and/or next of kin on 12/29/2023 during the course of this H&P.     Admission Status:  I believe this patient meets observation status.     Expected Length of Stay: 1-2 nights     PDMP and Medication Dispenses via Sidebar reviewed and consistent with patient reported medications.     I discussed the patient's findings and my recommendations with patient.        Signature:   Electronically signed by Miles Johnson MD, 12/31/23, 4:03 PM EST.         [facility]ist Team

## 2024-01-01 LAB
ALBUMIN SERPL-MCNC: 3 G/DL (ref 3.5–5.2)
ALBUMIN/GLOB SERPL: 1.2 G/DL
ALP SERPL-CCNC: 47 U/L (ref 39–117)
ALT SERPL W P-5'-P-CCNC: 20 U/L (ref 1–41)
ANION GAP SERPL CALCULATED.3IONS-SCNC: 9 MMOL/L (ref 5–15)
AST SERPL-CCNC: 29 U/L (ref 1–40)
BASOPHILS # BLD AUTO: 0 10*3/MM3 (ref 0–0.2)
BASOPHILS NFR BLD AUTO: 0.8 % (ref 0–1.5)
BILIRUB SERPL-MCNC: 0.3 MG/DL (ref 0–1.2)
BUN SERPL-MCNC: 11 MG/DL (ref 6–20)
BUN/CREAT SERPL: 11.3 (ref 7–25)
CALCIUM SPEC-SCNC: 8.4 MG/DL (ref 8.6–10.5)
CHLORIDE SERPL-SCNC: 95 MMOL/L (ref 98–107)
CO2 SERPL-SCNC: 28 MMOL/L (ref 22–29)
CREAT SERPL-MCNC: 0.97 MG/DL (ref 0.76–1.27)
D-LACTATE SERPL-SCNC: 2.5 MMOL/L (ref 0.5–2)
DEPRECATED RDW RBC AUTO: 43.8 FL (ref 37–54)
EGFRCR SERPLBLD CKD-EPI 2021: 90.5 ML/MIN/1.73
EOSINOPHIL # BLD AUTO: 0.1 10*3/MM3 (ref 0–0.4)
EOSINOPHIL NFR BLD AUTO: 1.8 % (ref 0.3–6.2)
ERYTHROCYTE [DISTWIDTH] IN BLOOD BY AUTOMATED COUNT: 12.4 % (ref 12.3–15.4)
GLOBULIN UR ELPH-MCNC: 2.6 GM/DL
GLUCOSE BLDC GLUCOMTR-MCNC: 105 MG/DL (ref 70–105)
GLUCOSE BLDC GLUCOMTR-MCNC: 114 MG/DL (ref 70–105)
GLUCOSE BLDC GLUCOMTR-MCNC: 147 MG/DL (ref 70–105)
GLUCOSE SERPL-MCNC: 125 MG/DL (ref 65–99)
HCT VFR BLD AUTO: 30.8 % (ref 37.5–51)
HGB BLD-MCNC: 10.5 G/DL (ref 13–17.7)
LIPASE SERPL-CCNC: 63 U/L (ref 13–60)
LYMPHOCYTES # BLD AUTO: 1 10*3/MM3 (ref 0.7–3.1)
LYMPHOCYTES NFR BLD AUTO: 34.6 % (ref 19.6–45.3)
MCH RBC QN AUTO: 32.4 PG (ref 26.6–33)
MCHC RBC AUTO-ENTMCNC: 34 G/DL (ref 31.5–35.7)
MCV RBC AUTO: 95.4 FL (ref 79–97)
MONOCYTES # BLD AUTO: 0.3 10*3/MM3 (ref 0.1–0.9)
MONOCYTES NFR BLD AUTO: 11 % (ref 5–12)
NEUTROPHILS NFR BLD AUTO: 1.5 10*3/MM3 (ref 1.7–7)
NEUTROPHILS NFR BLD AUTO: 51.8 % (ref 42.7–76)
NRBC BLD AUTO-RTO: 0 /100 WBC (ref 0–0.2)
PLATELET # BLD AUTO: 214 10*3/MM3 (ref 140–450)
PMV BLD AUTO: 7.5 FL (ref 6–12)
POTASSIUM SERPL-SCNC: 3.7 MMOL/L (ref 3.5–5.2)
PROT SERPL-MCNC: 5.6 G/DL (ref 6–8.5)
RBC # BLD AUTO: 3.22 10*6/MM3 (ref 4.14–5.8)
SODIUM SERPL-SCNC: 132 MMOL/L (ref 136–145)
TRIGL SERPL-MCNC: 187 MG/DL (ref 0–150)
WBC NRBC COR # BLD AUTO: 2.8 10*3/MM3 (ref 3.4–10.8)

## 2024-01-01 PROCEDURE — 84478 ASSAY OF TRIGLYCERIDES: CPT | Performed by: INTERNAL MEDICINE

## 2024-01-01 PROCEDURE — 87040 BLOOD CULTURE FOR BACTERIA: CPT | Performed by: INTERNAL MEDICINE

## 2024-01-01 PROCEDURE — 80053 COMPREHEN METABOLIC PANEL: CPT | Performed by: STUDENT IN AN ORGANIZED HEALTH CARE EDUCATION/TRAINING PROGRAM

## 2024-01-01 PROCEDURE — 83605 ASSAY OF LACTIC ACID: CPT | Performed by: INTERNAL MEDICINE

## 2024-01-01 PROCEDURE — 25010000002 HYDROMORPHONE 1 MG/ML SOLUTION: Performed by: STUDENT IN AN ORGANIZED HEALTH CARE EDUCATION/TRAINING PROGRAM

## 2024-01-01 PROCEDURE — 25010000002 CEFTRIAXONE PER 250 MG: Performed by: INTERNAL MEDICINE

## 2024-01-01 PROCEDURE — 25010000002 METRONIDAZOLE 500 MG/100ML SOLUTION: Performed by: INTERNAL MEDICINE

## 2024-01-01 PROCEDURE — 83690 ASSAY OF LIPASE: CPT | Performed by: STUDENT IN AN ORGANIZED HEALTH CARE EDUCATION/TRAINING PROGRAM

## 2024-01-01 PROCEDURE — 82948 REAGENT STRIP/BLOOD GLUCOSE: CPT

## 2024-01-01 PROCEDURE — 25810000003 LACTATED RINGERS PER 1000 ML: Performed by: STUDENT IN AN ORGANIZED HEALTH CARE EDUCATION/TRAINING PROGRAM

## 2024-01-01 PROCEDURE — 85025 COMPLETE CBC W/AUTO DIFF WBC: CPT | Performed by: STUDENT IN AN ORGANIZED HEALTH CARE EDUCATION/TRAINING PROGRAM

## 2024-01-01 PROCEDURE — 63710000001 INSULIN GLARGINE PER 5 UNITS: Performed by: STUDENT IN AN ORGANIZED HEALTH CARE EDUCATION/TRAINING PROGRAM

## 2024-01-01 PROCEDURE — 99231 SBSQ HOSP IP/OBS SF/LOW 25: CPT | Performed by: INTERNAL MEDICINE

## 2024-01-01 PROCEDURE — 25010000002 ENOXAPARIN PER 10 MG: Performed by: STUDENT IN AN ORGANIZED HEALTH CARE EDUCATION/TRAINING PROGRAM

## 2024-01-01 RX ORDER — METRONIDAZOLE 500 MG/100ML
500 INJECTION, SOLUTION INTRAVENOUS EVERY 8 HOURS
Qty: 2100 ML | Refills: 0 | Status: DISCONTINUED | OUTPATIENT
Start: 2024-01-01 | End: 2024-01-03 | Stop reason: HOSPADM

## 2024-01-01 RX ADMIN — PANCRELIPASE 72000 UNITS OF LIPASE: 36000; 180000; 114000 CAPSULE, DELAYED RELEASE PELLETS ORAL at 17:47

## 2024-01-01 RX ADMIN — METRONIDAZOLE 500 MG: 500 INJECTION, SOLUTION INTRAVENOUS at 21:54

## 2024-01-01 RX ADMIN — DULOXETINE HYDROCHLORIDE 30 MG: 30 CAPSULE, DELAYED RELEASE ORAL at 11:06

## 2024-01-01 RX ADMIN — Medication 10 ML: at 11:05

## 2024-01-01 RX ADMIN — ATORVASTATIN CALCIUM 20 MG: 20 TABLET, FILM COATED ORAL at 11:06

## 2024-01-01 RX ADMIN — HYDROMORPHONE HYDROCHLORIDE 1 MG: 1 INJECTION, SOLUTION INTRAMUSCULAR; INTRAVENOUS; SUBCUTANEOUS at 14:44

## 2024-01-01 RX ADMIN — HYDROMORPHONE HYDROCHLORIDE 1 MG: 1 INJECTION, SOLUTION INTRAMUSCULAR; INTRAVENOUS; SUBCUTANEOUS at 03:08

## 2024-01-01 RX ADMIN — Medication 10 ML: at 21:55

## 2024-01-01 RX ADMIN — INSULIN GLARGINE 20 UNITS: 100 INJECTION, SOLUTION SUBCUTANEOUS at 11:06

## 2024-01-01 RX ADMIN — PANCRELIPASE 72000 UNITS OF LIPASE: 36000; 180000; 114000 CAPSULE, DELAYED RELEASE PELLETS ORAL at 11:06

## 2024-01-01 RX ADMIN — OXYCODONE HYDROCHLORIDE 7.5 MG: 5 TABLET ORAL at 17:47

## 2024-01-01 RX ADMIN — HYDROMORPHONE HYDROCHLORIDE 1 MG: 1 INJECTION, SOLUTION INTRAMUSCULAR; INTRAVENOUS; SUBCUTANEOUS at 21:54

## 2024-01-01 RX ADMIN — FENOFIBRATE 145 MG: 145 TABLET ORAL at 11:06

## 2024-01-01 RX ADMIN — CEFTRIAXONE 1000 MG: 1 INJECTION, POWDER, FOR SOLUTION INTRAMUSCULAR; INTRAVENOUS at 21:07

## 2024-01-01 RX ADMIN — HYDROMORPHONE HYDROCHLORIDE 1 MG: 1 INJECTION, SOLUTION INTRAMUSCULAR; INTRAVENOUS; SUBCUTANEOUS at 07:10

## 2024-01-01 RX ADMIN — PANTOPRAZOLE SODIUM 40 MG: 40 TABLET, DELAYED RELEASE ORAL at 07:10

## 2024-01-01 RX ADMIN — LEVETIRACETAM 500 MG: 500 TABLET, FILM COATED ORAL at 21:54

## 2024-01-01 RX ADMIN — HYDROMORPHONE HYDROCHLORIDE 1 MG: 1 INJECTION, SOLUTION INTRAMUSCULAR; INTRAVENOUS; SUBCUTANEOUS at 11:23

## 2024-01-01 RX ADMIN — Medication 10 ML: at 21:54

## 2024-01-01 RX ADMIN — HYDROMORPHONE HYDROCHLORIDE 1 MG: 1 INJECTION, SOLUTION INTRAMUSCULAR; INTRAVENOUS; SUBCUTANEOUS at 00:27

## 2024-01-01 RX ADMIN — LEVETIRACETAM 500 MG: 500 TABLET, FILM COATED ORAL at 11:06

## 2024-01-01 RX ADMIN — SODIUM CHLORIDE, POTASSIUM CHLORIDE, SODIUM LACTATE AND CALCIUM CHLORIDE 150 ML/HR: 600; 310; 30; 20 INJECTION, SOLUTION INTRAVENOUS at 03:09

## 2024-01-01 RX ADMIN — Medication 10 ML: at 11:14

## 2024-01-01 RX ADMIN — ENOXAPARIN SODIUM 40 MG: 100 INJECTION SUBCUTANEOUS at 17:46

## 2024-01-01 NOTE — PLAN OF CARE
Goal Outcome Evaluation:  Plan of Care Reviewed With: patient        Progress: no change  Outcome Evaluation: VSS, bg elevated and treated per order, pain meds q2-3h for abd pain, otherwise pt resting well, alert/oriented x4, s/o at bedside. Tolerating liquid diet w/o nausea/vomiting. Continue current plan of care.

## 2024-01-01 NOTE — PROGRESS NOTES
[facility] Medicine Services   Daily Progress Note    Patient Name: Mitchell Jacome  : 1965  MRN: [id number]  Primary Care Physician:  Krystyna Hardwick DO  Date of admission: 2023  Date of Service:        Subjective      Patient  seen examined  Not better  Still has pain          Objective      Vitals:   Temp:  [97 °F (36.1 °C)-98.4 °F (36.9 °C)] 97.5 °F (36.4 °C)  Heart Rate:  [87-97] 92  Resp:  [11-16] 14  BP: (135-144)/(79-95) 135/79    Physical Exam       General:  Alert and oriented , no  distress  Eyes:  Pupils are equal, round and reactive to light. Extraocular movements are intact. Normal conjunctivae, vision unchanged    HENT:  Normocephalic, atraumatic   Respiratory: Decrease in breathing sounds anteriorly bilaterally. No wheezing  Cardiovascular: Regular rate, Regular, S1 auscultated. S2 auscultated , No edema   Gastrointestinal: Soft. Nontender, nondistended. Normal bowel sounds  Musculoskeletal: No tenderness   Neurologic: Alert, oriented. No focal defect   Psychiatric: Cooperative , appropriate mood and affect, nonsuicidal           Result Review    Result Review:  I have personally reviewed the results from the time of this admission to 2024 04:38 EST and agree with these findings:  [x]  Laboratory  []  Microbiology  [x]  Radiology  []  EKG/Telemetry   []  Cardiology/Vascular   []  Pathology  []  Old records  []  Other:  Most notable findings include: abd pain          Assessment & Plan          atorvastatin, 20 mg, Oral, Daily  cefTRIAXone, 1,000 mg, Intravenous, Q24H  DULoxetine, 30 mg, Oral, Daily  enoxaparin, 40 mg, Subcutaneous, Daily  fenofibrate, 145 mg, Oral, Daily  insulin glargine, 20 Units, Subcutaneous, Daily  insulin lispro, 2-7 Units, Subcutaneous, 4x Daily AC & at Bedtime  levETIRAcetam, 500 mg, Oral, BID  metroNIDAZOLE, 500 mg, Intravenous, Q8H  Pancrelipase (Lip-Prot-Amyl), 72,000 units of lipase, Oral, TID With Meals  pantoprazole, 40 mg, Oral,  Q AM  senna-docusate sodium, 2 tablet, Oral, BID  sodium chloride, 10 mL, Intravenous, Q12H  sodium chloride, 10 mL, Intravenous, Q12H       lactated ringers, 150 mL/hr, Last Rate: 150 mL/hr (01/01/24 0309)         Active Hospital Problems:  Active Hospital Problems    Diagnosis     **Uncontrolled type 2 diabetes mellitus with hyperglycemia     Hyperglycemia     Acute on chronic pancreatitis     Seizures     Dyslipidemia     Benign essential hypertension          #Acute on chronic pancreatitis  Not better  SIRS , probable sepsis  .  - LR @ 150cc/hr   Start ceftriaxone and flagly  Blood cx x 2  Check lactate  Lipase, cbc , bmp and TGL ordered and interpreted  - Pain control mild, mod, sev -> monitor for toxicity  - Nausea control  - CLD -> same for now    #DMT2 with hyperglycemia  Monitor  Patient likely had disruption in endogenous insulin production from pancreatitis, leading to this episode of hyperglycemia.  Also, holiday diet likely did not help as well.  - lantus + ssi  - LR @ 150cc/hr  endocrinology consulted         #Seizures  Remission  - Continue home meds     #HTN  #HLD  HDS.  - Continue home medications     D/w pt and wife  Not ready for dc today      DVT prophylaxis:  Medical DVT prophylaxis orders are present.    CODE STATUS:    Level Of Support Discussed With: Patient  Code Status (Patient has no pulse and is not breathing): CPR (Attempt to Resuscitate)  Medical Interventions (Patient has pulse or is breathing): Full Support      Disposition:  I expect patient to be discharged once  better.      Electronically signed by Yasmani Corbin MD, 01/02/24, 04:38 EST.  [facility] Hospitalist Team

## 2024-01-01 NOTE — PROGRESS NOTES
"Subjective   Mitchell Jacome is a 58 y.o. male.   Seen for diabetes f/u.  Started clear liq diet. Tolerating well.  LR IV fluids off.      Objective     /81 (BP Location: Left arm, Patient Position: Sitting)   Pulse 88   Temp 98.4 °F (36.9 °C) (Oral)   Resp 11   Ht 188 cm (74\")   Wt 75.6 kg (166 lb 10.7 oz)   SpO2 94%   BMI 21.40 kg/m²   Blood sugar  125@ 2 am,  147 @ lunch    ASSESSMENT  Diabetes type1, with hyperglycemia  Patient is Improving    PLAN    Continue same insulin regimen.  Ok to discharge home from endocrinology standpoint.           Renetta Natarajan MD  1/1/2024  14:43 EST    "

## 2024-01-02 LAB
ALBUMIN SERPL-MCNC: 3.1 G/DL (ref 3.5–5.2)
ALBUMIN/GLOB SERPL: 1.2 G/DL
ALP SERPL-CCNC: 48 U/L (ref 39–117)
ALT SERPL W P-5'-P-CCNC: 20 U/L (ref 1–41)
ANION GAP SERPL CALCULATED.3IONS-SCNC: 6 MMOL/L (ref 5–15)
AST SERPL-CCNC: 25 U/L (ref 1–40)
BASOPHILS # BLD AUTO: 0 10*3/MM3 (ref 0–0.2)
BASOPHILS NFR BLD AUTO: 0.5 % (ref 0–1.5)
BILIRUB SERPL-MCNC: 0.2 MG/DL (ref 0–1.2)
BUN SERPL-MCNC: 7 MG/DL (ref 6–20)
BUN/CREAT SERPL: 8.2 (ref 7–25)
CALCIUM SPEC-SCNC: 8.7 MG/DL (ref 8.6–10.5)
CHLORIDE SERPL-SCNC: 98 MMOL/L (ref 98–107)
CO2 SERPL-SCNC: 25 MMOL/L (ref 22–29)
CREAT SERPL-MCNC: 0.85 MG/DL (ref 0.76–1.27)
DEPRECATED RDW RBC AUTO: 43.3 FL (ref 37–54)
EGFRCR SERPLBLD CKD-EPI 2021: 100.7 ML/MIN/1.73
EOSINOPHIL # BLD AUTO: 0.1 10*3/MM3 (ref 0–0.4)
EOSINOPHIL NFR BLD AUTO: 3 % (ref 0.3–6.2)
ERYTHROCYTE [DISTWIDTH] IN BLOOD BY AUTOMATED COUNT: 12.3 % (ref 12.3–15.4)
GLOBULIN UR ELPH-MCNC: 2.6 GM/DL
GLUCOSE BLDC GLUCOMTR-MCNC: 121 MG/DL (ref 70–105)
GLUCOSE BLDC GLUCOMTR-MCNC: 130 MG/DL (ref 70–105)
GLUCOSE BLDC GLUCOMTR-MCNC: 151 MG/DL (ref 70–105)
GLUCOSE BLDC GLUCOMTR-MCNC: 210 MG/DL (ref 70–105)
GLUCOSE SERPL-MCNC: 147 MG/DL (ref 65–99)
HCT VFR BLD AUTO: 31.6 % (ref 37.5–51)
HGB BLD-MCNC: 10.9 G/DL (ref 13–17.7)
LIPASE SERPL-CCNC: 87 U/L (ref 13–60)
LYMPHOCYTES # BLD AUTO: 1.1 10*3/MM3 (ref 0.7–3.1)
LYMPHOCYTES NFR BLD AUTO: 34.6 % (ref 19.6–45.3)
MCH RBC QN AUTO: 32.9 PG (ref 26.6–33)
MCHC RBC AUTO-ENTMCNC: 34.6 G/DL (ref 31.5–35.7)
MCV RBC AUTO: 95.1 FL (ref 79–97)
MONOCYTES # BLD AUTO: 0.4 10*3/MM3 (ref 0.1–0.9)
MONOCYTES NFR BLD AUTO: 10.8 % (ref 5–12)
NEUTROPHILS NFR BLD AUTO: 1.7 10*3/MM3 (ref 1.7–7)
NEUTROPHILS NFR BLD AUTO: 51.1 % (ref 42.7–76)
NRBC BLD AUTO-RTO: 0.2 /100 WBC (ref 0–0.2)
PLATELET # BLD AUTO: 260 10*3/MM3 (ref 140–450)
PMV BLD AUTO: 7.2 FL (ref 6–12)
POTASSIUM SERPL-SCNC: 3.2 MMOL/L (ref 3.5–5.2)
PROT SERPL-MCNC: 5.7 G/DL (ref 6–8.5)
RBC # BLD AUTO: 3.33 10*6/MM3 (ref 4.14–5.8)
SODIUM SERPL-SCNC: 129 MMOL/L (ref 136–145)
WBC NRBC COR # BLD AUTO: 3.3 10*3/MM3 (ref 3.4–10.8)

## 2024-01-02 PROCEDURE — 82948 REAGENT STRIP/BLOOD GLUCOSE: CPT

## 2024-01-02 PROCEDURE — 25010000002 HYDROMORPHONE 1 MG/ML SOLUTION: Performed by: STUDENT IN AN ORGANIZED HEALTH CARE EDUCATION/TRAINING PROGRAM

## 2024-01-02 PROCEDURE — 36415 COLL VENOUS BLD VENIPUNCTURE: CPT | Performed by: STUDENT IN AN ORGANIZED HEALTH CARE EDUCATION/TRAINING PROGRAM

## 2024-01-02 PROCEDURE — 25010000002 ENOXAPARIN PER 10 MG: Performed by: STUDENT IN AN ORGANIZED HEALTH CARE EDUCATION/TRAINING PROGRAM

## 2024-01-02 PROCEDURE — 99231 SBSQ HOSP IP/OBS SF/LOW 25: CPT | Performed by: INTERNAL MEDICINE

## 2024-01-02 PROCEDURE — 80053 COMPREHEN METABOLIC PANEL: CPT | Performed by: STUDENT IN AN ORGANIZED HEALTH CARE EDUCATION/TRAINING PROGRAM

## 2024-01-02 PROCEDURE — 85025 COMPLETE CBC W/AUTO DIFF WBC: CPT | Performed by: STUDENT IN AN ORGANIZED HEALTH CARE EDUCATION/TRAINING PROGRAM

## 2024-01-02 PROCEDURE — 25810000003 LACTATED RINGERS PER 1000 ML: Performed by: STUDENT IN AN ORGANIZED HEALTH CARE EDUCATION/TRAINING PROGRAM

## 2024-01-02 PROCEDURE — 83690 ASSAY OF LIPASE: CPT | Performed by: STUDENT IN AN ORGANIZED HEALTH CARE EDUCATION/TRAINING PROGRAM

## 2024-01-02 PROCEDURE — 25010000002 METRONIDAZOLE 500 MG/100ML SOLUTION: Performed by: INTERNAL MEDICINE

## 2024-01-02 PROCEDURE — 63710000001 INSULIN GLARGINE PER 5 UNITS: Performed by: STUDENT IN AN ORGANIZED HEALTH CARE EDUCATION/TRAINING PROGRAM

## 2024-01-02 PROCEDURE — 25010000002 CEFTRIAXONE PER 250 MG: Performed by: INTERNAL MEDICINE

## 2024-01-02 PROCEDURE — 63710000001 INSULIN LISPRO (HUMAN) PER 5 UNITS: Performed by: HOSPITALIST

## 2024-01-02 RX ORDER — POTASSIUM CHLORIDE 20 MEQ/1
40 TABLET, EXTENDED RELEASE ORAL EVERY 4 HOURS
Status: COMPLETED | OUTPATIENT
Start: 2024-01-02 | End: 2024-01-02

## 2024-01-02 RX ADMIN — INSULIN GLARGINE 20 UNITS: 100 INJECTION, SOLUTION SUBCUTANEOUS at 08:13

## 2024-01-02 RX ADMIN — ENOXAPARIN SODIUM 40 MG: 100 INJECTION SUBCUTANEOUS at 16:29

## 2024-01-02 RX ADMIN — PANTOPRAZOLE SODIUM 40 MG: 40 TABLET, DELAYED RELEASE ORAL at 05:47

## 2024-01-02 RX ADMIN — LEVETIRACETAM 500 MG: 500 TABLET, FILM COATED ORAL at 08:13

## 2024-01-02 RX ADMIN — INSULIN LISPRO 3 UNITS: 100 INJECTION, SOLUTION INTRAVENOUS; SUBCUTANEOUS at 12:05

## 2024-01-02 RX ADMIN — METRONIDAZOLE 500 MG: 500 INJECTION, SOLUTION INTRAVENOUS at 05:47

## 2024-01-02 RX ADMIN — DULOXETINE HYDROCHLORIDE 30 MG: 30 CAPSULE, DELAYED RELEASE ORAL at 08:12

## 2024-01-02 RX ADMIN — FENOFIBRATE 145 MG: 145 TABLET ORAL at 08:12

## 2024-01-02 RX ADMIN — HYDROMORPHONE HYDROCHLORIDE 1 MG: 1 INJECTION, SOLUTION INTRAMUSCULAR; INTRAVENOUS; SUBCUTANEOUS at 12:09

## 2024-01-02 RX ADMIN — METRONIDAZOLE 500 MG: 500 INJECTION, SOLUTION INTRAVENOUS at 21:54

## 2024-01-02 RX ADMIN — POTASSIUM CHLORIDE 40 MEQ: 1500 TABLET, EXTENDED RELEASE ORAL at 14:40

## 2024-01-02 RX ADMIN — Medication 10 ML: at 08:15

## 2024-01-02 RX ADMIN — SENNOSIDES AND DOCUSATE SODIUM 2 TABLET: 50; 8.6 TABLET ORAL at 08:13

## 2024-01-02 RX ADMIN — METRONIDAZOLE 500 MG: 500 INJECTION, SOLUTION INTRAVENOUS at 14:40

## 2024-01-02 RX ADMIN — PANCRELIPASE 72000 UNITS OF LIPASE: 36000; 180000; 114000 CAPSULE, DELAYED RELEASE PELLETS ORAL at 08:13

## 2024-01-02 RX ADMIN — HYDROMORPHONE HYDROCHLORIDE 1 MG: 1 INJECTION, SOLUTION INTRAMUSCULAR; INTRAVENOUS; SUBCUTANEOUS at 08:21

## 2024-01-02 RX ADMIN — POTASSIUM CHLORIDE 40 MEQ: 1500 TABLET, EXTENDED RELEASE ORAL at 19:37

## 2024-01-02 RX ADMIN — HYDROMORPHONE HYDROCHLORIDE 1 MG: 1 INJECTION, SOLUTION INTRAMUSCULAR; INTRAVENOUS; SUBCUTANEOUS at 21:52

## 2024-01-02 RX ADMIN — PANCRELIPASE 72000 UNITS OF LIPASE: 36000; 180000; 114000 CAPSULE, DELAYED RELEASE PELLETS ORAL at 12:05

## 2024-01-02 RX ADMIN — ATORVASTATIN CALCIUM 20 MG: 20 TABLET, FILM COATED ORAL at 08:12

## 2024-01-02 RX ADMIN — LEVETIRACETAM 500 MG: 500 TABLET, FILM COATED ORAL at 21:52

## 2024-01-02 RX ADMIN — TEMAZEPAM 15 MG: 15 CAPSULE ORAL at 21:53

## 2024-01-02 RX ADMIN — CEFTRIAXONE 1000 MG: 1 INJECTION, POWDER, FOR SOLUTION INTRAMUSCULAR; INTRAVENOUS at 17:41

## 2024-01-02 RX ADMIN — SODIUM CHLORIDE, POTASSIUM CHLORIDE, SODIUM LACTATE AND CALCIUM CHLORIDE 150 ML/HR: 600; 310; 30; 20 INJECTION, SOLUTION INTRAVENOUS at 16:30

## 2024-01-02 RX ADMIN — SODIUM CHLORIDE, POTASSIUM CHLORIDE, SODIUM LACTATE AND CALCIUM CHLORIDE 150 ML/HR: 600; 310; 30; 20 INJECTION, SOLUTION INTRAVENOUS at 08:12

## 2024-01-02 RX ADMIN — Medication 10 ML: at 21:54

## 2024-01-02 RX ADMIN — INSULIN LISPRO 2 UNITS: 100 INJECTION, SOLUTION INTRAVENOUS; SUBCUTANEOUS at 21:52

## 2024-01-02 RX ADMIN — OXYCODONE HYDROCHLORIDE 7.5 MG: 5 TABLET ORAL at 17:39

## 2024-01-02 RX ADMIN — PANCRELIPASE 72000 UNITS OF LIPASE: 36000; 180000; 114000 CAPSULE, DELAYED RELEASE PELLETS ORAL at 17:40

## 2024-01-02 NOTE — PLAN OF CARE
Goal Outcome Evaluation:  Plan of Care Reviewed With: patient        Progress: improving  Outcome Evaluation: VSS, pt feeling much better, pain much improved, sepsis triggered on previous shift, started on IV abx/flagyl. No new complaints. Diet upgraded to bland. Continue current plan of care.

## 2024-01-02 NOTE — CASE MANAGEMENT/SOCIAL WORK
Discharge Planning Assessment   [facility]     Patient Name: Mitchell Jacome  MRN: [id number]  Today's Date: 1/2/2024    Admit Date: 12/29/2023    Plan: DC PLAN: Return home.     Discharge Needs Assessment       Row Name 01/02/24 0859       Living Environment    People in Home spouse    Current Living Arrangements home    Potentially Unsafe Housing Conditions none    In the past 12 months has the electric, gas, oil, or water company threatened to shut off services in your home? No    Able to Return to Prior Arrangements yes       Resource/Environmental Concerns    Resource/Environmental Concerns none       Food Insecurity    Within the past 12 months, you worried that your food would run out before you got the money to buy more. Never true    Within the past 12 months, the food you bought just didn't last and you didn't have money to get more. Never true       Transition Planning    Patient/Family Anticipates Transition to home with family    Patient/Family Anticipated Services at Transition none    Transportation Anticipated family or friend will provide       Discharge Needs Assessment    Equipment Currently Used at Home none                   Discharge Plan       Row Name 01/02/24 0900       Plan    Plan DC PLAN: Return home.    Patient/Family in Agreement with Plan yes    Plan Comments Met with patient at bedside, from routine home with wife and daughter. Independent with ADL's no DME other than glucometer, Checks blood sugars as instructed and compliant with Insulin.. PCP and Pharamacy verified. Able to afford medications and denies any transportation issues, still drives. Denies any concerns about return home.                  Continued Care and Services - Admitted Since 12/29/2023    Coordination has not been started for this encounter.       Expected Discharge Date and Time       Expected Discharge Date Expected Discharge Time    Jay 3, 2024            Demographic Summary       Row Name 01/02/24 0858        General Information    Admission Type inpatient    Arrived From emergency department    Referral Source admission list    Preferred Language English       Contact Information    Permission Granted to Share Info With     Contact Information Obtained for                    Functional Status       Row Name 01/02/24 0859       Functional Status    Usual Activity Tolerance moderate    Current Activity Tolerance moderate       Assessment of Health Literacy    How often do you have someone help you read hospital materials? Sometimes    How often do you have problems learning about your medical condition because of difficulty understanding written information? Sometimes    How often do you have a problem understanding what is told to you about your medical condition? Sometimes    How confident are you filling out medical forms by yourself? Somewhat    Health Literacy Moderate       Functional Status, IADL    Medications independent    Meal Preparation independent    Housekeeping independent    Laundry independent    Shopping independent       Mental Status    General Appearance WDL WDL                  Libby Og RN

## 2024-01-02 NOTE — PROGRESS NOTES
[facility] Medicine Services   Daily Progress Note    Patient Name: Mitchell Jacome  : 1965  MRN: [id number]  Primary Care Physician:  Krystyna Hardwick DO  Date of admission: 2023  Date of Service: 2024      Subjective      Patient is abdominal pain.  Moderate abdominal pain.  He is on IV Dilaudid every 2 hours.  Patient was updated.  Mild nausea.  No vomiting.    ROS-review    Objective      Vitals:   Temp:  [97 °F (36.1 °C)-98.9 °F (37.2 °C)] 98.5 °F (36.9 °C)  Heart Rate:  [89-97] 89  Resp:  [14-16] 16  BP: (135-148)/(79-95) 143/92    Physical Exam       General:  Alert and oriented , no  distress  Eyes:  Pupils are equal, round and reactive to light. Extraocular movements are intact. Normal conjunctivae, vision unchanged    HENT:  Normocephalic, atraumatic   Respiratory: Decrease in breathing sounds anteriorly bilaterally. No wheezing  Cardiovascular: Regular rate, Regular, S1 auscultated. S2 auscultated , No edema   Gastrointestinal: Soft.  Mild diffuse tenderness present anteriorly.  No rebound no guarding pulses are present.  Musculoskeletal: No tenderness   Neurologic: Alert, oriented. No focal defect   Psychiatric: Cooperative , appropriate mood and affect, nonsuicidal           Result Review    Result Review:  I have personally reviewed the results from the time of this admission to 2024 16:00 EST and agree with these findings:  [x]  Laboratory  []  Microbiology  [x]  Radiology  []  EKG/Telemetry   []  Cardiology/Vascular   []  Pathology  []  Old records  []  Other:  Most notable findings include: abd pain          Assessment & Plan          atorvastatin, 20 mg, Oral, Daily  cefTRIAXone, 1,000 mg, Intravenous, Q24H  DULoxetine, 30 mg, Oral, Daily  enoxaparin, 40 mg, Subcutaneous, Daily  fenofibrate, 145 mg, Oral, Daily  insulin glargine, 20 Units, Subcutaneous, Daily  insulin lispro, 2-7 Units, Subcutaneous, 4x Daily AC & at Bedtime  levETIRAcetam, 500 mg, Oral,  BID  metroNIDAZOLE, 500 mg, Intravenous, Q8H  Pancrelipase (Lip-Prot-Amyl), 72,000 units of lipase, Oral, TID With Meals  pantoprazole, 40 mg, Oral, Q AM  potassium chloride, 40 mEq, Oral, Q4H  senna-docusate sodium, 2 tablet, Oral, BID  sodium chloride, 10 mL, Intravenous, Q12H  sodium chloride, 10 mL, Intravenous, Q12H       lactated ringers, 150 mL/hr, Last Rate: 150 mL/hr (01/02/24 0812)         Active Hospital Problems:  Active Hospital Problems    Diagnosis     **Uncontrolled type 2 diabetes mellitus with hyperglycemia     Hyperglycemia     Acute on chronic pancreatitis     Seizures     Dyslipidemia     Benign essential hypertension          #Acute on chronic pancreatitis  Patient continues to abdominal pain.  He is on IV analgesics.  Patient on Dilaudid every 2 hours.  CT scan Acute interstitial pancreatitis. No organized peripancreatic fluid collection.   Chronic pancreatic ductal dilatation and irregularity related to sequela of chronic pancreatitis  Follow clinically.  IV hydration.  Patient already on IV antibiotics.  Follow cultures.  Follow-up electrolytes      #DMT2 with hyperglycemia  Follow-up sugars  SSI  Endocrine seen.    Hypokalemia  Replace potassium.  Follow-up electrolytes         #Seizures  No new issues  - Continue home meds     #HTN  #HLD  HDS.  - Continue home medications     Patient was updated with plan of care.  All questions answered      DVT prophylaxis:  Medical DVT prophylaxis orders are present.    CODE STATUS:    Level Of Support Discussed With: Patient  Code Status (Patient has no pulse and is not breathing): CPR (Attempt to Resuscitate)  Medical Interventions (Patient has pulse or is breathing): Full Support      Disposition:  I expect patient to be discharged  once abdominal pain is better.      Electronically signed by Sean Armando MD, 01/02/24, 16:00 EST.  [facility] Hospitalist Team

## 2024-01-02 NOTE — CONSULTS
"Diabetes Education  Assessment/Teaching    Patient Name:  Mitchell Jacome  YOB: 1965  MRN: [id number]  Admit Date:  12/29/2023      Assessment Date:  1/2/2024  Flowsheet Row Most Recent Value   General Information     Referral From: MD order  [Consult received due to adm bs >200. Adm bs 595. A1c this adm 8.5%.]   Height 188 cm (74\")   Weight 75.6 kg (166 lb 10.7 oz)   Pregnancy Assessment    Diabetes History    What type of diabetes do you have? Type 2   Length of Diabetes Diagnosis --  [dx about 6 years ago]   Have you had diabetes education/teaching in the past? yes   When and where was your diabetes education? Was last seen by inpatient diabetes educator at [facility] on 5/3/2023   Do you test your blood sugar at home? yes   Frequency of checks checking sensor sugars   Meter type Freestyle Curry 2   Who performs the test? self   Have you had low blood sugar? (<70mg/dl) yes   How often do you have low blood sugar? occasionally   Education Preferences    Nutrition Information    Assessment Topics    DM Goals             Flowsheet Row Most Recent Value   DM Education Needs    Meter Has own   Frequency of Testing --  [Discussed importance of sharing sensor readings with PCP for possible insulin adjustments.]   Blood Glucose Target Range Discussed A1c result of 8.5%. Reviewed healthy bs range and healthy A1c target. Discussed importance of bs control.   Medication Insulin, Pen, Vial  [Pt states he has used insulin pens in the past. He is currently using vials for both Tresiba and Novolog. Pt taking Tresiba 20 units in am and Novolog per sliding scale 2-3 times/day.]   Problem Solving Hypoglycemia, Signs, Symptoms, Treatment  [Discussed importance of always carrying low bs tx.]   Reducing Risks A1C testing  [Gave A1c info sheet.]   Healthy Eating --  [Pt states usually eating 3 meals/day. He states it is difficult to follow diabetes meal plan because of type of food in the house. Pt states lives with " wife, daughter, FLYNN and daughter's 3 children.]   Motivation Engaged   Teaching Method Explanation, Discussion, Handouts   Patient Response Verbalized understanding              Other Comments:  Met with pt at bedside. Pt states bs elevated due to pancreatitis. Pt states usual am sugars are 190 or 200. Discussed pt may need more long-acting insulin to help control bs during the night. Pt states will discuss with PCP. Pt states he does have low bs some after he has taken sliding scale insulin. Encouraged pt to discuss with PCP. Explained he may need adjustments with part or all of the scale to prevent low bs. Pt has been using the Curry 2 CGMS for about 2 years. Pt states has insulin and sensors at home. Pt states he does not need further education at this time. Pt understands importance of diabetes management in helping reduce the risk of long-term complications.       Electronically signed by:  Dyana Maravilla RN  01/02/24 15:08 EST

## 2024-01-03 ENCOUNTER — READMISSION MANAGEMENT (OUTPATIENT)
Dept: CALL CENTER | Facility: HOSPITAL | Age: 59
End: 2024-01-03
Payer: COMMERCIAL

## 2024-01-03 VITALS
WEIGHT: 166.67 LBS | OXYGEN SATURATION: 100 % | TEMPERATURE: 98.1 F | RESPIRATION RATE: 16 BRPM | BODY MASS INDEX: 21.39 KG/M2 | HEIGHT: 74 IN | HEART RATE: 80 BPM | DIASTOLIC BLOOD PRESSURE: 93 MMHG | SYSTOLIC BLOOD PRESSURE: 150 MMHG

## 2024-01-03 LAB
ALBUMIN SERPL-MCNC: 3.3 G/DL (ref 3.5–5.2)
ALBUMIN/GLOB SERPL: 1.3 G/DL
ALP SERPL-CCNC: 44 U/L (ref 39–117)
ALT SERPL W P-5'-P-CCNC: 18 U/L (ref 1–41)
ANION GAP SERPL CALCULATED.3IONS-SCNC: 6 MMOL/L (ref 5–15)
AST SERPL-CCNC: 22 U/L (ref 1–40)
BACTERIA SPEC AEROBE CULT: NORMAL
BACTERIA SPEC AEROBE CULT: NORMAL
BASOPHILS # BLD AUTO: 0 10*3/MM3 (ref 0–0.2)
BASOPHILS NFR BLD AUTO: 0.5 % (ref 0–1.5)
BILIRUB SERPL-MCNC: 0.2 MG/DL (ref 0–1.2)
BUN SERPL-MCNC: 8 MG/DL (ref 6–20)
BUN/CREAT SERPL: 8.2 (ref 7–25)
CALCIUM SPEC-SCNC: 8.9 MG/DL (ref 8.6–10.5)
CHLORIDE SERPL-SCNC: 102 MMOL/L (ref 98–107)
CO2 SERPL-SCNC: 26 MMOL/L (ref 22–29)
CREAT SERPL-MCNC: 0.98 MG/DL (ref 0.76–1.27)
DEPRECATED RDW RBC AUTO: 42 FL (ref 37–54)
EGFRCR SERPLBLD CKD-EPI 2021: 89.4 ML/MIN/1.73
EOSINOPHIL # BLD AUTO: 0.1 10*3/MM3 (ref 0–0.4)
EOSINOPHIL NFR BLD AUTO: 2.6 % (ref 0.3–6.2)
ERYTHROCYTE [DISTWIDTH] IN BLOOD BY AUTOMATED COUNT: 12.4 % (ref 12.3–15.4)
GLOBULIN UR ELPH-MCNC: 2.6 GM/DL
GLUCOSE BLDC GLUCOMTR-MCNC: 168 MG/DL (ref 70–105)
GLUCOSE BLDC GLUCOMTR-MCNC: 99 MG/DL (ref 70–105)
GLUCOSE SERPL-MCNC: 88 MG/DL (ref 65–99)
HCT VFR BLD AUTO: 33.5 % (ref 37.5–51)
HGB BLD-MCNC: 11.1 G/DL (ref 13–17.7)
LIPASE SERPL-CCNC: 87 U/L (ref 13–60)
LYMPHOCYTES # BLD AUTO: 1.5 10*3/MM3 (ref 0.7–3.1)
LYMPHOCYTES NFR BLD AUTO: 33.6 % (ref 19.6–45.3)
MCH RBC QN AUTO: 32.4 PG (ref 26.6–33)
MCHC RBC AUTO-ENTMCNC: 33.1 G/DL (ref 31.5–35.7)
MCV RBC AUTO: 97.7 FL (ref 79–97)
MONOCYTES # BLD AUTO: 0.5 10*3/MM3 (ref 0.1–0.9)
MONOCYTES NFR BLD AUTO: 12.3 % (ref 5–12)
NEUTROPHILS NFR BLD AUTO: 2.3 10*3/MM3 (ref 1.7–7)
NEUTROPHILS NFR BLD AUTO: 51 % (ref 42.7–76)
NRBC BLD AUTO-RTO: 0.1 /100 WBC (ref 0–0.2)
PLATELET # BLD AUTO: 308 10*3/MM3 (ref 140–450)
PMV BLD AUTO: 7.1 FL (ref 6–12)
POTASSIUM SERPL-SCNC: 4.8 MMOL/L (ref 3.5–5.2)
PROT SERPL-MCNC: 5.9 G/DL (ref 6–8.5)
RBC # BLD AUTO: 3.43 10*6/MM3 (ref 4.14–5.8)
SODIUM SERPL-SCNC: 134 MMOL/L (ref 136–145)
WBC NRBC COR # BLD AUTO: 4.5 10*3/MM3 (ref 3.4–10.8)

## 2024-01-03 PROCEDURE — 63710000001 INSULIN LISPRO (HUMAN) PER 5 UNITS: Performed by: HOSPITALIST

## 2024-01-03 PROCEDURE — 25810000003 LACTATED RINGERS PER 1000 ML: Performed by: STUDENT IN AN ORGANIZED HEALTH CARE EDUCATION/TRAINING PROGRAM

## 2024-01-03 PROCEDURE — 63710000001 INSULIN GLARGINE PER 5 UNITS: Performed by: STUDENT IN AN ORGANIZED HEALTH CARE EDUCATION/TRAINING PROGRAM

## 2024-01-03 PROCEDURE — 85025 COMPLETE CBC W/AUTO DIFF WBC: CPT | Performed by: INTERNAL MEDICINE

## 2024-01-03 PROCEDURE — 82948 REAGENT STRIP/BLOOD GLUCOSE: CPT

## 2024-01-03 PROCEDURE — 25010000002 METRONIDAZOLE 500 MG/100ML SOLUTION: Performed by: INTERNAL MEDICINE

## 2024-01-03 PROCEDURE — 25010000002 HYDROMORPHONE 1 MG/ML SOLUTION: Performed by: STUDENT IN AN ORGANIZED HEALTH CARE EDUCATION/TRAINING PROGRAM

## 2024-01-03 PROCEDURE — 83690 ASSAY OF LIPASE: CPT | Performed by: STUDENT IN AN ORGANIZED HEALTH CARE EDUCATION/TRAINING PROGRAM

## 2024-01-03 PROCEDURE — 36415 COLL VENOUS BLD VENIPUNCTURE: CPT | Performed by: STUDENT IN AN ORGANIZED HEALTH CARE EDUCATION/TRAINING PROGRAM

## 2024-01-03 PROCEDURE — 80053 COMPREHEN METABOLIC PANEL: CPT | Performed by: STUDENT IN AN ORGANIZED HEALTH CARE EDUCATION/TRAINING PROGRAM

## 2024-01-03 RX ADMIN — INSULIN LISPRO 2 UNITS: 100 INJECTION, SOLUTION INTRAVENOUS; SUBCUTANEOUS at 13:04

## 2024-01-03 RX ADMIN — PANCRELIPASE 72000 UNITS OF LIPASE: 36000; 180000; 114000 CAPSULE, DELAYED RELEASE PELLETS ORAL at 08:30

## 2024-01-03 RX ADMIN — LEVETIRACETAM 500 MG: 500 TABLET, FILM COATED ORAL at 08:30

## 2024-01-03 RX ADMIN — SODIUM CHLORIDE, POTASSIUM CHLORIDE, SODIUM LACTATE AND CALCIUM CHLORIDE 150 ML/HR: 600; 310; 30; 20 INJECTION, SOLUTION INTRAVENOUS at 02:08

## 2024-01-03 RX ADMIN — PANTOPRAZOLE SODIUM 40 MG: 40 TABLET, DELAYED RELEASE ORAL at 05:16

## 2024-01-03 RX ADMIN — INSULIN GLARGINE 20 UNITS: 100 INJECTION, SOLUTION SUBCUTANEOUS at 08:30

## 2024-01-03 RX ADMIN — DULOXETINE HYDROCHLORIDE 30 MG: 30 CAPSULE, DELAYED RELEASE ORAL at 08:30

## 2024-01-03 RX ADMIN — PANCRELIPASE 72000 UNITS OF LIPASE: 36000; 180000; 114000 CAPSULE, DELAYED RELEASE PELLETS ORAL at 13:05

## 2024-01-03 RX ADMIN — METRONIDAZOLE 500 MG: 500 INJECTION, SOLUTION INTRAVENOUS at 13:05

## 2024-01-03 RX ADMIN — Medication 10 ML: at 08:31

## 2024-01-03 RX ADMIN — ATORVASTATIN CALCIUM 20 MG: 20 TABLET, FILM COATED ORAL at 08:30

## 2024-01-03 RX ADMIN — SODIUM CHLORIDE, POTASSIUM CHLORIDE, SODIUM LACTATE AND CALCIUM CHLORIDE 150 ML/HR: 600; 310; 30; 20 INJECTION, SOLUTION INTRAVENOUS at 08:33

## 2024-01-03 RX ADMIN — OXYCODONE HYDROCHLORIDE 7.5 MG: 5 TABLET ORAL at 08:38

## 2024-01-03 RX ADMIN — FENOFIBRATE 145 MG: 145 TABLET ORAL at 08:30

## 2024-01-03 RX ADMIN — METRONIDAZOLE 500 MG: 500 INJECTION, SOLUTION INTRAVENOUS at 05:18

## 2024-01-03 RX ADMIN — HYDROMORPHONE HYDROCHLORIDE 1 MG: 1 INJECTION, SOLUTION INTRAMUSCULAR; INTRAVENOUS; SUBCUTANEOUS at 04:14

## 2024-01-03 NOTE — DISCHARGE SUMMARY
[facility] Medicine Services  Discharge Summary      Date of Admission: 12/29/2023  Date of Discharge:  1/3/2024  Primary Care Physician: Krystyna Hardwick DO    Presenting Problem/History of Present Illness:  Hyperglycemia [R73.9]  Pain of upper abdomen [R10.10]  Acute pancreatitis without infection or necrosis, unspecified pancreatitis type [K85.90]  Hypertension, unspecified type [I10]  Nausea and vomiting, unspecified vomiting type [R11.2]       Final Discharge Diagnoses:  Active Hospital Problems    Diagnosis     **Uncontrolled type 2 diabetes mellitus with hyperglycemia     Hyperglycemia     Acute on chronic pancreatitis     Seizures     Dyslipidemia     Benign essential hypertension        Consults:   Consults       Date and Time Order Name Status Description    12/30/2023  8:06 AM Inpatient Endocrinology Consult Completed     12/29/2023  9:38 PM Hospitalist (on-call MD unless specified)              Procedures Performed:                 Pertinent Test Results:   Lab Results (most recent)       Procedure Component Value Units Date/Time    POC Glucose Once [[id number]]  (Abnormal) Collected: 01/03/24 1143    Specimen: Blood Updated: 01/03/24 1144     Glucose 168 mg/dL      Comment: Serial Number: [id number]:  [id number]       POC Glucose Once [[id number]]  (Normal) Collected: 01/03/24 0802    Specimen: Blood Updated: 01/03/24 0804     Glucose 99 mg/dL      Comment: Serial Number: [id number]:  [id number]       Comprehensive Metabolic Panel [[id number]]  (Abnormal) Collected: 01/03/24 0046    Specimen: Blood Updated: 01/03/24 0118     Glucose 88 mg/dL      BUN 8 mg/dL      Creatinine 0.98 mg/dL      Sodium 134 mmol/L      Potassium 4.8 mmol/L      Comment: Result checked          Chloride 102 mmol/L      CO2 26.0 mmol/L      Calcium 8.9 mg/dL      Total Protein 5.9 g/dL      Albumin 3.3 g/dL      ALT (SGPT) 18 U/L      AST (SGOT) 22 U/L      Alkaline Phosphatase 44 U/L      Total  Bilirubin 0.2 mg/dL      Globulin 2.6 gm/dL      A/G Ratio 1.3 g/dL      BUN/Creatinine Ratio 8.2     Anion Gap 6.0 mmol/L      eGFR 89.4 mL/min/1.73     Narrative:      GFR Normal >60  Chronic Kidney Disease <60  Kidney Failure <15      Lipase [[id number]]  (Abnormal) Collected: 01/03/24 0046    Specimen: Blood Updated: 01/03/24 0116     Lipase 87 U/L     CBC & Differential [[id number]]  (Abnormal) Collected: 01/03/24 0046    Specimen: Blood Updated: 01/03/24 0055    Narrative:      The following orders were created for panel order CBC & Differential.  Procedure                               Abnormality         Status                     ---------                               -----------         ------                     CBC Auto Differential[[id number]]        Abnormal            Final result                 Please view results for these tests on the individual orders.    CBC Auto Differential [[id number]]  (Abnormal) Collected: 01/03/24 0046    Specimen: Blood Updated: 01/03/24 0055     WBC 4.50 10*3/mm3      RBC 3.43 10*6/mm3      Hemoglobin 11.1 g/dL      Hematocrit 33.5 %      MCV 97.7 fL      MCH 32.4 pg      MCHC 33.1 g/dL      RDW 12.4 %      RDW-SD 42.0 fl      MPV 7.1 fL      Platelets 308 10*3/mm3      Neutrophil % 51.0 %      Lymphocyte % 33.6 %      Monocyte % 12.3 %      Eosinophil % 2.6 %      Basophil % 0.5 %      Neutrophils, Absolute 2.30 10*3/mm3      Lymphocytes, Absolute 1.50 10*3/mm3      Monocytes, Absolute 0.50 10*3/mm3      Eosinophils, Absolute 0.10 10*3/mm3      Basophils, Absolute 0.00 10*3/mm3      nRBC 0.1 /100 WBC     Blood Culture - Blood, Arm, Left [[id number]]  (Normal) Collected: 12/29/23 2055    Specimen: Blood from Arm, Left Updated: 01/02/24 2115     Blood Culture No growth at 4 days    Blood Culture - Blood, Arm, Left [[id number]]  (Normal) Collected: 12/29/23 2019    Specimen: Blood from Arm, Left Updated: 01/02/24 2030     Blood Culture No growth at 4 days    Comprehensive  Metabolic Panel [[id number]]  (Abnormal) Collected: 01/02/24 0504    Specimen: Blood Updated: 01/02/24 0554     Glucose 147 mg/dL      BUN 7 mg/dL      Creatinine 0.85 mg/dL      Sodium 129 mmol/L      Potassium 3.2 mmol/L      Chloride 98 mmol/L      CO2 25.0 mmol/L      Calcium 8.7 mg/dL      Total Protein 5.7 g/dL      Albumin 3.1 g/dL      ALT (SGPT) 20 U/L      AST (SGOT) 25 U/L      Alkaline Phosphatase 48 U/L      Total Bilirubin 0.2 mg/dL      Globulin 2.6 gm/dL      A/G Ratio 1.2 g/dL      BUN/Creatinine Ratio 8.2     Anion Gap 6.0 mmol/L      eGFR 100.7 mL/min/1.73     Narrative:      GFR Normal >60  Chronic Kidney Disease <60  Kidney Failure <15      Lipase [[id number]]  (Abnormal) Collected: 01/02/24 0504    Specimen: Blood Updated: 01/02/24 0554     Lipase 87 U/L     CBC Auto Differential [[id number]]  (Abnormal) Collected: 01/02/24 0505    Specimen: Blood Updated: 01/02/24 0514     WBC 3.30 10*3/mm3      RBC 3.33 10*6/mm3      Hemoglobin 10.9 g/dL      Hematocrit 31.6 %      MCV 95.1 fL      MCH 32.9 pg      MCHC 34.6 g/dL      RDW 12.3 %      RDW-SD 43.3 fl      MPV 7.2 fL      Platelets 260 10*3/mm3      Neutrophil % 51.1 %      Lymphocyte % 34.6 %      Monocyte % 10.8 %      Eosinophil % 3.0 %      Basophil % 0.5 %      Neutrophils, Absolute 1.70 10*3/mm3      Lymphocytes, Absolute 1.10 10*3/mm3      Monocytes, Absolute 0.40 10*3/mm3      Eosinophils, Absolute 0.10 10*3/mm3      Basophils, Absolute 0.00 10*3/mm3      nRBC 0.2 /100 WBC     Lactic Acid, Plasma [[id number]]  (Abnormal) Collected: 01/01/24 1852    Specimen: Blood Updated: 01/01/24 1926     Lactate 2.5 mmol/L     Triglycerides [[id number]]  (Abnormal) Collected: 01/01/24 0147    Specimen: Blood Updated: 01/01/24 1014     Triglycerides 187 mg/dL     Hemoglobin A1c [[id number]]  (Abnormal) Collected: 12/31/23 0428    Specimen: Blood Updated: 12/31/23 1538     Hemoglobin A1C 8.50 %     STAT Lactic Acid, Reflex [[id number]]  (Abnormal) Collected:  12/30/23 0115    Specimen: Blood Updated: 12/30/23 0258     Lactate 3.5 mmol/L     POC Lactate [[id number]]  (Abnormal) Collected: 12/29/23 2025    Specimen: Blood Updated: 12/29/23 2027     Lactate 2.7 mmol/L      Comment: Serial Number: [id number]:  [id number]       Ketone Bodies, Serum (Not performed at [facility]) [[id number]]  (Abnormal) Collected: 12/29/23 1815    Specimen: Blood Updated: 12/29/23 1921    Narrative:      The following orders were created for panel order Ketone Bodies, Serum (Not performed at [facility]).  Procedure                               Abnormality         Status                     ---------                               -----------         ------                     Acetone[[id number]]                      Abnormal            Final result                 Please view results for these tests on the individual orders.    Acetone [[id number]]  (Abnormal) Collected: 12/29/23 1815    Specimen: Blood Updated: 12/29/23 1921     Acetone Small    [place] Draw [[id number]] Collected: 12/29/23 1815    Specimen: Blood Updated: 12/29/23 1916    Narrative:      The following orders were created for panel order [place] Draw.  Procedure                               Abnormality         Status                     ---------                               -----------         ------                     Green Top (Gel)[[id number]]                                  Final result               Lavender Top[[id number]]                                     Final result               Gold Top - SST[[id number]]                                   Final result               Light Blue Top[[id number]]                                   Final result                 Please view results for these tests on the individual orders.    Lavender Top [[id number]] Collected: 12/29/23 1815    Specimen: Blood Updated: 12/29/23 1916     Extra Tube hold for add-on     Comment: Auto resulted       Gold Top - SST [[id number]] Collected: 12/29/23  1815    Specimen: Blood Updated: 12/29/23 1916     Extra Tube Hold for add-ons.     Comment: Auto resulted.       Light Blue Top [[id number]] Collected: 12/29/23 1815    Specimen: Blood Updated: 12/29/23 1916     Extra Tube Hold for add-ons.     Comment: Auto resulted       Green Top (Gel) [[id number]] Collected: 12/29/23 1815    Specimen: Blood Updated: 12/29/23 1915     Extra Tube --    Magnesium [[id number]]  (Normal) Collected: 12/29/23 1815    Specimen: Blood Updated: 12/29/23 1849     Magnesium 1.8 mg/dL     Phosphorus [[id number]]  (Normal) Collected: 12/29/23 1815    Specimen: Blood Updated: 12/29/23 1846     Phosphorus 4.1 mg/dL     Urinalysis With Microscopic If Indicated (No Culture) - Urine, Clean Catch [[id number]]  (Abnormal) Collected: 12/29/23 1833    Specimen: Urine, Clean Catch Updated: 12/29/23 1843     Color, UA Yellow     Appearance, UA Clear     pH, UA 7.5     Specific Gravity, UA 1.029     Glucose, UA >=1000 mg/dL (3+)     Ketones, UA 15 mg/dL (1+)     Bilirubin, UA Negative     Blood, UA Moderate (2+)     Protein, UA 30 mg/dL (1+)     Leuk Esterase, UA Negative     Nitrite, UA Negative     Urobilinogen, UA 0.2 E.U./dL    Urinalysis, Microscopic Only - Urine, Clean Catch [[id number]] Collected: 12/29/23 1833    Specimen: Urine, Clean Catch Updated: 12/29/23 1843     RBC, UA 0-2 /HPF      WBC, UA 0-2 /HPF      Bacteria, UA None Seen /HPF      Squamous Epithelial Cells, UA 0-2 /HPF      Hyaline Casts, UA None Seen /LPF      Methodology Automated Microscopy    CBC & Differential [[id number]]  (Abnormal) Collected: 12/29/23 1815    Specimen: Blood Updated: 12/29/23 1832    Narrative:      The following orders were created for panel order CBC & Differential.  Procedure                               Abnormality         Status                     ---------                               -----------         ------                     CBC Auto Differential[[id number]]        Abnormal            Final result                  Please view results for these tests on the individual orders.    Blood Gas, Arterial - [[id number]]  (Abnormal) Collected: 12/29/23 1752    Specimen: Arterial Blood Updated: 12/29/23 1756     Site Left Radial     Matt's Test Positive     pH, Arterial 7.572 pH units      pCO2, Arterial 35.1 mm Hg      pO2, Arterial 52.7 mm Hg      HCO3, Arterial 32.3 mmol/L      Base Excess, Arterial 9.9 mmol/L      Comment: Serial Number: [id number]:  [id number]        O2 Saturation, Arterial 91.5 %      CO2 Content 33.4 mmol/L      Barometric Pressure for Blood Gas --     Comment: N/A        Modality Room Air     FIO2 21 %      Hemodilution No          Imaging Results (Most Recent)       Procedure Component Value Units Date/Time    XR Chest 1 View [[id number]] Collected: 12/29/23 2047     Updated: 12/29/23 2049    Narrative:      XR CHEST 1 VW    Date of Exam: 12/29/2023 8:23 PM EST    Indication: tachycardia    Comparison: 2/20/2021    Findings:  The cardiomediastinal silhouette is within normal limits. Lungs are clear. No focal consolidation, pneumothorax, or significant pleural effusion. Osseous structures grossly intact.      Impression:      Impression:  No acute process.            Electronically Signed: Vikas Bertrand MD    12/29/2023 8:47 PM EST    Workstation ID: [id number]    CT Abdomen Pelvis With Contrast [[id number]] Collected: 12/29/23 2021     Updated: 12/29/23 2040    Narrative:      CT ABDOMEN PELVIS W CONTRAST    Date of Exam: 12/29/2023 7:20 PM EST    Indication: upper abd pain, n/v.    Comparison: CT abdomen and pelvis 5/2/2023    Technique: Axial CT images were obtained of the abdomen and pelvis following the uneventful intravenous administration of iodinated contrast. Sagittal and coronal reconstructions were performed.  Automated exposure control and iterative reconstruction   methods were used.      Findings:  Lung bases are without consolidation. Heart size normal. Negative for pericardial or pleural  effusion. Mild wall thickening of the visualized distal esophagus.    The liver and spleen are normal in size and contour. Normal adrenal glands. Gallbladder absent. Dilatation of the common bile duct similar to prior study. There is inflammatory stranding surrounding the pancreas consistent with acute interstitial   pancreatitis. There are areas of chronic pancreatic ductal dilatation and irregularity related to sequela of chronic pancreatitis which are similar to the prior study for example the main pancreatic duct is dilated up to 8 mm at the pancreatic neck   (3/45). No organized peripancreatic fluid collection. Inflammation surrounding the pancreas results in new narrowing of the splenic vein without complete thrombosis (3/49). Mild narrowing of the portal vein at the portal splenic confluence. The portal   vein and SMV are patent.    Right kidney absent. Normal left renal enhancement. No hydronephrosis on the left. Urinary bladder thin-walled. Negative for pneumoperitoneum. No bowel obstruction. No findings of appendicitis.    Inflammatory stranding surrounding the second and third portion of the duodenum related to pancreatitis. The celiac axis and superior mesenteric artery are patent. Abdominal aortic branch vasculature patent. No organized or drainable fluid collection in   the abdomen or pelvis. Fluid-filled distention of the stomach. 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 No aggressive osseous lesion or acute fracture.      Impression:      Impression:  1. Acute interstitial pancreatitis. No organized peripancreatic fluid collection.  2. Chronic pancreatic ductal dilatation and irregularity related to sequela of chronic pancreatitis, similar to prior study.  3. New narrowing of splenic vein and portal vein related to pancreatitis. No portal venous or splenic vein thrombus.  4. Fluid-filled distention of stomach may relate to delayed transit secondary to inflammation involving the duodenum.  4. Additional chronic  "findings above.        Electronically Signed: Vikas Bertrand MD    12/29/2023 8:38 PM EST    Workstation ID: [id number]            Chief Complaint on Day of Discharge: pancreatitis, hyperglycemia    Hospital Course:  Mitchell Jacome is a 58 y.o. male with a CMH of DMT2, chornic pancreatitis, HTN, HLD, GERD who presented to [facility] on 12/29/2023 with abdominal pain.     Patient states that over the last 4 days he has been having progressively worsening abdominal pain, nausea, emesis.  He states he has a history of chronic pancreatitis, and feels like this is likely a flare.  Patient could not identify any precipitating factors.  ED found that the patient had a glucose of 622 and an elevated lipase.    The patient was managed supportively for his pancreatitis.  His hyperglycemia was secondary to his pancreatitis (per the patient, this is normal with pancreatitis flares).  His insulin was adjusted and his hyperglycemia was better controlled.  He was deemed stable for discharge on 1/3.  He is to f/u with his PCP in 1 week.    Condition on Discharge:  stable    Physical Exam on Discharge:  /93 (BP Location: Right arm, Patient Position: Lying)   Pulse 80   Temp 98.1 °F (36.7 °C) (Oral)   Resp 16   Ht 188 cm (74\")   Wt 75.6 kg (166 lb 10.7 oz)   SpO2 100%   BMI 21.40 kg/m²   Physical Exam  General: NAD, AAOx3  HEENT: AT NC, EOMI  Neck: No JVD  CVS: S1/S2 present, RRR, no M/R/G  Lungs: CTA B/L  Abdomen: soft, minimally tender to palpation diffusely, ND, BS+  Ext: no edema  Skin: tattoos  Neuro: no focal deficits  Psych: Not agitated       Discharge Disposition:  Home or Self Care    Discharge Medications:     Discharge Medications        Continue These Medications        Instructions Start Date   atorvastatin 20 MG tablet  Commonly known as: LIPITOR   20 mg, Oral, Daily      DULoxetine 30 MG capsule  Commonly known as: CYMBALTA   30 mg, Oral, Daily      fenofibrate 160 MG tablet   160 mg, Oral, " Daily      glucagon 1 MG injection  Commonly known as: GLUCAGEN   1 kit, Injection, As Needed      HYDROcodone-acetaminophen 7.5-325 MG per tablet  Commonly known as: Norco   1 tablet, Oral, Every 8 Hours PRN      Insulin Aspart 100 UNIT/ML injection  Commonly known as: novoLOG   Sliding scale QAC------BS<130, No insulin; BS= 131-150, give 3units; BS= 151-200, give 6units; BS= 201-250, give 9 units; 251-300, give 12 units      levETIRAcetam 500 MG tablet  Commonly known as: KEPPRA   500 mg, Oral, 2 Times Daily      multivitamin with minerals tablet tablet   1 tablet, Oral, Daily      omeprazole 40 MG capsule  Commonly known as: priLOSEC   40 mg, Oral, Every 24 Hours      pancrelipase (Lip-Prot-Amyl) 76504-04258 units capsule delayed-release particles capsule  Commonly known as: Creon   3 tabs po TID W/ meals      Tresiba FlexTouch 100 UNIT/ML solution pen-injector injection  Generic drug: insulin degludec   20 Units, Subcutaneous, Daily               Discharge Diet:  diabetic    Activity at Discharge:  as tolerated    Discharge Care Plan/Instructions: f/u PCP    Time: less than 30 minutes

## 2024-01-03 NOTE — PROGRESS NOTES
"Subjective   Mitchell Jacome is a 58 y.o. male.   Seen for diabetes f/u.  Back on LR IV fluids since sepsis dx triggered last night.  Not eating much. C/o abd pain.      Objective     /84 (BP Location: Right arm, Patient Position: Lying)   Pulse 93   Temp 97.7 °F (36.5 °C) (Oral)   Resp 16   Ht 188 cm (74\")   Wt 75.6 kg (166 lb 10.7 oz)   SpO2 99%   BMI 21.40 kg/m²   Blood sugar  121 this am,  210 @ lunch, 130 @ supper    ASSESSMENT  Diabetes type 1, with hyperglycemia  Patient is stable    PLAN    Continue same insulin regimen.         Renetta Natarajan MD  1/2/2024  20:13 EST    "

## 2024-01-03 NOTE — OUTREACH NOTE
Prep Survey      Flowsheet Row Responses   [facility] facility patient discharged from? [facility]   Is LACE score < 7 ? No   Eligibility TCM   Date of Admission 12/29/23   Date of Discharge 01/03/24   Discharge Disposition Home or Self Care   Discharge diagnosis abdominal pain   Acute on chronic pancreatitis type 2 dm   Does the patient have one of the following disease processes/diagnoses(primary or secondary)? Other   Does the patient have Home health ordered? No   Is there a DME ordered? No   Prep survey completed? Yes            MARLO CHAVEZ - Registered Nurse

## 2024-01-03 NOTE — PAYOR COMM NOTE
"Discharge notification and clinical update for case# [id number]     ===========  AUTHORIZATION PENDING FOR DATE 1/2/24:       PLEASE FAX OR CALL DETERMINATION TO CONTACT BELOW:       THANK YOU,    GERONIMO Ingram, RN  Utilization Review  [facility]  Phone: [phone]  Fax: [phone]      NPI: [id number]  Tax ID: [id number]        Ceci Tapia (58 y.o. Male)       Date of Birth   1965    Social Security Number       Address   [address] [place] [address]    Home Phone   [phone]    MRN   [id number]       [place]   None    Marital Status                               Admission Date   12/29/23    Admission Type   Emergency    Admitting Provider   Yasmani Corbin MD    Attending Provider       Department, Room/Bed   [facility], 1117/1       Discharge Date   1/3/2024    Discharge Disposition   Home or Self Care    Discharge Destination                                 Attending Provider: (none)   Allergies: Basaglar Kwikpen [Insulin Glargine], Gemfibrozil    Isolation: None   Infection: None   Code Status: CPR    Ht: 188 cm (74\")   Wt: 75.6 kg (166 lb 10.7 oz)    Admission Cmt: None   Principal Problem: Uncontrolled type 2 diabetes mellitus with hyperglycemia [E11.65]                   Active Insurance as of 12/29/2023       Primary Coverage       Payor Plan Insurance Group Employer/Plan Group    [employer]HE[employer] BLUE CROSS AN[employer]M BLUE CROSS BLUE SHIELD PPO [id number]       Payor Plan Address Payor Plan Phone Number Payor Plan Fax Number Effective Dates    [address] [phone]  1/1/2020 - None Entered    [facility] [id number]         Subscriber Name Subscriber Birth Date Member ID       [employer] TAPIA 1965 [id number]                     Emergency Contacts        (Rel.) Home Phone Work Phone Mobile Phone    ZAINAB CRUZ (Spouse) [phone] -- [phone]                 Physician Progress Notes (last 48 hours)        Delgado, " "MD Renetta at 24          Subjective   Mitchell Jacome is a 58 y.o. male.   Seen for diabetes f/u.  Back on LR IV fluids since sepsis dx triggered last night.  Not eating much. C/o abd pain.      Objective     /84 (BP Location: Right arm, Patient Position: Lying)   Pulse 93   Temp 97.7 °F (36.5 °C) (Oral)   Resp 16   Ht 188 cm (74\")   Wt 75.6 kg (166 lb 10.7 oz)   SpO2 99%   BMI 21.40 kg/m²   Blood sugar  121 this am,  210 @ lunch, 130 @ supper    ASSESSMENT  Diabetes type 1, with hyperglycemia  Patient is stable    PLAN    Continue same insulin regimen.        Rneetta Natarajan MD  2024  20:13 EST      Electronically signed by Renetta Nataarjan MD at 24       Sean Armando MD at 24 [address] [place] [id number] Medicine Services   Daily Progress Note    Patient Name: Mitchell Jacome  : 1965  MRN: [id number]  Primary Care Physician:  Krystyna Hardwick DO  Date of admission: 2023  Date of Service: 2024      Subjective      Patient is abdominal pain.  Moderate abdominal pain.  He is on IV Dilaudid every 2 hours.  Patient was updated.  Mild nausea.  No vomiting.    ROS-review    Objective      Vitals:   Temp:  [97 °F (36.1 °C)-98.9 °F (37.2 °C)] 98.5 °F (36.9 °C)  Heart Rate:  [89-97] 89  Resp:  [14-16] 16  BP: (135-148)/(79-95) 143/92    Physical Exam       General:  Alert and oriented , no  distress  Eyes:  Pupils are equal, round and reactive to light. Extraocular movements are intact. Normal conjunctivae, vision unchanged    HENT:  Normocephalic, atraumatic   Respiratory: Decrease in breathing sounds anteriorly bilaterally. No wheezing  Cardiovascular: Regular rate, Regular, S1 auscultated. S2 auscultated , No edema   Gastrointestinal: Soft.  Mild diffuse tenderness present anteriorly.  No rebound no guarding pulses are present.  Musculoskeletal: No tenderness   Neurologic: Alert, oriented. No focal defect   Psychiatric: Cooperative " , appropriate mood and affect, nonsuicidal           Result Review    Result Review:  I have personally reviewed the results from the time of this admission to 1/2/2024 16:00 EST and agree with these findings:  [x]  Laboratory  []  Microbiology  [x]  Radiology  []  EKG/Telemetry   []  Cardiology/Vascular   []  Pathology  []  Old records  []  Other:  Most notable findings include: abd pain          Assessment & Plan          atorvastatin, 20 mg, Oral, Daily  cefTRIAXone, 1,000 mg, Intravenous, Q24H  DULoxetine, 30 mg, Oral, Daily  enoxaparin, 40 mg, Subcutaneous, Daily  fenofibrate, 145 mg, Oral, Daily  insulin glargine, 20 Units, Subcutaneous, Daily  insulin lispro, 2-7 Units, Subcutaneous, 4x Daily AC & at Bedtime  levETIRAcetam, 500 mg, Oral, BID  metroNIDAZOLE, 500 mg, Intravenous, Q8H  Pancrelipase (Lip-Prot-Amyl), 72,000 units of lipase, Oral, TID With Meals  pantoprazole, 40 mg, Oral, Q AM  potassium chloride, 40 mEq, Oral, Q4H  senna-docusate sodium, 2 tablet, Oral, BID  sodium chloride, 10 mL, Intravenous, Q12H  sodium chloride, 10 mL, Intravenous, Q12H       lactated ringers, 150 mL/hr, Last Rate: 150 mL/hr (01/02/24 0812)         Active Hospital Problems:  Active Hospital Problems    Diagnosis     **Uncontrolled type 2 diabetes mellitus with hyperglycemia     Hyperglycemia     Acute on chronic pancreatitis     Seizures     Dyslipidemia     Benign essential hypertension          #Acute on chronic pancreatitis  Patient continues to abdominal pain.  He is on IV analgesics.  Patient on Dilaudid every 2 hours.  CT scan Acute interstitial pancreatitis. No organized peripancreatic fluid collection.   Chronic pancreatic ductal dilatation and irregularity related to sequela of chronic pancreatitis  Follow clinically.  IV hydration.  Patient already on IV antibiotics.  Follow cultures.  Follow-up electrolytes      #DMT2 with hyperglycemia  Follow-up sugars  SSI  Endocrine seen.    Hypokalemia  Replace  potassium.  Follow-up electrolytes         #Seizures  No new issues  - Continue home meds     #HTN  #HLD  HDS.  - Continue home medications     Patient was updated with plan of care.  All questions answered      DVT prophylaxis:  Medical DVT prophylaxis orders are present.    CODE STATUS:    Level Of Support Discussed With: Patient  Code Status (Patient has no pulse and is not breathing): CPR (Attempt to Resuscitate)  Medical Interventions (Patient has pulse or is breathing): Full Support      Disposition:  I expect patient to be discharged  once abdominal pain is better.      Electronically signed by Sean Armando MD, 01/02/24, 16:00 [facility].  [facility] Hospitalist Team               Electronically signed by Sean Armando MD at 01/02/24 1608              Discharge Summary        Justin Graves MD at 01/03/24 1212                     [facility] Medicine Services  Discharge Summary      Date of Admission: 12/29/2023  Date of Discharge:  1/3/2024  Primary Care Physician: Krystyna Hardwick DO    Presenting Problem/History of Present Illness:  Hyperglycemia [R73.9]  Pain of upper abdomen [R10.10]  Acute pancreatitis without infection or necrosis, unspecified pancreatitis type [K85.90]  Hypertension, unspecified type [I10]  Nausea and vomiting, unspecified vomiting type [R11.2]       Final Discharge Diagnoses:  Active Hospital Problems    Diagnosis     **Uncontrolled type 2 diabetes mellitus with hyperglycemia     Hyperglycemia     Acute on chronic pancreatitis     Seizures     Dyslipidemia     Benign essential hypertension        Consults:   Consults       Date and Time Order Name Status Description    12/30/2023  8:06 AM Inpatient Endocrinology Consult Completed     12/29/2023  9:38 PM Hospitalist (on-call MD unless specified)              Procedures Performed:                 Pertinent Test Results:   Lab Results (most recent)       Procedure Component Value Units Date/Time    POC Glucose Once [[id number]]   (Abnormal) Collected: 01/03/24 1143    Specimen: Blood Updated: 01/03/24 1144     Glucose 168 mg/dL      Comment: Serial Number: [id number]:  [id number]       POC Glucose Once [[id number]]  (Normal) Collected: 01/03/24 0802    Specimen: Blood Updated: 01/03/24 0804     Glucose 99 mg/dL      Comment: Serial Number: [id number]:  [id number]       Comprehensive Metabolic Panel [[id number]]  (Abnormal) Collected: 01/03/24 0046    Specimen: Blood Updated: 01/03/24 0118     Glucose 88 mg/dL      BUN 8 mg/dL      Creatinine 0.98 mg/dL      Sodium 134 mmol/L      Potassium 4.8 mmol/L      Comment: Result checked          Chloride 102 mmol/L      CO2 26.0 mmol/L      Calcium 8.9 mg/dL      Total Protein 5.9 g/dL      Albumin 3.3 g/dL      ALT (SGPT) 18 U/L      AST (SGOT) 22 U/L      Alkaline Phosphatase 44 U/L      Total Bilirubin 0.2 mg/dL      Globulin 2.6 gm/dL      A/G Ratio 1.3 g/dL      BUN/Creatinine Ratio 8.2     Anion Gap 6.0 mmol/L      eGFR 89.4 mL/min/1.73     Narrative:      GFR Normal >60  Chronic Kidney Disease <60  Kidney Failure <15      Lipase [[id number]]  (Abnormal) Collected: 01/03/24 0046    Specimen: Blood Updated: 01/03/24 0116     Lipase 87 U/L     CBC & Differential [[id number]]  (Abnormal) Collected: 01/03/24 0046    Specimen: Blood Updated: 01/03/24 0055    Narrative:      The following orders were created for panel order CBC & Differential.  Procedure                               Abnormality         Status                     ---------                               -----------         ------                     CBC Auto Differential[[id number]]        Abnormal            Final result                 Please view results for these tests on the individual orders.    CBC Auto Differential [[id number]]  (Abnormal) Collected: 01/03/24 0046    Specimen: Blood Updated: 01/03/24 0055     WBC 4.50 10*3/mm3      RBC 3.43 10*6/mm3      Hemoglobin 11.1 g/dL      Hematocrit 33.5 %      MCV 97.7 fL       MCH 32.4 pg      MCHC 33.1 g/dL      RDW 12.4 %      RDW-SD 42.0 fl      MPV 7.1 fL      Platelets 308 10*3/mm3      Neutrophil % 51.0 %      Lymphocyte % 33.6 %      Monocyte % 12.3 %      Eosinophil % 2.6 %      Basophil % 0.5 %      Neutrophils, Absolute 2.30 10*3/mm3      Lymphocytes, Absolute 1.50 10*3/mm3      Monocytes, Absolute 0.50 10*3/mm3      Eosinophils, Absolute 0.10 10*3/mm3      Basophils, Absolute 0.00 10*3/mm3      nRBC 0.1 /100 WBC     Blood Culture - Blood, Arm, Left [[id number]]  (Normal) Collected: 12/29/23 2055    Specimen: Blood from Arm, Left Updated: 01/02/24 2115     Blood Culture No growth at 4 days    Blood Culture - Blood, Arm, Left [[id number]]  (Normal) Collected: 12/29/23 2019    Specimen: Blood from Arm, Left Updated: 01/02/24 2030     Blood Culture No growth at 4 days    Comprehensive Metabolic Panel [[id number]]  (Abnormal) Collected: 01/02/24 0504    Specimen: Blood Updated: 01/02/24 0554     Glucose 147 mg/dL      BUN 7 mg/dL      Creatinine 0.85 mg/dL      Sodium 129 mmol/L      Potassium 3.2 mmol/L      Chloride 98 mmol/L      CO2 25.0 mmol/L      Calcium 8.7 mg/dL      Total Protein 5.7 g/dL      Albumin 3.1 g/dL      ALT (SGPT) 20 U/L      AST (SGOT) 25 U/L      Alkaline Phosphatase 48 U/L      Total Bilirubin 0.2 mg/dL      Globulin 2.6 gm/dL      A/G Ratio 1.2 g/dL      BUN/Creatinine Ratio 8.2     Anion Gap 6.0 mmol/L      eGFR 100.7 mL/min/1.73     Narrative:      GFR Normal >60  Chronic Kidney Disease <60  Kidney Failure <15      Lipase [[id number]]  (Abnormal) Collected: 01/02/24 0504    Specimen: Blood Updated: 01/02/24 0554     Lipase 87 U/L     CBC Auto Differential [[id number]]  (Abnormal) Collected: 01/02/24 0505    Specimen: Blood Updated: 01/02/24 0514     WBC 3.30 10*3/mm3      RBC 3.33 10*6/mm3      Hemoglobin 10.9 g/dL      Hematocrit 31.6 %      MCV 95.1 fL      MCH 32.9 pg      MCHC 34.6 g/dL      RDW 12.3 %      RDW-SD 43.3 fl      MPV 7.2 fL      Platelets 260  10*3/mm3      Neutrophil % 51.1 %      Lymphocyte % 34.6 %      Monocyte % 10.8 %      Eosinophil % 3.0 %      Basophil % 0.5 %      Neutrophils, Absolute 1.70 10*3/mm3      Lymphocytes, Absolute 1.10 10*3/mm3      Monocytes, Absolute 0.40 10*3/mm3      Eosinophils, Absolute 0.10 10*3/mm3      Basophils, Absolute 0.00 10*3/mm3      nRBC 0.2 /100 WBC     Lactic Acid, Plasma [[id number]]  (Abnormal) Collected: 01/01/24 1852    Specimen: Blood Updated: 01/01/24 1926     Lactate 2.5 mmol/L     Triglycerides [[id number]]  (Abnormal) Collected: 01/01/24 0147    Specimen: Blood Updated: 01/01/24 1014     Triglycerides 187 mg/dL     Hemoglobin A1c [[id number]]  (Abnormal) Collected: 12/31/23 0428    Specimen: Blood Updated: 12/31/23 1538     Hemoglobin A1C 8.50 %     STAT Lactic Acid, Reflex [[id number]]  (Abnormal) Collected: 12/30/23 0115    Specimen: Blood Updated: 12/30/23 0258     Lactate 3.5 mmol/L     POC Lactate [[id number]]  (Abnormal) Collected: 12/29/23 2025    Specimen: Blood Updated: 12/29/23 2027     Lactate 2.7 mmol/L      Comment: Serial Number: [id number]:  [id number]       Ketone Bodies, Serum (Not performed at [place]) [[id number]]  (Abnormal) Collected: 12/29/23 1815    Specimen: Blood Updated: 12/29/23 1921    Narrative:      The following orders were created for panel order Ketone Bodies, Serum (Not performed at [place]).  Procedure                               Abnormality         Status                     ---------                               -----------         ------                     Acetone[[id number]]                      Abnormal            Final result                 Please view results for these tests on the individual orders.    Acetone [[id number]]  (Abnormal) Collected: 12/29/23 1815    Specimen: Blood Updated: 12/29/23 1921     Acetone Small    [place] Draw [[id number]] Collected: 12/29/23 1815    Specimen: Blood Updated: 12/29/23 1916    Narrative:      The following orders were  created for panel order [place] Draw.  Procedure                               Abnormality         Status                     ---------                               -----------         ------                     Green Top (Gel)[[id number]]                                  Final result               Lavender Top[[id number]]                                     Final result               Gold Top - SST[[id number]]                                   Final result               Light Blue Top[[id number]]                                   Final result                 Please view results for these tests on the individual orders.    Lavender Top [[id number]] Collected: 12/29/23 1815    Specimen: Blood Updated: 12/29/23 1916     Extra Tube hold for add-on     Comment: Auto resulted       Gold Top - SST [[id number]] Collected: 12/29/23 1815    Specimen: Blood Updated: 12/29/23 1916     Extra Tube Hold for add-ons.     Comment: Auto resulted.       Light Blue Top [[id number]] Collected: 12/29/23 1815    Specimen: Blood Updated: 12/29/23 1916     Extra Tube Hold for add-ons.     Comment: Auto resulted       Green Top (Gel) [[id number]] Collected: 12/29/23 1815    Specimen: Blood Updated: 12/29/23 1915     Extra Tube --    Magnesium [[id number]]  (Normal) Collected: 12/29/23 1815    Specimen: Blood Updated: 12/29/23 1849     Magnesium 1.8 mg/dL     Phosphorus [[id number]]  (Normal) Collected: 12/29/23 1815    Specimen: Blood Updated: 12/29/23 1846     Phosphorus 4.1 mg/dL     Urinalysis With Microscopic If Indicated (No Culture) - Urine, Clean Catch [[id number]]  (Abnormal) Collected: 12/29/23 1833    Specimen: Urine, Clean Catch Updated: 12/29/23 1843     Color, UA Yellow     Appearance, UA Clear     pH, UA 7.5     Specific Gravity, UA 1.029     Glucose, UA >=1000 mg/dL (3+)     Ketones, UA 15 mg/dL (1+)     Bilirubin, UA Negative     Blood, UA Moderate (2+)     Protein, UA 30 mg/dL (1+)     Leuk Esterase, UA Negative     Nitrite, UA  Negative     Urobilinogen, UA 0.2 E.U./dL    Urinalysis, Microscopic Only - Urine, Clean Catch [[id number]] Collected: 12/29/23 1833    Specimen: Urine, Clean Catch Updated: 12/29/23 1843     RBC, UA 0-2 /HPF      WBC, UA 0-2 /HPF      Bacteria, UA None Seen /HPF      Squamous Epithelial Cells, UA 0-2 /HPF      Hyaline Casts, UA None Seen /LPF      Methodology Automated Microscopy    CBC & Differential [[id number]]  (Abnormal) Collected: 12/29/23 1815    Specimen: Blood Updated: 12/29/23 1832    Narrative:      The following orders were created for panel order CBC & Differential.  Procedure                               Abnormality         Status                     ---------                               -----------         ------                     CBC Auto Differential[[id number]]        Abnormal            Final result                 Please view results for these tests on the individual orders.    Blood Gas, Arterial - [[id number]]  (Abnormal) Collected: 12/29/23 1752    Specimen: Arterial Blood Updated: 12/29/23 1756     Site Left Radial     Matt's Test Positive     pH, Arterial 7.572 pH units      pCO2, Arterial 35.1 mm Hg      pO2, Arterial 52.7 mm Hg      HCO3, Arterial 32.3 mmol/L      Base Excess, Arterial 9.9 mmol/L      Comment: Serial Number: [id number]:  [id number]        O2 Saturation, Arterial 91.5 %      CO2 Content 33.4 mmol/L      Barometric Pressure for Blood Gas --     Comment: N/A        Modality Room Air     FIO2 21 %      Hemodilution No          Imaging Results (Most Recent)       Procedure Component Value Units Date/Time    XR Chest 1 View [[id number]] Collected: 12/29/23 2047     Updated: 12/29/23 2049    Narrative:      XR CHEST 1 VW    Date of Exam: 12/29/2023 8:23 PM EST    Indication: tachycardia    Comparison: 2/20/2021    Findings:  The cardiomediastinal silhouette is within normal limits. Lungs are clear. No focal consolidation, pneumothorax, or significant pleural effusion. Osseous  structures grossly intact.      Impression:      Impression:  No acute process.            Electronically Signed: Vikas Bertrand MD    12/29/2023 8:47 PM EST    Workstation ID: [id number]    CT Abdomen Pelvis With Contrast [[id number]] Collected: 12/29/23 2021     Updated: 12/29/23 2040    Narrative:      CT ABDOMEN PELVIS W CONTRAST    Date of Exam: 12/29/2023 7:20 PM EST    Indication: upper abd pain, n/v.    Comparison: CT abdomen and pelvis 5/2/2023    Technique: Axial CT images were obtained of the abdomen and pelvis following the uneventful intravenous administration of iodinated contrast. Sagittal and coronal reconstructions were performed.  Automated exposure control and iterative reconstruction   methods were used.      Findings:  Lung bases are without consolidation. Heart size normal. Negative for pericardial or pleural effusion. Mild wall thickening of the visualized distal esophagus.    The liver and spleen are normal in size and contour. Normal adrenal glands. Gallbladder absent. Dilatation of the common bile duct similar to prior study. There is inflammatory stranding surrounding the pancreas consistent with acute interstitial   pancreatitis. There are areas of chronic pancreatic ductal dilatation and irregularity related to sequela of chronic pancreatitis which are similar to the prior study for example the main pancreatic duct is dilated up to 8 mm at the pancreatic neck   (3/45). No organized peripancreatic fluid collection. Inflammation surrounding the pancreas results in new narrowing of the splenic vein without complete thrombosis (3/49). Mild narrowing of the portal vein at the portal splenic confluence. The portal   vein and SMV are patent.    Right kidney absent. Normal left renal enhancement. No hydronephrosis on the left. Urinary bladder thin-walled. Negative for pneumoperitoneum. No bowel obstruction. No findings of appendicitis.    Inflammatory stranding surrounding the second and third  portion of the duodenum related to pancreatitis. The celiac axis and superior mesenteric artery are patent. Abdominal aortic branch vasculature patent. No organized or drainable fluid collection in   the abdomen or pelvis. Fluid-filled distention of the stomach. 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 No aggressive osseous lesion or acute fracture.      Impression:      Impression:  1. Acute interstitial pancreatitis. No organized peripancreatic fluid collection.  2. Chronic pancreatic ductal dilatation and irregularity related to sequela of chronic pancreatitis, similar to prior study.  3. New narrowing of splenic vein and portal vein related to pancreatitis. No portal venous or splenic vein thrombus.  4. Fluid-filled distention of stomach may relate to delayed transit secondary to inflammation involving the duodenum.  4. Additional chronic findings above.        Electronically Signed: Vikas Bertrand MD    12/29/2023 8:38 PM EST    Workstation ID: [id number]            Chief Complaint on Day of Discharge: pancreatitis, hyperglycemia    Hospital Course:  Mitchell Jacome is a 58 y.o. male with a CMH of DMT2, chornic pancreatitis, HTN, HLD, GERD who presented to [facility] on 12/29/2023 with abdominal pain.     Patient states that over the last 4 days he has been having progressively worsening abdominal pain, nausea, emesis.  He states he has a history of chronic pancreatitis, and feels like this is likely a flare.  Patient could not identify any precipitating factors.  ED found that the patient had a glucose of 622 and an elevated lipase.    The patient was managed supportively for his pancreatitis.  His hyperglycemia was secondary to his pancreatitis (per the patient, this is normal with pancreatitis flares).  His insulin was adjusted and his hyperglycemia was better controlled.  He was deemed stable for discharge on 1/3.  He is to f/u with his PCP in 1 week.    Condition on Discharge:  stable    Physical Exam on  "Discharge:  /93 (BP Location: Right arm, Patient Position: Lying)   Pulse 80   Temp 98.1 °F (36.7 °C) (Oral)   Resp 16   Ht 188 cm (74\")   Wt 75.6 kg (166 lb 10.7 oz)   SpO2 100%   BMI 21.40 kg/m²   Physical Exam  General: NAD, AAOx3  HEENT: AT NC, EOMI  Neck: No JVD  CVS: S1/S2 present, RRR, no M/R/G  Lungs: CTA B/L  Abdomen: soft, minimally tender to palpation diffusely, ND, BS+  Ext: no edema  Skin: tattoos  Neuro: no focal deficits  Psych: Not agitated       Discharge Disposition:  Home or Self Care    Discharge Medications:     Discharge Medications        Continue These Medications        Instructions Start Date   atorvastatin 20 MG tablet  Commonly known as: LIPITOR   20 mg, Oral, Daily      DULoxetine 30 MG capsule  Commonly known as: CYMBALTA   30 mg, Oral, Daily      fenofibrate 160 MG tablet   160 mg, Oral, Daily      glucagon 1 MG injection  Commonly known as: GLUCAGEN   1 kit, Injection, As Needed      HYDROcodone-acetaminophen 7.5-325 MG per tablet  Commonly known as: Norco   1 tablet, Oral, Every 8 Hours PRN      Insulin Aspart 100 UNIT/ML injection  Commonly known as: novoLOG   Sliding scale QAC------BS<130, No insulin; BS= 131-150, give 3units; BS= 151-200, give 6units; BS= 201-250, give 9 units; 251-300, give 12 units      levETIRAcetam 500 MG tablet  Commonly known as: KEPPRA   500 mg, Oral, 2 Times Daily      multivitamin with minerals tablet tablet   1 tablet, Oral, Daily      omeprazole 40 MG capsule  Commonly known as: priLOSEC   40 mg, Oral, Every 24 Hours      pancrelipase (Lip-Prot-Amyl) 02690-67919 units capsule delayed-release particles capsule  Commonly known as: Creon   3 tabs po TID W/ meals      Tresiba FlexTouch 100 UNIT/ML solution pen-injector injection  Generic drug: insulin degludec   20 Units, Subcutaneous, Daily               Discharge Diet:  diabetic    Activity at Discharge:  as tolerated    Discharge Care Plan/Instructions: f/u PCP    Time: less than 30 " minutes                Electronically signed by Lenore Noyola MD at 01/03/24 1214       Discharge Order (From admission, onward)       Start     Ordered    01/03/24 1212  Discharge patient  Once        Expected Discharge Date: 01/03/24   Expected Discharge Time: Midday   Discharge Disposition: Home or Self Care   Physician of Record for Attribution - Please select from Treatment Team: LENORE NOYOLA [[id number]]   Review needed by CMO to determine Physician of Record: No      Question Answer Comment   Physician of Record for Attribution - Please select from Treatment Team LENORE NOYOLA    Review needed by CMO to determine Physician of Record No        01/03/24 121

## 2024-01-03 NOTE — PLAN OF CARE
Goal Outcome Evaluation:  Plan of Care Reviewed With: patient     Problem: Adult Inpatient Plan of Care  Goal: Plan of Care Review  Outcome: Ongoing, Progressing  Flowsheets (Taken 1/3/2024 6304)  Progress: improving  Plan of Care Reviewed With: patient        Progress: improving

## 2024-01-04 ENCOUNTER — TRANSITIONAL CARE MANAGEMENT TELEPHONE ENCOUNTER (OUTPATIENT)
Dept: CALL CENTER | Facility: HOSPITAL | Age: 59
End: 2024-01-04
Payer: COMMERCIAL

## 2024-01-04 DIAGNOSIS — K86.1 CHRONIC RECURRENT PANCREATITIS: ICD-10-CM

## 2024-01-04 RX ORDER — HYDROCODONE BITARTRATE AND ACETAMINOPHEN 7.5; 325 MG/1; MG/1
1 TABLET ORAL EVERY 8 HOURS PRN
Qty: 60 TABLET | Refills: 0 | Status: SHIPPED | OUTPATIENT
Start: 2024-01-04

## 2024-01-04 RX ORDER — INSULIN DEGLUDEC INJECTION 100 U/ML
20 INJECTION, SOLUTION SUBCUTANEOUS DAILY
Qty: 15 ML | Refills: 0
Start: 2024-01-04 | End: 2024-01-06 | Stop reason: SDUPTHER

## 2024-01-04 NOTE — TELEPHONE ENCOUNTER
INSPECT RAN    Rx Refill Note  Requested Prescriptions     Pending Prescriptions Disp Refills    insulin degludec (Tresiba FlexTouch) 100 UNIT/ML solution pen-injector injection 15 mL 0     Sig: Inject 20 Units under the skin into the appropriate area as directed Daily.    HYDROcodone-acetaminophen (Norco) 7.5-325 MG per tablet 60 tablet 0     Sig: Take 1 tablet by mouth Every 8 (Eight) Hours As Needed for Severe Pain.      Last office visit with prescribing clinician: 10/6/2023   Last telemedicine visit with prescribing clinician: Visit date not found   Next office visit with prescribing clinician: Visit date not found        Lipid Panel (05/03/2023 05:26)                  Would you like a call back once the refill request has been completed: [] Yes [] No    If the office needs to give you a call back, can they leave a voicemail: [] Yes [] No    Tyesha De Leon, RT  01/04/24, 13:09 EST

## 2024-01-04 NOTE — OUTREACH NOTE
Call Center TCM Note      Flowsheet Row Responses   [facility] patient discharged from? [facility]   Does the patient have one of the following disease processes/diagnoses(primary or secondary)? Other   TCM attempt successful? Yes   Call start time 1544   Call end time 1548   Discharge diagnosis abdominal pain   Acute on chronic pancreatitis type 2 dm   Meds reviewed with patient/caregiver? Yes   Does the patient have all medications ordered at discharge? N/A   Does the patient have an appointment with their PCP within 7-14 days of discharge? Yes   Has home health visited the patient within 72 hours of discharge? N/A   Psychosocial issues? No   Did the patient receive a copy of their discharge instructions? Yes   Nursing interventions Reviewed instructions with patient   What is the patient's perception of their health status since discharge? Same   Is the patient/caregiver able to teach back signs and symptoms related to disease process for when to call PCP? Yes   Is the patient/caregiver able to teach back signs and symptoms related to disease process for when to call 911? Yes   Is the patient/caregiver able to teach back the hierarchy of who to call/visit for symptoms/problems? PCP, Specialist, Home health nurse, Urgent Care, ED, 911 Yes   If the patient is a current smoker, are they able to teach back resources for cessation? Not a smoker   TCM call completed? Yes   Wrap up additional comments D/C DX: abdominal pain Acute on chronic pancreatitis type 2 dm - Pt still in moderate pain, but better than before admit. No changes to current medications at d/c. TCM APPT with PCP Dr Hardwick is 01/12/2024. Pt wants to discuss better BS mgmt with sensor and/or insulin pump at this tappt   Call end time 1548            Amber Tompkins MA    1/4/2024, 15:51 EST

## 2024-01-06 LAB
BACTERIA SPEC AEROBE CULT: NORMAL
BACTERIA SPEC AEROBE CULT: NORMAL

## 2024-01-08 RX ORDER — MULTIPLE VITAMINS W/ MINERALS TAB 9MG-400MCG
1 TAB ORAL DAILY
Qty: 90 TABLET | Refills: 2 | Status: SHIPPED | OUTPATIENT
Start: 2024-01-08

## 2024-01-08 RX ORDER — INSULIN DEGLUDEC INJECTION 100 U/ML
20 INJECTION, SOLUTION SUBCUTANEOUS DAILY
Qty: 15 ML | Refills: 0 | Status: SHIPPED | OUTPATIENT
Start: 2024-01-08 | End: 2024-01-12

## 2024-01-08 RX ORDER — INSULIN ASPART 100 [IU]/ML
INJECTION, SOLUTION INTRAVENOUS; SUBCUTANEOUS
Qty: 10 ML | Refills: 0 | Status: SHIPPED | OUTPATIENT
Start: 2024-01-08 | End: 2024-01-09 | Stop reason: SDUPTHER

## 2024-01-09 ENCOUNTER — TELEPHONE (OUTPATIENT)
Dept: FAMILY MEDICINE CLINIC | Facility: CLINIC | Age: 59
End: 2024-01-09
Payer: COMMERCIAL

## 2024-01-09 RX ORDER — INSULIN ASPART 100 [IU]/ML
INJECTION, SOLUTION INTRAVENOUS; SUBCUTANEOUS
Qty: 10 ML | Refills: 0 | Status: SHIPPED | OUTPATIENT
Start: 2024-01-09 | End: 2024-01-12

## 2024-01-09 NOTE — TELEPHONE ENCOUNTER
[facility] in [place] sent us a fax asking for you to resend the insulin aspart with a max daily dose so they will be able to bill insurance.

## 2024-01-12 ENCOUNTER — PRIOR AUTHORIZATION (OUTPATIENT)
Dept: FAMILY MEDICINE CLINIC | Facility: CLINIC | Age: 59
End: 2024-01-12

## 2024-01-12 ENCOUNTER — OFFICE VISIT (OUTPATIENT)
Dept: FAMILY MEDICINE CLINIC | Facility: CLINIC | Age: 59
End: 2024-01-12
Payer: COMMERCIAL

## 2024-01-12 VITALS
HEIGHT: 74 IN | HEART RATE: 92 BPM | SYSTOLIC BLOOD PRESSURE: 117 MMHG | DIASTOLIC BLOOD PRESSURE: 79 MMHG | WEIGHT: 171 LBS | OXYGEN SATURATION: 98 % | TEMPERATURE: 98.4 F | BODY MASS INDEX: 21.94 KG/M2

## 2024-01-12 DIAGNOSIS — E11.65 UNCONTROLLED TYPE 2 DIABETES MELLITUS WITH HYPERGLYCEMIA: Chronic | ICD-10-CM

## 2024-01-12 DIAGNOSIS — K85.90 ACUTE RECURRENT PANCREATITIS: Primary | ICD-10-CM

## 2024-01-12 DIAGNOSIS — I10 BENIGN ESSENTIAL HYPERTENSION: ICD-10-CM

## 2024-01-12 RX ORDER — INSULIN DEGLUDEC INJECTION 100 U/ML
25 INJECTION, SOLUTION SUBCUTANEOUS DAILY
Status: SHIPPED
Start: 2024-01-12

## 2024-01-12 RX ORDER — PEN NEEDLE, DIABETIC 32GX 5/32"
NEEDLE, DISPOSABLE MISCELLANEOUS
COMMUNITY
Start: 2024-01-07

## 2024-01-12 RX ORDER — INSULIN ASPART INJECTION 100 [IU]/ML
INJECTION, SOLUTION SUBCUTANEOUS
Qty: 15 ML | Refills: 3 | Status: SHIPPED | OUTPATIENT
Start: 2024-01-12

## 2024-01-12 NOTE — PROGRESS NOTES
Subjective   Mitchell Jacome is a 58 y.o. male.     Chief Complaint   Patient presents with    Transitional Care Management     Pt stated questions on insulin          Current Outpatient Medications:     atorvastatin (LIPITOR) 20 MG tablet, Take 1 tablet by mouth Daily., Disp: 90 tablet, Rfl: 1    BD Pen Needle Fernanda 2nd Gen 32G X 4 MM misc, USE 1 NEEDLE EVERY NIGHT AT BEDTIME TO CHECK BLOOD SUGAR, Disp: , Rfl:     DULoxetine (CYMBALTA) 30 MG capsule, Take 1 capsule by mouth Daily., Disp: 90 capsule, Rfl: 0    fenofibrate 160 MG tablet, Take 1 tablet by mouth Daily., Disp: 90 tablet, Rfl: 1    Glucagon, rDNA, (Glucagon Emergency) 1 MG kit, Inject 1 kit as directed As Needed (for low BS)., Disp: 1 each, Rfl: 2    HYDROcodone-acetaminophen (Norco) 7.5-325 MG per tablet, Take 1 tablet by mouth Every 8 (Eight) Hours As Needed for Severe Pain., Disp: 60 tablet, Rfl: 0    insulin degludec (Tresiba FlexTouch) 100 UNIT/ML solution pen-injector injection, Inject 25 Units under the skin into the appropriate area as directed Daily., Disp: , Rfl:     levETIRAcetam (KEPPRA) 500 MG tablet, Take 1 tablet by mouth 2 (Two) Times a Day., Disp: , Rfl:     multivitamin with minerals tablet tablet, Take 1 tablet by mouth Daily., Disp: 90 tablet, Rfl: 2    omeprazole (priLOSEC) 40 MG capsule, Take 1 capsule by mouth Daily., Disp: 90 capsule, Rfl: 0    pancrelipase, Lip-Prot-Amyl, (CREON) 81447-56192 units capsule delayed-release particles capsule, 3 tabs po TID W/ meals, Disp: , Rfl:     Insulin Aspart, w/Niacinamide, (Fiasp FlexTouch) 100 UNIT/ML solution pen-injector, USE SSI @ MEAL TO MAX OF 15units per meal---45 units per day, Disp: 15 mL, Rfl: 3    traMADol (ULTRAM) 50 MG tablet, Take 1 tablet by mouth Every 12 (Twelve) Hours As Needed for Moderate Pain., Disp: 20 tablet, Rfl: 0    Past Medical History:   Diagnosis Date    Chronic recurrent pancreatitis     COVID-19     Depression     DMII     GERD     Hearing loss, right      85%    HTN     PSA     2021= 0.775/ 2022=0.747    Seizures     Solitary kidney        Past Surgical History:   Procedure Laterality Date    APPENDECTOMY      CHOLECYSTECTOMY      COLONOSCOPY      NEG = 2016, rech 2026     GSI       Family History   Problem Relation Age of Onset    Cerebral aneurysm Mother     Hypertension Father     Cancer Father 60        Bladder    Diabetes Father     Hyperlipidemia Father     Hypertension Brother     Hyperlipidemia Brother        Social History     Socioeconomic History    Marital status:    Tobacco Use    Smoking status: Never     Passive exposure: Never    Smokeless tobacco: Never   Vaping Use    Vaping Use: Never used   Substance and Sexual Activity    Alcohol use: Not Currently     Comment: rare    Drug use: Yes     Types: Hydrocodone, Marijuana    Sexual activity: Yes     Partners: Female       History of Present Illness  59 y/o C male here for f/u from hosp for acute on chronic pancreatitis/ uncontrolled DMII    Pt states he was having increased abd pain x 4 days prior to going to the ER........... pt denies any known trigger and was found to have acute pancreatitis and BS >600    Pt was admitted and endo was consulted to assist w/ BS management.  Pt stabilized and was able to be discharged on 1/3/24    Pt states he is having issues w/ affording his insulin----they are charging about $400; pt states he tried to call his ins but can't get thru the phone system       The patient is a 58-year-old male who is here for follow-up from the hospital from 12/29/23-1/3/2024 for uncontrolled diabetes, acute on chronic pancreatitis,  hypertension, dyslipidemia, and seizures.    He did not get the Dexcom because his insurance would not pay for it. He is using the FreeStyle Curry. His daughter has been helping him quite a bit this week to try to get his strength back. He cannot afford the Tresiba------- He needs 3 months of insulin.    The following portions of the patient's  history were reviewed and updated as appropriate: allergies, current medications, past family history, past medical history, past social history, past surgical history and problem list.    Review of Systems   Constitutional:  Positive for activity change, appetite change and fatigue. Negative for unexpected weight gain and unexpected weight loss.   Eyes:  Negative for blurred vision, double vision, pain and visual disturbance.   Respiratory:  Negative for cough, shortness of breath and wheezing.    Cardiovascular:  Negative for leg swelling.   Gastrointestinal:  Positive for abdominal pain. Negative for abdominal distention, blood in stool, constipation, diarrhea, nausea and vomiting.   Endocrine: Negative for polydipsia, polyphagia and polyuria.   Genitourinary:  Negative for frequency.   Musculoskeletal:  Negative for gait problem.   Skin:  Negative for color change, dry skin, rash and skin lesions.   Neurological:  Negative for weakness and numbness.   Psychiatric/Behavioral:  Positive for sleep disturbance and stress.        Vitals:    01/12/24 1352   BP: 117/79   Pulse: 92   Temp: 98.4 °F (36.9 °C)   SpO2: 98%       Objective   Physical Exam  Vitals and nursing note reviewed.   Constitutional:       General: He is not in acute distress.     Appearance: Normal appearance. He is well-developed and underweight. He is not ill-appearing or toxic-appearing.   Cardiovascular:      Rate and Rhythm: Normal rate and regular rhythm.      Heart sounds: Normal heart sounds. No murmur heard.  Pulmonary:      Effort: Pulmonary effort is normal. No respiratory distress.      Breath sounds: Normal breath sounds. No stridor. No wheezing, rhonchi or rales.   Abdominal:      General: Abdomen is flat. Bowel sounds are normal.      Palpations: Abdomen is soft.      Tenderness: There is abdominal tenderness in the epigastric area. There is no guarding or rebound.   Musculoskeletal:         General: No tenderness or deformity.   Feet:       Right foot:      Skin integrity: No ulcer, blister or warmth.      Toenail Condition: ingrown     Left foot:      Skin integrity: No blister, warmth or callus.      Toenail Condition: ingrown  Skin:     General: Skin is warm and dry.      Capillary Refill: Capillary refill takes less than 2 seconds.      Findings: No erythema or rash.   Neurological:      Mental Status: He is alert and oriented to person, place, and time.      Cranial Nerves: No cranial nerve deficit.   Psychiatric:         Attention and Perception: Attention normal.         Mood and Affect: Mood and affect normal.         Speech: Speech normal.         Behavior: Behavior normal. Behavior is cooperative.         Thought Content: Thought content normal.         Cognition and Memory: Cognition and memory normal.         Judgment: Judgment normal.         BMI is within normal parameters. No other follow-up for BMI required.      Assessment & Plan   Diagnoses and all orders for this visit:    1. Acute recurrent pancreatitis (Primary)    2. Uncontrolled type 2 diabetes mellitus with hyperglycemia    3. Benign essential hypertension    Other orders  -     Insulin Aspart, w/Niacinamide, (Fiasp FlexTouch) 100 UNIT/ML solution pen-injector; USE SSI @ MEAL TO MAX OF 15units per meal---45 units per day  Dispense: 15 mL; Refill: 3  -     insulin degludec (Tresiba FlexTouch) 100 UNIT/ML solution pen-injector injection; Inject 25 Units under the skin into the appropriate area as directed Daily.    I attempted to call pt's ins line to get list of cheapest long and short acting insulins x 20min w/o success  We discussed about Tresiba[employer]com for coupons and websites for these drugs.   We discussed Humulin N vs R in vial form    Transcribed from ambient dictation for Krystyna Hardwick DO by Naima Campos.  01/12/24   15:58 EST    Patient or patient representative verbalized consent to the visit recording.  I have personally performed the services described in this  document as transcribed by the above individual, and it is both accurate and complete.

## 2024-01-15 ENCOUNTER — READMISSION MANAGEMENT (OUTPATIENT)
Dept: CALL CENTER | Facility: HOSPITAL | Age: 59
End: 2024-01-15
Payer: COMMERCIAL

## 2024-01-15 NOTE — OUTREACH NOTE
Medical Week 2 Survey      Flowsheet Row Responses   [facility] facility patient discharged from? [facility]   Does the patient have one of the following disease processes/diagnoses(primary or secondary)? Other   Week 2 attempt successful? No   Unsuccessful attempts Attempt 1            Courtney EPPS - Licensed Nurse

## 2024-01-19 ENCOUNTER — READMISSION MANAGEMENT (OUTPATIENT)
Dept: CALL CENTER | Facility: HOSPITAL | Age: 59
End: 2024-01-19
Payer: COMMERCIAL

## 2024-01-19 DIAGNOSIS — K86.1 CHRONIC RECURRENT PANCREATITIS: Primary | ICD-10-CM

## 2024-01-19 RX ORDER — TRAMADOL HYDROCHLORIDE 50 MG/1
50 TABLET ORAL EVERY 12 HOURS PRN
Qty: 20 TABLET | Refills: 0 | Status: SHIPPED | OUTPATIENT
Start: 2024-01-19

## 2024-01-19 NOTE — OUTREACH NOTE
Medical Week 2 Survey      Flowsheet Row Responses   [facility] facility patient discharged from? [facility]   Does the patient have one of the following disease processes/diagnoses(primary or secondary)? Other   Week 2 attempt successful? No   Unsuccessful attempts Attempt 2            Maral SAMANIEGO - Registered Nurse

## 2024-01-24 NOTE — TELEPHONE ENCOUNTER
RELAY      The original prescription was discontinued on 12/29/2023 by Marianne Bearden, Pharmacy Technician for the following reason: *Error. Renewing this prescription may not be appropriate.   NOT ACTIVE ON MED LIST

## 2024-01-31 RX ORDER — PEN NEEDLE, DIABETIC 32GX 5/32"
1 NEEDLE, DISPOSABLE MISCELLANEOUS
Qty: 360 EACH | Refills: 3 | Status: SHIPPED | OUTPATIENT
Start: 2024-01-31

## 2024-02-05 DIAGNOSIS — K86.1 CHRONIC RECURRENT PANCREATITIS: ICD-10-CM

## 2024-02-05 RX ORDER — HYDROCODONE BITARTRATE AND ACETAMINOPHEN 7.5; 325 MG/1; MG/1
1 TABLET ORAL EVERY 8 HOURS PRN
Qty: 60 TABLET | Refills: 0 | Status: SHIPPED | OUTPATIENT
Start: 2024-02-05

## 2024-02-16 RX ORDER — PEN NEEDLE, DIABETIC 32GX 5/32"
1 NEEDLE, DISPOSABLE MISCELLANEOUS
Qty: 360 EACH | Refills: 3 | Status: SHIPPED | OUTPATIENT
Start: 2024-02-16

## 2024-03-18 RX ORDER — DULOXETIN HYDROCHLORIDE 30 MG/1
30 CAPSULE, DELAYED RELEASE ORAL DAILY
Qty: 90 CAPSULE | Refills: 0 | Status: SHIPPED | OUTPATIENT
Start: 2024-03-18

## 2024-03-18 RX ORDER — MULTIPLE VITAMINS W/ MINERALS TAB 9MG-400MCG
1 TAB ORAL DAILY
Qty: 90 TABLET | Refills: 2 | Status: SHIPPED | OUTPATIENT
Start: 2024-03-18

## 2024-04-12 ENCOUNTER — OFFICE VISIT (OUTPATIENT)
Dept: FAMILY MEDICINE CLINIC | Facility: CLINIC | Age: 59
End: 2024-04-12
Payer: COMMERCIAL

## 2024-04-12 VITALS
SYSTOLIC BLOOD PRESSURE: 152 MMHG | OXYGEN SATURATION: 98 % | BODY MASS INDEX: 22.97 KG/M2 | TEMPERATURE: 98 F | WEIGHT: 179 LBS | HEIGHT: 74 IN | HEART RATE: 67 BPM | DIASTOLIC BLOOD PRESSURE: 81 MMHG

## 2024-04-12 DIAGNOSIS — E11.65 TYPE 2 DIABETES MELLITUS WITH HYPERGLYCEMIA, WITHOUT LONG-TERM CURRENT USE OF INSULIN: Primary | ICD-10-CM

## 2024-04-12 DIAGNOSIS — K86.1 CHRONIC RECURRENT PANCREATITIS: ICD-10-CM

## 2024-04-12 DIAGNOSIS — I10 BENIGN ESSENTIAL HYPERTENSION: ICD-10-CM

## 2024-04-12 LAB
EXPIRATION DATE: ABNORMAL
HBA1C MFR BLD: 7.4 % (ref 4.5–5.7)
Lab: ABNORMAL

## 2024-04-12 RX ORDER — ACYCLOVIR 400 MG/1
1 TABLET ORAL
Qty: 9 EACH | Refills: 3 | Status: SHIPPED | OUTPATIENT
Start: 2024-04-12

## 2024-04-12 NOTE — PROGRESS NOTES
Subjective   Mitchell Jacome is a 58 y.o. male.     Chief Complaint   Patient presents with    Diabetes     PT STATED THE FREESTYLE JOSH 3 WILL NOT SYNC UP WITH HIS CELL PHONE, HE WANTS TO GO BACK TO THE FREESTYLE JOSH 2         Current Outpatient Medications:     atorvastatin (LIPITOR) 20 MG tablet, Take 1 tablet by mouth Daily., Disp: 90 tablet, Rfl: 1    BD Pen Needle Fernanda 2nd Gen 32G X 4 MM misc, Inject 1 package under the skin into the appropriate area as directed 4 (Four) Times a Day Before Meals & at Bedtime., Disp: 360 each, Rfl: 3    DULoxetine (CYMBALTA) 30 MG capsule, Take 1 capsule by mouth Daily., Disp: 90 capsule, Rfl: 0    fenofibrate 160 MG tablet, Take 1 tablet by mouth Daily., Disp: 90 tablet, Rfl: 1    Glucagon, rDNA, (Glucagon Emergency) 1 MG kit, Inject 1 kit as directed As Needed (for low BS)., Disp: 1 each, Rfl: 2    HYDROcodone-acetaminophen (Norco) 7.5-325 MG per tablet, Take 1 tablet by mouth Every 8 (Eight) Hours As Needed for Severe Pain., Disp: 60 tablet, Rfl: 0    Insulin Aspart, w/Niacinamide, (Fiasp FlexTouch) 100 UNIT/ML solution pen-injector, USE SSI @ MEAL TO MAX OF 15units per meal---45 units per day, Disp: 15 mL, Rfl: 3    insulin degludec (Tresiba FlexTouch) 100 UNIT/ML solution pen-injector injection, Inject 25 Units under the skin into the appropriate area as directed Daily., Disp: , Rfl:     levETIRAcetam (KEPPRA) 500 MG tablet, Take 1 tablet by mouth 2 (Two) Times a Day., Disp: , Rfl:     multivitamin with minerals tablet tablet, Take 1 tablet by mouth Daily., Disp: 90 tablet, Rfl: 2    omeprazole (priLOSEC) 40 MG capsule, Take 1 capsule by mouth Daily., Disp: 90 capsule, Rfl: 0    pancrelipase, Lip-Prot-Amyl, (CREON) 05959-45494 units capsule delayed-release particles capsule, 3 tabs po TID W/ meals, Disp: , Rfl:     Continuous Blood Gluc Sensor (Dexcom G7 Sensor) misc, Use 1 package Every 10 (Ten) Days., Disp: 9 each, Rfl: 3    Past Medical History:   Diagnosis  Date    Chronic recurrent pancreatitis     COVID-19     Depression     DMII     GERD     Hearing loss, right     85%    HTN     PSA     2021= 0.775/ 2022=0.747    Seizures     Solitary kidney        Past Surgical History:   Procedure Laterality Date    APPENDECTOMY      CHOLECYSTECTOMY      COLONOSCOPY      NEG = 2016, rech 2026     GSI       Family History   Problem Relation Age of Onset    Cerebral aneurysm Mother     Hypertension Father     Cancer Father 60        Bladder    Diabetes Father     Hyperlipidemia Father     Hypertension Brother     Hyperlipidemia Brother        Social History     Socioeconomic History    Marital status:    Tobacco Use    Smoking status: Never     Passive exposure: Never    Smokeless tobacco: Never   Vaping Use    Vaping status: Never Used   Substance and Sexual Activity    Alcohol use: Not Currently     Comment: rare    Drug use: Yes     Types: Hydrocodone, Marijuana    Sexual activity: Yes     Partners: Female     Birth control/protection: Same-sex partner       History of Present Illness  The patient is a 57-year-old male who is here for diabetes follow-up, having concerns with switching from FreeStyle Curry 2 to FreeStyle Curry 3.    The patient's blood glucose levels have shown improvement, decreasing from 8.5 in 12/2023 to 7.4 today. He reports a decrease in fast-acting insulin usage, attributing this improvement to improved dietary habits and increased physical activity. He occasionally experiences hypoglycemia, which led to a reduction in his fast-acting insulin pre-meal use.     [facility] blood [facility]. His next appointment is scheduled for one month from now. He reports no new medical issues and regularly wears hearing aids. [facility], conducts regular eye examinations due to his diabetes, which yielded normal results. His feet are cold, which he manages by wearing  thick socks. He denies any symptoms of athlete's foot or perspiration. He occasionally experiences rough heels, which he treats with sandpaper.    Supplemental Information  He is on Lipitor 20, Cymbalta 30, fenofibric acid 160, pain medication 7.5 three [facility] a day through [facility], Keppra 500 twice a day, a vitamin, Prilosec 40, Creon, and pain medication. He does not have a seizure doctor. He is taking Keppra. He denies any abdominal pain.   He is allergic to BASAGAR and GEMFIBROZIL.   He is up-to-date on his influenza, shingles, and COVID-19 vaccines.        The following portions of the patient's history were reviewed and updated as appropriate: allergies, current medications, past family history, past medical history, past social history, past surgical history and problem list.    Review of Systems   Constitutional:  Positive for activity change, appetite change and unexpected weight loss. Negative for fever and unexpected weight gain.   Cardiovascular:  Negative for leg swelling.   Gastrointestinal:  Positive for abdominal pain, nausea and vomiting. Negative for constipation and diarrhea.   Genitourinary:  Negative for dysuria, frequency and hematuria.   Musculoskeletal:  Negative for arthralgias and myalgias.   Skin:  Negative for rash.   Psychiatric/Behavioral:  Negative for sleep disturbance.        Vitals:    04/12/24 0917   BP: 152/81   Pulse: 67   Temp: 98 °F (36.7 °C)   SpO2: 98%       Objective   Physical Exam  Vitals and nursing note reviewed.   Constitutional:       General: He is not in acute distress.     Appearance: Normal appearance. He is well-developed. He is not ill-appearing or toxic-appearing.   Cardiovascular:      Rate and Rhythm: Normal rate and regular rhythm.      Heart sounds: Normal heart sounds. No murmur heard.  Pulmonary:      Effort: Pulmonary effort is normal. No respiratory distress.      Breath sounds: Normal breath sounds. No stridor. No wheezing, rhonchi or rales.    Musculoskeletal:         General: No tenderness or deformity.      Right lower leg: No edema.      Left lower leg: No edema.   Feet:      Right foot:      Skin integrity: No ulcer, blister or warmth.      Toenail Condition: ingrown     Left foot:      Skin integrity: No blister, warmth or callus.      Toenail Condition: ingrown  Skin:     General: Skin is warm and dry.      Capillary Refill: Capillary refill takes less than 2 seconds.      Findings: No erythema or rash.   Neurological:      Mental Status: He is alert and oriented to person, place, and time.      Cranial Nerves: No cranial nerve deficit.   Psychiatric:         Attention and Perception: Attention and perception normal.         Mood and Affect: Mood and affect normal.         Speech: Speech normal.         Behavior: Behavior normal. Behavior is cooperative.         Thought Content: Thought content normal.         Cognition and Memory: Cognition and memory normal.         Judgment: Judgment normal.       Physical Exam       BMI is within normal parameters. No other follow-up for BMI required.      Assessment & Plan   Diagnoses and all orders for this visit:    1. Type 2 diabetes mellitus with hyperglycemia, without long-term current use of insulin (Primary)  -     POC Glycosylated Hemoglobin (Hb A1C)    2. Benign essential hypertension    3. Chronic recurrent pancreatitis    Other orders  -     Continuous Blood Gluc Sensor (Dexcom G7 Sensor) misc; Use 1 package Every 10 (Ten) Days.  Dispense: 9 each; Refill: 3      Assessment & Plan  1. Diabetes Mellitus.  The patient's condition has shown improvement. A prescription for Dexcom 7 has been issued. The patient is advised to contact the DME company next week to inquire about the availability of samples. An A1c test will be conducted in 3 months.    Follow-up  The patient is scheduled for a follow-up visit in 6 months, during which a full physical examination will be conducted.     Results  Laboratory  Studies  Blood sugar was 7.4.          Patient or patient representative verbalized consent for the use of Ambient Listening during the visit with  Krystyna Hardwick DO for chart documentation. 4/12/2024  22:06 EDT

## 2024-05-30 RX ORDER — DICYCLOMINE HCL 20 MG
TABLET ORAL
Qty: 30 TABLET | Refills: 0 | OUTPATIENT
Start: 2024-05-30

## 2024-05-30 NOTE — TELEPHONE ENCOUNTER
The original prescription was discontinued on 11/30/2023 by Krystyna Hardwick DO for the following reason: Reorder. Renewing this prescription may not be appropriate.

## 2024-06-24 NOTE — TELEPHONE ENCOUNTER
Rx Refill Note  Requested Prescriptions     Pending Prescriptions Disp Refills    Continuous Glucose Sensor (FreeStyle Curry 2 Sensor) misc [Pharmacy Med Name: FreeStyle Curry 2 Sensor Miscellaneous] 6 each 0     Sig: use to test blood sugars and change every 14 days      Last office visit with prescribing clinician: 4/12/2024   Last telemedicine visit with prescribing clinician: Visit date not found   Next office visit with prescribing clinician: Visit date not found     POC Glycosylated Hemoglobin (Hb A1C) (04/12/2024 09:30)   LABS SCANNED (02/07/2024)                     Would you like a call back once the refill request has been completed: [] Yes [] No    If the office needs to give you a call back, can they leave a voicemail: [] Yes [] No    Dedra Stewart CMA  06/24/24, 14:30 EDT

## 2024-08-13 ENCOUNTER — TELEPHONE (OUTPATIENT)
Dept: FAMILY MEDICINE CLINIC | Facility: CLINIC | Age: 59
End: 2024-08-13

## 2024-08-13 NOTE — TELEPHONE ENCOUNTER
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 HE IS USING A WALKER THAT WILL NOT GO THROUGH A TURN STILE IF HE VILLATORO IN THE NORMAL EMPLOYEE PARKING. HE IS TO RETURN TO WORK ON FRIDAY 08/16/24. PLEASE CALL TO DISCUSS ISSUE. PATIENT NEEDS LETTER BY 08/15/24.  PLEASE CALL TO INFORM SPOUSE WHEN LETTER IS READY FOR

## 2024-08-14 NOTE — TELEPHONE ENCOUNTER
PATIENT  BACK AND STATED HE WOULD PROBABLY NEED THE NOTE FOR AT LEAST A MONTH, HE HAS A COUPLE OF UPCOMING APPTS FOR THE ISSUE.     CALL BACK [phone]

## 2024-10-01 NOTE — PROGRESS NOTES
Subjective   History of Present Illness: Mitchell Jacome is a 59 y.o. male is being seen for consultation today at the request of Estrada Arreaga MD for right-sided leg pain.  Patient's symptoms began about a year and a half ago and have been progressively worsening.  He describes right-sided buttock and hip pain with extension posterior laterally down his leg into the shin and big toe.  He describes associated paresthesias in his right greater toe as well.  He reports weakness of the right leg.  Pain definitely worse when he stands.  Pain is better when he sits or leans forward and he utilizes a walker to lean forward to help with ambulation.  He has undergone a couple of injections in the piriformis muscle and maybe the right SI joint with absolutely no benefit.  He is limited on his pharmacotherapy that can be prescribed as he has a history of chronic pancreatitis due to gallstones and has subsequently had his gallstones removed but continues with a chronic pancreatitis.  He also has history of DM type II with last A1c of 8.5 approximately 9 months ago.  He has had no recent A1c.  He is on chronic insulin therapy.  He also has solitary kidney.  He has lost some weight as he has not been active and has no appetite.  He describes no bowel/bladder dysfunction or saddle anesthesia.    History of Present Illness    The following portions of the patient's history were reviewed and updated as appropriate: allergies, current medications, past family history, past medical history, past social history, past surgical history, and problem list.    Review of Systems   Constitutional:  Positive for activity change.   HENT: Negative.     Eyes: Negative.    Respiratory: Negative.     Cardiovascular: Negative.    Gastrointestinal: Negative.    Endocrine: Negative.    Genitourinary: Negative.    Musculoskeletal:  Positive for arthralgias, back pain (right hip) and myalgias.        Right leg pain   Skin: Negative.   "  Allergic/Immunologic: Negative.    Neurological:  Positive for weakness (right leg) and numbness (tingling/right leg).   Hematological: Negative.    Psychiatric/Behavioral:  Positive for sleep disturbance.    All other systems reviewed and are negative.      Objective     ./90 (BP Location: Left arm, Patient Position: Sitting)   Pulse 76   Ht 188 cm (74.02\")   Wt 72.9 kg (160 lb 12.8 oz)   SpO2 97%   BMI 20.63 kg/m²    Body mass index is 20.63 kg/m².    Vitals:    10/09/24 0903   PainSc: 10-Worst pain ever          Physical Exam  Neurological Exam  Right anterior tibialis 4/5  Right quadricep 4+/5      Assessment & Plan   Independent Review of Radiographic Studies:      I personally reviewed the images from the following studies.    MRI lumbar spine 7/28/2024    Multilevel degenerative disc and degenerative joint changes most noted at L4-L5 with a right sided left foraminal disc herniation superimposed on broad-based disc bulging compressing the right L4 nerve root.  The disc does extend somewhat caudally possibly checking the right L5 traversing nerve root.      Medical Decision Making:      Mitchell Jacomeis a 59 y.o. male with medical history significant for DM type II and last A1c 9 months ago, chronic pancreatitis at 8.5 being seen today as a new patient for back and right leg pain.  Patient symptomatology actually resembling more of an L5 distribution and correlating with recent MRI imaging.  He has had no targeted conservative therapy with lumbar injections but has undergone an SI joint injection and piriformis muscle injection with no benefit.  I am sending him for physical therapy, a selective nerve root block versus SARA, flexion-extension imaging and redraw of A1c.  He will need follow-up with Dr. Hernandez for potential surgical discussion hopefully in the form of a minimally invasive decompression.  Patient agrees to plan of care and wishes to proceed.  I encouraged him call the office " with any questions or concerns.          Diagnoses and all orders for this visit:    1. Lumbar disc herniation (Primary)  -     XR Spine Lumbar Complete With Flex & Ext; Future  -     Ambulatory Referral to Physical Therapy for Evaluation & Treatment  -     Cancel: Selective Nerve Root Injection  -     Epidural Block    2. Degeneration of intervertebral disc of lumbar region with lower extremity pain  -     Epidural Block    3. Type 2 diabetes mellitus with other specified complication, with long-term current use of insulin  -     Hemoglobin A1c; Future      Return for Recheck, f/u with Dr. Hernandez.    This patient was examined wearing appropriate personal protective equipment.     Mitchell Jacome  reports that he has never smoked. He has never been exposed to tobacco smoke. He has never used smokeless tobacco.      Body mass index is 20.63 kg/m².  BMI is within normal parameters. No other follow-up for BMI required.      Patient's blood pressure was reviewed.  Recommendations for  a low-salt diet and exercise to maintain/improve BP in addition to taking any presribed medications.           Marianne Tijerina DNP, APRN  10/09/24  09:46 EDT

## 2024-10-09 ENCOUNTER — HOSPITAL ENCOUNTER (OUTPATIENT)
Dept: GENERAL RADIOLOGY | Facility: HOSPITAL | Age: 59
Discharge: HOME | End: 2024-10-09
Payer: OTHER GOVERNMENT

## 2024-10-09 ENCOUNTER — OFFICE VISIT (OUTPATIENT)
Dept: NEUROSURGERY | Facility: CLINIC | Age: 59
End: 2024-10-09
Payer: OTHER GOVERNMENT

## 2024-10-09 ENCOUNTER — PATIENT ROUNDING (BHMG ONLY) (OUTPATIENT)
Dept: NEUROSURGERY | Facility: CLINIC | Age: 59
End: 2024-10-09
Payer: OTHER GOVERNMENT

## 2024-10-09 ENCOUNTER — LAB (OUTPATIENT)
Dept: LAB | Facility: HOSPITAL | Age: 59
End: 2024-10-09
Payer: OTHER GOVERNMENT

## 2024-10-09 VITALS
HEART RATE: 76 BPM | OXYGEN SATURATION: 97 % | HEIGHT: 74 IN | SYSTOLIC BLOOD PRESSURE: 146 MMHG | BODY MASS INDEX: 20.64 KG/M2 | DIASTOLIC BLOOD PRESSURE: 90 MMHG | WEIGHT: 160.8 LBS

## 2024-10-09 DIAGNOSIS — E11.69 TYPE 2 DIABETES MELLITUS WITH OTHER SPECIFIED COMPLICATION, WITH LONG-TERM CURRENT USE OF INSULIN: ICD-10-CM

## 2024-10-09 DIAGNOSIS — M51.26 LUMBAR DISC HERNIATION: Primary | ICD-10-CM

## 2024-10-09 DIAGNOSIS — M51.26 LUMBAR DISC HERNIATION: ICD-10-CM

## 2024-10-09 DIAGNOSIS — Z79.4 TYPE 2 DIABETES MELLITUS WITH OTHER SPECIFIED COMPLICATION, WITH LONG-TERM CURRENT USE OF INSULIN: ICD-10-CM

## 2024-10-09 DIAGNOSIS — M51.361 DEGENERATION OF INTERVERTEBRAL DISC OF LUMBAR REGION WITH LOWER EXTREMITY PAIN: ICD-10-CM

## 2024-10-09 LAB — HBA1C MFR BLD: 8.68 % (ref 4.8–5.6)

## 2024-10-09 PROCEDURE — 83036 HEMOGLOBIN GLYCOSYLATED A1C: CPT

## 2024-10-09 PROCEDURE — 36415 COLL VENOUS BLD VENIPUNCTURE: CPT

## 2024-10-09 PROCEDURE — 72114 X-RAY EXAM L-S SPINE BENDING: CPT

## 2024-10-16 ENCOUNTER — TREATMENT (OUTPATIENT)
Dept: PHYSICAL THERAPY | Facility: CLINIC | Age: 59
End: 2024-10-16
Payer: OTHER GOVERNMENT

## 2024-10-16 DIAGNOSIS — M54.41 CHRONIC LOW BACK PAIN WITH RIGHT-SIDED SCIATICA, UNSPECIFIED BACK PAIN LATERALITY: ICD-10-CM

## 2024-10-16 DIAGNOSIS — G89.29 CHRONIC LOW BACK PAIN WITH RIGHT-SIDED SCIATICA, UNSPECIFIED BACK PAIN LATERALITY: ICD-10-CM

## 2024-10-16 DIAGNOSIS — M54.16 RADICULOPATHY, LUMBAR REGION: ICD-10-CM

## 2024-10-16 DIAGNOSIS — M51.26 LUMBAR DISC HERNIATION: Primary | ICD-10-CM

## 2024-10-16 NOTE — PROGRESS NOTES
Physical Therapy Initial Evaluation and Plan of Care    Patient: Mitchell Jacome   : 1965  Diagnosis/ICD-10 Code:  Lumbar disc herniation [M51.26]  Referring practitioner: TARAH Apple  Date of Initial Visit: 10/16/2024  Today's Date: 10/16/2024  Patient seen for 1 sessions  PT Clinic location:  [address] [address], [place] [address]    Phone: [phone]    Fax: [phone]         Subjective Questionnaire: Oswestry: 66%    Subjective Evaluation    History of Present Illness  Mechanism of injury: History of current condition: Presents with reports of right hip pain that goes down the back of his right leg. Says the pain in the back of his right hip is constant, then occasionally goes down the back of his leg. Right big toe is numb, also numb spot on the outside of his lower leg. Had injections in piriformis and SIJ which did not help at all. Says his right leg is really weak and gives out on him.     Makes symptoms worse: standing, walking, sitting still too long.    Makes symptoms better: flexing hip up to this chest, lying down on left side.    Reported functional limitation: has to walk with walker at times, very limited standing and walking tolerance, difficulty doing his job. Significant difficulty with all self and home care, as well as taking care of grandchildren.     Lumbar MRI report from Marianne Tijerina's note on 10/26/24:  MRI lumbar spine 2024  Multilevel degenerative disc and degenerative joint changes most noted at L4-L5 with a right sided left foraminal disc herniation superimposed on broad-based disc bulging compressing the right L4 nerve root.  The disc does extend somewhat caudally possibly checking the right L5 traversing nerve root.      Flexion/extension xrays:  IMPRESSION:  1. Degenerative changes.  2. Sacralization of the L5 vertebral body as described.  3. No acute fracture.  4. No abnormal subluxation with flexion or extension to  indicate instability.        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 See chart for further detail.       Objective          Observations     Additional Hip Observation Details  GAIT: antalgic RLE, increased lateral trunk lean during RLE stance phase.  OBSERVATION: RLE generally less muscle tone and slightly atrophied compared to LLE    Palpation     Right Tenderness of the gluteus medius and piriformis.     Neurological Testing     Sensation     Hip     Right Hip   Paresthesia: light touch    Comments   Right light touch: numbness proximal lateral calf, & big toe.     Active Range of Motion   Left Hip   Normal active range of motion    Right Hip   Normal active range of motion  Left Ankle/Foot   Dorsiflexion (ke): 12 degrees     Right Ankle/Foot   Dorsiflexion (ke): 4 degrees     Additional Active Range of Motion Details  Lumbar AROM:  Flexion: no limitation no pain  Extension: 75% limitation & increases right hip pain  Left lateral flexion: no limitation no pain        Right lateral flexion: 50% limitation & increases right hip pain    Passive Range of Motion   Left Hip   Normal passive range of motion    Right Hip   Normal passive range of motion    Additional Passive Range of Motion Details  Bilateral hips are generally hypermobile    Strength/Myotome Testing     Right Hip   Planes of Motion   Flexion: 4  Abduction: 4-    Right Knee   Flexion: 4-  Extension: 4-    Left Ankle/Foot    Dorsiflexion: 5  Plantar flexion: 3    Right Ankle/Foot   Dorsiflexion: 2  Plantar flexion: 2 (barely clears heel with single leg heel raise)    Tests     Lumbar     Right   Positive passive SLR.     Additional Tests Details  RLE LAT - slightly decreases pain  Sidelying lumbar decompression - no change, started to increase pain after a few minutes          Assessment & Plan       Assessment  Impairments: abnormal gait, abnormal muscle tone, abnormal or restricted ROM, activity intolerance, impaired physical strength, lacks appropriate home exercise program and pain with function   Functional limitations: carrying objects, lifting, sleeping, walking, pulling, uncomfortable because of pain, sitting and standing   Assessment details: The patient is a 59 y.o. male who presents to physical therapy today for lumbar right L4-5 radiculopathy. Upon initial evaluation, the patient demonstrates the following impairments: limited lumbar ROM, core & RLE weakness, R hip mm tenderness. Examination findings consistent with L4-5 lumbar radiculopathy. Unable to reduce his symptoms during therapy session, only intervention that gave him slight relief was LAT. May trial mechanical traction, but he is scheduled to see spine surgeon in 2 weeks and I plan to wait until after that visit.     Due to these impairments, the patient is unable to perform or has difficulty with the following functional tasks: standing, walking, reliant on walker, sitting, sleeping, ADLs, IADLS, limited ability to work. The patient would benefit from skilled PT services to address functional limitations and impairments and to improve patient quality of life.      Prognosis: fair  Prognosis details: Fair prognosis due to how chronic his symptoms are, and how I was not able to decrease nerve symptoms in leg.     Goals  Plan Goals: ST. Pt will be independent and compliant with initial HEP in 4 weeks.  2. Pt will report a 25% improvement in symptoms since  starting therapy in 4 weeks.  3. Pt will report pain level at worst <7 during sitting activity in 4 weeks.  4. Pt will show improvement in body mechanics when lifting and sitting in 2 weeks in order to decrease strain on back.     LT. Pt will be independent with final HEP for self-management of condition by DC.  2. Pt will improve score on Oswesty to less than 35% by DC.   3. Pt will report a 75% improvement in symptoms by DC in order to allow return to PLOF.  4. Pt will improve lumbar AROM right sidebending and extension to 25% limitation or less and without reproduction of hip pain in order to complete ADLs with improved function by DC.   5. Pt will improve RLE strength to 5/5 and without pain in order to return to full work duty and negotiate stairs with normal mechanics.       Plan  Therapy options: will be seen for skilled therapy services  Planned modality interventions: cryotherapy, electrical stimulation/Russian stimulation, TENS, traction, thermotherapy (hydrocollator packs) and ultrasound  Planned therapy interventions: abdominal trunk stabilization, body mechanics training, flexibility, functional ROM exercises, gait training, joint mobilization, home exercise program, manual therapy, neuromuscular re-education, postural training, soft tissue mobilization, spinal/joint mobilization, strengthening, stretching, therapeutic activities and motor coordination training  Frequency: 2x week  Duration in weeks: 12  Treatment plan discussed with: patient        See flowsheets for treatment detail.    History # of Personal Factors and/or Comorbidities: HIGH (3+)  Examination of Body System(s): # of elements: HIGH (4+)  Clinical Presentation: UNSTABLE   Clinical Decision Making: HIGH      Timed:         Manual Therapy:    10     mins  46308;     Therapeutic Exercise:    10     mins  18745;     Neuromuscular Juli:        mins  63608;    Therapeutic Activity:          mins  90901;     Gait Training:           mins   20454;     Ultrasound:          mins  43498;    Ionto                                   mins   69012  Self Care                            mins   68277      Un-Timed:  Electrical Stimulation:         mins  77433 ( );  Dry Needling          mins self-pay  Traction          mins 29354  Low Eval          Mins  10314  Mod Eval          Mins  89175  High Eval                       25     Mins  11566  Re-Eval                               mins  43557      Timed Treatment:   20   mins   Total Treatment:     45   mins    PT SIGNATURE: Clara Shireen, PT   [place] PT license #: [id number]  [place] PT license #: [id number]  DATE TREATMENT INITIATED: 10/16/2024    Initial Certification  Certification Period: 1/13/2025  I certify that the therapy services are furnished while this patient is under my care.  The services outlined above are required by this patient, and will be reviewed every 90 days.    PHYSICIAN: Marianne Tijerina APRN  NPI: [id number]                                      DATE:     Please sign and [place] [address]    Phone: [phone]    Fax: [phone] . Thank you, [facility].

## 2024-10-23 ENCOUNTER — TREATMENT (OUTPATIENT)
Dept: PHYSICAL THERAPY | Facility: CLINIC | Age: 59
End: 2024-10-23
Payer: OTHER GOVERNMENT

## 2024-10-23 DIAGNOSIS — M51.26 LUMBAR DISC HERNIATION: Primary | ICD-10-CM

## 2024-10-23 DIAGNOSIS — G89.29 CHRONIC LOW BACK PAIN WITH RIGHT-SIDED SCIATICA, UNSPECIFIED BACK PAIN LATERALITY: ICD-10-CM

## 2024-10-23 DIAGNOSIS — M54.41 CHRONIC LOW BACK PAIN WITH RIGHT-SIDED SCIATICA, UNSPECIFIED BACK PAIN LATERALITY: ICD-10-CM

## 2024-10-23 DIAGNOSIS — M54.16 RADICULOPATHY, LUMBAR REGION: ICD-10-CM

## 2024-10-23 NOTE — PROGRESS NOTES
Physical Therapy Daily Treatment Note    Patient: Mitchell Jacome  : 1965  Referring practitioner: TARAH Apple  Today's Date: 10/23/2024    VISIT#: 2      Subjective   Mitchell Jacome reports: Doing ok, everything the same, the right hip and leg pain is still constant.      Objective     See Exercise, Manual, and Modality Logs for complete treatment.     Patient Education: continue HEP    Assessment/Plan  Fair response to session, no relief with LAT today, but did get some temporary reduction in foot symptoms with sidebending stretch - but it was very temporary and symptoms return immediately upon stopping manual technique. Overall, no change in symptoms during sessions.     Progress per Plan of Care            Timed:         Manual Therapy:    12     mins  25151;     Therapeutic Exercise:    18     mins  30492;     Neuromuscular Juli:        mins  37912;    Therapeutic Activity:          mins  83982;     Gait Training:           mins  02344;     Ultrasound:          mins  19504;    Ionto:                                   mins   25870  Self Care:                            mins   94336    Un-Timed:  Electrical Stimulation:         mins  89882 ( );  Dry Needling          mins self-pay  Traction          mins 56587  Re-Eval                               mins  61315    Timed Treatment:   30   mins   Total Treatment:     30   mins    Clara Iyer, PT  Physical Therapist  [place] PT license #: [id number]  [place] PT license #: [id number]

## 2024-10-28 ENCOUNTER — TELEPHONE (OUTPATIENT)
Dept: NEUROSURGERY | Facility: CLINIC | Age: 59
End: 2024-10-28

## 2024-10-28 NOTE — TELEPHONE ENCOUNTER
Called and LVM to call the office as Dr. Hernandez has had to add an emergency surgery today we need to move him to a different day.  
Denies

## 2024-11-20 ENCOUNTER — PREP FOR SURGERY (OUTPATIENT)
Dept: OTHER | Facility: HOSPITAL | Age: 59
End: 2024-11-20
Payer: OTHER GOVERNMENT

## 2024-11-20 ENCOUNTER — OFFICE VISIT (OUTPATIENT)
Dept: NEUROSURGERY | Facility: CLINIC | Age: 59
End: 2024-11-20
Payer: OTHER GOVERNMENT

## 2024-11-20 VITALS — RESPIRATION RATE: 18 BRPM | BODY MASS INDEX: 20.53 KG/M2 | WEIGHT: 160 LBS | HEIGHT: 74 IN

## 2024-11-20 DIAGNOSIS — M51.26 LUMBAR DISC HERNIATION: Primary | ICD-10-CM

## 2024-11-20 DIAGNOSIS — M54.16 LUMBAR RADICULOPATHY: ICD-10-CM

## 2024-11-20 PROCEDURE — 99213 OFFICE O/P EST LOW 20 MIN: CPT | Performed by: NEUROLOGICAL SURGERY

## 2024-11-20 NOTE — PROGRESS NOTES
"Subjective   History of Present Illness: Mitchell Jacome is a 59 y.o. male is here today for follow-up on lumbar disc herniation. Today patient reports about a year history of worsening pain radiating down his right lower extremity along the lateral thigh into the shin and down to the big toe.  This is associated with numbness and tingling.  He has also had progressive weakness and lifting his foot and toe on the right.  He has undergone physical therapy without improvement.  He recently underwent an epidural injection which provided some relief of the pain although he continues to have significant issues.           Previous treatment: Norco,     Previous neurosurgery:      Previous injections:     The following portions of the patient's history were reviewed and updated as appropriate: allergies, current medications, past family history, past medical history, past social history, past surgical history, and problem list.    Review of Systems   Constitutional:  Positive for activity change.   Respiratory:  Negative for chest tightness and shortness of breath.    Cardiovascular:  Negative for chest pain.   Musculoskeletal:  Positive for back pain, gait problem and myalgias.   Neurological:  Positive for numbness.       Objective      Resp 18   Ht 188 cm (74.02\")   Wt 72.6 kg (160 lb)   BMI 20.53 kg/m²    Body mass index is 20.53 kg/m².  Vitals:    11/20/24 1458   PainSc:   9   PainLoc: Back         Neurological Exam  Mental Status  Awake, alert and oriented to person, place and time.    Motor   Strength is 5/5 in all four extremities except as noted.  Right EHL and TA 3 out of 5.          Assessment & Plan   Independent Review of Radiographic Studies:      I personally reviewed and interpreted the images from the following studies.    MRI lumbar spine: Degenerative changes most severe at L4-5 where there is a far lateral disc herniation on the right causing severe foraminal stenosis and nerve " compression    Medical Decision Making:      Mitchell Jacome is a 59 y.o. male with a history of right-sided radiculopathy with a far lateral disc herniation at L4-5.  His symptoms and physical exam are really most consistent with an L5 radiculopathy but there is no compression of the L5 nerve root.  I believe compression of the L4 nerve root as it exits and is compressed by the far lateral disc is the likely cause of his symptoms.  We will plan for right L4-5 far lateral discectomy.  I did discuss with the patient that it is possible that some of his symptoms are not related to the disc herniation and nerve compression and could be related to his diabetes.  There is certainly no guarantee that he will improve in terms of his weakness.      Diagnoses and all orders for this visit:    1. Lumbar disc herniation (Primary)    2. Lumbar radiculopathy      No follow-ups on file.    This patient was examined wearing appropriate personal protective equipment.                      Dr. Edmond Hernandez IV    11/20/24  15:46 EST

## 2024-11-22 ENCOUNTER — TELEPHONE (OUTPATIENT)
Dept: NEUROSURGERY | Facility: CLINIC | Age: 59
End: 2024-11-22
Payer: OTHER GOVERNMENT

## 2024-11-22 NOTE — TELEPHONE ENCOUNTER
Caller: [facility]      Best call back number: [phone]    What form or medical record are you requesting: OVN 11-20-24    Who is requesting this form or medical record [facility] you: [facility]    How would you like to receive the form or medical records (pick-up, mail, fax): FAX ATTN [facility]NA  If fax, what is the fax number: [phone]    Timeframe paperwork needed: ASAP    Additional notes: SHE NEEDS THE OVN-    She would like for Hanna to give her a call, to discuss the info she will need regarding surgery.

## 2024-11-27 NOTE — TELEPHONE ENCOUNTER
[phone] [phone]@ [facility] CALLED STATING SHE NEVER RECEIVED PAPERWORK    PLEASE REFAX THEM TO ATTN ANA @ [phone]    THANK YOU

## 2024-12-27 ENCOUNTER — DOCUMENTATION (OUTPATIENT)
Dept: PHYSICAL THERAPY | Facility: CLINIC | Age: 59
End: 2024-12-27

## 2025-01-03 ENCOUNTER — TELEPHONE (OUTPATIENT)
Dept: NEUROSURGERY | Facility: CLINIC | Age: 60
End: 2025-01-03
Payer: OTHER GOVERNMENT

## 2025-01-03 NOTE — TELEPHONE ENCOUNTER
Sanjeev called about pt codes not matching the type of surgery that  had talked about his in last office note and it is in questioning and I stated I would have Dedra call.

## 2025-01-07 ENCOUNTER — HOSPITAL ENCOUNTER (INPATIENT)
Facility: HOSPITAL | Age: 60
LOS: 4 days | Discharge: HOME | DRG: 193 | End: 2025-01-11
Attending: EMERGENCY MEDICINE | Admitting: INTERNAL MEDICINE
Payer: OTHER GOVERNMENT

## 2025-01-07 ENCOUNTER — APPOINTMENT (OUTPATIENT)
Dept: CT IMAGING | Facility: HOSPITAL | Age: 60
DRG: 193 | End: 2025-01-07
Payer: OTHER GOVERNMENT

## 2025-01-07 ENCOUNTER — APPOINTMENT (OUTPATIENT)
Dept: GENERAL RADIOLOGY | Facility: HOSPITAL | Age: 60
DRG: 193 | End: 2025-01-07
Payer: OTHER GOVERNMENT

## 2025-01-07 DIAGNOSIS — J18.9 MULTIFOCAL PNEUMONIA: ICD-10-CM

## 2025-01-07 DIAGNOSIS — J93.9 PNEUMOTHORAX ON LEFT: Primary | ICD-10-CM

## 2025-01-07 DIAGNOSIS — R04.2 HEMOPTYSIS: ICD-10-CM

## 2025-01-07 LAB
ALBUMIN SERPL-MCNC: 3.5 G/DL (ref 3.5–5.2)
ALBUMIN/GLOB SERPL: 1 G/DL
ALP SERPL-CCNC: 84 U/L (ref 39–117)
ALT SERPL W P-5'-P-CCNC: 9 U/L (ref 1–41)
ANION GAP SERPL CALCULATED.3IONS-SCNC: 12.2 MMOL/L (ref 5–15)
AST SERPL-CCNC: 17 U/L (ref 1–40)
B PARAPERT DNA SPEC QL NAA+PROBE: NOT DETECTED
B PERT DNA SPEC QL NAA+PROBE: NOT DETECTED
BASOPHILS # BLD AUTO: 0.11 10*3/MM3 (ref 0–0.2)
BASOPHILS NFR BLD AUTO: 1.4 % (ref 0–1.5)
BILIRUB SERPL-MCNC: <0.2 MG/DL (ref 0–1.2)
BUN SERPL-MCNC: 12 MG/DL (ref 6–20)
BUN/CREAT SERPL: 12.2 (ref 7–25)
C PNEUM DNA NPH QL NAA+NON-PROBE: NOT DETECTED
CALCIUM SPEC-SCNC: 9.6 MG/DL (ref 8.6–10.5)
CHLORIDE SERPL-SCNC: 99 MMOL/L (ref 98–107)
CO2 SERPL-SCNC: 24.8 MMOL/L (ref 22–29)
CREAT SERPL-MCNC: 0.98 MG/DL (ref 0.76–1.27)
D DIMER PPP FEU-MCNC: 0.46 MCGFEU/ML (ref 0–0.59)
D-LACTATE SERPL-SCNC: 1.1 MMOL/L (ref 0.3–2)
DEPRECATED RDW RBC AUTO: 41 FL (ref 37–54)
EGFRCR SERPLBLD CKD-EPI 2021: 88.8 ML/MIN/1.73
EOSINOPHIL # BLD AUTO: 0.35 10*3/MM3 (ref 0–0.4)
EOSINOPHIL NFR BLD AUTO: 4.5 % (ref 0.3–6.2)
ERYTHROCYTE [DISTWIDTH] IN BLOOD BY AUTOMATED COUNT: 12.3 % (ref 12.3–15.4)
FLUAV SUBTYP SPEC NAA+PROBE: NOT DETECTED
FLUBV RNA ISLT QL NAA+PROBE: NOT DETECTED
GEN 5 1HR TROPONIN T REFLEX: 18 NG/L
GLOBULIN UR ELPH-MCNC: 3.6 GM/DL
GLUCOSE BLDC GLUCOMTR-MCNC: 116 MG/DL (ref 70–105)
GLUCOSE BLDC GLUCOMTR-MCNC: 136 MG/DL (ref 70–105)
GLUCOSE BLDC GLUCOMTR-MCNC: 151 MG/DL (ref 70–105)
GLUCOSE BLDC GLUCOMTR-MCNC: 221 MG/DL (ref 70–105)
GLUCOSE SERPL-MCNC: 216 MG/DL (ref 65–99)
HADV DNA SPEC NAA+PROBE: NOT DETECTED
HCOV 229E RNA SPEC QL NAA+PROBE: NOT DETECTED
HCOV HKU1 RNA SPEC QL NAA+PROBE: NOT DETECTED
HCOV NL63 RNA SPEC QL NAA+PROBE: NOT DETECTED
HCOV OC43 RNA SPEC QL NAA+PROBE: NOT DETECTED
HCT VFR BLD AUTO: 35.8 % (ref 37.5–51)
HGB BLD-MCNC: 12.2 G/DL (ref 13–17.7)
HMPV RNA NPH QL NAA+NON-PROBE: DETECTED
HOLD SPECIMEN: NORMAL
HOLD SPECIMEN: NORMAL
HPIV1 RNA ISLT QL NAA+PROBE: NOT DETECTED
HPIV2 RNA SPEC QL NAA+PROBE: NOT DETECTED
HPIV3 RNA NPH QL NAA+PROBE: NOT DETECTED
HPIV4 P GENE NPH QL NAA+PROBE: NOT DETECTED
IMM GRANULOCYTES # BLD AUTO: 0.04 10*3/MM3 (ref 0–0.05)
IMM GRANULOCYTES NFR BLD AUTO: 0.5 % (ref 0–0.5)
LYMPHOCYTES # BLD AUTO: 2 10*3/MM3 (ref 0.7–3.1)
LYMPHOCYTES NFR BLD AUTO: 26 % (ref 19.6–45.3)
M PNEUMO IGG SER IA-ACNC: NOT DETECTED
MCH RBC QN AUTO: 31.3 PG (ref 26.6–33)
MCHC RBC AUTO-ENTMCNC: 34.1 G/DL (ref 31.5–35.7)
MCV RBC AUTO: 91.8 FL (ref 79–97)
MONOCYTES # BLD AUTO: 0.58 10*3/MM3 (ref 0.1–0.9)
MONOCYTES NFR BLD AUTO: 7.5 % (ref 5–12)
NEUTROPHILS NFR BLD AUTO: 4.62 10*3/MM3 (ref 1.7–7)
NEUTROPHILS NFR BLD AUTO: 60.1 % (ref 42.7–76)
NRBC BLD AUTO-RTO: 0 /100 WBC (ref 0–0.2)
PLATELET # BLD AUTO: 496 10*3/MM3 (ref 140–450)
PMV BLD AUTO: 9 FL (ref 6–12)
POTASSIUM SERPL-SCNC: 4.2 MMOL/L (ref 3.5–5.2)
PROCALCITONIN SERPL-MCNC: 0.08 NG/ML (ref 0–0.25)
PROT SERPL-MCNC: 7.1 G/DL (ref 6–8.5)
RBC # BLD AUTO: 3.9 10*6/MM3 (ref 4.14–5.8)
RHINOVIRUS RNA SPEC NAA+PROBE: NOT DETECTED
RSV RNA NPH QL NAA+NON-PROBE: NOT DETECTED
SARS-COV-2 RNA RESP QL NAA+PROBE: NOT DETECTED
SODIUM SERPL-SCNC: 136 MMOL/L (ref 136–145)
TROPONIN T % DELTA: -22 %
TROPONIN T NUMERIC DELTA: -5 NG/L
TROPONIN T SERPL HS-MCNC: 23 NG/L
WBC NRBC COR # BLD AUTO: 7.7 10*3/MM3 (ref 3.4–10.8)
WHOLE BLOOD HOLD COAG: NORMAL
WHOLE BLOOD HOLD SPECIMEN: NORMAL

## 2025-01-07 PROCEDURE — 0202U NFCT DS 22 TRGT SARS-COV-2: CPT | Performed by: EMERGENCY MEDICINE

## 2025-01-07 PROCEDURE — 84145 PROCALCITONIN (PCT): CPT | Performed by: EMERGENCY MEDICINE

## 2025-01-07 PROCEDURE — 83605 ASSAY OF LACTIC ACID: CPT

## 2025-01-07 PROCEDURE — 25010000002 ONDANSETRON PER 1 MG: Performed by: EMERGENCY MEDICINE

## 2025-01-07 PROCEDURE — 25010000002 MORPHINE PER 10 MG: Performed by: EMERGENCY MEDICINE

## 2025-01-07 PROCEDURE — 99285 EMERGENCY DEPT VISIT HI MDM: CPT

## 2025-01-07 PROCEDURE — 25010000002 AMPICILLIN-SULBACTAM PER 1.5 G

## 2025-01-07 PROCEDURE — 80053 COMPREHEN METABOLIC PANEL: CPT | Performed by: EMERGENCY MEDICINE

## 2025-01-07 PROCEDURE — 85379 FIBRIN DEGRADATION QUANT: CPT | Performed by: EMERGENCY MEDICINE

## 2025-01-07 PROCEDURE — 71275 CT ANGIOGRAPHY CHEST: CPT

## 2025-01-07 PROCEDURE — 84484 ASSAY OF TROPONIN QUANT: CPT | Performed by: EMERGENCY MEDICINE

## 2025-01-07 PROCEDURE — 71045 X-RAY EXAM CHEST 1 VIEW: CPT

## 2025-01-07 PROCEDURE — 25010000002 AMPICILLIN-SULBACTAM PER 1.5 G: Performed by: EMERGENCY MEDICINE

## 2025-01-07 PROCEDURE — 99223 1ST HOSP IP/OBS HIGH 75: CPT | Performed by: SURGERY

## 2025-01-07 PROCEDURE — 87040 BLOOD CULTURE FOR BACTERIA: CPT | Performed by: EMERGENCY MEDICINE

## 2025-01-07 PROCEDURE — 63710000001 INSULIN LISPRO (HUMAN) PER 5 UNITS

## 2025-01-07 PROCEDURE — 85025 COMPLETE CBC W/AUTO DIFF WBC: CPT | Performed by: EMERGENCY MEDICINE

## 2025-01-07 PROCEDURE — 36415 COLL VENOUS BLD VENIPUNCTURE: CPT

## 2025-01-07 PROCEDURE — 82948 REAGENT STRIP/BLOOD GLUCOSE: CPT

## 2025-01-07 PROCEDURE — 0W9B30Z DRAINAGE OF LEFT PLEURAL CAVITY WITH DRAINAGE DEVICE, PERCUTANEOUS APPROACH: ICD-10-PCS | Performed by: EMERGENCY MEDICINE

## 2025-01-07 PROCEDURE — 25010000002 MORPHINE PER 10 MG: Performed by: INTERNAL MEDICINE

## 2025-01-07 PROCEDURE — 25510000001 IOPAMIDOL PER 1 ML: Performed by: EMERGENCY MEDICINE

## 2025-01-07 RX ORDER — HYDROCODONE BITARTRATE AND ACETAMINOPHEN 7.5; 325 MG/1; MG/1
1 TABLET ORAL EVERY 6 HOURS PRN
Status: ON HOLD | COMMUNITY
End: 2025-01-11

## 2025-01-07 RX ORDER — MORPHINE SULFATE 2 MG/ML
2 INJECTION, SOLUTION INTRAMUSCULAR; INTRAVENOUS ONCE
Status: COMPLETED | OUTPATIENT
Start: 2025-01-07 | End: 2025-01-07

## 2025-01-07 RX ORDER — DEXTROSE MONOHYDRATE 25 G/50ML
25 INJECTION, SOLUTION INTRAVENOUS
Status: DISCONTINUED | OUTPATIENT
Start: 2025-01-07 | End: 2025-01-11 | Stop reason: HOSPADM

## 2025-01-07 RX ORDER — ATENOLOL 25 MG/1
25 TABLET ORAL DAILY
COMMUNITY

## 2025-01-07 RX ORDER — FENOFIBRATE 145 MG/1
145 TABLET, COATED ORAL DAILY
COMMUNITY

## 2025-01-07 RX ORDER — ATENOLOL 25 MG/1
25 TABLET ORAL
Status: DISCONTINUED | OUTPATIENT
Start: 2025-01-07 | End: 2025-01-11 | Stop reason: HOSPADM

## 2025-01-07 RX ORDER — ONDANSETRON 4 MG/1
4 TABLET, ORALLY DISINTEGRATING ORAL EVERY 6 HOURS PRN
Status: DISCONTINUED | OUTPATIENT
Start: 2025-01-07 | End: 2025-01-11 | Stop reason: HOSPADM

## 2025-01-07 RX ORDER — ONDANSETRON 2 MG/ML
4 INJECTION INTRAMUSCULAR; INTRAVENOUS ONCE
Status: COMPLETED | OUTPATIENT
Start: 2025-01-07 | End: 2025-01-07

## 2025-01-07 RX ORDER — DOXYCYCLINE 100 MG/1
100 CAPSULE ORAL EVERY 12 HOURS
Status: DISCONTINUED | OUTPATIENT
Start: 2025-01-07 | End: 2025-01-10

## 2025-01-07 RX ORDER — ACETAMINOPHEN 650 MG/1
650 SUPPOSITORY RECTAL EVERY 4 HOURS PRN
Status: DISCONTINUED | OUTPATIENT
Start: 2025-01-07 | End: 2025-01-09

## 2025-01-07 RX ORDER — ONDANSETRON 2 MG/ML
4 INJECTION INTRAMUSCULAR; INTRAVENOUS EVERY 6 HOURS PRN
Status: DISCONTINUED | OUTPATIENT
Start: 2025-01-07 | End: 2025-01-11 | Stop reason: HOSPADM

## 2025-01-07 RX ORDER — NICOTINE POLACRILEX 4 MG
15 LOZENGE BUCCAL
Status: DISCONTINUED | OUTPATIENT
Start: 2025-01-07 | End: 2025-01-11 | Stop reason: HOSPADM

## 2025-01-07 RX ORDER — OXYCODONE HYDROCHLORIDE 5 MG/1
10 TABLET ORAL EVERY 4 HOURS PRN
Status: DISCONTINUED | OUTPATIENT
Start: 2025-01-07 | End: 2025-01-09

## 2025-01-07 RX ORDER — SODIUM CHLORIDE 0.9 % (FLUSH) 0.9 %
10 SYRINGE (ML) INJECTION AS NEEDED
Status: DISCONTINUED | OUTPATIENT
Start: 2025-01-07 | End: 2025-01-11 | Stop reason: HOSPADM

## 2025-01-07 RX ORDER — POLYETHYLENE GLYCOL 3350 17 G/17G
17 POWDER, FOR SOLUTION ORAL DAILY PRN
Status: DISCONTINUED | OUTPATIENT
Start: 2025-01-07 | End: 2025-01-11 | Stop reason: HOSPADM

## 2025-01-07 RX ORDER — PANTOPRAZOLE SODIUM 40 MG/1
40 TABLET, DELAYED RELEASE ORAL
Status: DISCONTINUED | OUTPATIENT
Start: 2025-01-08 | End: 2025-01-11 | Stop reason: HOSPADM

## 2025-01-07 RX ORDER — INSULIN ASPART 100 [IU]/ML
INJECTION, SOLUTION INTRAVENOUS; SUBCUTANEOUS
COMMUNITY

## 2025-01-07 RX ORDER — BISACODYL 5 MG/1
5 TABLET, DELAYED RELEASE ORAL DAILY PRN
Status: DISCONTINUED | OUTPATIENT
Start: 2025-01-07 | End: 2025-01-11 | Stop reason: HOSPADM

## 2025-01-07 RX ORDER — INSULIN LISPRO 100 [IU]/ML
2-7 INJECTION, SOLUTION INTRAVENOUS; SUBCUTANEOUS
Status: DISCONTINUED | OUTPATIENT
Start: 2025-01-07 | End: 2025-01-11 | Stop reason: HOSPADM

## 2025-01-07 RX ORDER — IOPAMIDOL 755 MG/ML
100 INJECTION, SOLUTION INTRAVASCULAR
Status: COMPLETED | OUTPATIENT
Start: 2025-01-07 | End: 2025-01-07

## 2025-01-07 RX ORDER — OXYCODONE HYDROCHLORIDE 5 MG/1
5 TABLET ORAL EVERY 4 HOURS PRN
Status: DISCONTINUED | OUTPATIENT
Start: 2025-01-07 | End: 2025-01-07

## 2025-01-07 RX ORDER — ACETAMINOPHEN 160 MG/5ML
650 SOLUTION ORAL EVERY 4 HOURS PRN
Status: DISCONTINUED | OUTPATIENT
Start: 2025-01-07 | End: 2025-01-09

## 2025-01-07 RX ORDER — DULOXETIN HYDROCHLORIDE 30 MG/1
30 CAPSULE, DELAYED RELEASE ORAL DAILY
Status: DISCONTINUED | OUTPATIENT
Start: 2025-01-07 | End: 2025-01-11 | Stop reason: HOSPADM

## 2025-01-07 RX ORDER — INSULIN GLARGINE 100 [IU]/ML
28-32 INJECTION, SOLUTION SUBCUTANEOUS DAILY
COMMUNITY

## 2025-01-07 RX ORDER — IBUPROFEN 600 MG/1
1 TABLET ORAL
Status: DISCONTINUED | OUTPATIENT
Start: 2025-01-07 | End: 2025-01-11 | Stop reason: HOSPADM

## 2025-01-07 RX ORDER — LEVETIRACETAM 500 MG/1
500 TABLET ORAL EVERY 12 HOURS SCHEDULED
Status: DISCONTINUED | OUTPATIENT
Start: 2025-01-07 | End: 2025-01-11 | Stop reason: HOSPADM

## 2025-01-07 RX ORDER — ACETAMINOPHEN 325 MG/1
650 TABLET ORAL EVERY 4 HOURS PRN
Status: DISCONTINUED | OUTPATIENT
Start: 2025-01-07 | End: 2025-01-09

## 2025-01-07 RX ORDER — MORPHINE SULFATE 2 MG/ML
2 INJECTION, SOLUTION INTRAMUSCULAR; INTRAVENOUS EVERY 4 HOURS PRN
Status: DISCONTINUED | OUTPATIENT
Start: 2025-01-07 | End: 2025-01-11 | Stop reason: HOSPADM

## 2025-01-07 RX ORDER — BISACODYL 10 MG
10 SUPPOSITORY, RECTAL RECTAL DAILY PRN
Status: DISCONTINUED | OUTPATIENT
Start: 2025-01-07 | End: 2025-01-11 | Stop reason: HOSPADM

## 2025-01-07 RX ORDER — HYDROCODONE BITARTRATE AND ACETAMINOPHEN 5; 325 MG/1; MG/1
1 TABLET ORAL EVERY 6 HOURS PRN
Status: DISCONTINUED | OUTPATIENT
Start: 2025-01-07 | End: 2025-01-09

## 2025-01-07 RX ORDER — AMOXICILLIN 250 MG
2 CAPSULE ORAL 2 TIMES DAILY PRN
Status: DISCONTINUED | OUTPATIENT
Start: 2025-01-07 | End: 2025-01-11 | Stop reason: HOSPADM

## 2025-01-07 RX ADMIN — INSULIN LISPRO 2 UNITS: 100 INJECTION, SOLUTION INTRAVENOUS; SUBCUTANEOUS at 13:33

## 2025-01-07 RX ADMIN — OXYCODONE 10 MG: 5 TABLET ORAL at 14:44

## 2025-01-07 RX ADMIN — INSULIN LISPRO 3 UNITS: 100 INJECTION, SOLUTION INTRAVENOUS; SUBCUTANEOUS at 21:13

## 2025-01-07 RX ADMIN — OXYCODONE 10 MG: 5 TABLET ORAL at 22:24

## 2025-01-07 RX ADMIN — MORPHINE SULFATE 2 MG: 2 INJECTION, SOLUTION INTRAMUSCULAR; INTRAVENOUS at 03:56

## 2025-01-07 RX ADMIN — AMPICILLIN SODIUM AND SULBACTAM SODIUM 3 G: 2; 1 INJECTION, POWDER, FOR SOLUTION INTRAMUSCULAR; INTRAVENOUS at 10:45

## 2025-01-07 RX ADMIN — IOPAMIDOL 100 ML: 755 INJECTION, SOLUTION INTRAVENOUS at 02:54

## 2025-01-07 RX ADMIN — AMPICILLIN SODIUM AND SULBACTAM SODIUM 3 G: 2; 1 INJECTION, POWDER, FOR SOLUTION INTRAMUSCULAR; INTRAVENOUS at 16:41

## 2025-01-07 RX ADMIN — DOXYCYCLINE 100 MG: 100 CAPSULE ORAL at 16:41

## 2025-01-07 RX ADMIN — AMPICILLIN SODIUM AND SULBACTAM SODIUM 3 G: 2; 1 INJECTION, POWDER, FOR SOLUTION INTRAMUSCULAR; INTRAVENOUS at 21:13

## 2025-01-07 RX ADMIN — ATENOLOL 25 MG: 50 TABLET ORAL at 17:41

## 2025-01-07 RX ADMIN — AMPICILLIN SODIUM AND SULBACTAM SODIUM 3 G: 2; 1 INJECTION, POWDER, FOR SOLUTION INTRAMUSCULAR; INTRAVENOUS at 03:58

## 2025-01-07 RX ADMIN — ONDANSETRON 4 MG: 2 INJECTION INTRAMUSCULAR; INTRAVENOUS at 03:56

## 2025-01-07 RX ADMIN — INSULIN GLARGINE-YFGN 25 UNITS: 100 INJECTION, SOLUTION SUBCUTANEOUS at 17:41

## 2025-01-07 RX ADMIN — HYDROCODONE BITARTRATE AND ACETAMINOPHEN 1 TABLET: 5; 325 TABLET ORAL at 19:54

## 2025-01-07 RX ADMIN — DULOXETINE 30 MG: 30 CAPSULE, DELAYED RELEASE ORAL at 17:41

## 2025-01-07 RX ADMIN — MORPHINE SULFATE 2 MG: 2 INJECTION, SOLUTION INTRAMUSCULAR; INTRAVENOUS at 13:31

## 2025-01-07 RX ADMIN — OXYCODONE 5 MG: 5 TABLET ORAL at 06:10

## 2025-01-07 RX ADMIN — MORPHINE SULFATE 2 MG: 2 INJECTION, SOLUTION INTRAMUSCULAR; INTRAVENOUS at 17:41

## 2025-01-07 RX ADMIN — OXYCODONE 5 MG: 5 TABLET ORAL at 10:44

## 2025-01-07 RX ADMIN — DOXYCYCLINE 100 MG: 100 INJECTION, POWDER, LYOPHILIZED, FOR SOLUTION INTRAVENOUS at 05:07

## 2025-01-07 RX ADMIN — LEVETIRACETAM 500 MG: 500 TABLET, FILM COATED ORAL at 21:13

## 2025-01-07 RX ADMIN — PANCRELIPASE 12000 UNITS OF LIPASE: 60000; 12000; 38000 CAPSULE, DELAYED RELEASE PELLETS ORAL at 17:41

## 2025-01-07 NOTE — TELEPHONE ENCOUNTER
I HAVE FAXED OVER THE CPT CODE INFO FOR THE 3RD TIME TO LIU. WAS UNABLE TO CALL HER BACK AS NO CONTACT NUMBER WAS PROVIDED IN MESSAGE

## 2025-01-07 NOTE — ED PROVIDER NOTES
Subjective   History of Present Illness  59-year-old male with no history of chronic lung disease or tobacco abuse complains of 5 days of hemoptysis.  Patient states is a small amount of bright red blood mixed in with phlegm, no millie, mopped assist or clots.  Patient developed some anterior chest pain, sharp, this evening with some dyspnea.  No fevers.  Patient states he is felt like he had a head cold for the past 2 months.  No recent trips or travel, no leg swelling.  Patient's chronic bilateral leg pain secondary to lumbar radiculopathy for which he is scheduled to have surgery on January 20.  Patient denies any abdominal pain or vomiting or diarrhea or blood in stool.  Patient is not managed on any blood thinning medication.  Patient does report a significant weight loss over the past 1 year of 45 pounds.      Review of Systems   Constitutional:  Positive for unexpected weight change.   Respiratory:  Positive for cough and shortness of breath.    Cardiovascular:  Positive for chest pain.   Musculoskeletal:  Positive for back pain.       Past Medical History:   Diagnosis Date    Chronic recurrent pancreatitis     COVID-19     Depression     DMII     GERD     Hearing loss, right     85%    History of transfusion 2016    HTN     Hyperlipidemia 2018    PSA     2021= 0.775/ 2022=0.747    Seizures     Solitary kidney        Allergies   Allergen Reactions    Basaglar Kwikpen [Insulin Glargine] Other (See Comments)     Injection bruising    Gemfibrozil Rash       Past Surgical History:   Procedure Laterality Date    APPENDECTOMY      CHOLECYSTECTOMY      COLONOSCOPY      NEG = 2016, rech 2026     GSI       Family History   Problem Relation Age of Onset    Cerebral aneurysm Mother     Early death Mother         Anurism    Hypertension Father     Cancer Father 60        Bladder    Diabetes Father     Hyperlipidemia Father     Hypertension Brother     Hyperlipidemia Brother        Social History     Socioeconomic History     Marital status:    Tobacco Use    Smoking status: Never     Passive exposure: Never    Smokeless tobacco: Never   Vaping Use    Vaping status: Never Used   Substance and Sexual Activity    Alcohol use: Not Currently     Comment: rare    Drug use: Not Currently     Types: Hydrocodone, Marijuana    Sexual activity: Yes     Partners: Female     Birth control/protection: Partner of same sex           Objective   Physical Exam  Constitutional:       Appearance: Normal appearance.   HENT:      Head: Normocephalic and atraumatic.      Mouth/Throat:      Mouth: Mucous membranes are moist.      Pharynx: Oropharynx is clear.   Cardiovascular:      Rate and Rhythm: Tachycardia present.      Heart sounds: Normal heart sounds.   Pulmonary:      Effort: Pulmonary effort is normal.      Comments: Rhonchi bilaterally, decreased on the left as compared to the right  Abdominal:      General: Bowel sounds are normal. There is no distension.      Palpations: Abdomen is soft.      Tenderness: There is no abdominal tenderness.   Musculoskeletal:      Comments: No calf swelling or tenderness   Skin:     General: Skin is warm and dry.      Capillary Refill: Capillary refill takes less than 2 seconds.   Neurological:      General: No focal deficit present.      Mental Status: He is alert and oriented to person, place, and time.   Psychiatric:         Mood and Affect: Mood normal.         Behavior: Behavior normal.         Chest Tube Insertion    Date/Time: 1/7/2025 3:54 AM    Performed by: Edmond Kat MD  Authorized by: Edmond Kat MD    Consent:     Consent obtained:  Verbal    Consent given by:  Patient    Risks discussed:  Bleeding, damage to surrounding structures, infection, incomplete drainage and pain  Universal protocol:     Patient identity confirmed:  Verbally with patient  Pre-procedure details:     Skin preparation:  Povidone-iodine and chlorhexidine  Sedation:     Sedation type:  None  Anesthesia:      Anesthesia method:  Local infiltration    Local anesthetic:  Lidocaine 1% w/o epi  Procedure details:     Placement location:  L lateral    Scalpel size:  11    Tube size (Fr):  12    Tension pneumothorax: yes      Tube connected to:  Water seal    Drainage characteristics:  Air only    Suture material:  2-0 silk    Dressing:  4x4 sterile gauze  Post-procedure details:     Post-insertion x-ray findings: tube in good position      Procedure completion:  Tolerated             ED Course                                                       Medical Decision Making  Very pleasant 59-year-old male with tension pneumothorax without any hemodynamic instability with chest tube placement with demonstration of reinflation of left lung.  Patient also has associated pneumonia and is human metapneumovirus positive with normal procalcitonin though it is difficult to tell how long the patient has had human metapneumovirus as he has had cough and congestion for several months.  Given associated hemoptysis, will cover with IV antibiotics for now, will defer subspecialty consultation to hospitalist, case discussed with [facility] service, agrees with plan.    Problems Addressed:  Hemoptysis: complicated acute illness or injury  Multifocal pneumonia: complicated acute illness or injury  Pneumothorax on left: complicated acute illness or injury    Amount and/or Complexity of Data Reviewed  Labs: ordered.     Details: Human metapneumovirus positive  Radiology: ordered and independent interpretation performed.     Details: Interval reinflation of left lung    Risk  Prescription drug management.  Decision regarding hospitalization.        Final diagnoses:   Pneumothorax on left   Multifocal pneumonia   Hemoptysis       ED Disposition  ED Disposition       ED Disposition   Decision to Admit    Condition   --    Comment   Level of Care: Med/Surg [1]   Diagnosis: Multifocal pneumonia [[id number]]   Admitting Physician: SEKOU GANT  [[id number]]   Attending Physician: SEKOU GANT [[id number]]   Certification: I Certify That Inpatient Hospital Services Are Medically Necessary For Greater Than 2 Midnights                 No follow-up provider specified.       Medication List      No changes were made to your prescriptions during this visit.            Edmond Kat MD  01/07/25 [id number]

## 2025-01-07 NOTE — PAYOR COMM NOTE
"Ceci Tapia (59 y.o. Male)  1965  Policy no. [id number]   Requesting IP Auth for ER Medical Admission    AUTHORIZATION PENDING  PLEASE FORWARD DETERMINATION TO FOLLOWING CONTACT:    SONIA ADAMSON LPN UR  Utilization Review Nurse  [facility]  Direct & confidential phone # [phone]  Fax # [phone]        Date of Birth   1965    Social Security Number       Address   [address] [place] [address]    Home Phone   [phone]    MRN   [id number]       Yarsani   None    Marital Status                               Admission Date   25    Admission Type   Emergency    Admitting Provider   Den Alves MD    Attending Provider   Omari Campos MD    Department, Room/Bed   [facility] EMERGENCY DEPARTMENT, 15/15       Discharge Date       Discharge Disposition       Discharge Destination                                 Attending Provider: Omari Campos MD    Allergies: Basaglar Kwikpen [Insulin Glargine], Gemfibrozil    Isolation: Contact Drop   Infection: Human Metapneumovirus  (25)   Code Status: CPR    Ht: 188 cm (74\")   Wt: 68.2 kg (150 lb 5.7 oz)    Admission Cmt: None   Principal Problem: Multifocal pneumonia [J18.9]                   Active Insurance as of 2025       Primary Coverage       Payor Plan Insurance Group Employer/Plan Group    [facility] CCN OPTUM        Payor Plan Address Payor Plan Phone Number Payor Plan Fax Number Effective Dates    [address] [phone]  2024 - None Entered    [facility] [id number]         Subscriber Name Subscriber Birth Date Member ID       CECI TAPIA 1965                Secondary Coverage       Payor Plan Insurance Group Employer/Plan Group    [employer] PPO [id number]       Payor Plan Address Payor Plan Phone Number Payor Plan Fax Number Effective Dates    [address] [phone]  " 2020 - None Entered    [facility] [id number]         Subscriber Name Subscriber Birth Date Member ID       CECI TAPIA 1965 [id number]                     Emergency Contacts        (Rel.) Home Phone Work Phone Mobile Phone    ZAINAB CRUZ (Spouse) [phone] -- [phone]                 History & Physical        Leilani Ruffin PA-C at 25 0357       Attestation signed by Omari Campos MD at 25 0887    I have reviewed this documentation and agree.                      [facility] Medicine Services  History & Physical    Patient Name: Ceci Tapia  : 1965  MRN: [id number]  Primary Care Physician:  Krystyna Hardwick DO  Date of admission: 2025  Date and Time of Service: 2025 at 0357    Subjective      Chief Complaint: Shortness of breath, coughing up blood    History of Present Illness: Ceci Tapia is a 59 y.o. male with a CMH of HTN, HLD, type 2 diabetes, history of chronic pancreatitis, seizures, chronic back pain, GERD who presented to [facility] on 2025 with shortness of breath x approximately 6 months ago. However he states, over the last 5 days, shortness of breath has been significantly worsened.  Patient grew concerned when he started coughing up blood when shortness of breath became worse.  Currently endorses chest wall pain, palpitations, fever, chills.  He denies tobacco use.  Denies history of lung disease.  Denies history of pneumothorax.  Denies recent sick contact exposure.    On ED evaluation, patient was noted to be tachycardic and hypoxic, requiring 2L NC.  CMP was pertinent for glucose of 16.  CBC was otherwise unremarkable, no leukocytosis WBC 7.7.  Lactic acid 1.1.  PCT 0.08.  D-dimer was normal at 0.4.  Respiratory panel was positive for metapneumovirus.  CTA of chest revealed large left-sided tension pneumothorax, multifocal bilateral pneumonia but no evidence of pulmonary embolism, aortic  dissection.  Chest tube was placed in ED.  Chest x-ray status post thoracostomy revealed near resolution of pneumothorax with tiny left apical pneumothorax.      Review of Systems   Constitutional:  Negative for chills, fatigue and fever.   Respiratory:  Positive for cough and shortness of breath.         Hemoptysis   Cardiovascular:  Positive for chest pain. Negative for palpitations.   Gastrointestinal:  Negative for abdominal pain, nausea and vomiting.   Neurological:  Negative for weakness.   All other systems reviewed and are negative.      Personal History     Past Medical History:   Diagnosis Date    Chronic recurrent pancreatitis     COVID-19     Depression     DMII     GERD     Hearing loss, right     85%    History of transfusion 2016    HTN     Hyperlipidemia 2018    PSA     2021= 0.775/ 2022=0.747    Seizures     Solitary kidney        Past Surgical History:   Procedure Laterality Date    APPENDECTOMY      CHOLECYSTECTOMY      COLONOSCOPY      NEG = 2016, rech 2026     GSI       Family History: family history includes Cancer (age of onset: 60) in his father; Cerebral aneurysm in his mother; Diabetes in his father; Early death in his mother; Hyperlipidemia in his brother and father; Hypertension in his brother and father. Otherwise pertinent FHx was reviewed and not pertinent to current issue.    Social History:  reports that he has never smoked. He has never been exposed to tobacco smoke. He has never used smokeless tobacco. He reports that he does not currently use alcohol. He reports that he does not currently use drugs after having used the following drugs: Hydrocodone and Marijuana.    Home Medications:  Prior to Admission Medications       Prescriptions Last Dose Informant Patient Reported? Taking?    atorvastatin (LIPITOR) 20 MG tablet   No No    Take 1 tablet by mouth Daily.    BD Pen Needle Fernanda 2nd Gen 32G X 4 MM misc   No No    Inject 1 package under the skin into the appropriate area as  directed 4 (Four) Times a Day Before Meals & at Bedtime.    Continuous Glucose Sensor (FreeStyle Curry 2 Sensor) misc   No No    use to test blood sugars and change every 14 days    DULoxetine (CYMBALTA) 30 MG capsule   No No    Take 1 capsule by mouth Daily.    fenofibrate 160 MG tablet   No No    Take 1 tablet by mouth Daily.    Glucagon, rDNA, (Glucagon Emergency) 1 MG kit   No No    Inject 1 kit as directed As Needed (for low BS).    HYDROcodone-acetaminophen (Norco) 7.5-325 MG per tablet   No No    Take 1 tablet by mouth Every 8 (Eight) Hours As Needed for Severe Pain.    Insulin Aspart, w/Niacinamide, (Fiasp FlexTouch) 100 UNIT/ML solution pen-injector   No No    USE SSI @ MEAL TO MAX OF 15units per meal---45 units per day    insulin degludec (Tresiba FlexTouch) 100 UNIT/ML solution pen-injector injection   No No    Inject 25 Units under the skin into the appropriate area as directed Daily.    levETIRAcetam (KEPPRA) 500 MG tablet   Yes No    Take 1 tablet by mouth 2 (Two) Times a Day.    multivitamin with minerals tablet tablet   No No    Take 1 tablet by mouth Daily.    omeprazole (priLOSEC) 40 MG capsule   No No    Take 1 capsule by mouth Daily.    pancrelipase, Lip-Prot-Amyl, (CREON) 99664-94742 units capsule delayed-release particles capsule   No No    3 tabs po TID W/ meals              Allergies:  Allergies   Allergen Reactions    Basaglar Kwikpen [Insulin Glargine] Other (See Comments)     Injection bruising    Gemfibrozil Rash       Objective      Vitals:   Temp:  [99 °F (37.2 °C)] 99 °F (37.2 °C)  Heart Rate:  [105-132] 105  Resp:  [20] 20  BP: (129)/(72) 129/72  Body mass index is 19.3 kg/m².    Physical Exam  General: 60 yo WM, Alert and oriented, well nourished, no acute distress.  HENT: Normocephalic, normal hearing, moist oral mucosa, no scleral icterus.  Neck: Supple, nontender, no carotid bruits, no JVD, no LAD.  Lungs: L chest thoracostomy, Clear to auscultation, nonlabored  respiration.  Heart: RRR, no murmur, gallop or edema.  Abdomen: Soft, nontender, nondistended, + bowel sounds.  Musculoskeletal: Normal range of motion and strength, no tenderness or swelling.  Skin: Skin is warm, dry and pink, no rashes or lesions.  Psychiatric: Cooperative, appropriate mood and affect.    Diagnostic Data:  Lab Results (last 24 hours)       Procedure Component Value Units Date/Time    Blood Culture - Blood, Arm, Left [[id number]] Collected: 01/07/25 0352    Specimen: Blood from Arm, Left Updated: 01/07/25 0356    High Sensitivity Troponin T 1Hr [[id number]] Collected: 01/07/25 0352    Specimen: Blood Updated: 01/07/25 0356    Respiratory Panel PCR w/COVID-19(SARS-CoV-2) DONALDO/TAVIA/ANNABEL/PAD/COR/ADRIAN In-House, NP Swab in UTM/VTM, 2 HR TAT - Swab, Nasopharynx [[id number]]  (Abnormal) Collected: 01/07/25 0139    Specimen: Swab from Nasopharynx Updated: 01/07/25 0234     ADENOVIRUS, PCR Not Detected     Coronavirus 229E Not Detected     Coronavirus HKU1 Not Detected     Coronavirus NL63 Not Detected     Coronavirus OC43 Not Detected     COVID19 Not Detected     Human Metapneumovirus Detected     Human Rhinovirus/Enterovirus Not Detected     Influenza A PCR Not Detected     Influenza B PCR Not Detected     Parainfluenza Virus 1 Not Detected     Parainfluenza Virus 2 Not Detected     Parainfluenza Virus 3 Not Detected     Parainfluenza Virus 4 Not Detected     RSV, PCR Not Detected     Bordetella pertussis pcr Not Detected     Bordetella parapertussis PCR Not Detected     Chlamydophila pneumoniae PCR Not Detected     Mycoplasma pneumo by PCR Not Detected    Narrative:      In the setting of a positive respiratory panel with a viral infection PLUS a negative procalcitonin without other underlying concern for bacterial infection, consider observing off antibiotics or discontinuation of antibiotics and continue supportive care. If the respiratory panel is positive for atypical bacterial infection (Bordetella  "pertussis, Chlamydophila pneumoniae, or Mycoplasma pneumoniae), consider antibiotic de-escalation to target atypical bacterial infection.    Procalcitonin [[id number]]  (Normal) Collected: 01/07/25 0138    Specimen: Blood Updated: 01/07/25 0217     Procalcitonin 0.08 ng/mL     Narrative:      As a Marker for Sepsis (Non-Neonates):    1. <0.5 ng/mL represents a low risk of severe sepsis and/or septic shock.  2. >2 ng/mL represents a high risk of severe sepsis and/or septic shock.    As a Marker for Lower Respiratory Tract Infections that require antibiotic therapy:    PCT on Admission    Antibiotic Therapy       6-12 Hrs later    >0.5                Strongly Recommended  >0.25 - <0.5        Recommended   0.1 - 0.25          Discouraged              Remeasure/reassess PCT  <0.1                Strongly Discouraged     Remeasure/reassess PCT    As 28 day mortality risk marker: \"Change in Procalcitonin Result\" (>80% or <=80%) if Day 0 (or Day 1) and Day 4 values are available. Refer to http://www.Matchs-pct-calculator.com    Change in PCT <=80%  A decrease of PCT levels below or equal to 80% defines a positive change in PCT test result representing a higher risk for 28-day all-cause mortality of patients diagnosed with severe sepsis for septic shock.    Change in PCT >80%  A decrease of PCT levels of more than 80% defines a negative change in PCT result representing a lower risk for 28-day all-cause mortality of patients diagnosed with severe sepsis or septic shock.       High Sensitivity Troponin T [[id number]]  (Abnormal) Collected: 01/07/25 0138    Specimen: Blood Updated: 01/07/25 0217     HS Troponin T 23 ng/L     Narrative:      High Sensitive Troponin T Reference Range:  <14.0 ng/L- Negative Female for AMI  <22.0 ng/L- Negative Male for AMI  >=14 - Abnormal Female indicating possible myocardial injury.  >=22 - Abnormal Male indicating possible myocardial injury.   Clinicians would have to utilize clinical acumen, " EKG, Troponin, and serial changes to determine if it is an Acute Myocardial Infarction or myocardial injury due to an underlying chronic condition.         Comprehensive Metabolic Panel [[id number]]  (Abnormal) Collected: 01/07/25 0138    Specimen: Blood Updated: 01/07/25 0217     Glucose 216 mg/dL      BUN 12 mg/dL      Creatinine 0.98 mg/dL      Sodium 136 mmol/L      Potassium 4.2 mmol/L      Chloride 99 mmol/L      CO2 24.8 mmol/L      Calcium 9.6 mg/dL      Total Protein 7.1 g/dL      Albumin 3.5 g/dL      ALT (SGPT) 9 U/L      AST (SGOT) 17 U/L      Alkaline Phosphatase 84 U/L      Total Bilirubin <0.2 mg/dL      Globulin 3.6 gm/dL      A/G Ratio 1.0 g/dL      BUN/Creatinine Ratio 12.2     Anion Gap 12.2 mmol/L      eGFR 88.8 mL/min/1.73     Narrative:      GFR Categories in Chronic Kidney Disease (CKD)      GFR Category          GFR (mL/min/1.73)    Interpretation  G1                     90 or greater         Normal or high (1)  G2                      60-89                Mild decrease (1)  G3a                   45-59                Mild to moderate decrease  G3b                   30-44                Moderate to severe decrease  G4                    15-29                Severe decrease  G5                    14 or less           Kidney failure          (1)In the absence of evidence of kidney disease, neither GFR category G1 or G2 fulfill the criteria for CKD.    eGFR calculation 2021 CKD-EPI creatinine equation, which does not include race as a factor    D-dimer, Quantitative [[id number]]  (Normal) Collected: 01/07/25 0138    Specimen: Blood Updated: 01/07/25 0206     D-Dimer, Quantitative 0.46 MCGFEU/mL     Narrative:      According to the assay 's published package insert, a normal (<0.50 MCGFEU/mL) D-dimer result in conjunction with a non-high clinical probability assessment, excludes deep vein thrombosis (DVT) and pulmonary embolism (PE) with high sensitivity.    D-dimer values increase with  "age and this can make VTE exclusion of an older population difficult. To address this, the American College of Physicians, based on best available evidence and recent guidelines, recommends that clinicians use age-adjusted D-dimer thresholds in patients greater than 50 years of age with: a) a low probability of PE who do not meet all Pulmonary Embolism Rule Out Criteria, or b) in those with intermediate probability of PE.   The formula for an age-adjusted D-dimer cut-off is \"age/100\".  For example, a 60 year old patient would have an age-adjusted cut-off of 0.60 MCGFEU/mL and an 80 year old 0.80 MCGFEU/mL.    [place] Draw [[id number]] Collected: 01/07/25 0138    Specimen: Blood Updated: 01/07/25 0147    Narrative:      The following orders were created for panel order [place] Draw.  Procedure                               Abnormality         Status                     ---------                               -----------         ------                     Green Top (Gel)[[id number]]                                  Final result               Lavender Top[[id number]]                                     Final result               Gold Top - SST[[id number]]                                   Final result               Light Blue Top[[id number]]                                   Final result                 Please view results for these tests on the individual orders.    Green Top (Gel) [[id number]] Collected: 01/07/25 0138    Specimen: Blood Updated: 01/07/25 0147     Extra Tube Hold for add-ons.     Comment: Auto resulted.       Lavender Top [[id number]] Collected: 01/07/25 0138    Specimen: Blood Updated: 01/07/25 0147     Extra Tube hold for add-on     Comment: Auto resulted       Gold Top - SST [[id number]] Collected: 01/07/25 0138    Specimen: Blood Updated: 01/07/25 0147     Extra Tube Hold for add-ons.     Comment: Auto resulted.       Light Blue Top [[id number]] Collected: 01/07/25 0138    Specimen: Blood Updated: 01/07/25 " 0147     Extra Tube Hold for add-ons.     Comment: Auto resulted       CBC & Differential [[id number]]  (Abnormal) Collected: 01/07/25 0138    Specimen: Blood Updated: 01/07/25 0146    Narrative:      The following orders were created for panel order CBC & Differential.  Procedure                               Abnormality         Status                     ---------                               -----------         ------                     CBC Auto Differential[[id number]]        Abnormal            Final result                 Please view results for these tests on the individual orders.    CBC Auto Differential [[id number]]  (Abnormal) Collected: 01/07/25 0138    Specimen: Blood Updated: 01/07/25 0146     WBC 7.70 10*3/mm3      RBC 3.90 10*6/mm3      Hemoglobin 12.2 g/dL      Hematocrit 35.8 %      MCV 91.8 fL      MCH 31.3 pg      MCHC 34.1 g/dL      RDW 12.3 %      RDW-SD 41.0 fl      MPV 9.0 fL      Platelets 496 10*3/mm3      Neutrophil % 60.1 %      Lymphocyte % 26.0 %      Monocyte % 7.5 %      Eosinophil % 4.5 %      Basophil % 1.4 %      Immature Grans % 0.5 %      Neutrophils, Absolute 4.62 10*3/mm3      Lymphocytes, Absolute 2.00 10*3/mm3      Monocytes, Absolute 0.58 10*3/mm3      Eosinophils, Absolute 0.35 10*3/mm3      Basophils, Absolute 0.11 10*3/mm3      Immature Grans, Absolute 0.04 10*3/mm3      nRBC 0.0 /100 WBC     POC Lactate [[id number]]  (Normal) Collected: 01/07/25 0143    Specimen: Blood Updated: 01/07/25 0145     Lactate 1.1 mmol/L      Comment: Serial Number: [id number]:  [id number]       Blood Culture - Blood, Arm, Left [[id number]] Collected: 01/07/25 0138    Specimen: Blood from Arm, Left Updated: 01/07/25 0143             Imaging Results (Last 24 Hours)       Procedure Component Value Units Date/Time    XR Chest 1 View [[id number]] Collected: 01/07/25 0352     Updated: 01/07/25 0356    Narrative:      XR CHEST 1 VW    Date of Exam: 1/7/2025 3:36 AM EST    Indication: chest tube  placement    Comparison: Chest radiograph 12/29/2023    Findings:  There is a left-sided smallbore thoracostomy tube in place with a tiny left apical pneumothorax remaining. There are bilateral lower lobe infiltrates consistent with pneumonia. The upper lung zones are clear. The heart size and pulmonary vascular   markings are normal.      Impression:      Impression:  Left-sided thoracostomy tube in place with a tiny left apical pneumothorax. Bilateral lower lobe infiltrates consistent with pneumonia.          Electronically Signed: Sascha Solorzano MD    1/7/2025 3:54 AM EST    Workstation ID: [id number]    CT Angiogram Chest Pulmonary Embolism [[id number]] Collected: 01/07/25 0255     Updated: 01/07/25 0309    Narrative:      CT ANGIOGRAM CHEST PULMONARY EMBOLISM    Date of Exam: 1/7/2025 2:52 AM EST    Indication: soa, hemoptysis.    Comparison: None available.    Technique: Axial CT images were obtained of the chest after the uneventful intravenous administration of iodinated contrast utilizing pulmonary embolism protocol.  Sagittal and coronal reconstructions were performed.  Automated exposure control and   iterative reconstruction methods were used.    Findings:  No pulmonary embolism or aortic dissection is identified. However, there is a large left pneumothorax with shift of the midline structures to the right compatible with tension. Chest tube placement is recommended. No right-sided pneumothorax is   identified. No pleural or pericardial effusion is seen. No adenopathy. Upper abdominal images demonstrate cholecystectomy and an absent right kidney. Lung windows demonstrate multifocal infiltrates in both lungs, most severe in the lower lobes,   compatible with multifocal bilateral pneumonia. Continued follow-up is recommended. No rib fractures are identified.      Impression:        1. Large left-sided tension pneumothorax. Chest tube placement recommended.  2. Multifocal bilateral pneumonia with a lower lobe  predominance. Continued imaging follow-up recommended.  3. No pulmonary embolism or aortic dissection.    NOTIFICATION: Critical Value/emergent results were called by telephone at the time of interpretation on 1/7/2025 3:02 AM EST to JOSELO COLE MD who verbally acknowledged these results.        Electronically Signed: Iker Stokes MD    1/7/2025 3:07 AM EST    Workstation ID: [id number]              Assessment & Plan        This is a 59 y.o. male with:    Active and Resolved Problems  Active Hospital Problems    Diagnosis  POA    **Multifocal pneumonia [J18.9]  Yes      Resolved Hospital Problems   No resolved problems to display.       Multifocal pneumonia  Left-sided tension pneumothorax  CTA chest: Large left-sided tension pneumothorax, multifocal bilateral pneumonia with lower lobe predominance, no evidence of PE  Status post chest tube placement in ED, continue LWS  Repeat CXR: near resolution of pneumothorax with tiny left apical pneumothorax  Currently on 2L NC, b/l RA  WBC 7.7, Pro-Ba 0.08, lactic 1.1  Continue Unasyn and Doxy  DuoNebs q6h   Thoracic surgery consulted    Diabetes Mellitus   Low SSI, Lantus once reconciled  Consistent carb diet  Hypoglycemia protocol     Hyperlipidemia-continue statin, fenofibrate as reconciled  Seizures-continue Keppra once reconciled  Chronic back pain-continue Cymbalta and home pain medication, scheduled for L4-L5 microdiscectomy with Dr. Hernandez on 1/20/2025  GERD-Continue PPI  History of chronic pancreatitis-continue Creon once reconciled        VTE Prophylaxis:  Mechanical VTE prophylaxis orders are present.        The patient desires to be as follows:    CODE STATUS:    Code Status (Patient has no pulse and is not breathing): CPR (Attempt to Resuscitate)  Medical Interventions (Patient has pulse or is breathing): Full Support        Corina barbour, who can be contacted at , is the designated person to make medical decisions on the patient's behalf if He is  incapable of doing so. This was clarified with patient and/or next of kin on 1/7/2025 during the course of this H&P.    Admission Status:  I believe this patient meets inpatient status.    Expected Length of Stay: 2 to 3 days    PDMP and Medication Dispenses via Sidebar reviewed and consistent with patient reported medications.    I discussed the patient's findings and my recommendations with patient.      Signature:     This document has been electronically signed by Leilani Ruffin PA-C on January 7, 2025 03:57 [facility] Hospitalist Team      Electronically signed by Omari Campos MD at 01/07/25 0833          Emergency Department Notes        Edmond Kat MD at 01/07/25 0116        Procedure Orders    1. Chest Tube Insertion [[id number]] ordered by Edmond Kat MD                 Subjective   History of Present Illness  59-year-old male with no history of chronic lung disease or tobacco abuse complains of 5 days of hemoptysis.  Patient states is a small amount of bright red blood mixed in with phlegm, no millie, mopped assist or clots.  Patient developed some anterior chest pain, sharp, this evening with some dyspnea.  No fevers.  Patient states he is felt like he had a head cold for the past 2 months.  No recent trips or travel, no leg swelling.  Patient's chronic bilateral leg pain secondary to lumbar radiculopathy for which he is scheduled to have surgery on January 20.  Patient denies any abdominal pain or vomiting or diarrhea or blood in stool.  Patient is not managed on any blood thinning medication.  Patient does report a significant weight loss over the past 1 year of 45 pounds.      Review of Systems   Constitutional:  Positive for unexpected weight change.   Respiratory:  Positive for cough and shortness of breath.    Cardiovascular:  Positive for chest pain.   Musculoskeletal:  Positive for back pain.       Past Medical History:   Diagnosis Date    Chronic recurrent pancreatitis      COVID-19     Depression     DMII     GERD     Hearing loss, right     85%    History of transfusion 2016    HTN     Hyperlipidemia 2018    PSA     2021= 0.775/ 2022=0.747    Seizures     Solitary kidney        Allergies   Allergen Reactions    Basaglar Kwikpen [Insulin Glargine] Other (See Comments)     Injection bruising    Gemfibrozil Rash       Past Surgical History:   Procedure Laterality Date    APPENDECTOMY      CHOLECYSTECTOMY      COLONOSCOPY      NEG = 2016, rech 2026     GSI       Family History   Problem Relation Age of Onset    Cerebral aneurysm Mother     Early death Mother         Anurism    Hypertension Father     Cancer Father 60        Bladder    Diabetes Father     Hyperlipidemia Father     Hypertension Brother     Hyperlipidemia Brother        Social History     Socioeconomic History    Marital status:    Tobacco Use    Smoking status: Never     Passive exposure: Never    Smokeless tobacco: Never   Vaping Use    Vaping status: Never Used   Substance and Sexual Activity    Alcohol use: Not Currently     Comment: rare    Drug use: Not Currently     Types: Hydrocodone, Marijuana    Sexual activity: Yes     Partners: Female     Birth control/protection: Partner of same sex           Objective   Physical Exam  Constitutional:       Appearance: Normal appearance.   HENT:      Head: Normocephalic and atraumatic.      Mouth/Throat:      Mouth: Mucous membranes are moist.      Pharynx: Oropharynx is clear.   Cardiovascular:      Rate and Rhythm: Tachycardia present.      Heart sounds: Normal heart sounds.   Pulmonary:      Effort: Pulmonary effort is normal.      Comments: Rhonchi bilaterally, decreased on the left as compared to the right  Abdominal:      General: Bowel sounds are normal. There is no distension.      Palpations: Abdomen is soft.      Tenderness: There is no abdominal tenderness.   Musculoskeletal:      Comments: No calf swelling or tenderness   Skin:     General: Skin is warm and  dry.      Capillary Refill: Capillary refill takes less than 2 seconds.   Neurological:      General: No focal deficit present.      Mental Status: He is alert and oriented to person, place, and time.   Psychiatric:         Mood and Affect: Mood normal.         Behavior: Behavior normal.         Chest Tube Insertion    Date/Time: 1/7/2025 3:54 AM    Performed by: Edmond Kat MD  Authorized by: Edmond Kat MD    Consent:     Consent obtained:  Verbal    Consent given by:  Patient    Risks discussed:  Bleeding, damage to surrounding structures, infection, incomplete drainage and pain  Universal protocol:     Patient identity confirmed:  Verbally with patient  Pre-procedure details:     Skin preparation:  Povidone-iodine and chlorhexidine  Sedation:     Sedation type:  None  Anesthesia:     Anesthesia method:  Local infiltration    Local anesthetic:  Lidocaine 1% w/o epi  Procedure details:     Placement location:  L lateral    Scalpel size:  11    Tube size (Fr):  12    Tension pneumothorax: yes      Tube connected to:  Water seal    Drainage characteristics:  Air only    Suture material:  2-0 silk    Dressing:  4x4 sterile gauze  Post-procedure details:     Post-insertion x-ray findings: tube in good position      Procedure completion:  Tolerated            ED Course                                                       Medical Decision Making  Very pleasant 59-year-old male with tension pneumothorax without any hemodynamic instability with chest tube placement with demonstration of reinflation of left lung.  Patient also has associated pneumonia and is human metapneumovirus positive with normal procalcitonin though it is difficult to tell how long the patient has had human metapneumovirus as he has had cough and congestion for several months.  Given associated hemoptysis, will cover with IV antibiotics for now, will defer subspecialty consultation to hospitalist, case discussed with [facility] service,  agrees with plan.    Problems Addressed:  Hemoptysis: complicated acute illness or injury  Multifocal pneumonia: complicated acute illness or injury  Pneumothorax on left: complicated acute illness or injury    Amount and/or Complexity of Data Reviewed  Labs: ordered.     Details: Human metapneumovirus positive  Radiology: ordered and independent interpretation performed.     Details: Interval reinflation of left lung    Risk  Prescription drug management.  Decision regarding hospitalization.        Final diagnoses:   Pneumothorax on left   Multifocal pneumonia   Hemoptysis       ED Disposition  ED Disposition       ED Disposition   Decision to Admit    Condition   --    Comment   Level of Care: Med/Surg [1]   Diagnosis: Multifocal pneumonia [[id number]]   Admitting Physician: SEKOU GANT [[id number]]   Attending Physician: SEKOU GANT [[id number]]   Certification: I Certify That Inpatient Hospital Services Are Medically Necessary For Greater Than 2 Midnights                 No follow-up provider specified.       Medication List      No changes were made to your prescriptions during this visit.            Edmond Kat MD  01/07/25 0358      Electronically signed by Edmond Kat MD at 01/07/25 0358

## 2025-01-07 NOTE — CONSULTS
THORACIC SURGERY INPATIENT CONSULT NOTE    REASON FOR CONSULT: Left spontaneous pneumothorax    Subjective   HISTORY OF PRESENTING ILLNESS:   Mitchell Jacome is a 59 y.o. male who has significant medical problems as mentioned in the medical chart.     The patient presented to hospital with 2 to 3 weeks of respiratory symptoms which he attributed to having acquired from multiple sick kids at home.  He had progressively worsening coughing along with hemoptysis.  He also reported chest pain.    On ED evaluation, patient was noted to be tachycardic and hypoxic, requiring 2L NC.  CMP was pertinent for glucose of 16.  CBC was otherwise unremarkable, no leukocytosis WBC 7.7.  Lactic acid 1.1.  PCT 0.08.  D-dimer was normal at 0.4.  Respiratory panel was positive for metapneumovirus.  CTA of chest revealed large left-sided tension pneumothorax, multifocal bilateral pneumonia but no evidence of pulmonary embolism, aortic dissection.  Chest tube was placed in ED.  Chest x-ray status post thoracostomy revealed near resolution of pneumothorax with tiny left apical pneumothorax.    Thoracic surgery was consulted for management of spontaneous pneumothorax.    Past Medical History:   Diagnosis Date    Chronic recurrent pancreatitis     COVID-19     Depression     DMII     GERD     Hearing loss, right     85%    History of transfusion 2016    HTN     Hyperlipidemia 2018    PSA     2021= 0.775/ 2022=0.747    Seizures     Solitary kidney        Past Surgical History:   Procedure Laterality Date    APPENDECTOMY      CHOLECYSTECTOMY      COLONOSCOPY      NEG = 2016, rech 2026     GSI       Family History   Problem Relation Age of Onset    Cerebral aneurysm Mother     Early death Mother         Anurism    Hypertension Father     Cancer Father 60        Bladder    Diabetes Father     Hyperlipidemia Father     Hypertension Brother     Hyperlipidemia Brother        Social History     Socioeconomic History    Marital status:     Tobacco Use    Smoking status: Never     Passive exposure: Never    Smokeless tobacco: Never   Vaping Use    Vaping status: Never Used   Substance and Sexual Activity    Alcohol use: Not Currently     Comment: rare    Drug use: Not Currently     Types: Hydrocodone, Marijuana    Sexual activity: Yes     Partners: Female     Birth control/protection: Partner of same sex         Current Facility-Administered Medications:     acetaminophen (TYLENOL) tablet 650 mg, 650 mg, Oral, Q4H PRN **OR** acetaminophen (TYLENOL) 160 MG/5ML oral solution 650 mg, 650 mg, Oral, Q4H PRN **OR** acetaminophen (TYLENOL) suppository 650 mg, 650 mg, Rectal, Q4H PRN, Leilani Ruffin PA-C    ampicillin-sulbactam (UNASYN) 3 g in sodium chloride 0.9 % 100 mL MBP, 3 g, Intravenous, Q6H, Leilani Ruffin PA-PAULA, Last Rate: 0 mL/hr at 01/07/25 1402, 3 g at 01/07/25 1641    atenolol (TENORMIN) tablet 25 mg, 25 mg, Oral, Q24H, Omari Campos MD    sennosides-docusate (PERICOLACE) 8.6-50 MG per tablet 2 tablet, 2 tablet, Oral, BID PRN **AND** polyethylene glycol (MIRALAX) packet 17 g, 17 g, Oral, Daily PRN **AND** bisacodyl (DULCOLAX) EC tablet 5 mg, 5 mg, Oral, Daily PRN **AND** bisacodyl (DULCOLAX) suppository 10 mg, 10 mg, Rectal, Daily PRN, Leilani Ruffin PA-C    Calcium Replacement - Follow Nurse / BPA Driven Protocol, , Not Applicable, PRN, Leilani Ruffin PA-PAULA    dextrose (D50W) (25 g/50 mL) IV injection 25 g, 25 g, Intravenous, Q15 Min PRN, Leilani Ruffin PA-PAULA    dextrose (GLUTOSE) oral gel 15 g, 15 g, Oral, Q15 Min PRN, Leilani Ruffin PA-PAULA    doxycycline (MONODOX) capsule 100 mg, 100 mg, Oral, Q12H, Leilani Ruffin PA-C, 100 mg at 01/07/25 1641    DULoxetine (CYMBALTA) DR capsule 30 mg, 30 mg, Oral, Daily, Omari Campos MD    glucagon (GLUCAGEN) injection 1 mg, 1 mg, Intramuscular, Q15 Min PRN, Leilani Ruffin PA-C    HYDROcodone-acetaminophen (NORCO) 5-325 MG per tablet 1 tablet, 1 tablet, Oral, Q6H PRN,  Oamri Campos MD    insulin glargine (LANTUS, SEMGLEE) injection 25 Units, 25 Units, Subcutaneous, Daily, Omari Campos MD    insulin lispro (HUMALOG/ADMELOG) injection 2-7 Units, 2-7 Units, Subcutaneous, 4x Daily AC & at Bedtime, Leilani Ruffin PA-C, 2 Units at 01/07/25 1333    levETIRAcetam (KEPPRA) tablet 500 mg, 500 mg, Oral, Q12H, Omari Campos MD    Magnesium Standard Dose Replacement - Follow Nurse / BPA Driven Protocol, , Not Applicable, PRN, Leilani Ruffin PA-C    melatonin tablet 5 mg, 5 mg, Oral, Nightly PRN, Leilani Ruffin PA-C    morphine injection 2 mg, 2 mg, Intravenous, Q4H PRN, Omari Campos MD, 2 mg at 01/07/25 1331    ondansetron ODT (ZOFRAN-ODT) disintegrating tablet 4 mg, 4 mg, Oral, Q6H PRN **OR** ondansetron (ZOFRAN) injection 4 mg, 4 mg, Intravenous, Q6H PRN, Leilani Ruffin PA-C    oxyCODONE (ROXICODONE) immediate release tablet 10 mg, 10 mg, Oral, Q4H PRN, Omari Campos MD, 10 mg at 01/07/25 1444    pancrelipase (Lip-Prot-Amyl) (CREON) capsule 12,000 units of lipase, 12,000 units of lipase, Oral, TID With Meals, Omari Campos MD    [START ON 1/8/2025] pantoprazole (PROTONIX) EC tablet 40 mg, 40 mg, Oral, Q AM, Omari Campos MD    Phosphorus Replacement - Follow Nurse / BPA Driven Protocol, , Not Applicable, PRN, Leilani uRffin PA-C    Potassium Replacement - Follow Nurse / BPA Driven Protocol, , Not Applicable, PRN, Leilani Ruffin PA-C    sodium chloride 0.9 % flush 10 mL, 10 mL, Intravenous, PRN, Edmond Kat MD    Current Outpatient Medications:     atenolol (TENORMIN) 25 MG tablet, Take 1 tablet by mouth Daily., Disp: , Rfl:     atorvastatin (LIPITOR) 20 MG tablet, Take 1 tablet by mouth Daily., Disp: 90 tablet, Rfl: 1    BD Pen Needle Fernanda 2nd Gen 32G X 4 MM misc, Inject 1 package under the skin into the appropriate area as directed 4 (Four) Times a Day Before Meals & at Bedtime., Disp: 360 each, Rfl: 3    DULoxetine  "(CYMBALTA) 30 MG capsule, Take 1 capsule by mouth Daily., Disp: 90 capsule, Rfl: 0    fenofibrate (TRICOR) 145 MG tablet, Take 1 tablet by mouth Daily., Disp: , Rfl:     Glucagon, rDNA, (Glucagon Emergency) 1 MG kit, Inject 1 kit as directed As Needed (for low BS)., Disp: 1 each, Rfl: 2    HYDROcodone-acetaminophen (NORCO) 7.5-325 MG per tablet, Take 1 tablet by mouth Every 6 (Six) Hours As Needed for Moderate Pain., Disp: , Rfl:     Insulin Aspart (novoLOG) 100 UNIT/ML injection, Inject  under the skin into the appropriate area as directed 3 (Three) Times a Day Before Meals. Sliding scale, Disp: , Rfl:     insulin glargine (LANTUS, SEMGLEE) 100 UNIT/ML injection, Inject 28-32 Units under the skin into the appropriate area as directed Daily., Disp: , Rfl:     levETIRAcetam (KEPPRA) 500 MG tablet, Take 1 tablet by mouth 2 (Two) Times a Day., Disp: , Rfl:     multivitamin with minerals tablet tablet, Take 1 tablet by mouth Daily., Disp: 90 tablet, Rfl: 2    omeprazole (priLOSEC) 40 MG capsule, Take 1 capsule by mouth Daily., Disp: 90 capsule, Rfl: 0    pancrelipase, Lip-Prot-Amyl, (CREON) 40625-38367 units capsule delayed-release particles capsule, Take 3 capsules by mouth 3 (Three) Times a Day With Meals., Disp: , Rfl:      Allergies   Allergen Reactions    Basaglar Kwikpen [Insulin Glargine] Other (See Comments)     Injection bruising    Gemfibrozil Rash             Objective    OBJECTIVE:     VITAL SIGNS:  /89 (BP Location: Right arm, Patient Position: Lying)   Pulse 84   Temp 98 °F (36.7 °C) (Oral)   Resp 15   Ht 188 cm (74\")   Wt 68.2 kg (150 lb 5.7 oz)   SpO2 96%   BMI 19.30 kg/m²     PHYSICAL EXAM:  Normal appearance.   Head is normocephalic.   Nose appears normal.   No obvious deformity of the mouth and throat.  Conjunctivae normal.   Heart rate and rhythm is normal.  Pulmonary effort is normal.  Chest tube with intermittent airleak with coughing and Valsalva.  Moving all 4 " extremities.  Extremities warm.  No focal deficit present.   He is alert and oriented to person, place, and time.     LAB RESULTS:  I have reviewed all the available laboratory results in the chart.    RESULTS REVIEW:  I have reviewed the patient's all relevant laboratory and imaging findings.           ASSESSMENT & PLAN:  Mitchell Jacome is a 59 y.o. male with significant medical conditions as mentioned above presented to the hospital with problem mentioned in history of presenting illness.    Left spontaneous pneumothorax  Patient presented with first episode of left spontaneous pneumothorax.  Chest tube was placed by ER with adequate lung expansion.  On the CT scan of the chest, he has pulmonary bleb.  On examination, he has intermittent airleak on -20 suction.  I discussed with him in detail the management of first episode of spontaneous pneumothorax.  He is a non-smoker.  I recommended conservative management currently.  I recommended surgical intervention if the airleak is not improving over the next 72 hours or if he discharges home after chest tube removal and presents with recurrent spontaneous pneumothorax.    Please keep the chest tube to -20 suction for now.  Repeat chest x-ray in the morning.  Rest of care per primary team.    I discussed the patient's findings and my recommendations with the patient. The patient was given adequate time to ask questions and all questions were answered to patient satisfaction.     Merlin Richter MD  Thoracic Surgeon  [facility]        Dictated utilizing Dragon dictation    I spent 60 minutes caring for Mitchell on this date of service. This time includes time spent by me in the following activities: reviewing tests, obtaining and/or reviewing a separately obtained history, performing a medically appropriate examination and/or evaluation , counseling and educating the patient/family/caregiver, ordering medications, tests, or procedures,  referring and communicating with other health care professionals , documenting information in the medical record, independently interpreting results and communicating that information with the patient/family/caregiver, and care coordination and more than half the time was spent in direct face to face evaluation and decision making.

## 2025-01-07 NOTE — H&P
[facility] Medicine Services  History & Physical    Patient Name: Mitchell Jacome  : 1965  MRN: [id number]  Primary Care Physician:  Krystyna Hardwick DO  Date of admission: 2025  Date and Time of Service: 2025 at 0357    Subjective      Chief Complaint: Shortness of breath, coughing up blood    History of Present Illness: Mitchell Jacome is a 59 y.o. male with a CMH of HTN, HLD, type 2 diabetes, history of chronic pancreatitis, seizures, chronic back pain, GERD who presented to [facility] on 2025 with shortness of breath x approximately 6 months ago. However he states, over the last 5 days, shortness of breath has been significantly worsened.  Patient grew concerned when he started coughing up blood when shortness of breath became worse.  Currently endorses chest wall pain, palpitations, fever, chills.  He denies tobacco use.  Denies history of lung disease.  Denies history of pneumothorax.  Denies recent sick contact exposure.    On ED evaluation, patient was noted to be tachycardic and hypoxic, requiring 2L NC.  CMP was pertinent for glucose of 16.  CBC was otherwise unremarkable, no leukocytosis WBC 7.7.  Lactic acid 1.1.  PCT 0.08.  D-dimer was normal at 0.4.  Respiratory panel was positive for metapneumovirus.  CTA of chest revealed large left-sided tension pneumothorax, multifocal bilateral pneumonia but no evidence of pulmonary embolism, aortic dissection.  Chest tube was placed in ED.  Chest x-ray status post thoracostomy revealed near resolution of pneumothorax with tiny left apical pneumothorax.      Review of Systems   Constitutional:  Negative for chills, fatigue and fever.   Respiratory:  Positive for cough and shortness of breath.         Hemoptysis   Cardiovascular:  Positive for chest pain. Negative for palpitations.   Gastrointestinal:  Negative for abdominal pain, nausea and vomiting.   Neurological:  Negative for weakness.   All other systems  reviewed and are negative.      Personal History     Past Medical History:   Diagnosis Date    Chronic recurrent pancreatitis     COVID-19     Depression     DMII     GERD     Hearing loss, right     85%    History of transfusion 2016    HTN     Hyperlipidemia 2018    PSA     2021= 0.775/ 2022=0.747    Seizures     Solitary kidney        Past Surgical History:   Procedure Laterality Date    APPENDECTOMY      CHOLECYSTECTOMY      COLONOSCOPY      NEG = 2016, rech 2026     GSI       Family History: family history includes Cancer (age of onset: 60) in his father; Cerebral aneurysm in his mother; Diabetes in his father; Early death in his mother; Hyperlipidemia in his brother and father; Hypertension in his brother and father. Otherwise pertinent FHx was reviewed and not pertinent to current issue.    Social History:  reports that he has never smoked. He has never been exposed to tobacco smoke. He has never used smokeless tobacco. He reports that he does not currently use alcohol. He reports that he does not currently use drugs after having used the following drugs: Hydrocodone and Marijuana.    Home Medications:  Prior to Admission Medications       Prescriptions Last Dose Informant Patient Reported? Taking?    atorvastatin (LIPITOR) 20 MG tablet   No No    Take 1 tablet by mouth Daily.    BD Pen Needle Fernanda 2nd Gen 32G X 4 MM misc   No No    Inject 1 package under the skin into the appropriate area as directed 4 (Four) Times a Day Before Meals & at Bedtime.    Continuous Glucose Sensor (FreeStyle Curry 2 Sensor) misc   No No    use to test blood sugars and change every 14 days    DULoxetine (CYMBALTA) 30 MG capsule   No No    Take 1 capsule by mouth Daily.    fenofibrate 160 MG tablet   No No    Take 1 tablet by mouth Daily.    Glucagon, rDNA, (Glucagon Emergency) 1 MG kit   No No    Inject 1 kit as directed As Needed (for low BS).    HYDROcodone-acetaminophen (Norco) 7.5-325 MG per tablet   No No    Take 1 tablet by  mouth Every 8 (Eight) Hours As Needed for Severe Pain.    Insulin Aspart, w/Niacinamide, (Fiasp FlexTouch) 100 UNIT/ML solution pen-injector   No No    USE SSI @ MEAL TO MAX OF 15units per meal---45 units per day    insulin degludec (Tresiba FlexTouch) 100 UNIT/ML solution pen-injector injection   No No    Inject 25 Units under the skin into the appropriate area as directed Daily.    levETIRAcetam (KEPPRA) 500 MG tablet   Yes No    Take 1 tablet by mouth 2 (Two) Times a Day.    multivitamin with minerals tablet tablet   No No    Take 1 tablet by mouth Daily.    omeprazole (priLOSEC) 40 MG capsule   No No    Take 1 capsule by mouth Daily.    pancrelipase, Lip-Prot-Amyl, (CREON) 12949-34242 units capsule delayed-release particles capsule   No No    3 tabs po TID W/ meals              Allergies:  Allergies   Allergen Reactions    Basaglar Kwikpen [Insulin Glargine] Other (See Comments)     Injection bruising    Gemfibrozil Rash       Objective      Vitals:   Temp:  [99 °F (37.2 °C)] 99 °F (37.2 °C)  Heart Rate:  [105-132] 105  Resp:  [20] 20  BP: (129)/(72) 129/72  Body mass index is 19.3 kg/m².    Physical Exam  General: 58 yo WM, Alert and oriented, well nourished, no acute distress.  HENT: Normocephalic, normal hearing, moist oral mucosa, no scleral icterus.  Neck: Supple, nontender, no carotid bruits, no JVD, no LAD.  Lungs: L chest thoracostomy, Clear to auscultation, nonlabored respiration.  Heart: RRR, no murmur, gallop or edema.  Abdomen: Soft, nontender, nondistended, + bowel sounds.  Musculoskeletal: Normal range of motion and strength, no tenderness or swelling.  Skin: Skin is warm, dry and pink, no rashes or lesions.  Psychiatric: Cooperative, appropriate mood and affect.    Diagnostic Data:  Lab Results (last 24 hours)       Procedure Component Value Units Date/Time    Blood Culture - Blood, Arm, Left [[id number]] Collected: 01/07/25 0352    Specimen: Blood from Arm, Left Updated: 01/07/25 0356    High  Sensitivity Troponin T 1Hr [[id number]] Collected: 01/07/25 0352    Specimen: Blood Updated: 01/07/25 0356    Respiratory Panel PCR w/COVID-19(SARS-CoV-2) DONALDO/TAVIA/ANNABEL/PAD/COR/ADRIAN In-House, NP Swab in UTM/VTM, 2 HR TAT - Swab, Nasopharynx [[id number]]  (Abnormal) Collected: 01/07/25 0139    Specimen: Swab from Nasopharynx Updated: 01/07/25 0234     ADENOVIRUS, PCR Not Detected     Coronavirus 229E Not Detected     Coronavirus HKU1 Not Detected     Coronavirus NL63 Not Detected     Coronavirus OC43 Not Detected     COVID19 Not Detected     Human Metapneumovirus Detected     Human Rhinovirus/Enterovirus Not Detected     Influenza A PCR Not Detected     Influenza B PCR Not Detected     Parainfluenza Virus 1 Not Detected     Parainfluenza Virus 2 Not Detected     Parainfluenza Virus 3 Not Detected     Parainfluenza Virus 4 Not Detected     RSV, PCR Not Detected     Bordetella pertussis pcr Not Detected     Bordetella parapertussis PCR Not Detected     Chlamydophila pneumoniae PCR Not Detected     Mycoplasma pneumo by PCR Not Detected    Narrative:      In the setting of a positive respiratory panel with a viral infection PLUS a negative procalcitonin without other underlying concern for bacterial infection, consider observing off antibiotics or discontinuation of antibiotics and continue supportive care. If the respiratory panel is positive for atypical bacterial infection (Bordetella pertussis, Chlamydophila pneumoniae, or Mycoplasma pneumoniae), consider antibiotic de-escalation to target atypical bacterial infection.    Procalcitonin [[id number]]  (Normal) Collected: 01/07/25 0138    Specimen: Blood Updated: 01/07/25 0217     Procalcitonin 0.08 ng/mL     Narrative:      As a Marker for Sepsis (Non-Neonates):    1. <0.5 ng/mL represents a low risk of severe sepsis and/or septic shock.  2. >2 ng/mL represents a high risk of severe sepsis and/or septic shock.    As a Marker for Lower Respiratory Tract Infections that  "require antibiotic therapy:    PCT on Admission    Antibiotic Therapy       6-12 Hrs later    >0.5                Strongly Recommended  >0.25 - <0.5        Recommended   0.1 - 0.25          Discouraged              Remeasure/reassess PCT  <0.1                Strongly Discouraged     Remeasure/reassess PCT    As 28 day mortality risk marker: \"Change in Procalcitonin Result\" (>80% or <=80%) if Day 0 (or Day 1) and Day 4 values are available. Refer to http://www.[facility]-pct-calculator.com    Change in PCT <=80%  A decrease of PCT levels below or equal to 80% defines a positive change in PCT test result representing a higher risk for 28-day all-cause mortality of patients diagnosed with severe sepsis for septic shock.    Change in PCT >80%  A decrease of PCT levels of more than 80% defines a negative change in PCT result representing a lower risk for 28-day all-cause mortality of patients diagnosed with severe sepsis or septic shock.       High Sensitivity Troponin T [[id number]]  (Abnormal) Collected: 01/07/25 0138    Specimen: Blood Updated: 01/07/25 0217     HS Troponin T 23 ng/L     Narrative:      High Sensitive Troponin T Reference Range:  <14.0 ng/L- Negative Female for AMI  <22.0 ng/L- Negative Male for AMI  >=14 - Abnormal Female indicating possible myocardial injury.  >=22 - Abnormal Male indicating possible myocardial injury.   Clinicians would have to utilize clinical acumen, EKG, Troponin, and serial changes to determine if it is an Acute Myocardial Infarction or myocardial injury due to an underlying chronic condition.         Comprehensive Metabolic Panel [[id number]]  (Abnormal) Collected: 01/07/25 0138    Specimen: Blood Updated: 01/07/25 0217     Glucose 216 mg/dL      BUN 12 mg/dL      Creatinine 0.98 mg/dL      Sodium 136 mmol/L      Potassium 4.2 mmol/L      Chloride 99 mmol/L      CO2 24.8 mmol/L      Calcium 9.6 mg/dL      Total Protein 7.1 g/dL      Albumin 3.5 g/dL      ALT (SGPT) 9 U/L      AST " "(SGOT) 17 U/L      Alkaline Phosphatase 84 U/L      Total Bilirubin <0.2 mg/dL      Globulin 3.6 gm/dL      A/G Ratio 1.0 g/dL      BUN/Creatinine Ratio 12.2     Anion Gap 12.2 mmol/L      eGFR 88.8 mL/min/1.73     Narrative:      GFR Categories in Chronic Kidney Disease (CKD)      GFR Category          GFR (mL/min/1.73)    Interpretation  G1                     90 or greater         Normal or high (1)  G2                      60-89                Mild decrease (1)  G3a                   45-59                Mild to moderate decrease  G3b                   30-44                Moderate to severe decrease  G4                    15-29                Severe decrease  G5                    14 or less           Kidney failure          (1)In the absence of evidence of kidney disease, neither GFR category G1 or G2 fulfill the criteria for CKD.    eGFR calculation 2021 CKD-EPI creatinine equation, which does not include race as a factor    D-dimer, Quantitative [[id number]]  (Normal) Collected: 01/07/25 0138    Specimen: Blood Updated: 01/07/25 0206     D-Dimer, Quantitative 0.46 MCGFEU/mL     Narrative:      According to the assay 's published package insert, a normal (<0.50 MCGFEU/mL) D-dimer result in conjunction with a non-high clinical probability assessment, excludes deep vein thrombosis (DVT) and pulmonary embolism (PE) with high sensitivity.    D-dimer values increase with age and this can make VTE exclusion of an older population difficult. To address this, the [employer], based on best available evidence and recent guidelines, recommends that clinicians use age-adjusted D-dimer thresholds in patients greater than 50 years of age with: a) a low probability of PE who do not meet all Pulmonary Embolism Rule Out Criteria, or b) in those with intermediate probability of PE.   The formula for an age-adjusted D-dimer cut-off is \"age/100\".  For example, a 60 year old patient would have an " age-adjusted cut-off of 0.60 MCGFEU/mL and an 80 year old 0.80 MCGFEU/mL.    Hunter Draw [[id number]] Collected: 01/07/25 0138    Specimen: Blood Updated: 01/07/25 0147    Narrative:      The following orders were created for panel order Hunter Draw.  Procedure                               Abnormality         Status                     ---------                               -----------         ------                     Green Top (Gel)[[id number]]                                  Final result               Lavender Top[[id number]]                                     Final result               Gold Top - SST[[id number]]                                   Final result               Light Blue Top[[id number]]                                   Final result                 Please view results for these tests on the individual orders.    Green Top (Gel) [[id number]] Collected: 01/07/25 0138    Specimen: Blood Updated: 01/07/25 0147     Extra Tube Hold for add-ons.     Comment: Auto resulted.       Lavender Top [[id number]] Collected: 01/07/25 0138    Specimen: Blood Updated: 01/07/25 0147     Extra Tube hold for add-on     Comment: Auto resulted       Gold Top - SST [[id number]] Collected: 01/07/25 0138    Specimen: Blood Updated: 01/07/25 0147     Extra Tube Hold for add-ons.     Comment: Auto resulted.       Light Blue Top [[id number]] Collected: 01/07/25 0138    Specimen: Blood Updated: 01/07/25 0147     Extra Tube Hold for add-ons.     Comment: Auto resulted       CBC & Differential [[id number]]  (Abnormal) Collected: 01/07/25 0138    Specimen: Blood Updated: 01/07/25 0146    Narrative:      The following orders were created for panel order CBC & Differential.  Procedure                               Abnormality         Status                     ---------                               -----------         ------                     CBC Auto Differential[[id number]]        Abnormal            Final result                  Please view results for these tests on the individual orders.    CBC Auto Differential [[id number]]  (Abnormal) Collected: 01/07/25 0138    Specimen: Blood Updated: 01/07/25 0146     WBC 7.70 10*3/mm3      RBC 3.90 10*6/mm3      Hemoglobin 12.2 g/dL      Hematocrit 35.8 %      MCV 91.8 fL      MCH 31.3 pg      MCHC 34.1 g/dL      RDW 12.3 %      RDW-SD 41.0 fl      MPV 9.0 fL      Platelets 496 10*3/mm3      Neutrophil % 60.1 %      Lymphocyte % 26.0 %      Monocyte % 7.5 %      Eosinophil % 4.5 %      Basophil % 1.4 %      Immature Grans % 0.5 %      Neutrophils, Absolute 4.62 10*3/mm3      Lymphocytes, Absolute 2.00 10*3/mm3      Monocytes, Absolute 0.58 10*3/mm3      Eosinophils, Absolute 0.35 10*3/mm3      Basophils, Absolute 0.11 10*3/mm3      Immature Grans, Absolute 0.04 10*3/mm3      nRBC 0.0 /100 WBC     POC Lactate [[id number]]  (Normal) Collected: 01/07/25 0143    Specimen: Blood Updated: 01/07/25 0145     Lactate 1.1 mmol/L      Comment: Serial Number: [id number]:  [id number]       Blood Culture - Blood, Arm, Left [[id number]] Collected: 01/07/25 0138    Specimen: Blood from Arm, Left Updated: 01/07/25 0143             Imaging Results (Last 24 Hours)       Procedure Component Value Units Date/Time    XR Chest 1 View [[id number]] Collected: 01/07/25 0352     Updated: 01/07/25 0356    Narrative:      XR CHEST 1 VW    Date of Exam: 1/7/2025 3:36 AM EST    Indication: chest tube placement    Comparison: Chest radiograph 12/29/2023    Findings:  There is a left-sided smallbore thoracostomy tube in place with a tiny left apical pneumothorax remaining. There are bilateral lower lobe infiltrates consistent with pneumonia. The upper lung zones are clear. The heart size and pulmonary vascular   markings are normal.      Impression:      Impression:  Left-sided thoracostomy tube in place with a tiny left apical pneumothorax. Bilateral lower lobe infiltrates consistent with pneumonia.          Electronically  Signed: Sascha Solorzano MD    1/7/2025 3:54 AM EST    Workstation ID: [id number]    CT Angiogram Chest Pulmonary Embolism [[id number]] Collected: 01/07/25 0255     Updated: 01/07/25 0309    Narrative:      CT ANGIOGRAM CHEST PULMONARY EMBOLISM    Date of Exam: 1/7/2025 2:52 AM EST    Indication: soa, hemoptysis.    Comparison: None available.    Technique: Axial CT images were obtained of the chest after the uneventful intravenous administration of iodinated contrast utilizing pulmonary embolism protocol.  Sagittal and coronal reconstructions were performed.  Automated exposure control and   iterative reconstruction methods were used.    Findings:  No pulmonary embolism or aortic dissection is identified. However, there is a large left pneumothorax with shift of the midline structures to the right compatible with tension. Chest tube placement is recommended. No right-sided pneumothorax is   identified. No pleural or pericardial effusion is seen. No adenopathy. Upper abdominal images demonstrate cholecystectomy and an absent right kidney. Lung windows demonstrate multifocal infiltrates in both lungs, most severe in the lower lobes,   compatible with multifocal bilateral pneumonia. Continued follow-up is recommended. No rib fractures are identified.      Impression:        1. Large left-sided tension pneumothorax. Chest tube placement recommended.  2. Multifocal bilateral pneumonia with a lower lobe predominance. Continued imaging follow-up recommended.  3. No pulmonary embolism or aortic dissection.    NOTIFICATION: Critical Value/emergent results were called by telephone at the time of interpretation on 1/7/2025 3:02 AM EST to JOSELO COLE MD who verbally acknowledged these results.        Electronically Signed: Iker Stokes MD    1/7/2025 3:07 AM EST    Workstation ID: [id number]              Assessment & Plan        This is a 59 y.o. male with:    Active and Resolved Problems  Active Hospital Problems    Diagnosis   POA    **Multifocal pneumonia [J18.9]  Yes      Resolved Hospital Problems   No resolved problems to display.       Multifocal pneumonia  Left-sided tension pneumothorax  CTA chest: Large left-sided tension pneumothorax, multifocal bilateral pneumonia with lower lobe predominance, no evidence of PE  Status post chest tube placement in ED, continue LWS  Repeat CXR: near resolution of pneumothorax with tiny left apical pneumothorax  Currently on 2L NC, b/l RA  WBC 7.7, Pro-Ba 0.08, lactic 1.1  Continue Unasyn and Doxy  DuoNebs q6h   Thoracic surgery consulted    Diabetes Mellitus   Low SSI, Lantus once reconciled  Consistent carb diet  Hypoglycemia protocol     Hyperlipidemia-continue statin, fenofibrate as reconciled  Seizures-continue Keppra once reconciled  Chronic back pain-continue Cymbalta and home pain medication, scheduled for L4-L5 microdiscectomy with Dr. Hernandez on 1/20/2025  GERD-Continue PPI  History of chronic pancreatitis-continue Creon once reconciled        VTE Prophylaxis:  Mechanical VTE prophylaxis orders are present.        The patient desires to be as follows:    CODE STATUS:    Code Status (Patient has no pulse and is not breathing): CPR (Attempt to Resuscitate)  Medical Interventions (Patient has pulse or is breathing): Full Support        Corina barbour, who can be contacted at , is the designated person to make medical decisions on the patient's behalf if He is incapable of doing so. This was clarified with patient and/or next of kin on 1/7/2025 during the course of this H&P.    Admission Status:  I believe this patient meets inpatient status.    Expected Length of Stay: 2 to 3 days    PDMP and Medication Dispenses via Sidebar reviewed and consistent with patient reported medications.    I discussed the patient's findings and my recommendations with patient.      Signature:     This document has been electronically signed by Leilani Ruffin PA-C on January 7, 2025 03:57 EST   [facility]

## 2025-01-07 NOTE — PLAN OF CARE
Goal Outcome Evaluation:  Plan of Care Reviewed With: patient        Progress: no change  Outcome Evaluation: New admission

## 2025-01-08 ENCOUNTER — TELEPHONE (OUTPATIENT)
Dept: NEUROSURGERY | Facility: CLINIC | Age: 60
End: 2025-01-08
Payer: OTHER GOVERNMENT

## 2025-01-08 ENCOUNTER — APPOINTMENT (OUTPATIENT)
Dept: GENERAL RADIOLOGY | Facility: HOSPITAL | Age: 60
DRG: 193 | End: 2025-01-08
Payer: OTHER GOVERNMENT

## 2025-01-08 LAB
ANION GAP SERPL CALCULATED.3IONS-SCNC: 9.5 MMOL/L (ref 5–15)
BASOPHILS # BLD AUTO: 0.08 10*3/MM3 (ref 0–0.2)
BASOPHILS NFR BLD AUTO: 0.8 % (ref 0–1.5)
BUN SERPL-MCNC: 17 MG/DL (ref 6–20)
BUN/CREAT SERPL: 14.8 (ref 7–25)
CALCIUM SPEC-SCNC: 9.3 MG/DL (ref 8.6–10.5)
CHLORIDE SERPL-SCNC: 97 MMOL/L (ref 98–107)
CO2 SERPL-SCNC: 27.5 MMOL/L (ref 22–29)
CREAT SERPL-MCNC: 1.15 MG/DL (ref 0.76–1.27)
DEPRECATED RDW RBC AUTO: 42 FL (ref 37–54)
EGFRCR SERPLBLD CKD-EPI 2021: 73.3 ML/MIN/1.73
EOSINOPHIL # BLD AUTO: 0.41 10*3/MM3 (ref 0–0.4)
EOSINOPHIL NFR BLD AUTO: 4.2 % (ref 0.3–6.2)
ERYTHROCYTE [DISTWIDTH] IN BLOOD BY AUTOMATED COUNT: 12.2 % (ref 12.3–15.4)
GLUCOSE BLDC GLUCOMTR-MCNC: 134 MG/DL (ref 70–105)
GLUCOSE BLDC GLUCOMTR-MCNC: 148 MG/DL (ref 70–105)
GLUCOSE BLDC GLUCOMTR-MCNC: 179 MG/DL (ref 70–105)
GLUCOSE BLDC GLUCOMTR-MCNC: 211 MG/DL (ref 70–105)
GLUCOSE SERPL-MCNC: 204 MG/DL (ref 65–99)
HCT VFR BLD AUTO: 34.2 % (ref 37.5–51)
HGB BLD-MCNC: 11.3 G/DL (ref 13–17.7)
IMM GRANULOCYTES # BLD AUTO: 0.04 10*3/MM3 (ref 0–0.05)
IMM GRANULOCYTES NFR BLD AUTO: 0.4 % (ref 0–0.5)
LYMPHOCYTES # BLD AUTO: 2.6 10*3/MM3 (ref 0.7–3.1)
LYMPHOCYTES NFR BLD AUTO: 26.6 % (ref 19.6–45.3)
MAGNESIUM SERPL-MCNC: 1.2 MG/DL (ref 1.6–2.6)
MAGNESIUM SERPL-MCNC: 2.2 MG/DL (ref 1.6–2.6)
MCH RBC QN AUTO: 30.9 PG (ref 26.6–33)
MCHC RBC AUTO-ENTMCNC: 33 G/DL (ref 31.5–35.7)
MCV RBC AUTO: 93.4 FL (ref 79–97)
MONOCYTES # BLD AUTO: 0.77 10*3/MM3 (ref 0.1–0.9)
MONOCYTES NFR BLD AUTO: 7.9 % (ref 5–12)
NEUTROPHILS NFR BLD AUTO: 5.89 10*3/MM3 (ref 1.7–7)
NEUTROPHILS NFR BLD AUTO: 60.1 % (ref 42.7–76)
NRBC BLD AUTO-RTO: 0 /100 WBC (ref 0–0.2)
PLATELET # BLD AUTO: 503 10*3/MM3 (ref 140–450)
PMV BLD AUTO: 8.9 FL (ref 6–12)
POTASSIUM SERPL-SCNC: 4.5 MMOL/L (ref 3.5–5.2)
RBC # BLD AUTO: 3.66 10*6/MM3 (ref 4.14–5.8)
SODIUM SERPL-SCNC: 134 MMOL/L (ref 136–145)
WBC NRBC COR # BLD AUTO: 9.79 10*3/MM3 (ref 3.4–10.8)

## 2025-01-08 PROCEDURE — 25010000002 MORPHINE PER 10 MG: Performed by: INTERNAL MEDICINE

## 2025-01-08 PROCEDURE — 99231 SBSQ HOSP IP/OBS SF/LOW 25: CPT | Performed by: NURSE PRACTITIONER

## 2025-01-08 PROCEDURE — 82948 REAGENT STRIP/BLOOD GLUCOSE: CPT

## 2025-01-08 PROCEDURE — 63710000001 INSULIN LISPRO (HUMAN) PER 5 UNITS

## 2025-01-08 PROCEDURE — 63710000001 INSULIN GLARGINE PER 5 UNITS: Performed by: INTERNAL MEDICINE

## 2025-01-08 PROCEDURE — 83735 ASSAY OF MAGNESIUM: CPT

## 2025-01-08 PROCEDURE — 25010000002 AMPICILLIN-SULBACTAM PER 1.5 G

## 2025-01-08 PROCEDURE — 71045 X-RAY EXAM CHEST 1 VIEW: CPT

## 2025-01-08 PROCEDURE — 25010000002 MAGNESIUM SULFATE 2 GM/50ML SOLUTION: Performed by: INTERNAL MEDICINE

## 2025-01-08 PROCEDURE — 80048 BASIC METABOLIC PNL TOTAL CA: CPT

## 2025-01-08 PROCEDURE — 97162 PT EVAL MOD COMPLEX 30 MIN: CPT

## 2025-01-08 PROCEDURE — 83735 ASSAY OF MAGNESIUM: CPT | Performed by: INTERNAL MEDICINE

## 2025-01-08 PROCEDURE — 85025 COMPLETE CBC W/AUTO DIFF WBC: CPT

## 2025-01-08 RX ORDER — MAGNESIUM SULFATE HEPTAHYDRATE 40 MG/ML
2 INJECTION, SOLUTION INTRAVENOUS
Status: COMPLETED | OUTPATIENT
Start: 2025-01-08 | End: 2025-01-08

## 2025-01-08 RX ORDER — DIAZEPAM 2 MG/1
2 TABLET ORAL EVERY 6 HOURS PRN
Status: DISCONTINUED | OUTPATIENT
Start: 2025-01-08 | End: 2025-01-11 | Stop reason: HOSPADM

## 2025-01-08 RX ADMIN — MORPHINE SULFATE 2 MG: 2 INJECTION, SOLUTION INTRAMUSCULAR; INTRAVENOUS at 06:23

## 2025-01-08 RX ADMIN — AMPICILLIN SODIUM AND SULBACTAM SODIUM 3 G: 2; 1 INJECTION, POWDER, FOR SOLUTION INTRAMUSCULAR; INTRAVENOUS at 11:49

## 2025-01-08 RX ADMIN — OXYCODONE 10 MG: 5 TABLET ORAL at 04:13

## 2025-01-08 RX ADMIN — OXYCODONE 10 MG: 5 TABLET ORAL at 08:36

## 2025-01-08 RX ADMIN — ATENOLOL 25 MG: 50 TABLET ORAL at 08:36

## 2025-01-08 RX ADMIN — SENNOSIDES AND DOCUSATE SODIUM 2 TABLET: 50; 8.6 TABLET ORAL at 09:30

## 2025-01-08 RX ADMIN — AMPICILLIN SODIUM AND SULBACTAM SODIUM 3 G: 2; 1 INJECTION, POWDER, FOR SOLUTION INTRAMUSCULAR; INTRAVENOUS at 04:04

## 2025-01-08 RX ADMIN — MAGNESIUM SULFATE HEPTAHYDRATE 2 G: 40 INJECTION, SOLUTION INTRAVENOUS at 04:52

## 2025-01-08 RX ADMIN — PANTOPRAZOLE SODIUM 40 MG: 40 TABLET, DELAYED RELEASE ORAL at 05:54

## 2025-01-08 RX ADMIN — INSULIN GLARGINE-YFGN 25 UNITS: 100 INJECTION, SOLUTION SUBCUTANEOUS at 08:35

## 2025-01-08 RX ADMIN — Medication 10 ML: at 08:36

## 2025-01-08 RX ADMIN — LEVETIRACETAM 500 MG: 500 TABLET, FILM COATED ORAL at 08:36

## 2025-01-08 RX ADMIN — INSULIN LISPRO 2 UNITS: 100 INJECTION, SOLUTION INTRAVENOUS; SUBCUTANEOUS at 17:36

## 2025-01-08 RX ADMIN — MORPHINE SULFATE 2 MG: 2 INJECTION, SOLUTION INTRAMUSCULAR; INTRAVENOUS at 11:28

## 2025-01-08 RX ADMIN — MAGNESIUM SULFATE HEPTAHYDRATE 2 G: 40 INJECTION, SOLUTION INTRAVENOUS at 02:32

## 2025-01-08 RX ADMIN — AMPICILLIN SODIUM AND SULBACTAM SODIUM 3 G: 2; 1 INJECTION, POWDER, FOR SOLUTION INTRAMUSCULAR; INTRAVENOUS at 21:37

## 2025-01-08 RX ADMIN — MAGNESIUM SULFATE HEPTAHYDRATE 2 G: 40 INJECTION, SOLUTION INTRAVENOUS at 06:23

## 2025-01-08 RX ADMIN — MORPHINE SULFATE 2 MG: 2 INJECTION, SOLUTION INTRAMUSCULAR; INTRAVENOUS at 15:22

## 2025-01-08 RX ADMIN — PANCRELIPASE 12000 UNITS OF LIPASE: 60000; 12000; 38000 CAPSULE, DELAYED RELEASE PELLETS ORAL at 08:36

## 2025-01-08 RX ADMIN — DOXYCYCLINE 100 MG: 100 CAPSULE ORAL at 17:36

## 2025-01-08 RX ADMIN — PANCRELIPASE 12000 UNITS OF LIPASE: 60000; 12000; 38000 CAPSULE, DELAYED RELEASE PELLETS ORAL at 11:48

## 2025-01-08 RX ADMIN — AMPICILLIN SODIUM AND SULBACTAM SODIUM 3 G: 2; 1 INJECTION, POWDER, FOR SOLUTION INTRAMUSCULAR; INTRAVENOUS at 15:23

## 2025-01-08 RX ADMIN — DOXYCYCLINE 100 MG: 100 CAPSULE ORAL at 04:53

## 2025-01-08 RX ADMIN — DULOXETINE 30 MG: 30 CAPSULE, DELAYED RELEASE ORAL at 08:36

## 2025-01-08 RX ADMIN — MORPHINE SULFATE 2 MG: 2 INJECTION, SOLUTION INTRAMUSCULAR; INTRAVENOUS at 01:16

## 2025-01-08 RX ADMIN — PANCRELIPASE 12000 UNITS OF LIPASE: 60000; 12000; 38000 CAPSULE, DELAYED RELEASE PELLETS ORAL at 17:36

## 2025-01-08 RX ADMIN — BISACODYL 5 MG: 5 TABLET, COATED ORAL at 11:48

## 2025-01-08 RX ADMIN — INSULIN LISPRO 3 UNITS: 100 INJECTION, SOLUTION INTRAVENOUS; SUBCUTANEOUS at 11:49

## 2025-01-08 RX ADMIN — OXYCODONE 10 MG: 5 TABLET ORAL at 21:37

## 2025-01-08 RX ADMIN — OXYCODONE 10 MG: 5 TABLET ORAL at 17:36

## 2025-01-08 RX ADMIN — DIAZEPAM 2 MG: 2 TABLET ORAL at 17:36

## 2025-01-08 RX ADMIN — OXYCODONE 10 MG: 5 TABLET ORAL at 13:04

## 2025-01-08 RX ADMIN — LEVETIRACETAM 500 MG: 500 TABLET, FILM COATED ORAL at 21:37

## 2025-01-08 RX ADMIN — HYDROCODONE BITARTRATE AND ACETAMINOPHEN 1 TABLET: 5; 325 TABLET ORAL at 11:48

## 2025-01-08 NOTE — PLAN OF CARE
Problem: Adult Inpatient Plan of Care  Goal: Plan of Care Review  Outcome: Not Progressing  Flowsheets (Taken 1/8/2025 1741)  Progress: no change  Outcome Evaluation: Patient is stable.complaints of pain often. See MAR. Chest tube still present. Safety measures in place. Call light within reach. Patient able to make needs known. Plan of care ongoing.  Plan of Care Reviewed With:   patient   spouse  Goal: Patient-Specific Goal (Individualized)  Outcome: Not Progressing  Goal: Absence of Hospital-Acquired Illness or Injury  Outcome: Not Progressing  Intervention: Identify and Manage Fall Risk  Recent Flowsheet Documentation  Taken 1/8/2025 1630 by Nisa Ruggiero LPN  Safety Promotion/Fall Prevention:   activity supervised   assistive device/personal items within reach   clutter free environment maintained   nonskid shoes/slippers when out of bed   room organization consistent   safety round/check completed   fall prevention program maintained  Taken 1/8/2025 1414 by Nisa Ruggiero LPN  Safety Promotion/Fall Prevention:   activity supervised   assistive device/personal items within reach   clutter free environment maintained   fall prevention program maintained   nonskid shoes/slippers when out of bed   room organization consistent   safety round/check completed  Taken 1/8/2025 1222 by Nisa Ruggiero LPN  Safety Promotion/Fall Prevention:   activity supervised   assistive device/personal items within reach   clutter free environment maintained   nonskid shoes/slippers when out of bed   room organization consistent   safety round/check completed   fall prevention program maintained  Taken 1/8/2025 1020 by Nisa Ruggiero LPN  Safety Promotion/Fall Prevention:   activity supervised   assistive device/personal items within reach   clutter free environment maintained   nonskid shoes/slippers when out of bed   room organization consistent   safety round/check completed  Taken 1/8/2025 0836 by Nisa Ruggiero  LPN  Safety Promotion/Fall Prevention:   activity supervised   assistive device/personal items within reach   clutter free environment maintained   nonskid shoes/slippers when out of bed   safety round/check completed   room organization consistent  Intervention: Prevent Skin Injury  Recent Flowsheet Documentation  Taken 1/8/2025 0836 by Nisa Ruggiero LPN  Skin Protection: incontinence pads utilized  Intervention: Prevent and Manage VTE (Venous Thromboembolism) Risk  Recent Flowsheet Documentation  Taken 1/8/2025 0836 by Nisa Ruggiero LPN  VTE Prevention/Management: (up in chair)   bilateral   SCDs (sequential compression devices) off  Intervention: Prevent Infection  Recent Flowsheet Documentation  Taken 1/8/2025 1630 by Nisa Ruggiero LPN  Infection Prevention:   hand hygiene promoted   personal protective equipment utilized   single patient room provided  Taken 1/8/2025 1222 by Nisa Ruggiero LPN  Infection Prevention:   hand hygiene promoted   personal protective equipment utilized   single patient room provided  Taken 1/8/2025 0836 by Nisa Ruggiero LPN  Infection Prevention:   hand hygiene promoted   personal protective equipment utilized   single patient room provided  Goal: Optimal Comfort and Wellbeing  Outcome: Not Progressing  Intervention: Provide Person-Centered Care  Recent Flowsheet Documentation  Taken 1/8/2025 0836 by Nisa Ruggiero LPN  Trust Relationship/Rapport:   care explained   questions answered   questions encouraged   reassurance provided   thoughts/feelings acknowledged  Goal: Readiness for Transition of Care  Outcome: Not Progressing     Problem: Pain Acute  Goal: Optimal Pain Control and Function  Outcome: Not Progressing  Intervention: Optimize Psychosocial Wellbeing  Recent Flowsheet Documentation  Taken 1/8/2025 0836 by Nisa Ruggiero LPN  Diversional Activities:   television   smartphone  Intervention: Prevent or Manage Pain  Recent Flowsheet Documentation  Taken 1/8/2025  1630 by Nisa Ruggiero LPN  Medication Review/Management: medications reviewed  Taken 1/8/2025 1414 by Nisa Ruggiero LPN  Medication Review/Management: medications reviewed  Taken 1/8/2025 1222 by Nisa Ruggiero LPN  Medication Review/Management: medications reviewed  Taken 1/8/2025 1020 by Nisa Ruggiero LPN  Medication Review/Management: medications reviewed  Taken 1/8/2025 0836 by Nisa Ruggiero LPN  Medication Review/Management: medications reviewed     Problem: Fall Injury Risk  Goal: Absence of Fall and Fall-Related Injury  Outcome: Not Progressing  Intervention: Identify and Manage Contributors  Recent Flowsheet Documentation  Taken 1/8/2025 1630 by Nisa Ruggiero LPN  Medication Review/Management: medications reviewed  Taken 1/8/2025 1414 by Nisa Ruggiero LPN  Medication Review/Management: medications reviewed  Taken 1/8/2025 1222 by Nisa Ruggiero LPN  Medication Review/Management: medications reviewed  Taken 1/8/2025 1020 by Nisa Ruggiero LPN  Medication Review/Management: medications reviewed  Taken 1/8/2025 0836 by Nisa Ruggiero LPN  Medication Review/Management: medications reviewed  Intervention: Promote Injury-Free Environment  Recent Flowsheet Documentation  Taken 1/8/2025 1630 by Nisa Ruggiero LPN  Safety Promotion/Fall Prevention:   activity supervised   assistive device/personal items within reach   clutter free environment maintained   nonskid shoes/slippers when out of bed   room organization consistent   safety round/check completed   fall prevention program maintained  Taken 1/8/2025 1414 by Nisa Ruggiero LPN  Safety Promotion/Fall Prevention:   activity supervised   assistive device/personal items within reach   clutter free environment maintained   fall prevention program maintained   nonskid shoes/slippers when out of bed   room organization consistent   safety round/check completed  Taken 1/8/2025 1222 by Nisa Ruggiero LPN  Safety Promotion/Fall Prevention:   activity  supervised   assistive device/personal items within reach   clutter free environment maintained   nonskid shoes/slippers when out of bed   room organization consistent   safety round/check completed   fall prevention program maintained  Taken 1/8/2025 1020 by Nisa Ruggiero LPN  Safety Promotion/Fall Prevention:   activity supervised   assistive device/personal items within reach   clutter free environment maintained   nonskid shoes/slippers when out of bed   room organization consistent   safety round/check completed  Taken 1/8/2025 0836 by Nisa Ruggiero LPN  Safety Promotion/Fall Prevention:   activity supervised   assistive device/personal items within reach   clutter free environment maintained   nonskid shoes/slippers when out of bed   safety round/check completed   room organization consistent   Goal Outcome Evaluation:  Plan of Care Reviewed With: patient, spouse        Progress: no change  Outcome Evaluation: Patient is stable.complaints of pain often. See MAR. Chest tube still present. Safety measures in place. Call light within reach. Patient able to make needs known. Plan of care ongoing.

## 2025-01-08 NOTE — PROGRESS NOTES
[facility] MEDICINE SERVICE  DAILY PROGRESS NOTE    NAME: Mitchell Jacome  : 1965  MRN: [id number]      LOS: 1 day     PROVIDER OF SERVICE: Omari Campos MD    Chief Complaint: Multifocal pneumonia    Subjective:     Interval History:  History taken from: patient    History of Present Illness: Mitchell Jacome is a 59 y.o. male with a CMH of HTN, HLD, type 2 diabetes, history of chronic pancreatitis, seizures, chronic back pain, GERD who presented to [facility] on 2025 with shortness of breath x approximately 6 months ago. However he states, over the last 5 days, shortness of breath has been significantly worsened.  Patient grew concerned when he started coughing up blood when shortness of breath became worse.  Currently endorses chest wall pain, palpitations, fever, chills.  He denies tobacco use.  Denies history of lung disease.  Denies history of pneumothorax.  Denies recent sick contact exposure.     On ED evaluation, patient was noted to be tachycardic and hypoxic, requiring 2L NC.  CMP was pertinent for glucose of 16.  CBC was otherwise unremarkable, no leukocytosis WBC 7.7.  Lactic acid 1.1.  PCT 0.08.  D-dimer was normal at 0.4.  Respiratory panel was positive for metapneumovirus.  CTA of chest revealed large left-sided tension pneumothorax, multifocal bilateral pneumonia but no evidence of pulmonary embolism, aortic dissection.  Chest tube was placed in ED.  Chest x-ray status post thoracostomy revealed near resolution of pneumothorax with tiny left apical pneumothorax.      patient seen and examined in bed no acute distress, chest tube in place, no new complaints, tolerating antibiotics, discussed with RN, pain improving        Review of Systems  Constitutional:  Negative for chills, fatigue and fever.   Respiratory:  Positive for cough and shortness of breath.         Hemoptysis   Cardiovascular:  Positive for chest pain. Negative for palpitations.    Gastrointestinal:  Negative for abdominal pain, nausea and vomiting.   Neurological:  Negative for weakness.   All other systems reviewed and are negative.  Objective:     Vital Signs  Temp:  [96.3 °F (35.7 °C)-98.5 °F (36.9 °C)] 97.7 °F (36.5 °C)  Heart Rate:  [75-90] 76  Resp:  [10-18] 10  BP: (109-137)/(79-90) 109/79  Flow (L/min) (Oxygen Therapy):  [3] 3   Body mass index is 19.3 kg/m².    Physical Exam     General: 60 yo WM, Alert and oriented, well nourished, no acute distress.  HENT: Normocephalic, normal hearing, moist oral mucosa, no scleral icterus.  Neck: Supple, nontender, no carotid bruits, no JVD, no LAD.  Lungs: L chest thoracostomy, Clear to auscultation, nonlabored respiration.  Heart: RRR, no murmur, gallop or edema.  Abdomen: Soft, nontender, nondistended, + bowel sounds.  Musculoskeletal: Normal range of motion and strength, no tenderness or swelling.  Skin: Skin is warm, dry and pink, no rashes or lesions.  Psychiatric: Cooperative, appropriate mood and affect.     Diagnostic Data    Results from last 7 days   Lab Units 01/08/25  0035 01/07/25  0138   WBC 10*3/mm3 9.79 7.70   HEMOGLOBIN g/dL 11.3* 12.2*   HEMATOCRIT % 34.2* 35.8*   PLATELETS 10*3/mm3 503* 496*   GLUCOSE mg/dL 204* 216*   CREATININE mg/dL 1.15 0.98   BUN mg/dL 17 12   SODIUM mmol/L 134* 136   POTASSIUM mmol/L 4.5 4.2   AST (SGOT) U/L  --  17   ALT (SGPT) U/L  --  9   ALK PHOS U/L  --  84   BILIRUBIN mg/dL  --  <0.2   ANION GAP mmol/L 9.5 12.2       XR Chest 1 View    Result Date: 1/8/2025  Impression: 1. Stable left-sided chest tube with persistent small left apical pneumothorax measuring 15 mm. 2. No change in multifocal airspace disease at the lung bases compatible with multifocal pneumonia. Electronically Signed: Vikas Bertrand MD  1/8/2025 7:11 AM EST  Workstation ID: [id number]    XR Chest 1 View    Result Date: 1/7/2025  Impression: Left-sided thoracostomy tube in place with a tiny left apical pneumothorax. Bilateral lower  lobe infiltrates consistent with pneumonia. Electronically Signed: Sascha Solorzano MD  1/7/2025 3:54 AM EST  Workstation ID: [id number]    CT Angiogram Chest Pulmonary Embolism    Result Date: 1/7/2025  1. Large left-sided tension pneumothorax. Chest tube placement recommended. 2. Multifocal bilateral pneumonia with a lower lobe predominance. Continued imaging follow-up recommended. 3. No pulmonary embolism or aortic dissection. NOTIFICATION: Critical Value/emergent results were called by telephone at the time of interpretation on 1/7/2025 3:02 AM EST to JOSELO COLE MD who verbally acknowledged these results. Electronically Signed: Iker Stokes MD  1/7/2025 3:07 AM EST  Workstation ID: [id number]       I reviewed the patient's new clinical results.    Assessment/Plan:     Active and Resolved Problems  Active Hospital Problems    Diagnosis  POA    **Multifocal pneumonia [J18.9]  Yes      Resolved Hospital Problems   No resolved problems to display.     Multifocal pneumonia  Left-sided tension pneumothorax  CTA chest: Large left-sided tension pneumothorax, multifocal bilateral pneumonia with lower lobe predominance, no evidence of PE  Status post chest tube placement in ED, continue LWS  Repeat CXR: near resolution of pneumothorax with tiny left apical pneumothorax  Currently on 2L NC, b/l RA  WBC 7.7, Pro-Ba 0.08, lactic 1.1  Continue Unasyn and Doxy  DuoNebs q6h   Thoracic surgery consulted     Diabetes Mellitus   Low SSI, Lantus once reconciled  Consistent carb diet  Hypoglycemia protocol      Hyperlipidemia-continue statin, fenofibrate as reconciled  Seizures-continue Keppra once reconciled  Chronic back pain-continue Cymbalta and home pain medication, scheduled for L4-L5 microdiscectomy with Dr. Hernandez on 1/20/2025  GERD-Continue PPI  History of chronic pancreatitis-continue Creon once reconciled    VTE Prophylaxis:  Mechanical VTE prophylaxis orders are present.    Disposition Planning:     Barriers to Discharge:  Chest tube  Anticipated Date of Discharge: 1/10  Place of Discharge: Home      Time: 30 minutes     Code Status and Medical Interventions: CPR (Attempt to Resuscitate); Full Support   Ordered at: 01/07/25 0357     Code Status (Patient has no pulse and is not breathing):    CPR (Attempt to Resuscitate)     Medical Interventions (Patient has pulse or is breathing):    Full Support       Signature: Electronically signed by Omari Campos MD, 01/08/25, 13:49 EST.  [facility]ist Team

## 2025-01-08 NOTE — PROGRESS NOTES
"    Chief Complaint: Pneumothorax  S/P: Chest tube placement  POD # 1    Subjective:  Symptoms:  Improved.    Activity level: Returning to normal.    Pain:  He complains of pain that is mild.  He reports pain is improving.  Pain is well controlled.        Vital Signs:  Temp:  [96.3 °F (35.7 °C)-98.6 °F (37 °C)] 97.7 °F (36.5 °C)  Heart Rate:  [75-90] 76  Resp:  [10-19] 10  BP: (109-137)/(79-90) 109/79    Intake & Output (last day)         01/07 0701  01/08 0700 01/08 0701  01/09 0700    P.O.  240    Total Intake(mL/kg)  240 (3.5)    Urine (mL/kg/hr) 1100 (0.7) 350 (0.9)    Chest Tube 34     Total Output 1134 350    Net -1134 -110                  Objective:  General Appearance:  Comfortable and in no acute distress.    Vital signs: (most recent): Blood pressure 109/79, pulse 76, temperature 97.7 °F (36.5 °C), temperature source Oral, resp. rate 10, height 188 cm (74\"), weight 68.2 kg (150 lb 5.7 oz), SpO2 98%.    HEENT: Normal HEENT exam.    Lungs:  Normal effort.    Heart: Normal rate.    Chest: Chest wall tenderness present.    Neurological: Patient is alert.                Chest tube:   Site: Left, Clean, Dry, and Intact  Suction: -20 cm  Air Leak: positive  24 Hour Total: 34    Results Review:     I reviewed the patient's new clinical results.  I reviewed the patient's new imaging results and agree with the interpretation.  Discussed with patient, surgeon    Imaging Results (Last 24 Hours)       Procedure Component Value Units Date/Time    XR Chest 1 View [[id number]] Collected: 01/08/25 0709     Updated: 01/08/25 0713    Narrative:      XR CHEST 1 VW    Date of Exam: 1/8/2025 6:00 AM EST    Indication: Chest tube management    Comparison: 1/7/2025    Findings:  Left-sided chest tube in stable position overlying left midlung. Persistent small left apical pneumothorax measures 15 mm. Multifocal airspace disease at the lung bases concerning for multifocal pneumonia not significantly changed. No significant "   effusion. No pneumothorax on the right. Heart size within normal limits for technique.      Impression:      Impression:  1. Stable left-sided chest tube with persistent small left apical pneumothorax measuring 15 mm.  2. No change in multifocal airspace disease at the lung bases compatible with multifocal pneumonia.          Electronically Signed: Vikas Bertrand MD    1/8/2025 7:11 AM EST    Workstation ID: [id number]            Lab Results:     Lab Results (last 24 hours)       Procedure Component Value Units Date/Time    POC Glucose 4x Daily Before Meals & at Bedtime [[id number]]  (Abnormal) Collected: 01/08/25 1145    Specimen: Blood Updated: 01/08/25 1146     Glucose 211 mg/dL      Comment: Serial Number: [id number]:  [id number]       Magnesium [[id number]]  (Normal) Collected: 01/08/25 0559    Specimen: Blood Updated: 01/08/25 0715     Magnesium 2.2 mg/dL     POC Glucose Once [[id number]]  (Abnormal) Collected: 01/08/25 0710    Specimen: Blood Updated: 01/08/25 0712     Glucose 148 mg/dL      Comment: Serial Number: [id number]:  [id number]       Blood Culture - Blood, Arm, Left [[id number]]  (Normal) Collected: 01/07/25 0352    Specimen: Blood from Arm, Left Updated: 01/08/25 0401     Blood Culture No growth at 24 hours    Blood Culture - Blood, Arm, Left [[id number]]  (Normal) Collected: 01/07/25 0138    Specimen: Blood from Arm, Left Updated: 01/08/25 0146     Blood Culture No growth at 24 hours    Basic Metabolic Panel [[id number]]  (Abnormal) Collected: 01/08/25 0035    Specimen: Blood from Arm, Right Updated: 01/08/25 0118     Glucose 204 mg/dL      BUN 17 mg/dL      Creatinine 1.15 mg/dL      Sodium 134 mmol/L      Potassium 4.5 mmol/L      Chloride 97 mmol/L      CO2 27.5 mmol/L      Calcium 9.3 mg/dL      BUN/Creatinine Ratio 14.8     Anion Gap 9.5 mmol/L      eGFR 73.3 mL/min/1.73     Narrative:      GFR Categories in Chronic Kidney Disease (CKD)      GFR Category          GFR (mL/min/1.73)     Interpretation  G1                     90 or greater         Normal or high (1)  G2                      60-89                Mild decrease (1)  G3a                   45-59                Mild to moderate decrease  G3b                   30-44                Moderate to severe decrease  G4                    15-29                Severe decrease  G5                    14 or less           Kidney failure          (1)In the absence of evidence of kidney disease, neither GFR category G1 or G2 fulfill the criteria for CKD.    eGFR calculation 2021 CKD-EPI creatinine equation, which does not include race as a factor    Magnesium [[id number]]  (Abnormal) Collected: 01/08/25 0035    Specimen: Blood from Arm, Right Updated: 01/08/25 0118     Magnesium 1.2 mg/dL     CBC & Differential [[id number]]  (Abnormal) Collected: 01/08/25 0035    Specimen: Blood from Arm, Right Updated: 01/08/25 0042    Narrative:      The following orders were created for panel order CBC & Differential.  Procedure                               Abnormality         Status                     ---------                               -----------         ------                     CBC Auto Differential[[id number]]        Abnormal            Final result                 Please view results for these tests on the individual orders.    CBC Auto Differential [[id number]]  (Abnormal) Collected: 01/08/25 0035    Specimen: Blood from Arm, Right Updated: 01/08/25 0042     WBC 9.79 10*3/mm3      RBC 3.66 10*6/mm3      Hemoglobin 11.3 g/dL      Hematocrit 34.2 %      MCV 93.4 fL      MCH 30.9 pg      MCHC 33.0 g/dL      RDW 12.2 %      RDW-SD 42.0 fl      MPV 8.9 fL      Platelets 503 10*3/mm3      Neutrophil % 60.1 %      Lymphocyte % 26.6 %      Monocyte % 7.9 %      Eosinophil % 4.2 %      Basophil % 0.8 %      Immature Grans % 0.4 %      Neutrophils, Absolute 5.89 10*3/mm3      Lymphocytes, Absolute 2.60 10*3/mm3      Monocytes, Absolute 0.77 10*3/mm3       Eosinophils, Absolute 0.41 10*3/mm3      Basophils, Absolute 0.08 10*3/mm3      Immature Grans, Absolute 0.04 10*3/mm3      nRBC 0.0 /100 WBC     POC Glucose Once [[id number]]  (Abnormal) Collected: 01/07/25 2105    Specimen: Blood Updated: 01/07/25 2106     Glucose 221 mg/dL      Comment: Serial Number: [id number]:  [id number]       POC Glucose Once [[id number]]  (Abnormal) Collected: 01/07/25 1647    Specimen: Blood Updated: 01/07/25 1649     Glucose 116 mg/dL      Comment: Serial Number: [id number]:  [id number]       POC Glucose Once [[id number]]  (Abnormal) Collected: 01/07/25 1316    Specimen: Blood Updated: 01/07/25 1318     Glucose 151 mg/dL      Comment: Serial Number: [id number]:  [id number]                Assessment & Plan       Multifocal pneumonia       Assessment & Plan    S/P left chest tube for spontaneous pneumothorax. Pneumothorax improving on x-ray. Chest tube to -20 with intermittent air leak.  Keep chest tube to -20. Repeat chest -ray and reassess tomorrow.    Livier Mccord, NELDA, APRN  [facility]  01/08/25  12:47 EST    Greater than 20 minutes was spent reviewing the patient's chart, radiographic imaging, labs, provider notes, assessing the patient and developing a plan of care.  This was discussed with the patient and RN.

## 2025-01-08 NOTE — CASE MANAGEMENT/SOCIAL WORK
Case Management/Social Work    Patient Name:  Mitchell Jacome  YOB: 1965  MRN: [id number]  Admit Date:  1/7/2025        Pir[facility] presented [facility]  Patient voiced understanding and stated [facility].  Pt signed the refusal of transfer to [facility] Form.  Pt voiced he wanted to stay at [facility].    Shala Wilcox RN    [address] [address]  Phone: [phone]  Fax: [phone]

## 2025-01-08 NOTE — TELEPHONE ENCOUNTER
CALLED PATIENTS WIFE LET HER KNOW THAT DR PATEL R/S SURGERY UNTIL 2/17/25 WITH PATIENT BEING SICK. PATIENT WIFE VERBALLY AGREED

## 2025-01-08 NOTE — THERAPY EVALUATION
Patient Name: Mitchell Jacome  : 1965    MRN: [id number]                              Today's Date: 2025       Admit Date: 2025    Visit Dx:     ICD-10-CM ICD-9-CM   1. Pneumothorax on left  J93.9 512.89   2. Multifocal pneumonia  J18.9 486   3. Hemoptysis  R04.2 786.30     Patient Active Problem List   Diagnosis    Abdominal pain, acute    Benign essential hypertension    Recurrent major depressive disorder, in remission    Chronic recurrent pancreatitis    Dyslipidemia    Encounter for screening for malignant neoplasm of prostate    Seizures    Uncontrolled type 2 diabetes mellitus with hyperglycemia    Upper gastrointestinal bleed    Solitary kidney    Acute on chronic pancreatitis    DMII (diabetes mellitus, type 2)    Acute pancreatitis    Hyperglycemia    Lumbar disc herniation    Degeneration of intervertebral disc of lumbar region with lower extremity pain    Multifocal pneumonia     Past Medical History:   Diagnosis Date    Chronic recurrent pancreatitis     COVID-19     Depression     DMII     GERD     Hearing loss, right     85%    History of transfusion     HTN     Hyperlipidemia     PSA     = 0.775/ =0.747    Seizures     Solitary kidney      Past Surgical History:   Procedure Laterality Date    APPENDECTOMY      CHOLECYSTECTOMY      COLONOSCOPY      NEG = , rech      GSI      General Information       Row Name 25 1359          Physical Therapy Time and Intention    Document Type evaluation  -     Mode of Treatment physical therapy  -       Row Name 25 1359          General Information    Patient Profile Reviewed yes  -     Prior Level of Function independent:;all household mobility;community mobility;ADL's  Using walker due to herniated disc. walker is a rollator with upright forearm supports. no falls. drives and works full time outside the home. no home O2.  -     Existing Precautions/Restrictions fall;oxygen therapy device and L/min   -     Barriers to Rehab medically complex  -[facility] Name 01/08/25 1359          Living Environment    People in Home spouse;child(jonathan), adult  3 grandchildren (ages 1.5-5 yrs)  -[facility] Name 01/08/25 1359          Home Main Entrance    Number of Stairs, Main Entrance three  -     Stair Railings, Main Entrance railings safe and in good condition  -[facility] Name 01/08/25 1359          Stairs Within Home, Primary    Number of Stairs, Within Home, Primary none  -[facility] Name 01/08/25 1359          Cognition    Orientation Status (Cognition) oriented x 4  -[facility] Name 01/08/25 1359          Safety Issues/Impairments Affecting Functional Mobility    Impairments Affecting Function (Mobility) endurance/activity tolerance;pain;balance;strength;sensation/sensory awareness  -               User Key  (r) = Recorded By, (t) = Taken By, (c) = Cosigned By      Initials Name Provider Type     Ladonna Mcghee, PT Physical Therapist                   Mobility       [facility] Name 01/08/25 1616          Bed Mobility    Bed Mobility bed mobility (all) activities  -     All Activities, [place] (Bed Mobility) not tested  -     Comment, (Bed Mobility) pt up in chair at beginning of eval. encouraged to stay up in chair until after dinner, pt agreeable.  -[facility] Name 01/08/25 1616          Bed-Chair Transfer    Bed-Chair [place] (Transfers) contact guard  -     Assistive Device (Bed-Chair Transfers) walker, front-wheeled  -[facility] Name 01/08/25 1616          Sit-Stand Transfer    Sit-Stand [place] (Transfers) contact guard  -     Assistive Device (Sit-Stand Transfers) walker, front-wheeled  -[facility] Name 01/08/25 1616          Gait/Stairs (Locomotion)    [place] Level (Gait) contact guard  -     Assistive Device (Gait) walker, front-wheeled  [facility]     Patient was able to Ambulate yes  -     Distance in Feet (Gait) 32  -     Deviations/Abnormal Patterns (Gait) right  sided deviations;stride length decreased;gait speed decreased;[place] decreased  -     Bilateral Gait Deviations forward flexed posture  improves with cues  -     Right Sided Gait Deviations foot drop/toe drag;heel strike decreased;weight shift ability decreased  -               User Key  (r) = Recorded By, (t) = Taken By, (c) = Cosigned By      Initials Name Provider Type     Ladonna Mcghee PT Physical Therapist                   Obj/Interventions       [facility] Name 01/08/25 1617          Range of Motion Comprehensive    General Range of Motion lower extremity range of motion deficits identified  -     Comment, General Range of Motion R ankle DF ~50% of normal.  -[facility] Name 01/08/25 1617          Strength Comprehensive (MMT)    General Manual Muscle Testing (MMT) Assessment lower extremity strength deficits identified  -     Comment, General Manual Muscle Testing (MMT) Assessment R knee flexion 4-/5, R DF 3-/5. LLE grossly 4+/5 throughout.  -[facility] Name 01/08/25 1617          Motor Skills    Motor Skills functional endurance  -     Functional Endurance FAIR  -[facility] Name 01/08/25 1617          Balance    Balance Assessment sitting static balance;standing static balance;sitting dynamic balance;standing dynamic balance  -     Static Sitting Balance standby assist  -     Dynamic Sitting Balance standby assist  -     Position, Sitting Balance unsupported  -     Static Standing Balance contact guard  -     Dynamic Standing Balance contact guard  -     Position/Device Used, Standing Balance walker, front-wheeled  -[facility] Name 01/08/25 1617          Sensory Assessment (Somatosensory)    Sensory Assessment (Somatosensory) --  impaired in right L4-L5 dermatome - chronic  -               User Key  (r) = Recorded By, (t) = Taken By, (c) = Cosigned By      Initials Name Provider Type     Ladonna Mcghee PT Physical Therapist                   Goals/Plan       [facility] Name  01/08/25 1613          Bed Mobility Goal 1 (PT)    Activity/Assistive Device (Bed Mobility Goal 1, PT) bed mobility activities, all  -     [place] Level/Cues Needed (Bed Mobility Goal 1, PT) standby assist  -     Time Frame (Bed Mobility Goal 1, PT) long term goal (LTG)  -[facility] Name 01/08/25 1613          Transfer Goal 1 (PT)    Activity/Assistive Device (Transfer Goal 1, PT) transfers, all;sit-to-stand/stand-to-sit;bed-to-chair/chair-to-bed;toilet  -     [place] Level/Cues Needed (Transfer Goal 1, PT) standby assist  -     Time Frame (Transfer Goal 1, PT) long term goal (LTG);2 weeks  -[facility] Name 01/08/25 1613          Gait Training Goal 1 (PT)    Activity/Assistive Device (Gait Training Goal 1, PT) gait (walking locomotion);assistive device use;walker, rolling  -     [place] Level (Gait Training Goal 1, PT) standby assist  -     Distance (Gait Training Goal 1, PT) 75'  -     Time Frame (Gait Training Goal 1, PT) long term goal (LTG);2 weeks  -[facility] Name 01/08/25 1613          Therapy Assessment/Plan (PT)    Planned Therapy Interventions (PT) balance training;bed mobility training;gait training;home exercise program;transfer training;strengthening;patient/family education  -               User Key  (r) = Recorded By, (t) = Taken By, (c) = Cosigned By      Initials Name Provider Type     Ladonna Mcghee, PT Physical Therapist                   Clinical Impression       [facility] Name 01/08/25 1610          Pain    Pretreatment Pain Rating 7/10  -     Posttreatment Pain Rating 8/10  -     Pain Location back;flank  CT site  -     Pain Side/Orientation lower;left  -     Pain Management Interventions activity modification encouraged;nursing notified  -     Response to Pain Interventions activity participation with increased pain  -[facility] Name 01/08/25 1610          Plan of Care Review    Plan of Care Reviewed With patient  -     Outcome Evaluation Mitchell  Kristofer Jacome is a 59 y.o. male with a CMH of HTN, HLD, type 2 diabetes, history of chronic pancreatitis, seizures, chronic back pain, GERD who presented to [facility] on 1/7/2025 with shortness of breath x approximately 6 months ago. Noted to be tachycardic and hypoxic, requiring 2L NC.    Respiratory panel was positive for metapneumovirus.  CTA of chest revealed large left-sided tension pneumothorax, multifocal bilateral pneumonia but no evidence of pulmonary embolism, aortic dissection.  Chest tube was placed in ED.  Chest x-ray status post thoracostomy revealed near resolution of pneumothorax with tiny left apical pneumothorax. 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 Pt lives at home with supportive family in a [facility] with a few ALYSSA. He works and drives. Pt with herniated disc in [facility] and is scheduled for surgery in ~2 weeks with Dr. Hernandez. Due to deficits associated with disc herniation, pt requires use of a walker. Pt's walker is a rollator with upright forearm supports that facilitates improved standing posture. Pt also notes recent weight loss and global muscle atrophy. Pt presents today with impaired R knee flexion and DF strength, impaired activity tolerance, transfers and gait. Pt with chest tube but is able to tolerate ~30' gait trial with front wheeled walker with CGA. Pt c/o pain in low back and at chest tube site. PT is able to titrate O2 to RA with sats remaining in the mid 90s. PT anticipates pt will progress quickly back to baseline and be safe to DC home with family support. No follow up therapy recommended at this time. Additional therapy will be needed after spine surgery. PT will follow to progress gait distance and endurance.  -       Row Name 01/08/25 1430          Therapy Assessment/Plan (PT)    Patient/Family Therapy Goals Statement (PT) get better and go home. be ready for spine surgery.  -     Rehab Potential (PT) good  -     Criteria for  Skilled Interventions Met (PT) yes;meets criteria  -     Therapy Frequency (PT) 3 times/wk  -       Row Name 01/08/25 1610          Vital Signs    Intra Systolic BP Rehab 127  -     Intra Treatment Diastolic BP 79  -     Intratreatment Heart Rate (beats/min) 76  -AH     Pre SpO2 (%) 96  -AH     O2 Delivery Pre Treatment supplemental O2  -     Intra SpO2 (%) 92  -AH     O2 Delivery Intra Treatment room air  -AH     Post SpO2 (%) 93  -AH     O2 Delivery Post Treatment room air  -       Row Name 01/08/25 1610          Positioning and Restraints    Post Treatment Position chair  -     In Chair notified nsg;sitting;call light within reach;encouraged to call for assist;exit alarm on  chest tube remained connect to LWS throughout session, confirmed all lines/tubes intact at end of therapy. With RN permission, pt left on RA.  -               User Key  (r) = Recorded By, (t) = Taken By, (c) = Cosigned By      Initials Name Provider Type     Ladonna Mcghee, PT Physical Therapist                   Outcome Measures       Row Name 01/08/25 1619 01/08/25 0836       How much help from another person do you currently need...    Turning from your back to your side while in flat bed without using bedrails? 3  - 4  -AHA    Moving from lying on back to sitting on the side of a flat bed without bedrails? 3  - 4  -AHA    Moving to and from a bed to a chair (including a wheelchair)? 3  - 4  -AHA    Standing up from a chair using your arms (e.g., wheelchair, bedside chair)? 3  - 4  -AHA    Climbing 3-5 steps with a railing? 2  - 3  -AHA    To walk in hospital room? 3  - 3  -AHA    AM-PAC 6 Clicks Score (PT) 17  - 22  -AHA    Highest Level of Mobility Goal 5 --> Static standing  - 7 --> Walk 25 feet or more  -AHA      Row Name 01/08/25 1619          Functional Assessment    Outcome Measure Options AM-PAC 6 Clicks Basic Mobility (PT)  -               User Key  (r) = Recorded By, (t) = Taken By, (c) =  Cosigned By      Initials Name Provider Type    AHA Nisa Ruggiero LPN Licensed Nurse    Ladonna Kim, KEREN Physical Therapist                                 Physical Therapy Education       Title: PT OT SLP Therapies (Done)       Topic: Physical Therapy (Done)       Point: Mobility training (Done)       Learning Progress Summary            Patient Acceptance, E, VU by  at 1/8/2025 1619                      Point: Home exercise program (Done)       Learning Progress Summary            Patient Acceptance, E, VU by  at 1/8/2025 1619                      Point: Body mechanics (Done)       Learning Progress Summary            Patient Acceptance, E, VU by  at 1/8/2025 1619                      Point: Precautions (Done)       Learning Progress Summary            Patient Acceptance, E, VU by  at 1/8/2025 1619                                      User Key       Initials Effective Dates Name Provider Type Discipline     12/04/23 -  Ladonna Mcghee PT Physical Therapist PT                  PT Recommendation and Plan  Planned Therapy Interventions (PT): balance training, bed mobility training, gait training, home exercise program, transfer training, strengthening, patient/family education  Outcome Evaluation: Mitchell Jacome is a 59 y.o. male with a CMH of HTN, HLD, type 2 diabetes, history of chronic pancreatitis, seizures, chronic back pain, GERD who presented to [facility] on 1/7/2025 with shortness of breath x approximately 6 months ago. Noted to be tachycardic and hypoxic, requiring 2L NC.    Respiratory panel was positive for metapneumovirus.  CTA of chest revealed large left-sided tension pneumothorax, multifocal bilateral pneumonia but no evidence of pulmonary embolism, aortic dissection.  Chest tube was placed in ED.  Chest x-ray status post thoracostomy revealed near resolution of pneumothorax with tiny left apical pneumothorax. Pt also with Lumbar disc herniation with Right L4-5  radiculopathy. Pt lives at home with supportive family in a [place] with a few ALYSSA. He works and drives. Pt with herniated disc in [facility] and is scheduled for surgery in ~2 weeks with Dr. Hernandez. Due to deficits associated with disc herniation, pt requires use of a walker. Pt's walker is a rollator with upright forearm supports that facilitates improved standing posture. Pt also notes recent weight loss and global muscle atrophy. Pt presents today with impaired R knee flexion and DF strength, impaired activity tolerance, transfers and gait. Pt with chest tube but is able to tolerate ~30' gait trial with front wheeled walker with CGA. Pt c/o pain in low back and at chest tube site. PT is able to titrate O2 to RA with sats remaining in the mid 90s. PT anticipates pt will progress quickly back to baseline and be safe to DC home with family support. No follow up therapy recommended at this time. Additional therapy will be needed after spine surgery. PT will follow to progress gait distance and endurance.     Time Calculation:         PT Charges       Row Name 01/08/25 1623             Time Calculation    Start Time 1358  -      Stop Time 1427  -      Time Calculation (min) 29 min  -      PT Non-Billable Time (min) 0 min  -      PT Received On 01/08/25  -      PT - Next Appointment 01/09/25  -      PT Goal Re-Cert Due Date 01/22/25  -         Time Calculation- PT    Total Timed Code Minutes- PT 0 minute(s)  -                User Key  (r) = Recorded By, (t) = Taken By, (c) = Cosigned By      Initials Name Provider Type     Ladonna Mcghee, PT Physical Therapist                  Therapy Charges for Today       Code Description Service Date Service Provider Modifiers Qty    [id number]  PT EVAL MOD COMPLEXITY 4 1/8/2025 Ladonna Mcghee, PT GP 1            PT G-Codes  Outcome Measure Options: AM-PAC 6 Clicks Basic Mobility (PT)  AM-PAC 6 Clicks Score (PT): 17  PT Discharge Summary  Anticipated Discharge  Disposition (PT): home with assist    Ladonna Mcghee, PT  1/8/2025

## 2025-01-08 NOTE — CASE MANAGEMENT/SOCIAL WORK
Discharge Planning Assessment   [place]     Patient Name: Mitchell Jacome  MRN: [id number]  Today's Date: 1/8/2025    Admit Date: 1/7/2025    Plan: DC Plan: Anticipate routine home with family. Spouse will provide transportation.   Discharge Needs Assessment       Row Name 01/08/25 1553       Living Environment    People in Home spouse    Name(s) of People in Home Corina Herrera, adult child, and 3 grandchildren    Current Living Arrangements home    Potentially Unsafe Housing Conditions none    In the past 12 months has the electric, gas, oil, or water company threatened to shut off services in your home? No    Primary Care Provided by self    Provides Primary Care For no one    Family Caregiver if Needed spouse    Family Caregiver Names Corina Herrera    Quality of Family Relationships helpful;involved;supportive    Able to Return to Prior Arrangements yes       Resource/Environmental Concerns    Resource/Environmental Concerns none    Transportation Concerns none       Transportation Needs    In the past 12 months, has lack of transportation kept you from medical appointments or from getting medications? no    In the past 12 months, has lack of transportation kept you from meetings, work, or from getting things needed for daily living? No       Food Insecurity    Within the past 12 months, you worried that your food would run out before you got the money to buy more. Never true    Within the past 12 months, the food you bought just didn't last and you didn't have money to get more. Never true       Transition Planning    Patient/Family Anticipates Transition to home with family    Patient/Family Anticipated Services at Transition none    Transportation Anticipated car, drives self;family or friend will provide       Discharge Needs Assessment    Readmission Within the Last 30 Days no previous admission in last 30 days    Concerns to be Addressed discharge planning    Do you want help finding or keeping work or a  job? I do not need or want help    Do you want help with school or training? For example, starting or completing job training or getting a high school diploma, GED or equivalent No    Anticipated Changes Related to Illness none    Equipment Needed After Discharge none    Provided Post Acute Provider List? N/A    Provided Post Acute Provider Quality & Resource List? N/A    Current Discharge Risk chronically ill                   Discharge Plan       Row Name 01/08/25 5513       Plan    Plan DC Plan: Anticipate routine home with family. Spouse will provide transportation.    Patient/Family in Agreement with Plan yes    Provided Post Acute Provider List? N/A    Provided Post Acute Provider Quality & Resource List? N/A    Plan Comments CM spoke with patient at bedside to discuss admission assessment and discharge planning. Patient confirms PCP and pharmacy. Patient is already enrolled in meds to bed program. Patient denies any difficulty affording medications or food at this time. Patient denies any additional needs for services or DME at this time. Reports he has BP cuff, glucometer, and RW at home. Patient reports independent with ADL's and drives. Patient spouse will provide transportation when ready for DC. Patient states he has already completed [facility] refusal to transfer form in the ED.CM will continue to follow for any additional needs and adjust DC plan accordingly. DC Barriers: Cardiac montioring, O2@3L nc, chest tube x1, IV abx, glucose control, pain control, and monitor labs.                 Expected Discharge Date and Time       Expected Discharge Date Expected Discharge Time    Jan 9, 2025            Demographic Summary       Row Name 01/08/25 1555       General Information    Admission Type inpatient    Arrived From emergency department;home    Required Notices Provided other (see comments)  [facility] Refusal to Transfer form per ED, reported by patient    Referral Source admission list    Reason for Consult  discharge planning    Preferred Language English       Contact Information    Permission Granted to Share Info With                    Functional Status       Row Name 01/08/25 1552       Functional Status    Usual Activity Tolerance moderate    Current Activity Tolerance moderate       Physical Activity    On average, how many days per week do you engage in moderate to strenuous exercise (like a brisk walk)? 0 days    On average, how many minutes do you engage in exercise at this level? 0 min    Number of minutes of exercise per week 0       Functional Status, IADL    Medications independent    Meal Preparation independent    Housekeeping independent    Laundry independent    Shopping independent    If for any reason you need help with day-to-day activities such as bathing, preparing meals, shopping, managing finances, etc., do you get the help you need? I don't need any help       Mental Status    General Appearance WDL WDL       Mental Status Summary    Recent Changes in Mental Status/Cognitive Functioning no changes       Employment/    Employment Status employed full-time;, previous service    Current or Previous Occupation factory work    Employment/ Comments [employer]           Current or Previous  Service active duty, past     [employer]                 Met with patient in room wearing PPE: mask, fgloves, gown.      Maintain distance greater than six feet and spent less than fifteen minutes in the room.      Qi Gleason RN     Office Phone: [phone]  Office Cell:     [phone]

## 2025-01-08 NOTE — CONSULTS
"Diabetes Education  Assessment/Teaching    Patient Name:  Mitchell Jacome  YOB: 1965  MRN: [id number]  Admit Date:  1/7/2025      Assessment Date:  1/8/2025  Flowsheet Row Most Recent Value   General Information     Referral From: A1c, Database, Blood glucose  [MD consult per database, admission blood sugar 216, and on 10/9/2024 A1c was 8.68%.]   Height 188 cm (74\")   Height Method Stated   Weight 68.2 kg (150 lb 5.7 oz)   Pregnancy Assessment    Diabetes History    What type of diabetes do you have? Type 2   Length of Diabetes Diagnosis 6 - 10 years  [Diagnosed 6-7 years ago.]   Current DM knowledge fair   Have you had diabetes education/teaching in the past? yes   When and where was your diabetes education? Inpatient hospitalization at [facility] on 1/2/2024   Do you test your blood sugar at home? yes   Frequency of checks as often as needs   Meter type Freestyle Curry 3   Who performs the test? patient   Typical readings usually <180   Have you had high blood sugar? (>140mg/dl) yes   How often do you have high blood sugar? occasionally   When was your last high blood sugar? Admission blood sugar 216   Education Preferences    What areas of diabetes would you like to learn about? avoiding high blood sugar, diabetes complications   Nutrition Information    Assessment Topics    Problem Solving - Assessment Needs education   Reducing Risk - Assessment Needs education   DM Goals    Problem Solving - Goal Today   Reducing Risk - Goal Today            Flowsheet Row Most Recent Value   DM Education Needs    Meter Has own   Meter Type Freestyle   Medication Insulin, Pen  [At home patient stated he takes Glargine 28-32 units every morning and Novolog sliding scale which many days he doesn't need to take any.]   Problem Solving Hyperglycemia, Signs, Symptoms, Treatment   Reducing Risks A1C testing  [On 10/9/2024 A1c was 8.68%.]   Discharge Plan Home   Motivation Moderate   Teaching Method Discussion, " Handouts   Patient Response Verbalized understanding              Other Comments:  A1c info sheet given with discussion on A1c target and healthy blood sugar range. Patient had his insulin pens in his back pack at bedside and had 2 pens of each insulin. Discussed with patient the proper storage of insulin and insulin only being good for 28 days at room temperature. Patient stated that his insulin pens were almost empty so he put new ones in his back pack so he would have enough. Patient stated [facility] Discussed with patient that drinking water and exercise are 2 natural ways to control blood sugar. Patient stated exercise is not an option for him at this time. Patient stated he has a herniated disc, had pneumonia for the last 1 1/2 months, and a collapsed lung. Patient stated he uses a walker. Patient has no further questions or concerns related to diabetes at this time.        Electronically signed by:  Amber Cooper RN  01/08/25 12:20 EST

## 2025-01-08 NOTE — TELEPHONE ENCOUNTER
Returned the patient's wife's call, told her that I will send a message over to Dr. Hernandez and will call her back once he replies to the message.

## 2025-01-08 NOTE — PLAN OF CARE
Goal Outcome Evaluation:  Plan of Care Reviewed With: patient           Outcome Evaluation: Mitchell Jacome is a 59 y.o. male with a CMH of HTN, HLD, type 2 diabetes, history of chronic pancreatitis, seizures, chronic back pain, GERD who presented to [facility] on 1/7/2025 with shortness of breath x approximately 6 months ago. Noted to be tachycardic and hypoxic, requiring 2L NC.    Respiratory panel was positive for metapneumovirus.  CTA of chest revealed large left-sided tension pneumothorax, multifocal bilateral pneumonia but no evidence of pulmonary embolism, aortic dissection.  Chest tube was placed in ED.  Chest x-ray status post thoracostomy revealed near resolution of pneumothorax with tiny left apical pneumothorax. 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 Pt lives at home with supportive family in a [facility] with a few ALYSS[place]. He works and drives. Pt with herniated disc in [facility] and is scheduled for surgery in ~2 weeks with Dr. Hernandez. Due to deficits associated with disc herniation, pt requires use of a walker. Pt's walker is a rollator with upright forearm supports that facilitates improved standing posture. Pt also notes recent weight loss and global muscle atrophy. Pt presents today with impaired R knee flexion and DF strength, impaired activity tolerance, transfers and gait. Pt with chest tube but is able to tolerate ~30' gait trial with front wheeled walker with CGA. Pt c/o pain in low back and at chest tube site. PT is able to titrate O2 to RA with sats remaining in the mid 90s. PT anticipates pt will progress quickly back to baseline and be safe to DC home with family support. No follow up therapy recommended at this time. Additional therapy will be needed after spine surgery. PT will follow to progress gait distance and endurance.    Anticipated Discharge Disposition (PT): home with assist

## 2025-01-08 NOTE — PLAN OF CARE
Goal Outcome Evaluation:   Pt new admit to sips last night. Pt very painful throughout the night, alternating between oxycodone and morphine per MAR have helped manage the pain. Pt chest tube does not have a lot of output and have not seen an air leak present in a few hours. Pt does have sub q air on his anterior left chest wall now. Pt is able to make needs known, call light is within reach, and plan of care ongoing

## 2025-01-09 ENCOUNTER — APPOINTMENT (OUTPATIENT)
Dept: GENERAL RADIOLOGY | Facility: HOSPITAL | Age: 60
DRG: 193 | End: 2025-01-09
Payer: OTHER GOVERNMENT

## 2025-01-09 LAB
ANION GAP SERPL CALCULATED.3IONS-SCNC: 8.7 MMOL/L (ref 5–15)
ANION GAP SERPL CALCULATED.3IONS-SCNC: 8.8 MMOL/L (ref 5–15)
BASOPHILS # BLD AUTO: 0.05 10*3/MM3 (ref 0–0.2)
BASOPHILS # BLD AUTO: 0.06 10*3/MM3 (ref 0–0.2)
BASOPHILS NFR BLD AUTO: 0.6 % (ref 0–1.5)
BASOPHILS NFR BLD AUTO: 0.9 % (ref 0–1.5)
BUN SERPL-MCNC: 16 MG/DL (ref 6–20)
BUN SERPL-MCNC: 17 MG/DL (ref 6–20)
BUN/CREAT SERPL: 15.4 (ref 7–25)
BUN/CREAT SERPL: 15.7 (ref 7–25)
CALCIUM SPEC-SCNC: 8.7 MG/DL (ref 8.6–10.5)
CALCIUM SPEC-SCNC: 8.9 MG/DL (ref 8.6–10.5)
CHLORIDE SERPL-SCNC: 98 MMOL/L (ref 98–107)
CHLORIDE SERPL-SCNC: 99 MMOL/L (ref 98–107)
CO2 SERPL-SCNC: 27.2 MMOL/L (ref 22–29)
CO2 SERPL-SCNC: 27.3 MMOL/L (ref 22–29)
CREAT SERPL-MCNC: 1.04 MG/DL (ref 0.76–1.27)
CREAT SERPL-MCNC: 1.08 MG/DL (ref 0.76–1.27)
DEPRECATED RDW RBC AUTO: 42.5 FL (ref 37–54)
DEPRECATED RDW RBC AUTO: 42.9 FL (ref 37–54)
EGFRCR SERPLBLD CKD-EPI 2021: 79.1 ML/MIN/1.73
EGFRCR SERPLBLD CKD-EPI 2021: 82.7 ML/MIN/1.73
EOSINOPHIL # BLD AUTO: 0.39 10*3/MM3 (ref 0–0.4)
EOSINOPHIL # BLD AUTO: 0.4 10*3/MM3 (ref 0–0.4)
EOSINOPHIL NFR BLD AUTO: 5.1 % (ref 0.3–6.2)
EOSINOPHIL NFR BLD AUTO: 5.9 % (ref 0.3–6.2)
ERYTHROCYTE [DISTWIDTH] IN BLOOD BY AUTOMATED COUNT: 12.4 % (ref 12.3–15.4)
ERYTHROCYTE [DISTWIDTH] IN BLOOD BY AUTOMATED COUNT: 12.4 % (ref 12.3–15.4)
GLUCOSE BLDC GLUCOMTR-MCNC: 123 MG/DL (ref 70–105)
GLUCOSE BLDC GLUCOMTR-MCNC: 138 MG/DL (ref 70–105)
GLUCOSE BLDC GLUCOMTR-MCNC: 234 MG/DL (ref 70–105)
GLUCOSE BLDC GLUCOMTR-MCNC: 236 MG/DL (ref 70–105)
GLUCOSE SERPL-MCNC: 148 MG/DL (ref 65–99)
GLUCOSE SERPL-MCNC: 176 MG/DL (ref 65–99)
HCT VFR BLD AUTO: 33.2 % (ref 37.5–51)
HCT VFR BLD AUTO: 33.5 % (ref 37.5–51)
HGB BLD-MCNC: 10.6 G/DL (ref 13–17.7)
HGB BLD-MCNC: 10.8 G/DL (ref 13–17.7)
IMM GRANULOCYTES # BLD AUTO: 0.02 10*3/MM3 (ref 0–0.05)
IMM GRANULOCYTES # BLD AUTO: 0.03 10*3/MM3 (ref 0–0.05)
IMM GRANULOCYTES NFR BLD AUTO: 0.3 % (ref 0–0.5)
IMM GRANULOCYTES NFR BLD AUTO: 0.4 % (ref 0–0.5)
LYMPHOCYTES # BLD AUTO: 1.76 10*3/MM3 (ref 0.7–3.1)
LYMPHOCYTES # BLD AUTO: 2.14 10*3/MM3 (ref 0.7–3.1)
LYMPHOCYTES NFR BLD AUTO: 26.5 % (ref 19.6–45.3)
LYMPHOCYTES NFR BLD AUTO: 27.2 % (ref 19.6–45.3)
MAGNESIUM SERPL-MCNC: 1.5 MG/DL (ref 1.6–2.6)
MAGNESIUM SERPL-MCNC: 1.6 MG/DL (ref 1.6–2.6)
MCH RBC QN AUTO: 29.9 PG (ref 26.6–33)
MCH RBC QN AUTO: 30.8 PG (ref 26.6–33)
MCHC RBC AUTO-ENTMCNC: 31.6 G/DL (ref 31.5–35.7)
MCHC RBC AUTO-ENTMCNC: 32.5 G/DL (ref 31.5–35.7)
MCV RBC AUTO: 94.6 FL (ref 79–97)
MCV RBC AUTO: 94.6 FL (ref 79–97)
MONOCYTES # BLD AUTO: 0.56 10*3/MM3 (ref 0.1–0.9)
MONOCYTES # BLD AUTO: 0.58 10*3/MM3 (ref 0.1–0.9)
MONOCYTES NFR BLD AUTO: 7.1 % (ref 5–12)
MONOCYTES NFR BLD AUTO: 8.7 % (ref 5–12)
NEUTROPHILS NFR BLD AUTO: 3.83 10*3/MM3 (ref 1.7–7)
NEUTROPHILS NFR BLD AUTO: 4.69 10*3/MM3 (ref 1.7–7)
NEUTROPHILS NFR BLD AUTO: 57.7 % (ref 42.7–76)
NEUTROPHILS NFR BLD AUTO: 59.6 % (ref 42.7–76)
NRBC BLD AUTO-RTO: 0 /100 WBC (ref 0–0.2)
NRBC BLD AUTO-RTO: 0 /100 WBC (ref 0–0.2)
PLATELET # BLD AUTO: 438 10*3/MM3 (ref 140–450)
PLATELET # BLD AUTO: 442 10*3/MM3 (ref 140–450)
PMV BLD AUTO: 8.9 FL (ref 6–12)
PMV BLD AUTO: 8.9 FL (ref 6–12)
POTASSIUM SERPL-SCNC: 4.6 MMOL/L (ref 3.5–5.2)
POTASSIUM SERPL-SCNC: 4.7 MMOL/L (ref 3.5–5.2)
RBC # BLD AUTO: 3.51 10*6/MM3 (ref 4.14–5.8)
RBC # BLD AUTO: 3.54 10*6/MM3 (ref 4.14–5.8)
SODIUM SERPL-SCNC: 134 MMOL/L (ref 136–145)
SODIUM SERPL-SCNC: 135 MMOL/L (ref 136–145)
WBC NRBC COR # BLD AUTO: 6.64 10*3/MM3 (ref 3.4–10.8)
WBC NRBC COR # BLD AUTO: 7.87 10*3/MM3 (ref 3.4–10.8)

## 2025-01-09 PROCEDURE — 82948 REAGENT STRIP/BLOOD GLUCOSE: CPT

## 2025-01-09 PROCEDURE — 25010000002 AMPICILLIN-SULBACTAM PER 1.5 G

## 2025-01-09 PROCEDURE — 80048 BASIC METABOLIC PNL TOTAL CA: CPT

## 2025-01-09 PROCEDURE — 71045 X-RAY EXAM CHEST 1 VIEW: CPT

## 2025-01-09 PROCEDURE — 63710000001 INSULIN GLARGINE PER 5 UNITS: Performed by: INTERNAL MEDICINE

## 2025-01-09 PROCEDURE — 83735 ASSAY OF MAGNESIUM: CPT

## 2025-01-09 PROCEDURE — 85025 COMPLETE CBC W/AUTO DIFF WBC: CPT

## 2025-01-09 PROCEDURE — 25010000002 MAGNESIUM SULFATE 2 GM/50ML SOLUTION: Performed by: INTERNAL MEDICINE

## 2025-01-09 PROCEDURE — 63710000001 INSULIN LISPRO (HUMAN) PER 5 UNITS

## 2025-01-09 PROCEDURE — 99232 SBSQ HOSP IP/OBS MODERATE 35: CPT | Performed by: SURGERY

## 2025-01-09 RX ORDER — HYDROMORPHONE HYDROCHLORIDE 1 MG/ML
0.5 INJECTION, SOLUTION INTRAMUSCULAR; INTRAVENOUS; SUBCUTANEOUS
Status: DISCONTINUED | OUTPATIENT
Start: 2025-01-09 | End: 2025-01-11 | Stop reason: HOSPADM

## 2025-01-09 RX ORDER — MAGNESIUM SULFATE HEPTAHYDRATE 40 MG/ML
2 INJECTION, SOLUTION INTRAVENOUS
Status: COMPLETED | OUTPATIENT
Start: 2025-01-09 | End: 2025-01-09

## 2025-01-09 RX ORDER — LIDOCAINE 4 G/G
1 PATCH TOPICAL
Status: DISCONTINUED | OUTPATIENT
Start: 2025-01-10 | End: 2025-01-11 | Stop reason: HOSPADM

## 2025-01-09 RX ORDER — OXYCODONE HYDROCHLORIDE 5 MG/1
5 TABLET ORAL EVERY 4 HOURS PRN
Status: DISCONTINUED | OUTPATIENT
Start: 2025-01-09 | End: 2025-01-11 | Stop reason: HOSPADM

## 2025-01-09 RX ORDER — ACETAMINOPHEN 500 MG
1000 TABLET ORAL 3 TIMES DAILY
Status: DISCONTINUED | OUTPATIENT
Start: 2025-01-09 | End: 2025-01-11 | Stop reason: HOSPADM

## 2025-01-09 RX ORDER — OXYCODONE HYDROCHLORIDE 5 MG/1
10 TABLET ORAL EVERY 4 HOURS PRN
Status: DISCONTINUED | OUTPATIENT
Start: 2025-01-09 | End: 2025-01-11 | Stop reason: HOSPADM

## 2025-01-09 RX ADMIN — AMPICILLIN SODIUM AND SULBACTAM SODIUM 3 G: 2; 1 INJECTION, POWDER, FOR SOLUTION INTRAMUSCULAR; INTRAVENOUS at 20:14

## 2025-01-09 RX ADMIN — INSULIN GLARGINE-YFGN 25 UNITS: 100 INJECTION, SOLUTION SUBCUTANEOUS at 08:20

## 2025-01-09 RX ADMIN — DIAZEPAM 2 MG: 2 TABLET ORAL at 00:09

## 2025-01-09 RX ADMIN — OXYCODONE 10 MG: 5 TABLET ORAL at 03:19

## 2025-01-09 RX ADMIN — ATENOLOL 25 MG: 50 TABLET ORAL at 08:19

## 2025-01-09 RX ADMIN — MAGNESIUM SULFATE HEPTAHYDRATE 2 G: 40 INJECTION, SOLUTION INTRAVENOUS at 06:04

## 2025-01-09 RX ADMIN — LEVETIRACETAM 500 MG: 500 TABLET, FILM COATED ORAL at 08:19

## 2025-01-09 RX ADMIN — MAGNESIUM SULFATE HEPTAHYDRATE 2 G: 40 INJECTION, SOLUTION INTRAVENOUS at 08:20

## 2025-01-09 RX ADMIN — PANCRELIPASE 12000 UNITS OF LIPASE: 60000; 12000; 38000 CAPSULE, DELAYED RELEASE PELLETS ORAL at 12:20

## 2025-01-09 RX ADMIN — MAGNESIUM SULFATE HEPTAHYDRATE 2 G: 40 INJECTION, SOLUTION INTRAVENOUS at 03:19

## 2025-01-09 RX ADMIN — HYDROCODONE BITARTRATE AND ACETAMINOPHEN 1 TABLET: 5; 325 TABLET ORAL at 00:09

## 2025-01-09 RX ADMIN — OXYCODONE 10 MG: 5 TABLET ORAL at 08:19

## 2025-01-09 RX ADMIN — PANCRELIPASE 12000 UNITS OF LIPASE: 60000; 12000; 38000 CAPSULE, DELAYED RELEASE PELLETS ORAL at 17:09

## 2025-01-09 RX ADMIN — DIAZEPAM 2 MG: 2 TABLET ORAL at 15:40

## 2025-01-09 RX ADMIN — PANTOPRAZOLE SODIUM 40 MG: 40 TABLET, DELAYED RELEASE ORAL at 05:19

## 2025-01-09 RX ADMIN — DIAZEPAM 2 MG: 2 TABLET ORAL at 21:46

## 2025-01-09 RX ADMIN — AMPICILLIN SODIUM AND SULBACTAM SODIUM 3 G: 2; 1 INJECTION, POWDER, FOR SOLUTION INTRAMUSCULAR; INTRAVENOUS at 13:18

## 2025-01-09 RX ADMIN — DOXYCYCLINE 100 MG: 100 CAPSULE ORAL at 05:19

## 2025-01-09 RX ADMIN — LEVETIRACETAM 500 MG: 500 TABLET, FILM COATED ORAL at 21:46

## 2025-01-09 RX ADMIN — ACETAMINOPHEN 1000 MG: 500 TABLET, FILM COATED ORAL at 22:43

## 2025-01-09 RX ADMIN — INSULIN LISPRO 3 UNITS: 100 INJECTION, SOLUTION INTRAVENOUS; SUBCUTANEOUS at 17:09

## 2025-01-09 RX ADMIN — PANCRELIPASE 12000 UNITS OF LIPASE: 60000; 12000; 38000 CAPSULE, DELAYED RELEASE PELLETS ORAL at 08:20

## 2025-01-09 RX ADMIN — HYDROCODONE BITARTRATE AND ACETAMINOPHEN 1 TABLET: 5; 325 TABLET ORAL at 11:04

## 2025-01-09 RX ADMIN — AMPICILLIN SODIUM AND SULBACTAM SODIUM 3 G: 2; 1 INJECTION, POWDER, FOR SOLUTION INTRAMUSCULAR; INTRAVENOUS at 04:29

## 2025-01-09 RX ADMIN — INSULIN LISPRO 3 UNITS: 100 INJECTION, SOLUTION INTRAVENOUS; SUBCUTANEOUS at 12:20

## 2025-01-09 RX ADMIN — OXYCODONE 10 MG: 5 TABLET ORAL at 13:23

## 2025-01-09 RX ADMIN — OXYCODONE 10 MG: 5 TABLET ORAL at 20:14

## 2025-01-09 RX ADMIN — DOXYCYCLINE 100 MG: 100 CAPSULE ORAL at 17:09

## 2025-01-09 RX ADMIN — DULOXETINE 30 MG: 30 CAPSULE, DELAYED RELEASE ORAL at 08:20

## 2025-01-09 NOTE — PAYOR COMM NOTE
"Clinical update for case# [id number]     Patient remains in acute care with chest tube in place.  Continued IV antibiotic & supplemental oxygen  Updated MD note for today pdg. Clinical attached  ==========  EXTENDED AUTHORIZATION PENDING:   PLEASE FAX OR CALL DETERMINATION TO CONTACT BELOW:       THANK YOU,    LINDA IngramN, RN  Utilization Review  [facility]  Phone: [phone]  Fax: [phone]      NPI: [id number]  Tax ID: [id number]        Ceci Tapia (59 y.o. Male)       Date of Birth   1965    Social Security Number       Address   [address] [place] [address] [place] [facility]    Home Phone   [phone]    MRN   [id number]       [employer]   None    Marital Status                               Admission Date   1/7/25    Admission Type   Emergency    Admitting Provider   Omari Campos MD    Attending Provider   Sean Hernandez MD    Department, Room/Bed   [facility] SURGICAL INPATIENT, 4117/1       Discharge Date       Discharge Disposition       Discharge Destination                                 Attending Provider: Sean Hernandez MD    Allergies: Basaglar Kwikpen [Insulin Glargine], Gemfibrozil    Isolation: Contact Drop   Infection: Human Metapneumovirus  (01/07/25)   Code Status: CPR    Ht: 188 cm (74\")   Wt: 68.2 kg (150 lb 5.7 oz)    Admission Cmt: None   Principal Problem: Multifocal pneumonia [J18.9]                   Active Insurance as of 1/7/2025       Primary Coverage       Payor Plan Insurance Group Employer/Plan Group    [facility] CCN OPTUM        Payor Plan Address Payor Plan Phone Number Payor Plan Fax Number Effective Dates    [address] [phone]  1/1/2024 - None Entered    [facility] [id number]         Subscriber Name Subscriber Birth Date Member ID       [employer] TAPIA 1965                Secondary Coverage       Payor Plan Insurance Group Employer/Plan Group    [employer] ANTHEM " [facility] [id number]       Payor Plan Address Payor Plan Phone Number Payor Plan Fax Number Effective Dates    [address] [phone]  1/1/2020 - None Entered    [facility] [id number]         Subscriber Name Subscriber Birth Date Member ID       [employer] TAPIA 1965 [id number]                     Emergency Contacts        (Rel.) Home Phone Work Phone Mobile Phone    POWER, ZAINAB (Spouse) [phone] -- [phone]              Vital Signs (last day)       Date/Time Temp Temp src Pulse Resp BP Patient Position SpO2    01/09/25 1132 98 (36.7) Oral 73 14 122/77 Sitting 95    01/09/25 0818 -- -- 80 -- 123/83 Lying --    01/09/25 0412 97.8 (36.6) Oral 67 17 120/81 Sitting 96    01/08/25 2354 98.2 (36.8) Oral 77 17 118/76 Lying 97    01/08/25 2144 98.1 (36.7) Oral 82 13 117/79 Lying 97    01/08/25 1629 98 (36.7) Oral 71 16 121/80 Sitting 96    01/08/25 1133 97.7 (36.5) Oral 76 10 109/79 Sitting 98    01/08/25 0812 96.3 (35.7) Axillary 83 15 128/83 Sitting 95    01/08/25 0615 97.9 (36.6) Oral 80 18 136/90 Sitting 97    01/08/25 0022 98.3 (36.8) Oral 90 17 115/83 Lying 94          Oxygen Therapy (last day)       Date/Time SpO2 Device (Oxygen Therapy) Flow (L/min) (Oxygen Therapy) Oxygen Concentration (%) ETCO2 (mmHg)    01/09/25 1132 95 nasal cannula 3 -- --    01/09/25 0412 96 nasal cannula 3 -- --    01/09/25 0318 -- nasal cannula 3 -- --    01/09/25 0008 -- nasal cannula 3 -- --    01/08/25 2354 97 nasal cannula 3 -- --    01/08/25 2144 97 nasal cannula 3 -- --    01/08/25 1952 -- nasal cannula 3 -- --    01/08/25 1629 96 nasal cannula 3 -- --    01/08/25 1133 98 nasal cannula 3 -- --    01/08/25 0836 -- nasal cannula 3 -- --    01/08/25 0812 95 nasal cannula 3 -- --    01/08/25 0615 97 nasal cannula 3 -- --    01/08/25 0412 -- nasal cannula 3 -- --    01/08/25 0022 94 nasal cannula 3 -- --    01/08/25 0000 -- nasal cannula 3 -- --          Current Facility-Administered  Medications   Medication Dose Route Frequency Provider Last Rate Last Admin    acetaminophen (TYLENOL) tablet 650 mg  650 mg Oral Q4H PRN Leilani Ruffin PA-C        Or    acetaminophen (TYLENOL) 160 MG/5ML oral solution 650 mg  650 mg Oral Q4H PRN Leilani Ruffin PA-C        Or    acetaminophen (TYLENOL) suppository 650 mg  650 mg Rectal Q4H PRN Leilani Ruffin PA-C        ampicillin-sulbactam (UNASYN) 3 g in sodium chloride 0.9 % 100 mL MBP  3 g Intravenous Q6H Leilani Ruffin PA-C 0 mL/hr at 01/07/25 1907 3 g at 01/09/25 1318    atenolol (TENORMIN) tablet 25 mg  25 mg Oral Q24H Omari Campos MD   25 mg at 01/09/25 0819    sennosides-docusate (PERICOLACE) 8.6-50 MG per tablet 2 tablet  2 tablet Oral BID PRN Leilani Ruffin PA-C   2 tablet at 01/08/25 0930    And    polyethylene glycol (MIRALAX) packet 17 g  17 g Oral Daily PRN Leilani Ruffin PA-C        And    bisacodyl (DULCOLAX) EC tablet 5 mg  5 mg Oral Daily PRN Leilani Ruffin PA-C   5 mg at 01/08/25 1148    And    bisacodyl (DULCOLAX) suppository 10 mg  10 mg Rectal Daily PRN Leilani Ruffin PA-C        Calcium Replacement - Follow Nurse / BPA Driven Protocol   Not Applicable PRN Leilani Ruffin PA-C        dextrose (D50W) (25 g/50 mL) IV injection 25 g  25 g Intravenous Q15 Min PRN Leilani Ruffin PA-C        dextrose (GLUTOSE) oral gel 15 g  15 g Oral Q15 Min PRN Leilani Ruffin PA-C        diazePAM (VALIUM) tablet 2 mg  2 mg Oral Q6H PRN Livier Mccord, DNP, APRN   2 mg at 01/09/25 0009    doxycycline (MONODOX) capsule 100 mg  100 mg Oral Q12H Leilani Ruffin PA-C   100 mg at 01/09/25 0519    DULoxetine (CYMBALTA) DR capsule 30 mg  30 mg Oral Daily Omari Campos MD   30 mg at 01/09/25 0820    glucagon (GLUCAGEN) injection 1 mg  1 mg Intramuscular Q15 Min PRN Leilani Ruffin PA-C        HYDROcodone-acetaminophen (NORCO) 5-325 MG per tablet 1 tablet  1 tablet Oral Q6H PRN Omari Campos MD   1 tablet at 01/09/25 1104     insulin glargine (LANTUS, SEMGLEE) injection 25 Units  25 Units Subcutaneous Daily Omari Campos MD   25 Units at 01/09/25 0820    insulin lispro (HUMALOG/ADMELOG) injection 2-7 Units  2-7 Units Subcutaneous 4x Daily AC & at Bedtime Leilani Ruffin PA-C   3 Units at 01/09/25 1220    levETIRAcetam (KEPPRA) tablet 500 mg  500 mg Oral Q12H Omari Campos MD   500 mg at 01/09/25 0819    Magnesium Standard Dose Replacement - Follow Nurse / BPA Driven Protocol   Not Applicable PRN Leilani Ruffin PA-C        melatonin tablet 5 mg  5 mg Oral Nightly PRN Leilani Ruffin PA-C        morphine injection 2 mg  2 mg Intravenous Q4H PRN Omari Campos MD   2 mg at 01/08/25 1522    ondansetron ODT (ZOFRAN-ODT) disintegrating tablet 4 mg  4 mg Oral Q6H PRN Leilani Ruffin PA-C        Or    ondansetron (ZOFRAN) injection 4 mg  4 mg Intravenous Q6H PRN Leilani Ruffin PA-C        oxyCODONE (ROXICODONE) immediate release tablet 10 mg  10 mg Oral Q4H PRN Omari Campos MD   10 mg at 01/09/25 1323    pancrelipase (Lip-Prot-Amyl) (CREON) capsule 12,000 units of lipase  12,000 units of lipase Oral TID With Meals Omari Campos MD   12,000 units of lipase at 01/09/25 1220    pantoprazole (PROTONIX) EC tablet 40 mg  40 mg Oral Q AM Omari Campos MD   40 mg at 01/09/25 0519    Phosphorus Replacement - Follow Nurse / BPA Driven Protocol   Not Applicable PRN Leilani Ruffin PA-C        Potassium Replacement - Follow Nurse / BPA Driven Protocol   Not Applicable PRN Leilani Ruffin PA-C        sodium chloride 0.9 % flush 10 mL  10 mL Intravenous PRN Edmond Kat MD   10 mL at 01/08/25 0836     Lab Results (last 24 hours)       Procedure Component Value Units Date/Time    POC Glucose 4x Daily Before Meals & at Bedtime [[id number]]  (Abnormal) Collected: 01/09/25 1129    Specimen: Blood Updated: 01/09/25 1132     Glucose 234 mg/dL      Comment: Serial Number: [id number]:  [id number]       POC  Glucose 4x Daily Before Meals & at Bedtime [[id number]]  (Abnormal) Collected: 01/09/25 0809    Specimen: Blood Updated: 01/09/25 0810     Glucose 123 mg/dL      Comment: Serial Number: [id number]:  [id number]       Blood Culture - Blood, Arm, Left [[id number]]  (Normal) Collected: 01/07/25 0352    Specimen: Blood from Arm, Left Updated: 01/09/25 0401     Blood Culture No growth at 2 days    Blood Culture - Blood, Arm, Left [[id number]]  (Normal) Collected: 01/07/25 0138    Specimen: Blood from Arm, Left Updated: 01/09/25 0146     Blood Culture No growth at 2 days    Magnesium [[id number]]  (Abnormal) Collected: 01/09/25 0001    Specimen: Blood from Arm, Right Updated: 01/09/25 0032     Magnesium 1.5 mg/dL     Basic Metabolic Panel [[id number]]  (Abnormal) Collected: 01/09/25 0001    Specimen: Blood from Arm, Right Updated: 01/09/25 0031     Glucose 176 mg/dL      BUN 17 mg/dL      Creatinine 1.08 mg/dL      Sodium 134 mmol/L      Potassium 4.7 mmol/L      Chloride 98 mmol/L      CO2 27.2 mmol/L      Calcium 8.9 mg/dL      BUN/Creatinine Ratio 15.7     Anion Gap 8.8 mmol/L      eGFR 79.1 mL/min/1.73     Narrative:      GFR Categories in Chronic Kidney Disease (CKD)      GFR Category          GFR (mL/min/1.73)    Interpretation  G1                     90 or greater         Normal or high (1)  G2                      60-89                Mild decrease (1)  G3a                   45-59                Mild to moderate decrease  G3b                   30-44                Moderate to severe decrease  G4                    15-29                Severe decrease  G5                    14 or less           Kidney failure          (1)In the absence of evidence of kidney disease, neither GFR category G1 or G2 fulfill the criteria for CKD.    eGFR calculation 2021 CKD-EPI creatinine equation, which does not include race as a factor    CBC & Differential [[id number]]  (Abnormal) Collected: 01/09/25 0001    Specimen: Blood from Arm,  Right Updated: 01/09/25 0008    Narrative:      The following orders were created for panel order CBC & Differential.  Procedure                               Abnormality         Status                     ---------                               -----------         ------                     CBC Auto Differential[[id number]]        Abnormal            Final result                 Please view results for these tests on the individual orders.    CBC Auto Differential [[id number]]  (Abnormal) Collected: 01/09/25 0001    Specimen: Blood from Arm, Right Updated: 01/09/25 0008     WBC 6.64 10*3/mm3      RBC 3.51 10*6/mm3      Hemoglobin 10.8 g/dL      Hematocrit 33.2 %      MCV 94.6 fL      MCH 30.8 pg      MCHC 32.5 g/dL      RDW 12.4 %      RDW-SD 42.5 fl      MPV 8.9 fL      Platelets 442 10*3/mm3      Neutrophil % 57.7 %      Lymphocyte % 26.5 %      Monocyte % 8.7 %      Eosinophil % 5.9 %      Basophil % 0.9 %      Immature Grans % 0.3 %      Neutrophils, Absolute 3.83 10*3/mm3      Lymphocytes, Absolute 1.76 10*3/mm3      Monocytes, Absolute 0.58 10*3/mm3      Eosinophils, Absolute 0.39 10*3/mm3      Basophils, Absolute 0.06 10*3/mm3      Immature Grans, Absolute 0.02 10*3/mm3      nRBC 0.0 /100 WBC     POC Glucose Once [[id number]]  (Abnormal) Collected: 01/08/25 2140    Specimen: Blood Updated: 01/08/25 2142     Glucose 134 mg/dL      Comment: Serial Number: [id number]:  [id number]       POC Glucose Once [[id number]]  (Abnormal) Collected: 01/08/25 1627    Specimen: Blood Updated: 01/08/25 1629     Glucose 179 mg/dL      Comment: Serial Number: [id number]:  [id number]             Imaging Results (Last 48 Hours)       Procedure Component Value Units Date/Time    XR Chest 1 View [[id number]] Collected: 01/09/25 0705     Updated: 01/09/25 0709    Narrative:      XR CHEST 1 VW    Date of Exam: 1/9/2025 5:53 AM EST    Indication: Chest tube management    Comparison: 1/8/2025    Findings:  Stable chest tube  overlying the left lung base. Small left apical pneumothorax decreased measuring 10 mm, previously 15 mm. No pneumothorax on the right. Patchy bibasilar airspace disease unchanged. No significant effusion.      Impression:      Impression:  1. Small left apical pneumothorax decreased in size measuring 10 mm.  2. Stable left basilar chest tube.  3. Unchanged bibasilar pneumonia.          Electronically Signed: Vikas Bertrand MD    1/9/2025 7:07 AM EST    Workstation ID: [id number]    XR Chest 1 View [[id number]] Collected: 01/08/25 0709     Updated: 01/08/25 0713    Narrative:      XR CHEST 1 VW    Date of Exam: 1/8/2025 6:00 AM EST    Indication: Chest tube management    Comparison: 1/7/2025    Findings:  Left-sided chest tube in stable position overlying left midlung. Persistent small left apical pneumothorax measures 15 mm. Multifocal airspace disease at the lung bases concerning for multifocal pneumonia not significantly changed. No significant   effusion. No pneumothorax on the right. Heart size within normal limits for technique.      Impression:      Impression:  1. Stable left-sided chest tube with persistent small left apical pneumothorax measuring 15 mm.  2. No change in multifocal airspace disease at the lung bases compatible with multifocal pneumonia.          Electronically Signed: Vikas Bertrand MD    1/8/2025 7:11 AM EST    Workstation ID: [id number]          Physician Progress Notes (last 24 hours)  Notes from 01/08/25 1435 through 01/09/25 1435   No notes of this type exist for this encounter.       Consult Notes (last 24 hours)  Notes from 01/08/25 1435 through 01/09/25 1435   No notes of this type exist for this encounter.

## 2025-01-09 NOTE — CASE MANAGEMENT/SOCIAL WORK
Continued Stay Note   Raom[facility]     Patient Name: Mitchell Jacome  MRN: [id number]  Today's Date: 1/9/2025    Admit Date: 1/7/2025    Plan: DC Plan: Anticipate routine home with family. Spouse will provide transportation.   Discharge Plan       Row Name 01/09/25 1619       Plan    Plan DC Plan: Anticipate routine home with family. Spouse will provide transportation.    Plan Comments DC barriers: chest tube,  CXR 1/10/25 to eval chest tube, 02 , 3L NC , IV antibiotics, pain control  Watch for 02 needs at dc                      Sheryl Serrano RN    SIPS 1  [email or web address]  Office [phone]  Cell [phone]

## 2025-01-09 NOTE — PROGRESS NOTES
[facility] MEDICINE SERVICE  DAILY PROGRESS NOTE    NAME: Mitchell Jacome  : 1965  MRN: [id number]      LOS: 2 days     PROVIDER OF SERVICE: Sean Hernandez MD    Chief Complaint: Multifocal pneumonia    Subjective:     Interval History:  History taken from: patient    Patient seen evaluated bedside.  Complains of pain at chest tube insertion site.  Chest tube still on suction.  Treatment plan medicated with with patient.  All questions answered.        Review of Systems:   Review of Systems   Constitutional:  Negative for chills and fever.   Respiratory:  Negative for shortness of breath.    Cardiovascular:  Negative for chest pain.   Gastrointestinal:  Negative for abdominal pain.       Objective:     Vital Signs  Temp:  [97.8 °F (36.6 °C)-98.2 °F (36.8 °C)] 98 °F (36.7 °C)  Heart Rate:  [67-82] 73  Resp:  [13-17] 14  BP: (117-123)/(76-83) 122/77  Flow (L/min) (Oxygen Therapy):  [3] 3   Body mass index is 19.3 kg/m².    Physical Exam  Physical Exam  Constitutional:       General: He is not in acute distress.  Cardiovascular:      Rate and Rhythm: Normal rate.      Heart sounds: No murmur heard.  Pulmonary:      Effort: Pulmonary effort is normal.      Breath sounds: Normal breath sounds.      Comments: + Chest tube  Abdominal:      General: Bowel sounds are normal.      Palpations: Abdomen is soft.   Neurological:      Mental Status: He is alert.            Diagnostic Data    Results from last 7 days   Lab Units 25  0001 25  0035 25  0138   WBC 10*3/mm3 6.64   < > 7.70   HEMOGLOBIN g/dL 10.8*   < > 12.2*   HEMATOCRIT % 33.2*   < > 35.8*   PLATELETS 10*3/mm3 442   < > 496*   GLUCOSE mg/dL 176*   < > 216*   CREATININE mg/dL 1.08   < > 0.98   BUN mg/dL 17   < > 12   SODIUM mmol/L 134*   < > 136   POTASSIUM mmol/L 4.7   < > 4.2   AST (SGOT) U/L  --   --  17   ALT (SGPT) U/L  --   --  9   ALK PHOS U/L  --   --  84   BILIRUBIN mg/dL  --   --  <0.2   ANION GAP mmol/L 8.8   < >  12.2    < > = values in this interval not displayed.       XR Chest 1 View    Result Date: 1/9/2025  Impression: 1. Small left apical pneumothorax decreased in size measuring 10 mm. 2. Stable left basilar chest tube. 3. Unchanged bibasilar pneumonia. Electronically Signed: Vikas Bertrand MD  1/9/2025 7:07 AM EST  Workstation ID: [id number]    XR Chest 1 View    Result Date: 1/8/2025  Impression: 1. Stable left-sided chest tube with persistent small left apical pneumothorax measuring 15 mm. 2. No change in multifocal airspace disease at the lung bases compatible with multifocal pneumonia. Electronically Signed: Vikas Bertrand MD  1/8/2025 7:11 AM EST  Workstation ID: [id number]       I reviewed the patient's new clinical results.    Assessment/Plan:     Active and Resolved Problems  Active Hospital Problems    Diagnosis  POA    **Multifocal pneumonia [J18.9]  Yes      Resolved Hospital Problems   No resolved problems to display.       Diagnoses  Focal pneumonia in the setting of human metapneumovirus  Tension pneumothorax status post chest tube  Acute anemia  History of hypertension  History of hyperlipidemia  History of type 2 diabetes  History of chronic pancreatitis  History of seizures  History of chronic back pain  History of GERD      Plan  Focal pneumonia in the setting of human metapneumovirus  Tension pneumothorax status post chest tube  -Patient with chest tube currently on wall suction.  Management per CT surgery.  Pain management.  -Currently on Unasyn and doxycycline for concerns of pneumonia.  Will de-escalate antibiotics.    Acute anemia  -Continue monitor hemoglobin.  Reevaluate sources of bleeding.  Transfuse greater than 7 hemoglobin.    VTE Prophylaxis:  Mechanical VTE prophylaxis orders are present.             Disposition Planning:     Barriers to Discharge:Not medically cleared  Anticipated Date of Discharge: 48 hours  Place of Discharge: Anticipated home with family      Time: +35 minutes     Code  Status and Medical Interventions: CPR (Attempt to Resuscitate); Full Support   Ordered at: 01/07/25 0357     Code Status (Patient has no pulse and is not breathing):    CPR (Attempt to Resuscitate)     Medical Interventions (Patient has pulse or is breathing):    Full Support       Signature: Electronically signed by Sean Hernandez MD, 01/09/25, 18:47 EST.  [facility]

## 2025-01-09 NOTE — PLAN OF CARE
Goal Outcome Evaluation:   Pt still in a lot of pain, pain controlled per MAR. Pt chest tube remains in place, no air leak or sub q air present. Wife remained at bedside with patient throughout the night. Pt able to make needs known, call light is within reach, and plan of care ongoing

## 2025-01-10 ENCOUNTER — APPOINTMENT (OUTPATIENT)
Dept: GENERAL RADIOLOGY | Facility: HOSPITAL | Age: 60
DRG: 193 | End: 2025-01-10
Payer: OTHER GOVERNMENT

## 2025-01-10 LAB
GLUCOSE BLDC GLUCOMTR-MCNC: 145 MG/DL (ref 70–105)
GLUCOSE BLDC GLUCOMTR-MCNC: 159 MG/DL (ref 70–105)
GLUCOSE BLDC GLUCOMTR-MCNC: 220 MG/DL (ref 70–105)
GLUCOSE BLDC GLUCOMTR-MCNC: 236 MG/DL (ref 70–105)

## 2025-01-10 PROCEDURE — 97116 GAIT TRAINING THERAPY: CPT

## 2025-01-10 PROCEDURE — 25010000002 MORPHINE PER 10 MG: Performed by: INTERNAL MEDICINE

## 2025-01-10 PROCEDURE — 63710000001 INSULIN GLARGINE PER 5 UNITS: Performed by: INTERNAL MEDICINE

## 2025-01-10 PROCEDURE — 71045 X-RAY EXAM CHEST 1 VIEW: CPT

## 2025-01-10 PROCEDURE — 25010000002 CEFTRIAXONE PER 250 MG: Performed by: INTERNAL MEDICINE

## 2025-01-10 PROCEDURE — 25010000002 AMPICILLIN-SULBACTAM PER 1.5 G

## 2025-01-10 PROCEDURE — 82948 REAGENT STRIP/BLOOD GLUCOSE: CPT

## 2025-01-10 PROCEDURE — 99231 SBSQ HOSP IP/OBS SF/LOW 25: CPT | Performed by: NURSE PRACTITIONER

## 2025-01-10 PROCEDURE — 97530 THERAPEUTIC ACTIVITIES: CPT

## 2025-01-10 PROCEDURE — 63710000001 INSULIN LISPRO (HUMAN) PER 5 UNITS

## 2025-01-10 PROCEDURE — 25010000002 HYDROMORPHONE PER 4 MG: Performed by: SURGERY

## 2025-01-10 RX ORDER — GABAPENTIN 100 MG/1
200 CAPSULE ORAL 2 TIMES DAILY
Status: DISCONTINUED | OUTPATIENT
Start: 2025-01-10 | End: 2025-01-11 | Stop reason: HOSPADM

## 2025-01-10 RX ADMIN — AMPICILLIN SODIUM AND SULBACTAM SODIUM 3 G: 2; 1 INJECTION, POWDER, FOR SOLUTION INTRAMUSCULAR; INTRAVENOUS at 08:34

## 2025-01-10 RX ADMIN — MORPHINE SULFATE 2 MG: 2 INJECTION, SOLUTION INTRAMUSCULAR; INTRAVENOUS at 14:13

## 2025-01-10 RX ADMIN — ACETAMINOPHEN 1000 MG: 500 TABLET, FILM COATED ORAL at 21:27

## 2025-01-10 RX ADMIN — PANCRELIPASE 12000 UNITS OF LIPASE: 60000; 12000; 38000 CAPSULE, DELAYED RELEASE PELLETS ORAL at 17:14

## 2025-01-10 RX ADMIN — ACETAMINOPHEN 1000 MG: 500 TABLET, FILM COATED ORAL at 08:35

## 2025-01-10 RX ADMIN — DULOXETINE 30 MG: 30 CAPSULE, DELAYED RELEASE ORAL at 08:35

## 2025-01-10 RX ADMIN — CEFTRIAXONE SODIUM 1000 MG: 1 INJECTION, POWDER, FOR SOLUTION INTRAMUSCULAR; INTRAVENOUS at 21:00

## 2025-01-10 RX ADMIN — AMPICILLIN SODIUM AND SULBACTAM SODIUM 3 G: 2; 1 INJECTION, POWDER, FOR SOLUTION INTRAMUSCULAR; INTRAVENOUS at 01:22

## 2025-01-10 RX ADMIN — LEVETIRACETAM 500 MG: 500 TABLET, FILM COATED ORAL at 08:35

## 2025-01-10 RX ADMIN — LIDOCAINE 1 PATCH: 4 PATCH TOPICAL at 08:35

## 2025-01-10 RX ADMIN — PANTOPRAZOLE SODIUM 40 MG: 40 TABLET, DELAYED RELEASE ORAL at 05:38

## 2025-01-10 RX ADMIN — DIAZEPAM 2 MG: 2 TABLET ORAL at 08:35

## 2025-01-10 RX ADMIN — OXYCODONE 10 MG: 5 TABLET ORAL at 17:15

## 2025-01-10 RX ADMIN — DOXYCYCLINE 100 MG: 100 CAPSULE ORAL at 05:38

## 2025-01-10 RX ADMIN — GABAPENTIN 200 MG: 100 CAPSULE ORAL at 10:20

## 2025-01-10 RX ADMIN — DOXYCYCLINE 100 MG: 100 CAPSULE ORAL at 17:15

## 2025-01-10 RX ADMIN — OXYCODONE 10 MG: 5 TABLET ORAL at 10:20

## 2025-01-10 RX ADMIN — OXYCODONE 5 MG: 5 TABLET ORAL at 01:29

## 2025-01-10 RX ADMIN — ATENOLOL 25 MG: 50 TABLET ORAL at 08:35

## 2025-01-10 RX ADMIN — DIAZEPAM 2 MG: 2 TABLET ORAL at 14:55

## 2025-01-10 RX ADMIN — INSULIN GLARGINE-YFGN 25 UNITS: 100 INJECTION, SOLUTION SUBCUTANEOUS at 08:34

## 2025-01-10 RX ADMIN — HYDROMORPHONE HYDROCHLORIDE 0.5 MG: 1 INJECTION, SOLUTION INTRAMUSCULAR; INTRAVENOUS; SUBCUTANEOUS at 12:02

## 2025-01-10 RX ADMIN — ACETAMINOPHEN 1000 MG: 500 TABLET, FILM COATED ORAL at 14:13

## 2025-01-10 RX ADMIN — HYDROMORPHONE HYDROCHLORIDE 0.5 MG: 1 INJECTION, SOLUTION INTRAMUSCULAR; INTRAVENOUS; SUBCUTANEOUS at 18:55

## 2025-01-10 RX ADMIN — AMPICILLIN SODIUM AND SULBACTAM SODIUM 3 G: 2; 1 INJECTION, POWDER, FOR SOLUTION INTRAMUSCULAR; INTRAVENOUS at 14:13

## 2025-01-10 RX ADMIN — DIAZEPAM 2 MG: 2 TABLET ORAL at 21:27

## 2025-01-10 RX ADMIN — GABAPENTIN 200 MG: 100 CAPSULE ORAL at 21:27

## 2025-01-10 RX ADMIN — OXYCODONE 5 MG: 5 TABLET ORAL at 01:30

## 2025-01-10 RX ADMIN — PANCRELIPASE 12000 UNITS OF LIPASE: 60000; 12000; 38000 CAPSULE, DELAYED RELEASE PELLETS ORAL at 12:02

## 2025-01-10 RX ADMIN — SENNOSIDES AND DOCUSATE SODIUM 2 TABLET: 50; 8.6 TABLET ORAL at 12:08

## 2025-01-10 RX ADMIN — INSULIN LISPRO 3 UNITS: 100 INJECTION, SOLUTION INTRAVENOUS; SUBCUTANEOUS at 12:02

## 2025-01-10 RX ADMIN — LEVETIRACETAM 500 MG: 500 TABLET, FILM COATED ORAL at 21:27

## 2025-01-10 RX ADMIN — PANCRELIPASE 12000 UNITS OF LIPASE: 60000; 12000; 38000 CAPSULE, DELAYED RELEASE PELLETS ORAL at 08:35

## 2025-01-10 RX ADMIN — Medication 10 ML: at 08:35

## 2025-01-10 RX ADMIN — POLYETHYLENE GLYCOL 3350 17 G: 17 POWDER, FOR SOLUTION ORAL at 12:08

## 2025-01-10 RX ADMIN — HYDROMORPHONE HYDROCHLORIDE 0.5 MG: 1 INJECTION, SOLUTION INTRAMUSCULAR; INTRAVENOUS; SUBCUTANEOUS at 08:34

## 2025-01-10 RX ADMIN — HYDROMORPHONE HYDROCHLORIDE 0.5 MG: 1 INJECTION, SOLUTION INTRAMUSCULAR; INTRAVENOUS; SUBCUTANEOUS at 21:27

## 2025-01-10 NOTE — PLAN OF CARE
Goal Outcome Evaluation:   Pt still very painful throughout the night, pain controlled per MAR. Chest tube is now to water seal, no subq emphysema or air leak present. Pt is a standby assist. Pt able to make needs known, call light is within reach, and plan of care ongoing

## 2025-01-10 NOTE — PROGRESS NOTES
"    Chief Complaint: Pneumothorax  S/P: Chest tube placement  POD # 2    Subjective:  Symptoms:  Improved.    Activity level: Returning to normal.    Pain:  He complains of pain that is moderate.  He reports pain is unchanged.  Pain is partially controlled.        Vital Signs:  Temp:  [97.8 °F (36.6 °C)-98.2 °F (36.8 °C)] 98.1 °F (36.7 °C)  Heart Rate:  [67-82] 75  Resp:  [13-18] 18  BP: (117-129)/(76-83) 129/80    Intake & Output (last day)         01/09 0701  01/10 0700    P.O. 940    I.V. (mL/kg) 1114 (16.3)    Total Intake(mL/kg) 2054 (30.1)    Urine (mL/kg/hr) 675 (0.7)    Stool 0    Total Output 675    Net +1379         Stool Unmeasured Occurrence 1 x            Objective:  General Appearance:  Comfortable and in no acute distress.    Vital signs: (most recent): Blood pressure 129/80, pulse 75, temperature 98.1 °F (36.7 °C), temperature source Oral, resp. rate 18, height 188 cm (74\"), weight 68.2 kg (150 lb 5.7 oz), SpO2 95%.    HEENT: Normal HEENT exam.    Lungs:  Normal effort.    Heart: Normal rate.    Chest: Chest wall tenderness present.    Neurological: Patient is alert.                Chest tube:   Site: Left, Clean, Dry, and Intact  Suction: -20 cm  Air Leak: Negative    Results Review:     I reviewed the patient's new clinical results.  I reviewed the patient's new imaging results and agree with the interpretation.  Discussed with patient, surgeon    Imaging Results (Last 24 Hours)       Procedure Component Value Units Date/Time    XR Chest 1 View [[id number]] Collected: 01/09/25 0705     Updated: 01/09/25 0709    Narrative:      XR CHEST 1 VW    Date of Exam: 1/9/2025 5:53 AM EST    Indication: Chest tube management    Comparison: 1/8/2025    Findings:  Stable chest tube overlying the left lung base. Small left apical pneumothorax decreased measuring 10 mm, previously 15 mm. No pneumothorax on the right. Patchy bibasilar airspace disease unchanged. No significant effusion.      Impression:      " Impression:  1. Small left apical pneumothorax decreased in size measuring 10 mm.  2. Stable left basilar chest tube.  3. Unchanged bibasilar pneumonia.          Electronically Signed: Vikas Bertrand MD    1/9/2025 7:07 AM EST    Workstation ID: [id number]            Lab Results:     Lab Results (last 24 hours)       Procedure Component Value Units Date/Time    POC Glucose Once [[id number]]  (Abnormal) Collected: 01/09/25 2119    Specimen: Blood Updated: 01/09/25 2121     Glucose 138 mg/dL      Comment: Serial Number: [id number]:  [id number]       POC Glucose Once [[id number]]  (Abnormal) Collected: 01/09/25 1524    Specimen: Blood Updated: 01/09/25 1526     Glucose 236 mg/dL      Comment: Serial Number: [id number]:  [id number]       POC Glucose 4x Daily Before Meals & at Bedtime [[id number]]  (Abnormal) Collected: 01/09/25 1129    Specimen: Blood Updated: 01/09/25 1132     Glucose 234 mg/dL      Comment: Serial Number: [id number]:  [id number]       POC Glucose 4x Daily Before Meals & at Bedtime [[id number]]  (Abnormal) Collected: 01/09/25 0809    Specimen: Blood Updated: 01/09/25 0810     Glucose 123 mg/dL      Comment: Serial Number: [id number]:  [id number]       Blood Culture - Blood, Arm, Left [[id number]]  (Normal) Collected: 01/07/25 0352    Specimen: Blood from Arm, Left Updated: 01/09/25 0401     Blood Culture No growth at 2 days    Blood Culture - Blood, Arm, Left [[id number]]  (Normal) Collected: 01/07/25 0138    Specimen: Blood from Arm, Left Updated: 01/09/25 0146     Blood Culture No growth at 2 days    Magnesium [[id number]]  (Abnormal) Collected: 01/09/25 0001    Specimen: Blood from Arm, Right Updated: 01/09/25 0032     Magnesium 1.5 mg/dL     Basic Metabolic Panel [[id number]]  (Abnormal) Collected: 01/09/25 0001    Specimen: Blood from Arm, Right Updated: 01/09/25 0031     Glucose 176 mg/dL      BUN 17 mg/dL      Creatinine 1.08 mg/dL      Sodium 134 mmol/L      Potassium 4.7 mmol/L       Chloride 98 mmol/L      CO2 27.2 mmol/L      Calcium 8.9 mg/dL      BUN/Creatinine Ratio 15.7     Anion Gap 8.8 mmol/L      eGFR 79.1 mL/min/1.73     Narrative:      GFR Categories in Chronic Kidney Disease (CKD)      GFR Category          GFR (mL/min/1.73)    Interpretation  G1                     90 or greater         Normal or high (1)  G2                      60-89                Mild decrease (1)  G3a                   45-59                Mild to moderate decrease  G3b                   30-44                Moderate to severe decrease  G4                    15-29                Severe decrease  G5                    14 or less           Kidney failure          (1)In the absence of evidence of kidney disease, neither GFR category G1 or G2 fulfill the criteria for CKD.    eGFR calculation 2021 CKD-EPI creatinine equation, which does not include race as a factor    CBC & Differential [[id number]]  (Abnormal) Collected: 01/09/25 0001    Specimen: Blood from Arm, Right Updated: 01/09/25 0008    Narrative:      The following orders were created for panel order CBC & Differential.  Procedure                               Abnormality         Status                     ---------                               -----------         ------                     CBC Auto Differential[[id number]]        Abnormal            Final result                 Please view results for these tests on the individual orders.    CBC Auto Differential [[id number]]  (Abnormal) Collected: 01/09/25 0001    Specimen: Blood from Arm, Right Updated: 01/09/25 0008     WBC 6.64 10*3/mm3      RBC 3.51 10*6/mm3      Hemoglobin 10.8 g/dL      Hematocrit 33.2 %      MCV 94.6 fL      MCH 30.8 pg      MCHC 32.5 g/dL      RDW 12.4 %      RDW-SD 42.5 fl      MPV 8.9 fL      Platelets 442 10*3/mm3      Neutrophil % 57.7 %      Lymphocyte % 26.5 %      Monocyte % 8.7 %      Eosinophil % 5.9 %      Basophil % 0.9 %      Immature Grans % 0.3 %      Neutrophils,  Absolute 3.83 10*3/mm3      Lymphocytes, Absolute 1.76 10*3/mm3      Monocytes, Absolute 0.58 10*3/mm3      Eosinophils, Absolute 0.39 10*3/mm3      Basophils, Absolute 0.06 10*3/mm3      Immature Grans, Absolute 0.02 10*3/mm3      nRBC 0.0 /100 WBC     POC Glucose Once [[id number]]  (Abnormal) Collected: 01/08/25 2140    Specimen: Blood Updated: 01/08/25 2142     Glucose 134 mg/dL      Comment: Serial Number: [id number]:  [id number]                Assessment & Plan       Multifocal pneumonia       Assessment & Plan    S/P left chest tube for spontaneous pneumothorax. Pneumothorax stable.  Transition chest tube to waterseal today.  Repeat chest -ray and reassess tomorrow.    Pain medication adjusted to achieve better pain control.  Encourage incentive spirometer, ambulation.      Merlin Richter MD  [facility]  01/09/25  21:35 EST    Greater than 20 minutes was spent reviewing the patient's chart, radiographic imaging, labs, provider notes, assessing the patient and developing a plan of care.  This was discussed with the patient and RN.

## 2025-01-10 NOTE — CASE MANAGEMENT/SOCIAL WORK
Continued Stay Note  JORGE Navarro     Patient Name: Mitchell Jacome  MRN: [id number]  Today's Date: 1/10/2025    Admit Date: 1/7/2025    Plan: Routine home w/family. Spouse will provide transportation. Watch for O2 needs.   Discharge Plan       Row Name 01/10/25 1031       Plan    Plan Routine home w/family. Spouse will provide transportation. Watch for O2 needs.    Plan Comments DC barriers: Chest tube, IV abx, 3L NC, blood cx pending, electrolyte management, POD 3, cardiothoracic surgery following.                 Expected Discharge Date and Time       Expected Discharge Date Expected Discharge Time    Jan 13, 2025           Phone communication or documentation only - no physical contact with patient or family.     Glory Hinkle RN

## 2025-01-10 NOTE — THERAPY TREATMENT NOTE
"Subjective: Pt agreeable to therapeutic plan of care.    Objective:     Precautions - Chest tube to water seal    Bed mobility - Independent    Transfers - Modified-Independent and with rolling walker    Ambulation - 100 feet Supervision and with rolling walker    Vitals: WNL on room air    Pain: 7 VAS   Location: chest tube site, back pain  Intervention for pain: Increased Activity and Therapeutic Presence    Education: Provided education on the importance of mobility in the acute care setting, Verbal/Tactile Cues, Transfer Training, and Gait Training    Assessment: Mitchell Jacome presents with functional mobility impairments which indicate the need for skilled intervention. Tolerating session today without incident. Will continue to follow and progress as tolerated.     Plan/Recommendations:   If medically appropriate, Low Intensity Therapy recommended post-acute care - This is recommended as therapy feels this patient would require 2-3 visits per week. OP or HH would be the best option depending on patient's home bound status. Consider, if the patient has other  \"skilled\" needs such as wounds, IV antibiotics, etc. Combined with \"low intensity\" could also equate to a SNF. If patient is medically complex, consider LTAC. Pt requires no DME at discharge.     Pt desires Home with family assist and Home Health at discharge. Pt cooperative; agreeable to therapeutic recommendations and plan of care.         Basic Mobility 6-click:  Rollin = Total, A lot = 2, A little = 3; 4 = None  Supine>Sit:   1 = Total, A lot = 2, A little = 3; 4 = None   Sit>Stand with arms:  1 = Total, A lot = 2, A little = 3; 4 = None  Bed>Chair:   1 = Total, A lot = 2, A little = 3; 4 = None  Ambulate in room:  1 = Total, A lot = 2, A little = 3; 4 = None  3-5 Steps with railin = Total, A lot = 2, A little = 3; 4 = None  Score: 23    Modified Jhonathan: N/A = No pre-op stroke/TIA    Post-Tx Position: Up in Chair and Call " light and personal items within reach  PPE: gloves, surgical mask, and gown    Therapy Charges for Today       Code Description Service Date Service Provider Modifiers Qty    53996539815 HC PT THERAPEUTIC ACT EA 15 MIN 1/10/2025 Lam Heck PTA GP 1    51532501526 HC GAIT TRAINING EA 15 MIN 1/10/2025 Lam Heck PTA GP 1           PT Charges       Row Name 01/10/25 1122             Time Calculation    Start Time 1033  -UN      Stop Time 1049  -UN      Time Calculation (min) 16 min  -UN      PT Received On 01/10/25  -UN      PT - Next Appointment 01/12/25  -UN         Time Calculation- PT    Total Timed Code Minutes- PT 16 minute(s)  -UN                User Key  (r) = Recorded By, (t) = Taken By, (c) = Cosigned By      Initials Name Provider Type    UN Lam Heck PTA Physical Therapist Assistant

## 2025-01-10 NOTE — PLAN OF CARE
"Assessment: Mitchell Jacome presents with functional mobility impairments which indicate the need for skilled intervention. Tolerating session today without incident. Will continue to follow and progress as tolerated.     Plan/Recommendations:   If medically appropriate, Low Intensity Therapy recommended post-acute care - This is recommended as therapy feels this patient would require 2-3 visits per week. OP or HH would be the best option depending on patient's home bound status. Consider, if the patient has other  \"skilled\" needs such as wounds, IV antibiotics, etc. Combined with \"low intensity\" could also equate to a SNF. If patient is medically complex, consider LTAC. Pt requires no DME at discharge.     Pt desires Home with family assist and Home Health at discharge. Pt cooperative; agreeable to therapeutic recommendations and plan of care.     "

## 2025-01-10 NOTE — PROGRESS NOTES
"    Chief Complaint: Pneumothorax  S/P: Chest tube placement  POD # 3    Subjective:  Symptoms:  Improved.    Activity level: Returning to normal.    Pain:  He complains of pain that is moderate.  He reports pain is improving.  Pain is partially controlled.    Continues to do well out of bed. Ambulating with assistance.     Vital Signs:  Temp:  [98 °F (36.7 °C)-98.1 °F (36.7 °C)] 98 °F (36.7 °C)  Heart Rate:  [71-75] 71  Resp:  [14-18] 14  BP: (122-129)/(77-80) 123/79    Intake & Output (last day)         01/09 0701  01/10 0700 01/10 0701  01/11 0700    P.O. 940     I.V. (mL/kg) 1114 (16.3)     Total Intake(mL/kg) 2054 (30.1)     Urine (mL/kg/hr) 1425 (0.9)     Stool 0     Chest Tube 5     Total Output 1430     Net +624           Stool Unmeasured Occurrence 1 x             Objective:  General Appearance:  Comfortable and in no acute distress.    Vital signs: (most recent): Blood pressure 123/79, pulse 71, temperature 98 °F (36.7 °C), temperature source Oral, resp. rate 14, height 188 cm (74\"), weight 68.2 kg (150 lb 5.7 oz), SpO2 97%.    HEENT: Normal HEENT exam.    Lungs:  Normal effort.    Heart: Normal rate.    Chest: Chest wall tenderness present.    Neurological: Patient is alert and oriented to person, place and time.                Chest tube:   Site: Left, Clean, Dry, and Intact  Suction: waterseal  Air Leak: Negative    Results Review:     I reviewed the patient's new clinical results.  I reviewed the patient's new imaging results and agree with the interpretation.  Discussed with patient, surgeon    Imaging Results (Last 24 Hours)       Procedure Component Value Units Date/Time    XR Chest 1 View [[id number]] Collected: 01/10/25 0739     Updated: 01/10/25 0743    Narrative:      XR CHEST 1 VW    Date of Exam: 1/10/2025 5:44 AM EST    Indication: Chest tube management    Comparison: 1/9/2025    Findings:  Left pleural catheter remains in place. No pleural line is visualized. Stable bibasilar airspace " disease. Heart size and pulmonary vasculature are within normal limit. Interval increase in body wall emphysema of the left lateral thorax and base of the   left side of the neck      Impression:      Impression:    1. No pneumothorax visualized on today's exam  2. Stable bibasilar airspace disease      Electronically Signed: Sean Mendoza    1/10/2025 7:41 AM EST    Workstation ID: [id number]            Lab Results:     Lab Results (last 24 hours)       Procedure Component Value Units Date/Time    POC Glucose 4x Daily Before Meals & at Bedtime [[id number]]  (Abnormal) Collected: 01/10/25 0809    Specimen: Blood Updated: 01/10/25 0812     Glucose 145 mg/dL      Comment: Serial Number: [id number]:  [id number]       Blood Culture - Blood, Arm, Left [[id number]]  (Normal) Collected: 01/07/25 0352    Specimen: Blood from Arm, Left Updated: 01/10/25 0401     Blood Culture No growth at 3 days    Blood Culture - Blood, Arm, Left [[id number]]  (Normal) Collected: 01/07/25 0138    Specimen: Blood from Arm, Left Updated: 01/10/25 0146     Blood Culture No growth at 3 days    Basic Metabolic Panel [[id number]]  (Abnormal) Collected: 01/09/25 2326    Specimen: Blood from Arm, Right Updated: 01/09/25 2357     Glucose 148 mg/dL      BUN 16 mg/dL      Creatinine 1.04 mg/dL      Sodium 135 mmol/L      Potassium 4.6 mmol/L      Comment: Specimen hemolyzed.  Result may be falsely elevated.        Chloride 99 mmol/L      CO2 27.3 mmol/L      Calcium 8.7 mg/dL      BUN/Creatinine Ratio 15.4     Anion Gap 8.7 mmol/L      eGFR 82.7 mL/min/1.73     Narrative:      GFR Categories in Chronic Kidney Disease (CKD)      GFR Category          GFR (mL/min/1.73)    Interpretation  G1                     90 or greater         Normal or high (1)  G2                      60-89                Mild decrease (1)  G3a                   45-59                Mild to moderate decrease  G3b                   30-44                Moderate to severe  decrease  G4                    15-29                Severe decrease  G5                    14 or less           Kidney failure          (1)In the absence of evidence of kidney disease, neither GFR category G1 or G2 fulfill the criteria for CKD.    eGFR calculation 2021 CKD-EPI creatinine equation, which does not include race as a factor    Magnesium [[id number]]  (Normal) Collected: 01/09/25 2326    Specimen: Blood from Arm, Right Updated: 01/09/25 2357     Magnesium 1.6 mg/dL     CBC & Differential [[id number]]  (Abnormal) Collected: 01/09/25 2326    Specimen: Blood from Arm, Right Updated: 01/09/25 2331    Narrative:      The following orders were created for panel order CBC & Differential.  Procedure                               Abnormality         Status                     ---------                               -----------         ------                     CBC Auto Differential[[id number]]        Abnormal            Final result                 Please view results for these tests on the individual orders.    CBC Auto Differential [[id number]]  (Abnormal) Collected: 01/09/25 2326    Specimen: Blood from Arm, Right Updated: 01/09/25 2331     WBC 7.87 10*3/mm3      RBC 3.54 10*6/mm3      Hemoglobin 10.6 g/dL      Hematocrit 33.5 %      MCV 94.6 fL      MCH 29.9 pg      MCHC 31.6 g/dL      RDW 12.4 %      RDW-SD 42.9 fl      MPV 8.9 fL      Platelets 438 10*3/mm3      Neutrophil % 59.6 %      Lymphocyte % 27.2 %      Monocyte % 7.1 %      Eosinophil % 5.1 %      Basophil % 0.6 %      Immature Grans % 0.4 %      Neutrophils, Absolute 4.69 10*3/mm3      Lymphocytes, Absolute 2.14 10*3/mm3      Monocytes, Absolute 0.56 10*3/mm3      Eosinophils, Absolute 0.40 10*3/mm3      Basophils, Absolute 0.05 10*3/mm3      Immature Grans, Absolute 0.03 10*3/mm3      nRBC 0.0 /100 WBC     POC Glucose Once [[id number]]  (Abnormal) Collected: 01/09/25 2119    Specimen: Blood Updated: 01/09/25 2121     Glucose 138 mg/dL      Comment:  Serial Number: [id number]:  [id number]       POC Glucose Once [[id number]]  (Abnormal) Collected: 01/09/25 1524    Specimen: Blood Updated: 01/09/25 1526     Glucose 236 mg/dL      Comment: Serial Number: [id number]:  [id number]       POC Glucose 4x Daily Before Meals & at Bedtime [[id number]]  (Abnormal) Collected: 01/09/25 1129    Specimen: Blood Updated: 01/09/25 1132     Glucose 234 mg/dL      Comment: Serial Number: [id number]:  [id number]                Assessment & Plan       Multifocal pneumonia       Assessment & Plan    S/P left chest tube for spontaneous pneumothorax. Pneumothorax stable. Chest tube clamped today.   Repeat chest -ray and reassess tomorrow. Continue antibiotics for pneumonia.    Pain medication adjusted to achieve better pain control.  Encourage incentive spirometer, ambulation.    Possible chest tube removal tomorrow pending x-ray results.    Livier Mccord, NELDA, APRN  [facility]  01/10/25  10:26 EST    Greater than 20 minutes was spent reviewing the patient's chart, radiographic imaging, labs, provider notes, assessing the patient and developing a plan of care.  This was discussed with the patient and RN.

## 2025-01-10 NOTE — PAYOR COMM NOTE
"Ceci Tapia (59 y.o. Male)  1965  Ref #[id number]   Clinical UD- Pt remains in Hospital 1/10/25.    AUTHORIZATION PENDING  PLEASE FORWARD DETERMINATION TO FOLLOWING CONTACT:    SONIA ADAMSON LPN UR  Utilization Review Nurse  [facility]  Direct & confidential phone # [phone]  Fax # [phone]        Date of Birth   1965    Social Security Number       Address   [address] [place] [address] [place] [facility]    Home Phone   [phone]    MRN   [id number]       Sabianism   None    Marital Status                               Admission Date   25    Admission Type   Emergency    Admitting Provider   Omari Campos MD    Attending Provider   Sean Hernandez MD    Department, Room/Bed   [facility] SURGICAL INPATIENT,        Discharge Date       Discharge Disposition       Discharge Destination                                 Attending Provider: Sean Hernandez MD    Allergies: Basaglar Kwikpen [Insulin Glargine], Gemfibrozil    Isolation: Contact Drop   Infection: Human Metapneumovirus  (25)   Code Status: CPR    Ht: 188 cm (74\")   Wt: 68.2 kg (150 lb 5.7 oz)    Admission Cmt: None   Principal Problem: Multifocal pneumonia [J18.9]                   Active Insurance as of 2025       Primary Coverage       Payor Plan Insurance Group Employer/Plan Group    [facility] OPTUM        Payor Plan Address Payor Plan Phone Number Payor Plan Fax Number Effective Dates    [address] [phone]  2024 - None Entered    [employer] [id number]         Subscriber Name Subscriber Birth Date Member ID       [employer] T[employer] 1965                Secondary Coverage       Payor Plan Insurance Group Employer/Plan Group    [employer] PPO [id number]       Payor Plan Address Payor Plan Phone Number Payor Plan Fax Number Effective Dates    [address] [phone]  2020 - " "None Entered    [facility] [id number]         Subscriber Name Subscriber Birth Date Member ID       [employer] TAPIA 1965 [id number]                     Emergency Contacts        (Rel.) Home Phone Work Phone Mobile Phone    ZAINAB CRUZ (Spouse) [phone] -- [phone]                 Physician Progress Notes (last 24 hours)        Livier Mccord DNP, APRN at 01/10/25 1026       Attestation signed by Merlin Richter MD at 01/10/25 1033    I have reviewed this documentation and agree.                      Chief Complaint: Pneumothorax  S/P: Chest tube placement  POD # 3    Subjective:  Symptoms:  Improved.    Activity level: Returning to normal.    Pain:  He complains of pain that is moderate.  He reports pain is improving.  Pain is partially controlled.    Continues to do well out of bed. Ambulating with assistance.     Vital Signs:  Temp:  [98 °F (36.7 °C)-98.1 °F (36.7 °C)] 98 °F (36.7 °C)  Heart Rate:  [71-75] 71  Resp:  [14-18] 14  BP: (122-129)/(77-80) 123/79    Intake & Output (last day)         01/09 0701  01/10 0700 01/10 0701  01/11 0700    P.O. 940     I.V. (mL/kg) 1114 (16.3)     Total Intake(mL/kg) 2054 (30.1)     Urine (mL/kg/hr) 1425 (0.9)     Stool 0     Chest Tube 5     Total Output 1430     Net +624           Stool Unmeasured Occurrence 1 x             Objective:  General Appearance:  Comfortable and in no acute distress.    Vital signs: (most recent): Blood pressure 123/79, pulse 71, temperature 98 °F (36.7 °C), temperature source Oral, resp. rate 14, height 188 cm (74\"), weight 68.2 kg (150 lb 5.7 oz), SpO2 97%.    HEENT: Normal HEENT exam.    Lungs:  Normal effort.    Heart: Normal rate.    Chest: Chest wall tenderness present.    Neurological: Patient is alert and oriented to person, place and time.                Chest tube:   Site: Left, Clean, Dry, and Intact  Suction: waterseal  Air Leak: Negative    Results Review:     I reviewed the patient's new clinical " results.  I reviewed the patient's new imaging results and agree with the interpretation.  Discussed with patient, surgeon    Imaging Results (Last 24 Hours)       Procedure Component Value Units Date/Time    XR Chest 1 View [[id number]] Collected: 01/10/25 0739     Updated: 01/10/25 0743    Narrative:      XR CHEST 1 VW    Date of Exam: 1/10/2025 5:44 AM EST    Indication: Chest tube management    Comparison: 1/9/2025    Findings:  Left pleural catheter remains in place. No pleural line is visualized. Stable bibasilar airspace disease. Heart size and pulmonary vasculature are within normal limit. Interval increase in body wall emphysema of the left lateral thorax and base of the   left side of the neck      Impression:      Impression:    1. No pneumothorax visualized on today's exam  2. Stable bibasilar airspace disease      Electronically Signed: Sean Mendoza    1/10/2025 7:41 AM EST    Workstation ID: [id number]            Lab Results:     Lab Results (last 24 hours)       Procedure Component Value Units Date/Time    POC Glucose 4x Daily Before Meals & at Bedtime [[id number]]  (Abnormal) Collected: 01/10/25 0809    Specimen: Blood Updated: 01/10/25 0812     Glucose 145 mg/dL      Comment: Serial Number: [id number]:  [id number]       Blood Culture - Blood, Arm, Left [[id number]]  (Normal) Collected: 01/07/25 0352    Specimen: Blood from Arm, Left Updated: 01/10/25 0401     Blood Culture No growth at 3 days    Blood Culture - Blood, Arm, Left [[id number]]  (Normal) Collected: 01/07/25 0138    Specimen: Blood from Arm, Left Updated: 01/10/25 0146     Blood Culture No growth at 3 days    Basic Metabolic Panel [[id number]]  (Abnormal) Collected: 01/09/25 2326    Specimen: Blood from Arm, Right Updated: 01/09/25 2357     Glucose 148 mg/dL      BUN 16 mg/dL      Creatinine 1.04 mg/dL      Sodium 135 mmol/L      Potassium 4.6 mmol/L      Comment: Specimen hemolyzed.  Result may be falsely elevated.        Chloride 99  mmol/L      CO2 27.3 mmol/L      Calcium 8.7 mg/dL      BUN/Creatinine Ratio 15.4     Anion Gap 8.7 mmol/L      eGFR 82.7 mL/min/1.73     Narrative:      GFR Categories in Chronic Kidney Disease (CKD)      GFR Category          GFR (mL/min/1.73)    Interpretation  G1                     90 or greater         Normal or high (1)  G2                      60-89                Mild decrease (1)  G3a                   45-59                Mild to moderate decrease  G3b                   30-44                Moderate to severe decrease  G4                    15-29                Severe decrease  G5                    14 or less           Kidney failure          (1)In the absence of evidence of kidney disease, neither GFR category G1 or G2 fulfill the criteria for CKD.    eGFR calculation 2021 CKD-EPI creatinine equation, which does not include race as a factor    Magnesium [[id number]]  (Normal) Collected: 01/09/25 2326    Specimen: Blood from Arm, Right Updated: 01/09/25 2357     Magnesium 1.6 mg/dL     CBC & Differential [[id number]]  (Abnormal) Collected: 01/09/25 2326    Specimen: Blood from Arm, Right Updated: 01/09/25 2331    Narrative:      The following orders were created for panel order CBC & Differential.  Procedure                               Abnormality         Status                     ---------                               -----------         ------                     CBC Auto Differential[[id number]]        Abnormal            Final result                 Please view results for these tests on the individual orders.    CBC Auto Differential [[id number]]  (Abnormal) Collected: 01/09/25 2326    Specimen: Blood from Arm, Right Updated: 01/09/25 2331     WBC 7.87 10*3/mm3      RBC 3.54 10*6/mm3      Hemoglobin 10.6 g/dL      Hematocrit 33.5 %      MCV 94.6 fL      MCH 29.9 pg      MCHC 31.6 g/dL      RDW 12.4 %      RDW-SD 42.9 fl      MPV 8.9 fL      Platelets 438 10*3/mm3      Neutrophil % 59.6 %       Lymphocyte % 27.2 %      Monocyte % 7.1 %      Eosinophil % 5.1 %      Basophil % 0.6 %      Immature Grans % 0.4 %      Neutrophils, Absolute 4.69 10*3/mm3      Lymphocytes, Absolute 2.14 10*3/mm3      Monocytes, Absolute 0.56 10*3/mm3      Eosinophils, Absolute 0.40 10*3/mm3      Basophils, Absolute 0.05 10*3/mm3      Immature Grans, Absolute 0.03 10*3/mm3      nRBC 0.0 /100 WBC     POC Glucose Once [[id number]]  (Abnormal) Collected: 01/09/25 2119    Specimen: Blood Updated: 01/09/25 2121     Glucose 138 mg/dL      Comment: Serial Number: [id number]:  [id number]       POC Glucose Once [[id number]]  (Abnormal) Collected: 01/09/25 1524    Specimen: Blood Updated: 01/09/25 1526     Glucose 236 mg/dL      Comment: Serial Number: [id number]:  [id number]       POC Glucose 4x Daily Before Meals & at Bedtime [[id number]]  (Abnormal) Collected: 01/09/25 1129    Specimen: Blood Updated: 01/09/25 1132     Glucose 234 mg/dL      Comment: Serial Number: [id number]:  [id number]                Assessment & Plan       Multifocal pneumonia       Assessment & Plan    S/P left chest tube for spontaneous pneumothorax. Pneumothorax stable. Chest tube clamped today.   Repeat chest -ray and reassess tomorrow. Continue antibiotics for pneumonia.    Pain medication adjusted to achieve better pain control.  Encourage incentive spirometer, ambulation.    Possible chest tube removal tomorrow pending x-ray results.    Livier Mccord, NELDA, APRN  [facility]  01/10/25  10:26 EST    Greater than 20 minutes was spent reviewing the patient's chart, radiographic imaging, labs, provider notes, assessing the patient and developing a plan of care.  This was discussed with the patient and RN.            Electronically signed by Merlin Richter MD at 01/10/25 3836       Merlin Richter MD at 01/09/25 2550              Chief Complaint: Pneumothorax  S/P: Chest tube placement  POD # 2    Subjective:  Symptoms:  Improved.   "  Activity level: Returning to normal.    Pain:  He complains of pain that is moderate.  He reports pain is unchanged.  Pain is partially controlled.        Vital Signs:  Temp:  [97.8 °F (36.6 °C)-98.2 °F (36.8 °C)] 98.1 °F (36.7 °C)  Heart Rate:  [67-82] 75  Resp:  [13-18] 18  BP: (117-129)/(76-83) 129/80    Intake & Output (last day)         01/09 0701  01/10 0700    P.O. 940    I.V. (mL/kg) 1114 (16.3)    Total Intake(mL/kg) 2054 (30.1)    Urine (mL/kg/hr) 675 (0.7)    Stool 0    Total Output 675    Net +1379         Stool Unmeasured Occurrence 1 x            Objective:  General Appearance:  Comfortable and in no acute distress.    Vital signs: (most recent): Blood pressure 129/80, pulse 75, temperature 98.1 °F (36.7 °C), temperature source Oral, resp. rate 18, height 188 cm (74\"), weight 68.2 kg (150 lb 5.7 oz), SpO2 95%.    HEENT: Normal HEENT exam.    Lungs:  Normal effort.    Heart: Normal rate.    Chest: Chest wall tenderness present.    Neurological: Patient is alert.                Chest tube:   Site: Left, Clean, Dry, and Intact  Suction: -20 cm  Air Leak: Negative    Results Review:     I reviewed the patient's new clinical results.  I reviewed the patient's new imaging results and agree with the interpretation.  Discussed with patient, surgeon    Imaging Results (Last 24 Hours)       Procedure Component Value Units Date/Time    XR Chest 1 View [[id number]] Collected: 01/09/25 0705     Updated: 01/09/25 0709    Narrative:      XR CHEST 1 VW    Date of Exam: 1/9/2025 5:53 AM EST    Indication: Chest tube management    Comparison: 1/8/2025    Findings:  Stable chest tube overlying the left lung base. Small left apical pneumothorax decreased measuring 10 mm, previously 15 mm. No pneumothorax on the right. Patchy bibasilar airspace disease unchanged. No significant effusion.      Impression:      Impression:  1. Small left apical pneumothorax decreased in size measuring 10 mm.  2. Stable left basilar chest " tube.  3. Unchanged bibasilar pneumonia.          Electronically Signed: Vikas Bertrand MD    1/9/2025 7:07 AM EST    Workstation ID: [id number]            Lab Results:     Lab Results (last 24 hours)       Procedure Component Value Units Date/Time    POC Glucose Once [[id number]]  (Abnormal) Collected: 01/09/25 2119    Specimen: Blood Updated: 01/09/25 2121     Glucose 138 mg/dL      Comment: Serial Number: [id number]:  [id number]       POC Glucose Once [[id number]]  (Abnormal) Collected: 01/09/25 1524    Specimen: Blood Updated: 01/09/25 1526     Glucose 236 mg/dL      Comment: Serial Number: [id number]:  [id number]       POC Glucose 4x Daily Before Meals & at Bedtime [[id number]]  (Abnormal) Collected: 01/09/25 1129    Specimen: Blood Updated: 01/09/25 1132     Glucose 234 mg/dL      Comment: Serial Number: [id number]:  [id number]       POC Glucose 4x Daily Before Meals & at Bedtime [[id number]]  (Abnormal) Collected: 01/09/25 0809    Specimen: Blood Updated: 01/09/25 0810     Glucose 123 mg/dL      Comment: Serial Number: [id number]:  [id number]       Blood Culture - Blood, Arm, Left [[id number]]  (Normal) Collected: 01/07/25 0352    Specimen: Blood from Arm, Left Updated: 01/09/25 0401     Blood Culture No growth at 2 days    Blood Culture - Blood, Arm, Left [[id number]]  (Normal) Collected: 01/07/25 0138    Specimen: Blood from Arm, Left Updated: 01/09/25 0146     Blood Culture No growth at 2 days    Magnesium [[id number]]  (Abnormal) Collected: 01/09/25 0001    Specimen: Blood from Arm, Right Updated: 01/09/25 0032     Magnesium 1.5 mg/dL     Basic Metabolic Panel [[id number]]  (Abnormal) Collected: 01/09/25 0001    Specimen: Blood from Arm, Right Updated: 01/09/25 0031     Glucose 176 mg/dL      BUN 17 mg/dL      Creatinine 1.08 mg/dL      Sodium 134 mmol/L      Potassium 4.7 mmol/L      Chloride 98 mmol/L      CO2 27.2 mmol/L      Calcium 8.9 mg/dL      BUN/Creatinine Ratio 15.7     Anion Gap  8.8 mmol/L      eGFR 79.1 mL/min/1.73     Narrative:      GFR Categories in Chronic Kidney Disease (CKD)      GFR Category          GFR (mL/min/1.73)    Interpretation  G1                     90 or greater         Normal or high (1)  G2                      60-89                Mild decrease (1)  G3a                   45-59                Mild to moderate decrease  G3b                   30-44                Moderate to severe decrease  G4                    15-29                Severe decrease  G5                    14 or less           Kidney failure          (1)In the absence of evidence of kidney disease, neither GFR category G1 or G2 fulfill the criteria for CKD.    eGFR calculation 2021 CKD-EPI creatinine equation, which does not include race as a factor    CBC & Differential [[id number]]  (Abnormal) Collected: 01/09/25 0001    Specimen: Blood from Arm, Right Updated: 01/09/25 0008    Narrative:      The following orders were created for panel order CBC & Differential.  Procedure                               Abnormality         Status                     ---------                               -----------         ------                     CBC Auto Differential[[id number]]        Abnormal            Final result                 Please view results for these tests on the individual orders.    CBC Auto Differential [[id number]]  (Abnormal) Collected: 01/09/25 0001    Specimen: Blood from Arm, Right Updated: 01/09/25 0008     WBC 6.64 10*3/mm3      RBC 3.51 10*6/mm3      Hemoglobin 10.8 g/dL      Hematocrit 33.2 %      MCV 94.6 fL      MCH 30.8 pg      MCHC 32.5 g/dL      RDW 12.4 %      RDW-SD 42.5 fl      MPV 8.9 fL      Platelets 442 10*3/mm3      Neutrophil % 57.7 %      Lymphocyte % 26.5 %      Monocyte % 8.7 %      Eosinophil % 5.9 %      Basophil % 0.9 %      Immature Grans % 0.3 %      Neutrophils, Absolute 3.83 10*3/mm3      Lymphocytes, Absolute 1.76 10*3/mm3      Monocytes, Absolute 0.58 10*3/mm3       Eosinophils, Absolute 0.39 10*3/mm3      Basophils, Absolute 0.06 10*3/mm3      Immature Grans, Absolute 0.02 10*3/mm3      nRBC 0.0 /100 WBC     POC Glucose Once [[id number]]  (Abnormal) Collected: 25    Specimen: Blood Updated: 25     Glucose 134 mg/dL      Comment: Serial Number: [id number]:  [id number]                Assessment & Plan       Multifocal pneumonia       Assessment & Plan    S/P left chest tube for spontaneous pneumothorax. Pneumothorax stable.  Transition chest tube to waterseal today.  Repeat chest -ray and reassess tomorrow.    Pain medication adjusted to achieve better pain control.  Encourage incentive spirometer, ambulation.      Merlin Richter MD  [facility]  25  21:35 EST    Greater than 20 minutes was spent reviewing the patient's chart, radiographic imaging, labs, provider notes, assessing the patient and developing a plan of care.  This was discussed with the patient and RN.            Electronically signed by Merlin Richter MD at 25       Sean Hernandez MD at 25 184              [facility] MEDICINE SERVICE  DAILY PROGRESS NOTE    NAME: Mitchell Jacome  : 1965  MRN: [id number]      LOS: 2 days     PROVIDER OF SERVICE: Sean Hernandez MD    Chief Complaint: Multifocal pneumonia    Subjective:     Interval History:  History taken from: patient    Patient seen evaluated bedside.  Complains of pain at chest tube insertion site.  Chest tube still on suction.  Treatment plan medicated with with patient.  All questions answered.        Review of Systems:   Review of Systems   Constitutional:  Negative for chills and fever.   Respiratory:  Negative for shortness of breath.    Cardiovascular:  Negative for chest pain.   Gastrointestinal:  Negative for abdominal pain.       Objective:     Vital Signs  Temp:  [97.8 °F (36.6 °C)-98.2 °F (36.8 °C)] 98 °F (36.7 °C)  Heart Rate:  [67-82] 73  Resp:  [13-17]  14  BP: (117-123)/(76-83) 122/77  Flow (L/min) (Oxygen Therapy):  [3] 3   Body mass index is 19.3 kg/m².    Physical Exam  Physical Exam  Constitutional:       General: He is not in acute distress.  Cardiovascular:      Rate and Rhythm: Normal rate.      Heart sounds: No murmur heard.  Pulmonary:      Effort: Pulmonary effort is normal.      Breath sounds: Normal breath sounds.      Comments: + Chest tube  Abdominal:      General: Bowel sounds are normal.      Palpations: Abdomen is soft.   Neurological:      Mental Status: He is alert.            Diagnostic Data    Results from last 7 days   Lab Units 01/09/25  0001 01/08/25  0035 01/07/25  0138   WBC 10*3/mm3 6.64   < > 7.70   HEMOGLOBIN g/dL 10.8*   < > 12.2*   HEMATOCRIT % 33.2*   < > 35.8*   PLATELETS 10*3/mm3 442   < > 496*   GLUCOSE mg/dL 176*   < > 216*   CREATININE mg/dL 1.08   < > 0.98   BUN mg/dL 17   < > 12   SODIUM mmol/L 134*   < > 136   POTASSIUM mmol/L 4.7   < > 4.2   AST (SGOT) U/L  --   --  17   ALT (SGPT) U/L  --   --  9   ALK PHOS U/L  --   --  84   BILIRUBIN mg/dL  --   --  <0.2   ANION GAP mmol/L 8.8   < > 12.2    < > = values in this interval not displayed.       XR Chest 1 View    Result Date: 1/9/2025  Impression: 1. Small left apical pneumothorax decreased in size measuring 10 mm. 2. Stable left basilar chest tube. 3. Unchanged bibasilar pneumonia. Electronically Signed: Vikas Bertrand MD  1/9/2025 7:07 AM EST  Workstation ID: [id number]    XR Chest 1 View    Result Date: 1/8/2025  Impression: 1. Stable left-sided chest tube with persistent small left apical pneumothorax measuring 15 mm. 2. No change in multifocal airspace disease at the lung bases compatible with multifocal pneumonia. Electronically Signed: Vikas Bertrand MD  1/8/2025 7:11 AM EST  Workstation ID: [id number]       I reviewed the patient's new clinical results.    Assessment/Plan:     Active and Resolved Problems  Active Hospital Problems    Diagnosis  POA    **Multifocal pneumonia  [J18.9]  Yes      Resolved Hospital Problems   No resolved problems to display.       Diagnoses  Focal pneumonia in the setting of human metapneumovirus  Tension pneumothorax status post chest tube  Acute anemia  History of hypertension  History of hyperlipidemia  History of type 2 diabetes  History of chronic pancreatitis  History of seizures  History of chronic back pain  History of GERD      Plan  Focal pneumonia in the setting of human metapneumovirus  Tension pneumothorax status post chest tube  -Patient with chest tube currently on wall suction.  Management per CT surgery.  Pain management.  -Currently on Unasyn and doxycycline for concerns of pneumonia.  Will de-escalate antibiotics.    Acute anemia  -Continue monitor hemoglobin.  Reevaluate sources of bleeding.  Transfuse greater than 7 hemoglobin.    VTE Prophylaxis:  Mechanical VTE prophylaxis orders are present.             Disposition Planning:     Barriers to Discharge:Not medically cleared  Anticipated Date of Discharge: 48 hours  Place of Discharge: Anticipated home with family      Time: +35 minutes     Code Status and Medical Interventions: CPR (Attempt to Resuscitate); Full Support   Ordered at: 01/07/25 0357     Code Status (Patient has no pulse and is not breathing):    CPR (Attempt to Resuscitate)     Medical Interventions (Patient has pulse or is breathing):    Full Support       Signature: Electronically signed by Sean Hernandez MD, 01/09/25, 18:47 EST.  [facility] Hospitalist Team      Electronically signed by Sean Hernandez MD at 01/09/25 6486

## 2025-01-10 NOTE — PROGRESS NOTES
[facility] MEDICINE SERVICE  DAILY PROGRESS NOTE    NAME: Mitchell Jacome  : 1965  MRN: [id number]      LOS: 3 days     PROVIDER OF SERVICE: Sean Hernandez MD    Chief Complaint: Multifocal pneumonia    Subjective:     Interval History:  History taken from: patient    Patient seen evaluated bedside.  Continues to complain of pain at chest tube insertion site.  Chest tube clamped.  Treatment plan communicated with with patient.  All questions answered         Review of Systems:   Review of Systems   Constitutional:  Negative for chills and fever.   Respiratory:  Negative for shortness of breath.    Cardiovascular:  Negative for chest pain.   Gastrointestinal:  Negative for abdominal pain.       Objective:     Vital Signs  Temp:  [98 °F (36.7 °C)-98.4 °F (36.9 °C)] 98.4 °F (36.9 °C)  Heart Rate:  [71-81] 81  Resp:  [12-18] 12  BP: (122-129)/(79-80) 122/79  Flow (L/min) (Oxygen Therapy):  [3] 3   Body mass index is 19.3 kg/m².    Physical Exam  Physical Exam  Constitutional:       General: He is not in acute distress.  Cardiovascular:      Rate and Rhythm: Normal rate.      Heart sounds: No murmur heard.  Pulmonary:      Effort: Pulmonary effort is normal.      Breath sounds: Normal breath sounds.   Abdominal:      General: Bowel sounds are normal.      Palpations: Abdomen is soft.   Neurological:      Mental Status: He is alert.            Diagnostic Data    Results from last 7 days   Lab Units 25  2326 25  0035 25  0138   WBC 10*3/mm3 7.87   < > 7.70   HEMOGLOBIN g/dL 10.6*   < > 12.2*   HEMATOCRIT % 33.5*   < > 35.8*   PLATELETS 10*3/mm3 438   < > 496*   GLUCOSE mg/dL 148*   < > 216*   CREATININE mg/dL 1.04   < > 0.98   BUN mg/dL 16   < > 12   SODIUM mmol/L 135*   < > 136   POTASSIUM mmol/L 4.6   < > 4.2   AST (SGOT) U/L  --   --  17   ALT (SGPT) U/L  --   --  9   ALK PHOS U/L  --   --  84   BILIRUBIN mg/dL  --   --  <0.2   ANION GAP mmol/L 8.7   < > 12.2    < > = values  in this interval not displayed.       XR Chest 1 View    Result Date: 1/10/2025  Impression: 1.No visible pneumothorax. Left pleural catheter remains in place. 2.Stable bibasilar airspace disease, left greater than right. Electronically Signed: Iesha Anastacia  1/10/2025 11:38 AM EST  Workstation ID: [id number]    XR Chest 1 View    Result Date: 1/10/2025  Impression: 1. No pneumothorax visualized on today's exam 2. Stable bibasilar airspace disease Electronically Signed: Sean Kelly  1/10/2025 7:41 AM EST  Workstation ID: [id number]    XR Chest 1 View    Result Date: 1/9/2025  Impression: 1. Small left apical pneumothorax decreased in size measuring 10 mm. 2. Stable left basilar chest tube. 3. Unchanged bibasilar pneumonia. Electronically Signed: Vikas Bertrand MD  1/9/2025 7:07 AM EST  Workstation ID: [id number]       I reviewed the patient's new clinical results.    Assessment/Plan:     Active and Resolved Problems  Active Hospital Problems    Diagnosis  POA    **Multifocal pneumonia [J18.9]  Yes      Resolved Hospital Problems   No resolved problems to display.       Diagnoses  MF pneumonia in the setting of human metapneumovirus  Tension pneumothorax status post chest tube  Acute anemia  History of hypertension  History of hyperlipidemia  History of type 2 diabetes  History of chronic pancreatitis  History of seizures  History of chronic back pain  History of GERD        Plan  MF pneumonia in the setting of human metapneumovirus  Tension pneumothorax status post chest tube  -Patient with chest tube now clamped.  Management per CT surgery.  Pain management.  -Currently on Unasyn and doxycycline for concerns of pneumonia.   De escalation to Ceftriaxone today.     Acute anemia  -Continue monitor hemoglobin.  Reevaluate sources of bleeding.  Transfuse greater than 7 hemoglobin.    VTE Prophylaxis:  Mechanical VTE prophylaxis orders are present.       Everything  Disposition Planning:     Barriers to Discharge:Not medically  cleared  Anticipated Date of Discharge: 24-48 hours  Place of Discharge: Anticipated home with family       Time: +35 minutes     Code Status and Medical Interventions: CPR (Attempt to Resuscitate); Full Support   Ordered at: 01/07/25 0357     Code Status (Patient has no pulse and is not breathing):    CPR (Attempt to Resuscitate)     Medical Interventions (Patient has pulse or is breathing):    Full Support       Signature: Electronically signed by Sean Hernandez MD, 01/10/25, 17:35 EST.  [facility]

## 2025-01-11 ENCOUNTER — APPOINTMENT (OUTPATIENT)
Dept: GENERAL RADIOLOGY | Facility: HOSPITAL | Age: 60
DRG: 193 | End: 2025-01-11
Payer: OTHER GOVERNMENT

## 2025-01-11 ENCOUNTER — READMISSION MANAGEMENT (OUTPATIENT)
Dept: CALL CENTER | Facility: HOSPITAL | Age: 60
End: 2025-01-11
Payer: OTHER GOVERNMENT

## 2025-01-11 VITALS
OXYGEN SATURATION: 97 % | WEIGHT: 150.35 LBS | TEMPERATURE: 98.2 F | BODY MASS INDEX: 19.3 KG/M2 | HEIGHT: 74 IN | DIASTOLIC BLOOD PRESSURE: 78 MMHG | RESPIRATION RATE: 14 BRPM | HEART RATE: 75 BPM | SYSTOLIC BLOOD PRESSURE: 124 MMHG

## 2025-01-11 LAB
ANION GAP SERPL CALCULATED.3IONS-SCNC: 9.7 MMOL/L (ref 5–15)
BASOPHILS # BLD AUTO: 0.05 10*3/MM3 (ref 0–0.2)
BASOPHILS NFR BLD AUTO: 0.8 % (ref 0–1.5)
BUN SERPL-MCNC: 18 MG/DL (ref 6–20)
BUN/CREAT SERPL: 22.8 (ref 7–25)
CALCIUM SPEC-SCNC: 9.6 MG/DL (ref 8.6–10.5)
CHLORIDE SERPL-SCNC: 101 MMOL/L (ref 98–107)
CO2 SERPL-SCNC: 26.3 MMOL/L (ref 22–29)
CREAT SERPL-MCNC: 0.79 MG/DL (ref 0.76–1.27)
DEPRECATED RDW RBC AUTO: 41.9 FL (ref 37–54)
EGFRCR SERPLBLD CKD-EPI 2021: 102.3 ML/MIN/1.73
EOSINOPHIL # BLD AUTO: 0.36 10*3/MM3 (ref 0–0.4)
EOSINOPHIL NFR BLD AUTO: 5.5 % (ref 0.3–6.2)
ERYTHROCYTE [DISTWIDTH] IN BLOOD BY AUTOMATED COUNT: 12.2 % (ref 12.3–15.4)
GLUCOSE BLDC GLUCOMTR-MCNC: 165 MG/DL (ref 70–105)
GLUCOSE BLDC GLUCOMTR-MCNC: 274 MG/DL (ref 70–105)
GLUCOSE SERPL-MCNC: 125 MG/DL (ref 65–99)
HCT VFR BLD AUTO: 34.5 % (ref 37.5–51)
HGB BLD-MCNC: 11.5 G/DL (ref 13–17.7)
IMM GRANULOCYTES # BLD AUTO: 0.03 10*3/MM3 (ref 0–0.05)
IMM GRANULOCYTES NFR BLD AUTO: 0.5 % (ref 0–0.5)
LYMPHOCYTES # BLD AUTO: 2.33 10*3/MM3 (ref 0.7–3.1)
LYMPHOCYTES NFR BLD AUTO: 35.5 % (ref 19.6–45.3)
MCH RBC QN AUTO: 31.1 PG (ref 26.6–33)
MCHC RBC AUTO-ENTMCNC: 33.3 G/DL (ref 31.5–35.7)
MCV RBC AUTO: 93.2 FL (ref 79–97)
MONOCYTES # BLD AUTO: 0.6 10*3/MM3 (ref 0.1–0.9)
MONOCYTES NFR BLD AUTO: 9.1 % (ref 5–12)
NEUTROPHILS NFR BLD AUTO: 3.2 10*3/MM3 (ref 1.7–7)
NEUTROPHILS NFR BLD AUTO: 48.6 % (ref 42.7–76)
NRBC BLD AUTO-RTO: 0 /100 WBC (ref 0–0.2)
PLATELET # BLD AUTO: 463 10*3/MM3 (ref 140–450)
PMV BLD AUTO: 8.7 FL (ref 6–12)
POTASSIUM SERPL-SCNC: 5 MMOL/L (ref 3.5–5.2)
RBC # BLD AUTO: 3.7 10*6/MM3 (ref 4.14–5.8)
SODIUM SERPL-SCNC: 137 MMOL/L (ref 136–145)
WBC NRBC COR # BLD AUTO: 6.57 10*3/MM3 (ref 3.4–10.8)

## 2025-01-11 PROCEDURE — 25010000002 HYDROMORPHONE PER 4 MG: Performed by: SURGERY

## 2025-01-11 PROCEDURE — 97116 GAIT TRAINING THERAPY: CPT

## 2025-01-11 PROCEDURE — 63710000001 INSULIN LISPRO (HUMAN) PER 5 UNITS

## 2025-01-11 PROCEDURE — 97112 NEUROMUSCULAR REEDUCATION: CPT

## 2025-01-11 PROCEDURE — 82948 REAGENT STRIP/BLOOD GLUCOSE: CPT

## 2025-01-11 PROCEDURE — 71045 X-RAY EXAM CHEST 1 VIEW: CPT

## 2025-01-11 PROCEDURE — 85025 COMPLETE CBC W/AUTO DIFF WBC: CPT | Performed by: INTERNAL MEDICINE

## 2025-01-11 PROCEDURE — 99232 SBSQ HOSP IP/OBS MODERATE 35: CPT | Performed by: SURGERY

## 2025-01-11 PROCEDURE — 80048 BASIC METABOLIC PNL TOTAL CA: CPT | Performed by: INTERNAL MEDICINE

## 2025-01-11 PROCEDURE — 25010000002 MORPHINE PER 10 MG: Performed by: INTERNAL MEDICINE

## 2025-01-11 PROCEDURE — 63710000001 INSULIN GLARGINE PER 5 UNITS: Performed by: INTERNAL MEDICINE

## 2025-01-11 RX ORDER — CEFDINIR 300 MG/1
300 CAPSULE ORAL 2 TIMES DAILY
Qty: 14 CAPSULE | Refills: 0 | Status: SHIPPED | OUTPATIENT
Start: 2025-01-11 | End: 2025-01-11

## 2025-01-11 RX ORDER — AMOXICILLIN 250 MG
2 CAPSULE ORAL 2 TIMES DAILY PRN
Qty: 30 TABLET | Refills: 0 | Status: SHIPPED | OUTPATIENT
Start: 2025-01-11

## 2025-01-11 RX ORDER — GABAPENTIN 100 MG/1
200 CAPSULE ORAL 2 TIMES DAILY
Qty: 30 CAPSULE | Refills: 0 | Status: SHIPPED | OUTPATIENT
Start: 2025-01-11 | End: 2025-01-11

## 2025-01-11 RX ORDER — AMOXICILLIN 250 MG
2 CAPSULE ORAL 2 TIMES DAILY PRN
Qty: 30 TABLET | Refills: 0 | Status: SHIPPED | OUTPATIENT
Start: 2025-01-11 | End: 2025-01-11

## 2025-01-11 RX ORDER — GABAPENTIN 100 MG/1
200 CAPSULE ORAL 2 TIMES DAILY
Qty: 30 CAPSULE | Refills: 0 | Status: SHIPPED | OUTPATIENT
Start: 2025-01-11

## 2025-01-11 RX ORDER — HYDROCODONE BITARTRATE AND ACETAMINOPHEN 7.5; 325 MG/1; MG/1
1 TABLET ORAL EVERY 6 HOURS PRN
Qty: 16 TABLET | Refills: 0 | Status: SHIPPED | OUTPATIENT
Start: 2025-01-11 | End: 2025-01-11

## 2025-01-11 RX ORDER — CEFDINIR 300 MG/1
300 CAPSULE ORAL 2 TIMES DAILY
Qty: 14 CAPSULE | Refills: 0 | Status: SHIPPED | OUTPATIENT
Start: 2025-01-11

## 2025-01-11 RX ORDER — HYDROCODONE BITARTRATE AND ACETAMINOPHEN 7.5; 325 MG/1; MG/1
1 TABLET ORAL EVERY 6 HOURS PRN
Qty: 16 TABLET | Refills: 0 | Status: SHIPPED | OUTPATIENT
Start: 2025-01-11

## 2025-01-11 RX ORDER — LIDOCAINE 4 G/G
1 PATCH TOPICAL
Qty: 10 EACH | Refills: 0 | Status: SHIPPED | OUTPATIENT
Start: 2025-01-12 | End: 2025-01-11

## 2025-01-11 RX ORDER — LIDOCAINE 4 G/G
1 PATCH TOPICAL
Qty: 10 EACH | Refills: 0 | Status: SHIPPED | OUTPATIENT
Start: 2025-01-12

## 2025-01-11 RX ADMIN — PANCRELIPASE 12000 UNITS OF LIPASE: 60000; 12000; 38000 CAPSULE, DELAYED RELEASE PELLETS ORAL at 08:11

## 2025-01-11 RX ADMIN — INSULIN GLARGINE-YFGN 25 UNITS: 100 INJECTION, SOLUTION SUBCUTANEOUS at 08:10

## 2025-01-11 RX ADMIN — OXYCODONE 10 MG: 5 TABLET ORAL at 03:24

## 2025-01-11 RX ADMIN — ACETAMINOPHEN 1000 MG: 500 TABLET, FILM COATED ORAL at 08:10

## 2025-01-11 RX ADMIN — OXYCODONE 10 MG: 5 TABLET ORAL at 11:57

## 2025-01-11 RX ADMIN — MORPHINE SULFATE 2 MG: 2 INJECTION, SOLUTION INTRAMUSCULAR; INTRAVENOUS at 05:13

## 2025-01-11 RX ADMIN — LIDOCAINE 1 PATCH: 4 PATCH TOPICAL at 08:10

## 2025-01-11 RX ADMIN — HYDROMORPHONE HYDROCHLORIDE 0.5 MG: 1 INJECTION, SOLUTION INTRAMUSCULAR; INTRAVENOUS; SUBCUTANEOUS at 10:13

## 2025-01-11 RX ADMIN — ATENOLOL 25 MG: 50 TABLET ORAL at 08:10

## 2025-01-11 RX ADMIN — PANCRELIPASE 12000 UNITS OF LIPASE: 60000; 12000; 38000 CAPSULE, DELAYED RELEASE PELLETS ORAL at 11:57

## 2025-01-11 RX ADMIN — PANTOPRAZOLE SODIUM 40 MG: 40 TABLET, DELAYED RELEASE ORAL at 05:13

## 2025-01-11 RX ADMIN — INSULIN LISPRO 4 UNITS: 100 INJECTION, SOLUTION INTRAVENOUS; SUBCUTANEOUS at 11:56

## 2025-01-11 RX ADMIN — GABAPENTIN 200 MG: 100 CAPSULE ORAL at 08:11

## 2025-01-11 RX ADMIN — DULOXETINE 30 MG: 30 CAPSULE, DELAYED RELEASE ORAL at 08:11

## 2025-01-11 RX ADMIN — LEVETIRACETAM 500 MG: 500 TABLET, FILM COATED ORAL at 08:10

## 2025-01-11 NOTE — DISCHARGE SUMMARY
[facility] Medicine Services  Discharge Summary    Date of Service: 2025  Patient Name: Mitchell Jacome  : 1965  MRN: [id number]    Date of Admission: 2025  Discharge Diagnosis: Multifocal pneumonia  Date of Discharge: 2025  Primary Care Physician: Krystyna Hardwick DO      Presenting Problem:   Pneumothorax on left [J93.9]  Hemoptysis [R04.2]  Multifocal pneumonia [J18.9]    Active and Resolved Hospital Problems:  Active Hospital Problems    Diagnosis POA    **Multifocal pneumonia [J18.9] Yes      Resolved Hospital Problems   No resolved problems to display.       MF pneumonia in the setting of human metapneumovirus  Tension pneumothorax status post chest tube  -Patient with chest tube now clamped.  Management per CT surgery.  Pain management.  -Currently on Unasyn and doxycycline for concerns of pneumonia.   De escalation to Ceftriaxone today.     Acute anemia  -Continue monitor hemoglobin.  Reevaluate sources of bleeding.  Transfuse greater than 7 hemoglobin.       Hospital Course     History of Present Illness: Mitchell Jacome is a 59 y.o. male with a CMH of HTN, HLD, type 2 diabetes, history of chronic pancreatitis, seizures, chronic back pain, GERD who presented to [facility] on 2025 with shortness of breath x approximately 6 months ago. However he states, over the last 5 days, shortness of breath has been significantly worsened.  Patient grew concerned when he started coughing up blood when shortness of breath became worse.  Currently endorses chest wall pain, palpitations, fever, chills.  He denies tobacco use.  Denies history of lung disease.  Denies history of pneumothorax.  Denies recent sick contact exposure.     On ED evaluation, patient was noted to be tachycardic and hypoxic, requiring 2L NC.  CMP was pertinent for glucose of 16.  CBC was otherwise unremarkable, no leukocytosis WBC 7.7.  Lactic acid 1.1.  PCT 0.08.  D-dimer was normal  "at 0.4.  Respiratory panel was positive for metapneumovirus.  CTA of chest revealed large left-sided tension pneumothorax, multifocal bilateral pneumonia but no evidence of pulmonary embolism, aortic dissection.  Chest tube was placed in ED.  Chest x-ray status post thoracostomy revealed near resolution of pneumothorax with tiny left apical pneumothorax.\"    Hospital Course:  1/10-Patient seen evaluated bedside.  Continues to complain of pain at chest tube insertion site.  Chest tube clamped.  Treatment plan communicated with with patient.  All questions answered    1/11 seen in bed no acute distress, vital signs stable, discussed with RN, chest tube removed.  Surgery cleared for discharge will discharge patient home.  Condition at discharge stable    DISCHARGE Follow Up Recommendations for labs and diagnostics: follow with pcp in one week        Day of Discharge     Vital Signs:  Temp:  [97.7 °F (36.5 °C)-98.2 °F (36.8 °C)] 98.2 °F (36.8 °C)  Heart Rate:  [75-90] 75  Resp:  [12-15] 14  BP: (124-151)/(78-88) 124/78      Physical Exam      General: He is not in acute distress.  Cardiovascular:      Rate and Rhythm: Normal rate.      Heart sounds: No murmur heard.  Pulmonary:      Effort: Pulmonary effort is normal.      Breath sounds: Normal breath sounds.   Abdominal:      General: Bowel sounds are normal.      Palpations: Abdomen is soft.   Neurological:      Mental Status: He is alert.          Pertinent  and/or Most Recent Results     LAB RESULTS:      Lab 01/11/25  0326 01/09/25  2326 01/09/25  0001 01/08/25  0035 01/07/25  0143 01/07/25  0138   WBC 6.57 7.87 6.64 9.79  --  7.70   HEMOGLOBIN 11.5* 10.6* 10.8* 11.3*  --  12.2*   HEMATOCRIT 34.5* 33.5* 33.2* 34.2*  --  35.8*   PLATELETS 463* 438 442 503*  --  496*   NEUTROS ABS 3.20 4.69 3.83 5.89  --  4.62   IMMATURE GRANS (ABS) 0.03 0.03 0.02 0.04  --  0.04   LYMPHS ABS 2.33 2.14 1.76 2.60  --  2.00   MONOS ABS 0.60 0.56 0.58 0.77  --  0.58   EOS ABS 0.36 0.40 " 0.39 0.41*  --  0.35   MCV 93.2 94.6 94.6 93.4  --  91.8   PROCALCITONIN  --   --   --   --   --  0.08   LACTATE  --   --   --   --  1.1  --    D DIMER QUANT  --   --   --   --   --  0.46         Lab 01/11/25  0326 01/09/25  2326 01/09/25  0001 01/08/25  0559 01/08/25  0035 01/07/25 0138   SODIUM 137 135* 134*  --  134* 136   POTASSIUM 5.0 4.6 4.7  --  4.5 4.2   CHLORIDE 101 99 98  --  97* 99   CO2 26.3 27.3 27.2  --  27.5 24.8   ANION GAP 9.7 8.7 8.8  --  9.5 12.2   BUN 18 16 17  --  17 12   CREATININE 0.79 1.04 1.08  --  1.15 0.98   EGFR 102.3 82.7 79.1  --  73.3 88.8   GLUCOSE 125* 148* 176*  --  204* 216*   CALCIUM 9.6 8.7 8.9  --  9.3 9.6   MAGNESIUM  --  1.6 1.5* 2.2 1.2*  --          Lab 01/07/25 0138   TOTAL PROTEIN 7.1   ALBUMIN 3.5   GLOBULIN 3.6   ALT (SGPT) 9   AST (SGOT) 17   BILIRUBIN <0.2   ALK PHOS 84         Lab 01/07/25  0352 01/07/25 0138   HSTROP T 18 23*                 Brief Urine Lab Results       None          Microbiology Results (last 10 days)       Procedure Component Value - Date/Time    Blood Culture - Blood, Arm, Left [[id number]]  (Normal) Collected: 01/07/25 0352    Lab Status: Preliminary result Specimen: Blood from Arm, Left Updated: 01/11/25 0401     Blood Culture No growth at 4 days    Respiratory Panel PCR w/COVID-19(SARS-CoV-2) [facility] In-House, NP Swab in UTM/VTM, 2 HR TAT - Swab, Nasopharynx [[id number]]  (Abnormal) Collected: 01/07/25 0139    Lab Status: Final result Specimen: Swab from Nasopharynx Updated: 01/07/25 0234     ADENOVIRUS, PCR Not Detected     Coronavirus 229E Not Detected     Coronavirus HKU1 Not Detected     Coronavirus NL63 Not Detected     Coronavirus OC43 Not Detected     COVID19 Not Detected     Human Metapneumovirus Detected     Human Rhinovirus/Enterovirus Not Detected     Influenza A PCR Not Detected     Influenza B PCR Not Detected     Parainfluenza Virus 1 Not Detected     Parainfluenza Virus 2 Not Detected     Parainfluenza Virus 3  Not Detected     Parainfluenza Virus 4 Not Detected     RSV, PCR Not Detected     Bordetella pertussis pcr Not Detected     Bordetella parapertussis PCR Not Detected     Chlamydophila pneumoniae PCR Not Detected     Mycoplasma pneumo by PCR Not Detected    Narrative:      In the setting of a positive respiratory panel with a viral infection PLUS a negative procalcitonin without other underlying concern for bacterial infection, consider observing off antibiotics or discontinuation of antibiotics and continue supportive care. If the respiratory panel is positive for atypical bacterial infection (Bordetella pertussis, Chlamydophila pneumoniae, or Mycoplasma pneumoniae), consider antibiotic de-escalation to target atypical bacterial infection.    Blood Culture - Blood, Arm, Left [[id number]]  (Normal) Collected: 01/07/25 0138    Lab Status: Preliminary result Specimen: Blood from Arm, Left Updated: 01/11/25 0147     Blood Culture No growth at 4 days            XR Chest 1 View    Result Date: 1/11/2025  Impression: Impression: 1.The left chest tube has been removed. There may be a tiny residual component of pneumothorax at the apex measuring 1 to 2 mm. 2.Residual infiltrate/atelectasis is seen in the left lower lobe with small residual left pleural effusion. 3.Atelectasis versus infiltrates are also seen in the right lower lobe. Electronically Signed: Geovanny Phelan MD  1/11/2025 11:38 AM EST  Workstation ID: [id number]    XR Chest 1 View    Result Date: 1/11/2025  Impression: Impression: No significant interval change. Electronically Signed: Tomás Freire MD  1/11/2025 9:19 AM EST  Workstation ID: [id number]    XR Chest 1 View    Result Date: 1/10/2025  Impression: Impression: 1.No visible pneumothorax. Left pleural catheter remains in place. 2.Stable bibasilar airspace disease, left greater than right. Electronically Signed: Iesha Galaviz  1/10/2025 11:38 AM EST  Workstation ID: [id number]    XR Chest 1 View    Result Date:  1/10/2025  Impression: Impression: 1. No pneumothorax visualized on today's exam 2. Stable bibasilar airspace disease Electronically Signed: Sean Kelly  1/10/2025 7:41 AM EST  Workstation ID: [id number]    XR Chest 1 View    Result Date: 1/9/2025  Impression: Impression: 1. Small left apical pneumothorax decreased in size measuring 10 mm. 2. Stable left basilar chest tube. 3. Unchanged bibasilar pneumonia. Electronically Signed: Vikas Bertrand MD  1/9/2025 7:07 AM EST  Workstation ID: [id number]    XR Chest 1 View    Result Date: 1/8/2025  Impression: Impression: 1. Stable left-sided chest tube with persistent small left apical pneumothorax measuring 15 mm. 2. No change in multifocal airspace disease at the lung bases compatible with multifocal pneumonia. Electronically Signed: Vikas Bertrand MD  1/8/2025 7:11 AM EST  Workstation ID: [id number]    XR Chest 1 View    Result Date: 1/7/2025  Impression: Impression: Left-sided thoracostomy tube in place with a tiny left apical pneumothorax. Bilateral lower lobe infiltrates consistent with pneumonia. Electronically Signed: Sascha Solorzano MD  1/7/2025 3:54 AM EST  Workstation ID: [id number]    CT Angiogram Chest Pulmonary Embolism    Result Date: 1/7/2025  Impression: 1. Large left-sided tension pneumothorax. Chest tube placement recommended. 2. Multifocal bilateral pneumonia with a lower lobe predominance. Continued imaging follow-up recommended. 3. No pulmonary embolism or aortic dissection. NOTIFICATION: Critical Value/emergent results were called by telephone at the time of interpretation on 1/7/2025 3:02 AM EST to JOSELO COLE MD who verbally acknowledged these results. Electronically Signed: Iker Stokes MD  1/7/2025 3:07 AM EST  Workstation ID: [id number]                 Labs Pending at Discharge:  Pending Results       Procedure [Order ID] Specimen - Date/Time    XR Chest 1 View [[id number]]     XR Chest 1 View [[id number]]             Procedures Performed            Consults:   Consults       Date and Time Order Name Status Description    1/7/2025  4:21 AM Inpatient Thoracic Surgery Consult Completed     1/7/2025  3:43 AM Inpatient Hospitalist Consult                Discharge Details        Discharge Medications        New Medications        Instructions Start Date   cefdinir 300 MG capsule  Commonly known as: OMNICEF   300 mg, Oral, 2 Times Daily      gabapentin 100 MG capsule  Commonly known as: NEURONTIN   200 mg, Oral, 2 Times Daily      Lidocaine 4 %   1 patch, Transdermal, Every 24 Hours Scheduled, Remove & Discard patch within 12 hours or as directed by MD   Start Date: January 12, 2025     sennosides-docusate 8.6-50 MG per tablet  Commonly known as: PERICOLACE   2 tablets, Oral, 2 Times Daily PRN             Continue These Medications        Instructions Start Date   atenolol 25 MG tablet  Commonly known as: TENORMIN   25 mg, Oral, Daily      atorvastatin 20 MG tablet  Commonly known as: LIPITOR   20 mg, Oral, Daily      BD Pen Needle Fernanda 2nd Gen 32G X 4 MM misc  Generic drug: Insulin Pen Needle   1 package, Subcutaneous, 4 Times Daily Before Meals & Nightly      DULoxetine 30 MG capsule  Commonly known as: CYMBALTA   30 mg, Oral, Daily      fenofibrate 145 MG tablet  Commonly known as: TRICOR   145 mg, Oral, Daily      glucagon 1 MG injection  Commonly known as: GLUCAGEN   1 kit, Injection, As Needed      HYDROcodone-acetaminophen 7.5-325 MG per tablet  Commonly known as: NORCO   1 tablet, Oral, Every 6 Hours PRN      Insulin Aspart 100 UNIT/ML injection  Commonly known as: novoLOG   Subcutaneous, 3 Times Daily Before Meals, Sliding scale      insulin glargine 100 UNIT/ML injection  Commonly known as: LANTUS, SEMGLEE   28-32 Units, Subcutaneous, Daily      levETIRAcetam 500 MG tablet  Commonly known as: KEPPRA   500 mg, Oral, 2 Times Daily      multivitamin with minerals tablet tablet   1 tablet, Oral, Daily      omeprazole 40 MG capsule  Commonly known as:  priLOSEC   40 mg, Oral, Every 24 Hours      pancrelipase (Lip-Prot-Amyl) 63211-71136 units capsule delayed-release particles capsule  Commonly known as: CREON   36,000 units of lipase, Oral, 3 Times Daily With Meals               Allergies   Allergen Reactions    Basaglar Kwikpen [Insulin Glargine] Other (See Comments)     Injection bruising    Gemfibrozil Rash         Discharge Disposition:   Home or Self Care    Diet:  Hospital:  Diet Order   Procedures    Diet: Diabetic; Consistent Carbohydrate; Fluid Consistency: Thin (IDDSI 0)         Discharge Activity:   Activity Instructions       Gradually Increase Activity Until at Pre-Hospitalization Level                CODE STATUS:  Code Status and Medical Interventions: CPR (Attempt to Resuscitate); Full Support   Ordered at: 01/07/25 0357     Code Status (Patient has no pulse and is not breathing):    CPR (Attempt to Resuscitate)     Medical Interventions (Patient has pulse or is breathing):    Full Support         Future Appointments   Date Time Provider Department Center   3/3/2025  9:30 AM Rita Alvarado PA-C [facility]       Additional Instructions for the Follow-ups that You Need to Schedule       Discharge Follow-up with PCP   As directed       Currently Documented PCP:    Krystyna Hardwick DO    PCP Phone Number:    [phone]     Follow Up Details: 1 week                Time spent on Discharge including face to face service:  35 minutes    Signature: Electronically signed by Omari Campos MD, 01/11/25, 13:33 EST.  [facility] Hospitalist Team

## 2025-01-11 NOTE — PLAN OF CARE
Assessment: Mitchell Jacome presents with functional mobility impairments which indicate the need for skilled intervention. Tolerating session today without incident. Patient amb at slowed [place], lat sway and unable to take on any challenges. Presents with decrease R df. Plans on home with assist.Will continue to follow and progress as tolerated.

## 2025-01-11 NOTE — OUTREACH NOTE
Prep Survey      Flowsheet Row Responses   [facility]thodoxy [facility] patient discharged from? [facility]   Is LACE score < 7 ? No   Eligibility [facility]   Date of Admission 01/07/25   Date of Discharge 01/11/25   Discharge Disposition Home or Self Care   Discharge diagnosis Multifocal pneumonia   Does the patient have one of the following disease processes/diagnoses(primary or secondary)? Pneumonia   Does the patient have Home health ordered? No   Is there a DME ordered? No   Prep survey completed? Yes            Mana GRANT - Registered Nurse

## 2025-01-11 NOTE — PLAN OF CARE
Goal Outcome Evaluation: Patient resting between care with complaints of pain, see MAR for interventions. Chest tube remains clamped this shift. Lung sounds diminished bilaterally to the lower lobes, IS at bedside. Education was given. Able to make needs known and care on going.

## 2025-01-11 NOTE — PLAN OF CARE
Goal Outcome Evaluation:      Pt is able to make needs known. Pain is managed with medication regimen. Pt is able to ambulate independently. Education is complete, call light is in reach, and no other needs at this time. Continue to monitor.

## 2025-01-11 NOTE — THERAPY TREATMENT NOTE
Subjective: Pt agreeable to therapeutic plan of care.Patient stated he's been walking some without rwx and wanted to do it today. Stated supposed to have back sx in 2 weeks    Objective:     Precautions - falls, contact and droplet isol    Bed mobility - N/A or Not attempted.  Transfers - Modified-Independent  Ambulation - 70 feet CGA    Vitals: WNL    Pain: 7 VAS   Location: where CT was  Intervention for pain: Repositioned, RN notified, and Therapeutic Presence    Education: Provided education on the importance of mobility in the acute care setting, Verbal/Tactile Cues, Transfer Training, and Gait Training    Assessment: Mitchell Jacome presents with functional mobility impairments which indicate the need for skilled intervention. Tolerating session today without incident. Patient amb at slowed [place], lat sway and unable to take on any challenges. Presents with decrease R df. Plans on home with assist.Will continue to follow and progress as tolerated.     Plan/Recommendations:   If medically appropriate, No ongoing therapy recommended post-acute care. No therapy needs. Pt requires no DME at discharge.     Pt desires Home with family assist at discharge. Pt cooperative; agreeable to therapeutic recommendations and plan of care.         Basic Mobility 6-click:  Rollin = Total, A lot = 2, A little = 3; 4 = None  Supine>Sit:   1 = Total, A lot = 2, A little = 3; 4 = None   Sit>Stand with arms:  1 = Total, A lot = 2, A little = 3; 4 = None  Bed>Chair:   1 = Total, A lot = 2, A little = 3; 4 = None  Ambulate in room:  1 = Total, A lot = 2, A little = 3; 4 = None  3-5 Steps with railin = Total, A lot = 2, A little = 3; 4 = None  Score: 22    Post-Tx Position: Up in Chair and Call light and personal items within reach  PPE: gloves, surgical mask, and gown    Therapy Charges for Today       Code Description Service Date Service Provider Modifiers Qty    41789713204 HC GAIT TRAINING EA 15 MIN  1/11/2025 Paz Miller PTA GP 1    88926158260 HC PT NEUROMUSC RE EDUCATION EA 15 MIN 1/11/2025 Paz Miller PTA GP 1           PT Charges       Row Name 01/11/25 1731             Time Calculation    Start Time 1126  -MC      Stop Time 1144  -MC      Time Calculation (min) 18 min  -MC      PT Received On 01/11/25  -         Time Calculation- PT    Total Timed Code Minutes- PT 18 minute(s)  -                User Key  (r) = Recorded By, (t) = Taken By, (c) = Cosigned By      Initials Name Provider Type     Paz Miller PTA Physical Therapist Assistant

## 2025-01-12 LAB
BACTERIA SPEC AEROBE CULT: NORMAL
BACTERIA SPEC AEROBE CULT: NORMAL

## 2025-01-13 ENCOUNTER — TRANSITIONAL CARE MANAGEMENT TELEPHONE ENCOUNTER (OUTPATIENT)
Dept: CALL CENTER | Facility: HOSPITAL | Age: 60
End: 2025-01-13
Payer: OTHER GOVERNMENT

## 2025-01-13 ENCOUNTER — TELEPHONE (OUTPATIENT)
Dept: NEUROSURGERY | Facility: CLINIC | Age: 60
End: 2025-01-13
Payer: OTHER GOVERNMENT

## 2025-01-13 NOTE — TELEPHONE ENCOUNTER
Caller: [facility]     Relationship: @ [facility]     Best call back number: [phone]    Who are you requesting to speak with (clinical staff, provider,  specific staff member): YUMIKO    What was the call regarding: [facility] @ [facility] CALLED NEEDING CLARIFCATION ON HOW THE SX IS GOING TO BE PREFORMED - STATES THE PATIENTS INS HAS DENIED HIS SX AGAIN    PATIENT IS SCHEDULED 02.17.25 W DR PATEL     PLEASE ADVISE  THANK YOU

## 2025-01-13 NOTE — PROGRESS NOTES
"    Chief Complaint: Pneumothorax  S/P: Chest tube placement  POD # 4    Subjective:  Symptoms:  Improved.    Activity level: Returning to normal.    Pain:  He complains of pain that is moderate.  He reports pain is unchanged.  Pain is partially controlled.        Vital Signs:       Intake & Output (last day)         01/12 0701  01/13 0700 01/13 0701  01/14 0700    P.O.      Total Intake(mL/kg)      Urine (mL/kg/hr)      Chest Tube      Total Output      Net                    Objective:  General Appearance:  Comfortable and in no acute distress.    Vital signs: (most recent): Blood pressure 124/78, pulse 75, temperature 98.2 °F (36.8 °C), temperature source Oral, resp. rate 14, height 188 cm (74\"), weight 68.2 kg (150 lb 5.7 oz), SpO2 97%.    HEENT: Normal HEENT exam.    Lungs:  Normal effort.    Heart: Normal rate.    Chest: Chest wall tenderness present.    Neurological: Patient is alert.                Chest tube:   Site: Left, Clean, Dry, and Intact  Suction: -20 cm  Air Leak: Negative    Results Review:     I reviewed the patient's new clinical results.  I reviewed the patient's new imaging results and agree with the interpretation.  Discussed with patient, surgeon    Imaging Results (Last 24 Hours)       Procedure Component Value Units Date/Time    XR Chest 1 View [[id number]] Collected: 01/11/25 1132     Updated: 01/11/25 1140    Narrative:      XR CHEST 1 VW    Date of Exam: 1/11/2025 11:15 AM EST    Indication: assess for pneumothorax    Comparison: 1/11/2025 at 65 hours    Findings:  The left chest tube has been removed. There may be a tiny residual component of pneumothorax at the apex measuring 1 to 2 mm. Residual infiltrate/atelectasis is noted in the left lower lobe. There is evidence for small residual left pleural effusion.   Atelectasis versus infiltrates are also seen in the right lower lobe. The cardiac silhouette and mediastinum are stable.      Impression:      Impression:  1.The left chest tube " has been removed. There may be a tiny residual component of pneumothorax at the apex measuring 1 to 2 mm.  2.Residual infiltrate/atelectasis is seen in the left lower lobe with small residual left pleural effusion.  3.Atelectasis versus infiltrates are also seen in the right lower lobe.        Electronically Signed: Geovanny Phelan MD    1/11/2025 11:38 AM EST    Workstation ID: [id number]    XR Chest 1 View [[id number]] Collected: 01/11/25 0917     Updated: 01/11/25 0921    Narrative:      XR CHEST 1 VW    Date of Exam: 1/11/2025 6:19 AM EST    Indication: Chest tube management    Comparison: 1/10/2025 radiographs    Findings:  Unchanged cardiomediastinal silhouette. Emphysema. Left approach central venous catheter in unchanged position. No visible pneumothorax. Unchanged bibasilar airspace opacities. No visible pneumothorax. No new pleural effusion. Unchanged appearance of the   left chest wall subcutaneous emphysema.      Impression:      Impression:  No significant interval change.      Electronically Signed: Tomás Freire MD    1/11/2025 9:19 AM EST    Workstation ID: [id number]            Lab Results:     Lab Results (last 24 hours)       Procedure Component Value Units Date/Time    POC Glucose 4x Daily Before Meals & at Bedtime [[id number]]  (Abnormal) Collected: 01/11/25 1143    Specimen: Blood Updated: 01/11/25 1145     Glucose 274 mg/dL      Comment: Serial Number: [id number]:  [id number]       POC Glucose Once [[id number]]  (Abnormal) Collected: 01/11/25 0728    Specimen: Blood Updated: 01/11/25 0730     Glucose 165 mg/dL      Comment: Serial Number: [id number]:  [id number]       Basic Metabolic Panel [[id number]]  (Abnormal) Collected: 01/11/25 0326    Specimen: Blood from Hand, Left Updated: 01/11/25 0402     Glucose 125 mg/dL      BUN 18 mg/dL      Creatinine 0.79 mg/dL      Sodium 137 mmol/L      Potassium 5.0 mmol/L      Comment: Specimen hemolyzed.  Result may be falsely elevated.        Chloride  101 mmol/L      CO2 26.3 mmol/L      Calcium 9.6 mg/dL      BUN/Creatinine Ratio 22.8     Anion Gap 9.7 mmol/L      eGFR 102.3 mL/min/1.73     Narrative:      GFR Categories in Chronic Kidney Disease (CKD)      GFR Category          GFR (mL/min/1.73)    Interpretation  G1                     90 or greater         Normal or high (1)  G2                      60-89                Mild decrease (1)  G3a                   45-59                Mild to moderate decrease  G3b                   30-44                Moderate to severe decrease  G4                    15-29                Severe decrease  G5                    14 or less           Kidney failure          (1)In the absence of evidence of kidney disease, neither GFR category G1 or G2 fulfill the criteria for CKD.    eGFR calculation 2021 CKD-EPI creatinine equation, which does not include race as a factor    CBC & Differential [[id number]]  (Abnormal) Collected: 01/11/25 0326    Specimen: Blood from Hand, Left Updated: 01/11/25 0333    Narrative:      The following orders were created for panel order CBC & Differential.  Procedure                               Abnormality         Status                     ---------                               -----------         ------                     CBC Auto Differential[[id number]]        Abnormal            Final result                 Please view results for these tests on the individual orders.    CBC Auto Differential [[id number]]  (Abnormal) Collected: 01/11/25 0326    Specimen: Blood from Hand, Left Updated: 01/11/25 0333     WBC 6.57 10*3/mm3      RBC 3.70 10*6/mm3      Hemoglobin 11.5 g/dL      Hematocrit 34.5 %      MCV 93.2 fL      MCH 31.1 pg      MCHC 33.3 g/dL      RDW 12.2 %      RDW-SD 41.9 fl      MPV 8.7 fL      Platelets 463 10*3/mm3      Neutrophil % 48.6 %      Lymphocyte % 35.5 %      Monocyte % 9.1 %      Eosinophil % 5.5 %      Basophil % 0.8 %      Immature Grans % 0.5 %      Neutrophils, Absolute  3.20 10*3/mm3      Lymphocytes, Absolute 2.33 10*3/mm3      Monocytes, Absolute 0.60 10*3/mm3      Eosinophils, Absolute 0.36 10*3/mm3      Basophils, Absolute 0.05 10*3/mm3      Immature Grans, Absolute 0.03 10*3/mm3      nRBC 0.0 /100 WBC     POC Glucose Once [[id number]]  (Abnormal) Collected: 01/10/25 1953    Specimen: Blood Updated: 01/10/25 1955     Glucose 236 mg/dL      Comment: Serial Number: [id number]:  [id number]       POC Glucose Once [[id number]]  (Abnormal) Collected: 01/10/25 1650    Specimen: Blood Updated: 01/10/25 1654     Glucose 159 mg/dL      Comment: Serial Number: [id number]:  [id number]                Assessment & Plan       Multifocal pneumonia       Assessment & Plan    S/P left chest tube for spontaneous pneumothorax. Pneumothorax stable.  Chest tube was clamped and repeat x-ray showed no significant pneumothorax.  Chest tube was removed.    Repeat chest x-ray shows stable findings.  He was cleared for discharge from thoracic surgery standpoint.  He was informed that he may require surgery if he has recurrent spontaneous pneumothorax on the same side.      Merlin Richter MD  [facility]  01/13/25  07:41 EST    Greater than 20 minutes was spent reviewing the patient's chart, radiographic imaging, labs, provider notes, assessing the patient and developing a plan of care.  This was discussed with the patient and RN.

## 2025-01-13 NOTE — OUTREACH NOTE
Call Center TCM Note      Flowsheet Row Responses   [facility] patient discharged from? [facility]   Does the patient have one of the following disease processes/diagnoses(primary or secondary)? Pneumonia   TCM attempt successful? Yes   Call start time 1104   Call end time 1104   Discharge diagnosis Multifocal pneumonia   Person spoke with today (if not patient) and relationship Corina   Meds reviewed with patient/caregiver? Yes   Does the patient have all medications ordered at discharge? Yes   Prescription comments No concerns or questions noted.   Is the patient taking all medications as directed (includes completed medication regime)? Yes   Comments At this time patient [facility]- since [facility]   Does the patient have an appointment with their PCP within 7-14 days of discharge? Other   Nursing Interventions Routed TCM call to PCP office   Has home health visited the patient within 72 hours of discharge? N/A   Psychosocial issues? No   Did the patient receive a copy of their discharge instructions? Yes   Nursing interventions Reviewed instructions with patient   What is the patient's perception of their health status since discharge? Improving   Nursing Interventions Nurse provided patient education   Is the patient/caregiver able to teach back the hierarchy of who to call/visit for symptoms/problems? PCP, Specialist, Home health nurse, Urgent Care, ED, 911 Yes   Is the patient/caregiver able to teach back signs and symptoms of worsening condition: Fever/chills, Shortness of breath, Chest pain   Is the patient/caregiver able to teach back importance of completing antibiotic course of treatment? Yes   TCM call completed? Yes   Wrap up additional comments Spouse states patient is doing well. No concerns or questions noted.   Call end time 1104   Would this patient benefit from a Referral to Amb Social Work? No   Is the patient interested in additional calls from an ambulatory ? No             Claudia Suh RN    1/13/2025, 11:06 EST

## 2025-01-15 NOTE — PROGRESS NOTES
"Enter Query Response Below      Query Response: Bacterial pneumonia superimposed on viral pneumonia Electronically signed by Omari Campos MD, 25, 10:50 PM EST.              If applicable, please update the problem list.         Patient: Mitchell Jacome        : 1965  Account: [id number]           Admit Date: 2025        How to Respond to this query:       a. Click New Note     b. Answer query within the yellow box.                c. Update the Problem List, if applicable.      If you have any questions about this query contact me at: [email or web address]     :     59 y.o. male with history of type 2 diabetes admitted () with \"Multifocal pneumonia\" per the H&P through the hospitalist's progress notes (- 1/10) and the discharge summary.   () Respiratory panel positive for Human Metapneumovirus.  Treatment:  IV Unasyn (-1/10)  PO Doxycycline (-1/10)  IV Doxycycline ()  IV Rocephin (1/10)  Discharged on PO Omnicef per the AVS.    Can the pneumonia be further specified as follows:    -Bacterial pneumonia superimposed on viral pneumonia  -Other- specify_____      By submitting this query, we are merely seeking further clarification of documentation to accurately reflect all conditions that you are monitoring, evaluating, treating or that extend the hospitalization or utilize additional resources of care. Please utilize your independent clinical judgment when addressing the question(s) above.     This query and your response, once completed, will be entered into the legal medical record.    Sincerely,  Natalee Montgomery MSN RN   Clinical Documentation Integrity Program     "

## 2025-01-22 ENCOUNTER — TELEPHONE (OUTPATIENT)
Dept: SURGERY | Facility: CLINIC | Age: 60
End: 2025-01-22

## 2025-01-22 ENCOUNTER — TELEPHONE (OUTPATIENT)
Dept: SURGERY | Facility: CLINIC | Age: 60
End: 2025-01-22
Payer: OTHER GOVERNMENT

## 2025-01-22 NOTE — TELEPHONE ENCOUNTER
Caller: Mitchell Jacome    Relationship: Self    Best call back number: [phone]*    What is the best time to reach you: ANYTIME    Who are you requesting to speak with (clinical staff, provider,  specific staff member): DAVID    Do you know the name of the person who called: DAVID    What was the call regarding: PT RECEIVED A VM FROM DAVID. TE SAID THAT HE HAD A FORM HE NEEDED TO SIGN VIA EMAIL AND A 25 DOLLAR PAYMENT TO PROCESS PAPERWORK. HE WAS WANTING TO MAKE PAYMENT AND GIVE THE EMAIL FOR THE FORM. HIS EMAIL IS [email or web address] TRIED TO CONTACT OFFICE AND WAS TRANSFERRED TO [facility] . PLEASE CALL PT BACK ONCE EMAIL IS SENT TO INFORM HIM AND TO TAKE PAYMENT    Is it okay if the provider responds through DFMSimhart: NO

## 2025-01-22 NOTE — TELEPHONE ENCOUNTER
LM ON PTS VM (NAME LISTED) LETTING PATIENT KNOW THAT WE HAVE [facility] PAPERWORK FROM [place] TO COMPLETE.  OUR OFFICE WILL NEED THE PATIENT TO SIGN THE AUTH TO RELEASE FORM, AND COLLECT 25.00 FORM FEE FOR COMPLETION.     RELEASE CAN BE EMAILED TO PATIENT TO COMPLETE AND SIGN (ORANGE FORM)  AND FORM FEE CAN BE TAKEN OVER THE PHONE.     HUB OK TO READ.

## 2025-01-23 ENCOUNTER — READMISSION MANAGEMENT (OUTPATIENT)
Dept: CALL CENTER | Facility: HOSPITAL | Age: 60
End: 2025-01-23
Payer: OTHER GOVERNMENT

## 2025-01-23 NOTE — OUTREACH NOTE
COPD/PN Week 2 Survey      Flowsheet Row Responses   [facility] patient discharged from? [facility]   Does the patient have one of the following disease processes/diagnoses(primary or secondary)? Pneumonia   Week 2 attempt successful? Yes   Call start time 1055   Call end time 1057   Person spoke with today (if not patient) and relationship Spike Feliciano reviewed with patient/caregiver? Yes   Is the patient having any side effects they believe may be caused by any medication additions or changes? No   Does the patient have all medications ordered at discharge? Yes   Is the patient taking all medications as directed (includes completed medication regime)? Yes   Does the patient have a primary care provider?  Yes   Does the patient have an appointment with their PCP or specialist within 7 days of discharge? Greater than 7 days   What is preventing the patient from scheduling follow up appointments within 7 days of discharge? Earlier appointment not available   Has the patient kept scheduled appointments due by today? N/A   Psychosocial issues? No   Did the patient receive a copy of their discharge instructions? Yes   Nursing interventions Reviewed instructions with patient   What is the patient's perception of their health status since discharge? Improving   Nursing Interventions Nurse provided patient education   If the patient is a current smoker, are they able to teach back resources for cessation? Not a smoker   Is the patient/caregiver able to teach back the hierarchy of who to call/visit for symptoms/problems? PCP, Specialist, Home health nurse, Urgent Care, ED, 911 Yes   Is the patient/caregiver able to teach back signs and symptoms of worsening condition: Fever/chills, Shortness of breath, Chest pain   Is the patient/caregiver able to teach back importance of completing antibiotic course of treatment? Yes   Week 2 call completed? Yes   Graduated Yes   Is the patient interested in additional calls from an  ambulatory ? No   Would this patient benefit from a Referral to [facility] Social Work? No   Wrap up additional comments patient is doing well. No concerns or questions noted.   Call end time 1056            MARLO CHAVEZ - Registered Nurse

## 2025-01-28 NOTE — PROGRESS NOTES
Discharge Summary  Discharge Summary from Physical/Occupational Therapy Report    Patient: Mitchell Jacome   : 1965  Today's Date: 2025    Patient seen for 2 visits.  Dates of Service: 10/16/24 - 10/23/24    Discharge Status of Patient: He cancelled his last visit due to wanting to see his doctor, he did not call back to reschedule.    Goals: Not Met    Discharge Plan: Patient to return to referring/providing physician      Thank you for this referral to [facility]ational [facility].    SIGNATURE: Clara Iyer, PT

## 2025-02-08 ENCOUNTER — HOSPITAL ENCOUNTER (OUTPATIENT)
Dept: CARDIOLOGY | Facility: HOSPITAL | Age: 60
Discharge: HOME | End: 2025-02-08
Payer: OTHER GOVERNMENT

## 2025-02-08 ENCOUNTER — LAB (OUTPATIENT)
Dept: LAB | Facility: HOSPITAL | Age: 60
End: 2025-02-08
Payer: OTHER GOVERNMENT

## 2025-02-08 LAB
ABO GROUP BLD: NORMAL
ANION GAP SERPL CALCULATED.3IONS-SCNC: 11.9 MMOL/L (ref 5–15)
BACTERIA UR QL AUTO: NORMAL /HPF
BASOPHILS # BLD AUTO: 0.07 10*3/MM3 (ref 0–0.2)
BASOPHILS NFR BLD AUTO: 1 % (ref 0–1.5)
BILIRUB UR QL STRIP: NEGATIVE
BLD GP AB SCN SERPL QL: NEGATIVE
BUN SERPL-MCNC: 19 MG/DL (ref 6–20)
BUN/CREAT SERPL: 16.5 (ref 7–25)
CALCIUM SPEC-SCNC: 9.5 MG/DL (ref 8.6–10.5)
CHLORIDE SERPL-SCNC: 100 MMOL/L (ref 98–107)
CLARITY UR: CLEAR
CO2 SERPL-SCNC: 27.1 MMOL/L (ref 22–29)
COLOR UR: ABNORMAL
CREAT SERPL-MCNC: 1.15 MG/DL (ref 0.76–1.27)
DEPRECATED RDW RBC AUTO: 44.9 FL (ref 37–54)
EGFRCR SERPLBLD CKD-EPI 2021: 73.3 ML/MIN/1.73
EOSINOPHIL # BLD AUTO: 0.27 10*3/MM3 (ref 0–0.4)
EOSINOPHIL NFR BLD AUTO: 3.7 % (ref 0.3–6.2)
ERYTHROCYTE [DISTWIDTH] IN BLOOD BY AUTOMATED COUNT: 13.2 % (ref 12.3–15.4)
GLUCOSE SERPL-MCNC: 145 MG/DL (ref 65–99)
GLUCOSE UR STRIP-MCNC: NEGATIVE MG/DL
HBA1C MFR BLD: 7.58 % (ref 4.8–5.6)
HCT VFR BLD AUTO: 42.1 % (ref 37.5–51)
HGB BLD-MCNC: 13.8 G/DL (ref 13–17.7)
HGB UR QL STRIP.AUTO: NEGATIVE
HYALINE CASTS UR QL AUTO: NORMAL /LPF
IMM GRANULOCYTES # BLD AUTO: 0.02 10*3/MM3 (ref 0–0.05)
IMM GRANULOCYTES NFR BLD AUTO: 0.3 % (ref 0–0.5)
INR PPP: 1.07 (ref 0.9–1.1)
KETONES UR QL STRIP: ABNORMAL
LEUKOCYTE ESTERASE UR QL STRIP.AUTO: ABNORMAL
LYMPHOCYTES # BLD AUTO: 2.12 10*3/MM3 (ref 0.7–3.1)
LYMPHOCYTES NFR BLD AUTO: 29.3 % (ref 19.6–45.3)
MCH RBC QN AUTO: 30.7 PG (ref 26.6–33)
MCHC RBC AUTO-ENTMCNC: 32.8 G/DL (ref 31.5–35.7)
MCV RBC AUTO: 93.8 FL (ref 79–97)
MONOCYTES # BLD AUTO: 0.73 10*3/MM3 (ref 0.1–0.9)
MONOCYTES NFR BLD AUTO: 10.1 % (ref 5–12)
MRSA DNA SPEC QL NAA+PROBE: NORMAL
NEUTROPHILS NFR BLD AUTO: 4.02 10*3/MM3 (ref 1.7–7)
NEUTROPHILS NFR BLD AUTO: 55.6 % (ref 42.7–76)
NITRITE UR QL STRIP: NEGATIVE
NRBC BLD AUTO-RTO: 0 /100 WBC (ref 0–0.2)
PH UR STRIP.AUTO: 7 [PH] (ref 5–8)
PLATELET # BLD AUTO: 382 10*3/MM3 (ref 140–450)
PMV BLD AUTO: 8.8 FL (ref 6–12)
POTASSIUM SERPL-SCNC: 4.4 MMOL/L (ref 3.5–5.2)
PROT UR QL STRIP: ABNORMAL
PROTHROMBIN TIME: 13.9 SECONDS (ref 11.7–14.2)
QT INTERVAL: 368 MS
QTC INTERVAL: 418 MS
RBC # BLD AUTO: 4.49 10*6/MM3 (ref 4.14–5.8)
RBC # UR STRIP: NORMAL /HPF
REF LAB TEST METHOD: NORMAL
RH BLD: POSITIVE
SODIUM SERPL-SCNC: 139 MMOL/L (ref 136–145)
SP GR UR STRIP: 1.03 (ref 1–1.03)
SQUAMOUS #/AREA URNS HPF: NORMAL /HPF
T&S EXPIRATION DATE: NORMAL
UROBILINOGEN UR QL STRIP: ABNORMAL
WBC # UR STRIP: NORMAL /HPF
WBC NRBC COR # BLD AUTO: 7.23 10*3/MM3 (ref 3.4–10.8)

## 2025-02-08 PROCEDURE — 85025 COMPLETE CBC W/AUTO DIFF WBC: CPT | Performed by: NEUROLOGICAL SURGERY

## 2025-02-08 PROCEDURE — 86901 BLOOD TYPING SEROLOGIC RH(D): CPT

## 2025-02-08 PROCEDURE — 81001 URINALYSIS AUTO W/SCOPE: CPT

## 2025-02-08 PROCEDURE — 93005 ELECTROCARDIOGRAM TRACING: CPT | Performed by: NEUROLOGICAL SURGERY

## 2025-02-08 PROCEDURE — 87641 MR-STAPH DNA AMP PROBE: CPT

## 2025-02-08 PROCEDURE — 86850 RBC ANTIBODY SCREEN: CPT

## 2025-02-08 PROCEDURE — 83036 HEMOGLOBIN GLYCOSYLATED A1C: CPT

## 2025-02-08 PROCEDURE — 85610 PROTHROMBIN TIME: CPT

## 2025-02-08 PROCEDURE — 36415 COLL VENOUS BLD VENIPUNCTURE: CPT | Performed by: NEUROLOGICAL SURGERY

## 2025-02-08 PROCEDURE — 86900 BLOOD TYPING SEROLOGIC ABO: CPT

## 2025-02-08 PROCEDURE — 80048 BASIC METABOLIC PNL TOTAL CA: CPT

## 2025-02-14 LAB
QT INTERVAL: 368 MS
QTC INTERVAL: 418 MS

## 2025-02-17 ENCOUNTER — APPOINTMENT (OUTPATIENT)
Dept: GENERAL RADIOLOGY | Facility: HOSPITAL | Age: 60
End: 2025-02-17
Payer: OTHER GOVERNMENT

## 2025-02-17 ENCOUNTER — HOSPITAL ENCOUNTER (OUTPATIENT)
Facility: HOSPITAL | Age: 60
Setting detail: HOSPITAL OUTPATIENT SURGERY
Discharge: HOME | End: 2025-02-17
Attending: NEUROLOGICAL SURGERY | Admitting: NEUROLOGICAL SURGERY
Payer: OTHER GOVERNMENT

## 2025-02-17 ENCOUNTER — ANESTHESIA EVENT (OUTPATIENT)
Dept: PERIOP | Facility: HOSPITAL | Age: 60
End: 2025-02-17
Payer: OTHER GOVERNMENT

## 2025-02-17 ENCOUNTER — ANESTHESIA (OUTPATIENT)
Dept: PERIOP | Facility: HOSPITAL | Age: 60
End: 2025-02-17
Payer: OTHER GOVERNMENT

## 2025-02-17 VITALS
BODY MASS INDEX: 21.05 KG/M2 | SYSTOLIC BLOOD PRESSURE: 128 MMHG | RESPIRATION RATE: 12 BRPM | HEIGHT: 74 IN | OXYGEN SATURATION: 98 % | TEMPERATURE: 97.9 F | WEIGHT: 164 LBS | HEART RATE: 82 BPM | DIASTOLIC BLOOD PRESSURE: 76 MMHG

## 2025-02-17 DIAGNOSIS — J93.9 PNEUMOTHORAX ON LEFT: ICD-10-CM

## 2025-02-17 DIAGNOSIS — M51.26 LUMBAR DISC HERNIATION: ICD-10-CM

## 2025-02-17 LAB
GLUCOSE BLDC GLUCOMTR-MCNC: 103 MG/DL (ref 70–105)
GLUCOSE BLDC GLUCOMTR-MCNC: 113 MG/DL (ref 70–105)
GLUCOSE BLDC GLUCOMTR-MCNC: 83 MG/DL (ref 70–105)
GLUCOSE BLDC GLUCOMTR-MCNC: 85 MG/DL (ref 70–105)

## 2025-02-17 PROCEDURE — 25010000002 DEXAMETHASONE PER 1 MG: Performed by: NURSE ANESTHETIST, CERTIFIED REGISTERED

## 2025-02-17 PROCEDURE — 63056 DECOMPRESS SPINAL CORD LMBR: CPT

## 2025-02-17 PROCEDURE — 82948 REAGENT STRIP/BLOOD GLUCOSE: CPT

## 2025-02-17 PROCEDURE — 63056 DECOMPRESS SPINAL CORD LMBR: CPT | Performed by: NEUROLOGICAL SURGERY

## 2025-02-17 PROCEDURE — 76000 FLUOROSCOPY <1 HR PHYS/QHP: CPT

## 2025-02-17 PROCEDURE — 25010000002 METHYLPREDNISOLONE PER 40 MG: Performed by: NEUROLOGICAL SURGERY

## 2025-02-17 PROCEDURE — 25010000002 SUCCINYLCHOLINE PER 20 MG: Performed by: NURSE ANESTHETIST, CERTIFIED REGISTERED

## 2025-02-17 PROCEDURE — 25010000002 LIDOCAINE 1% - EPINEPHRINE 1:100000 1 %-1:100000 SOLUTION: Performed by: NEUROLOGICAL SURGERY

## 2025-02-17 PROCEDURE — 99024 POSTOP FOLLOW-UP VISIT: CPT

## 2025-02-17 PROCEDURE — 72100 X-RAY EXAM L-S SPINE 2/3 VWS: CPT

## 2025-02-17 PROCEDURE — 25010000002 ONDANSETRON PER 1 MG: Performed by: NURSE ANESTHETIST, CERTIFIED REGISTERED

## 2025-02-17 PROCEDURE — 25010000002 HYDROMORPHONE 1 MG/ML SOLUTION: Performed by: NURSE ANESTHETIST, CERTIFIED REGISTERED

## 2025-02-17 PROCEDURE — 25010000002 MAGNESIUM SULFATE PER 500 MG OF MAGNESIUM: Performed by: NURSE ANESTHETIST, CERTIFIED REGISTERED

## 2025-02-17 PROCEDURE — 25010000002 LIDOCAINE PF 1% 1 % SOLUTION: Performed by: NURSE ANESTHETIST, CERTIFIED REGISTERED

## 2025-02-17 PROCEDURE — 25010000002 PROPOFOL 10 MG/ML EMULSION: Performed by: NURSE ANESTHETIST, CERTIFIED REGISTERED

## 2025-02-17 PROCEDURE — 25810000003 SODIUM CHLORIDE 0.9 % SOLUTION: Performed by: NURSE ANESTHETIST, CERTIFIED REGISTERED

## 2025-02-17 PROCEDURE — 25010000002 CEFAZOLIN PER 500 MG: Performed by: NEUROLOGICAL SURGERY

## 2025-02-17 PROCEDURE — 82948 REAGENT STRIP/BLOOD GLUCOSE: CPT | Performed by: NEUROLOGICAL SURGERY

## 2025-02-17 PROCEDURE — 25010000002 FENTANYL CITRATE (PF) 100 MCG/2ML SOLUTION: Performed by: NURSE ANESTHETIST, CERTIFIED REGISTERED

## 2025-02-17 PROCEDURE — 25810000003 SODIUM CHLORIDE 0.9 % SOLUTION: Performed by: NEUROLOGICAL SURGERY

## 2025-02-17 DEVICE — DEV CONTRL TISS STRATAFIX SPIRAL MNCRYL UD 3/0 PLS 30CM: Type: IMPLANTABLE DEVICE | Site: SPINE LUMBAR | Status: FUNCTIONAL

## 2025-02-17 DEVICE — FLOSEAL WITH RECOTHROM - 10ML.
Type: IMPLANTABLE DEVICE | Site: SPINE LUMBAR | Status: FUNCTIONAL
Brand: FLOSEAL HEMOSTATIC MATRIX

## 2025-02-17 RX ORDER — SODIUM CHLORIDE 9 MG/ML
9 INJECTION, SOLUTION INTRAVENOUS CONTINUOUS PRN
Status: DISCONTINUED | OUTPATIENT
Start: 2025-02-17 | End: 2025-02-17 | Stop reason: HOSPADM

## 2025-02-17 RX ORDER — DEXTROSE MONOHYDRATE 25 G/50ML
12.5 INJECTION, SOLUTION INTRAVENOUS ONCE
Status: COMPLETED | OUTPATIENT
Start: 2025-02-17 | End: 2025-02-17

## 2025-02-17 RX ORDER — OXYCODONE HYDROCHLORIDE 5 MG/1
5 TABLET ORAL ONCE AS NEEDED
Status: DISCONTINUED | OUTPATIENT
Start: 2025-02-17 | End: 2025-02-17 | Stop reason: HOSPADM

## 2025-02-17 RX ORDER — GABAPENTIN 300 MG/1
300 CAPSULE ORAL ONCE
Status: COMPLETED | OUTPATIENT
Start: 2025-02-17 | End: 2025-02-17

## 2025-02-17 RX ORDER — SODIUM CHLORIDE 9 MG/ML
INJECTION, SOLUTION INTRAVENOUS CONTINUOUS PRN
Status: DISCONTINUED | OUTPATIENT
Start: 2025-02-17 | End: 2025-02-17 | Stop reason: SURG

## 2025-02-17 RX ORDER — SODIUM CHLORIDE 9 MG/ML
40 INJECTION, SOLUTION INTRAVENOUS AS NEEDED
Status: DISCONTINUED | OUTPATIENT
Start: 2025-02-17 | End: 2025-02-17 | Stop reason: HOSPADM

## 2025-02-17 RX ORDER — FLUMAZENIL 0.1 MG/ML
0.2 INJECTION INTRAVENOUS AS NEEDED
Status: DISCONTINUED | OUTPATIENT
Start: 2025-02-17 | End: 2025-02-17 | Stop reason: HOSPADM

## 2025-02-17 RX ORDER — SODIUM CHLORIDE 0.9 % (FLUSH) 0.9 %
10 SYRINGE (ML) INJECTION AS NEEDED
Status: DISCONTINUED | OUTPATIENT
Start: 2025-02-17 | End: 2025-02-17 | Stop reason: HOSPADM

## 2025-02-17 RX ORDER — HYDROCODONE BITARTRATE AND ACETAMINOPHEN 7.5; 325 MG/1; MG/1
1 TABLET ORAL EVERY 4 HOURS PRN
Qty: 18 TABLET | Refills: 0 | Status: SHIPPED | OUTPATIENT
Start: 2025-02-17

## 2025-02-17 RX ORDER — DIPHENHYDRAMINE HYDROCHLORIDE 50 MG/ML
12.5 INJECTION INTRAMUSCULAR; INTRAVENOUS
Status: DISCONTINUED | OUTPATIENT
Start: 2025-02-17 | End: 2025-02-17 | Stop reason: HOSPADM

## 2025-02-17 RX ORDER — ONDANSETRON 2 MG/ML
INJECTION INTRAMUSCULAR; INTRAVENOUS AS NEEDED
Status: DISCONTINUED | OUTPATIENT
Start: 2025-02-17 | End: 2025-02-17 | Stop reason: SURG

## 2025-02-17 RX ORDER — MAGNESIUM SULFATE HEPTAHYDRATE 500 MG/ML
INJECTION, SOLUTION INTRAMUSCULAR; INTRAVENOUS AS NEEDED
Status: DISCONTINUED | OUTPATIENT
Start: 2025-02-17 | End: 2025-02-17 | Stop reason: SURG

## 2025-02-17 RX ORDER — MIDAZOLAM HYDROCHLORIDE 1 MG/ML
1 INJECTION, SOLUTION INTRAMUSCULAR; INTRAVENOUS
Status: DISCONTINUED | OUTPATIENT
Start: 2025-02-17 | End: 2025-02-17 | Stop reason: HOSPADM

## 2025-02-17 RX ORDER — SUCCINYLCHOLINE CHLORIDE 20 MG/ML
INJECTION INTRAMUSCULAR; INTRAVENOUS AS NEEDED
Status: DISCONTINUED | OUTPATIENT
Start: 2025-02-17 | End: 2025-02-17 | Stop reason: SURG

## 2025-02-17 RX ORDER — LIDOCAINE HYDROCHLORIDE AND EPINEPHRINE 10; 10 MG/ML; UG/ML
INJECTION, SOLUTION INFILTRATION; PERINEURAL AS NEEDED
Status: DISCONTINUED | OUTPATIENT
Start: 2025-02-17 | End: 2025-02-17 | Stop reason: HOSPADM

## 2025-02-17 RX ORDER — PROPOFOL 10 MG/ML
VIAL (ML) INTRAVENOUS AS NEEDED
Status: DISCONTINUED | OUTPATIENT
Start: 2025-02-17 | End: 2025-02-17 | Stop reason: SURG

## 2025-02-17 RX ORDER — ACETAMINOPHEN 500 MG
1000 TABLET ORAL ONCE
Status: COMPLETED | OUTPATIENT
Start: 2025-02-17 | End: 2025-02-17

## 2025-02-17 RX ORDER — ONDANSETRON 2 MG/ML
4 INJECTION INTRAMUSCULAR; INTRAVENOUS ONCE AS NEEDED
Status: DISCONTINUED | OUTPATIENT
Start: 2025-02-17 | End: 2025-02-17 | Stop reason: HOSPADM

## 2025-02-17 RX ORDER — LIDOCAINE HYDROCHLORIDE 10 MG/ML
INJECTION, SOLUTION EPIDURAL; INFILTRATION; INTRACAUDAL; PERINEURAL AS NEEDED
Status: DISCONTINUED | OUTPATIENT
Start: 2025-02-17 | End: 2025-02-17 | Stop reason: SURG

## 2025-02-17 RX ORDER — DEXAMETHASONE SODIUM PHOSPHATE 4 MG/ML
INJECTION, SOLUTION INTRA-ARTICULAR; INTRALESIONAL; INTRAMUSCULAR; INTRAVENOUS; SOFT TISSUE AS NEEDED
Status: DISCONTINUED | OUTPATIENT
Start: 2025-02-17 | End: 2025-02-17 | Stop reason: SURG

## 2025-02-17 RX ORDER — FENTANYL CITRATE 50 UG/ML
INJECTION, SOLUTION INTRAMUSCULAR; INTRAVENOUS AS NEEDED
Status: DISCONTINUED | OUTPATIENT
Start: 2025-02-17 | End: 2025-02-17 | Stop reason: SURG

## 2025-02-17 RX ORDER — SODIUM CHLORIDE 0.9 % (FLUSH) 0.9 %
10 SYRINGE (ML) INJECTION EVERY 12 HOURS SCHEDULED
Status: DISCONTINUED | OUTPATIENT
Start: 2025-02-17 | End: 2025-02-17 | Stop reason: HOSPADM

## 2025-02-17 RX ORDER — DIPHENHYDRAMINE HYDROCHLORIDE 50 MG/ML
12.5 INJECTION INTRAMUSCULAR; INTRAVENOUS ONCE AS NEEDED
Status: DISCONTINUED | OUTPATIENT
Start: 2025-02-17 | End: 2025-02-17 | Stop reason: HOSPADM

## 2025-02-17 RX ORDER — EPHEDRINE SULFATE 5 MG/ML
5 INJECTION INTRAVENOUS ONCE AS NEEDED
Status: DISCONTINUED | OUTPATIENT
Start: 2025-02-17 | End: 2025-02-17 | Stop reason: HOSPADM

## 2025-02-17 RX ORDER — LABETALOL HYDROCHLORIDE 5 MG/ML
5 INJECTION, SOLUTION INTRAVENOUS
Status: DISCONTINUED | OUTPATIENT
Start: 2025-02-17 | End: 2025-02-17 | Stop reason: HOSPADM

## 2025-02-17 RX ORDER — SODIUM CHLORIDE, SODIUM LACTATE, POTASSIUM CHLORIDE, CALCIUM CHLORIDE 600; 310; 30; 20 MG/100ML; MG/100ML; MG/100ML; MG/100ML
50 INJECTION, SOLUTION INTRAVENOUS CONTINUOUS
Status: DISCONTINUED | OUTPATIENT
Start: 2025-02-17 | End: 2025-02-17

## 2025-02-17 RX ORDER — LIDOCAINE HYDROCHLORIDE 10 MG/ML
0.5 INJECTION, SOLUTION EPIDURAL; INFILTRATION; INTRACAUDAL; PERINEURAL ONCE AS NEEDED
Status: DISCONTINUED | OUTPATIENT
Start: 2025-02-17 | End: 2025-02-17 | Stop reason: HOSPADM

## 2025-02-17 RX ORDER — OXYCODONE HCL 10 MG/1
10 TABLET, FILM COATED, EXTENDED RELEASE ORAL ONCE
Status: COMPLETED | OUTPATIENT
Start: 2025-02-17 | End: 2025-02-17

## 2025-02-17 RX ORDER — NALOXONE HCL 0.4 MG/ML
0.4 VIAL (ML) INJECTION AS NEEDED
Status: DISCONTINUED | OUTPATIENT
Start: 2025-02-17 | End: 2025-02-17 | Stop reason: HOSPADM

## 2025-02-17 RX ORDER — METHYLPREDNISOLONE ACETATE 40 MG/ML
INJECTION, SUSPENSION INTRA-ARTICULAR; INTRALESIONAL; INTRAMUSCULAR; SOFT TISSUE AS NEEDED
Status: DISCONTINUED | OUTPATIENT
Start: 2025-02-17 | End: 2025-02-17 | Stop reason: HOSPADM

## 2025-02-17 RX ORDER — HYDRALAZINE HYDROCHLORIDE 20 MG/ML
5 INJECTION INTRAMUSCULAR; INTRAVENOUS
Status: DISCONTINUED | OUTPATIENT
Start: 2025-02-17 | End: 2025-02-17 | Stop reason: HOSPADM

## 2025-02-17 RX ORDER — ROCURONIUM BROMIDE 10 MG/ML
INJECTION, SOLUTION INTRAVENOUS AS NEEDED
Status: DISCONTINUED | OUTPATIENT
Start: 2025-02-17 | End: 2025-02-17 | Stop reason: SURG

## 2025-02-17 RX ORDER — PHENYLEPHRINE HCL IN 0.9% NACL 1 MG/10 ML
SYRINGE (ML) INTRAVENOUS AS NEEDED
Status: DISCONTINUED | OUTPATIENT
Start: 2025-02-17 | End: 2025-02-17 | Stop reason: SURG

## 2025-02-17 RX ORDER — SODIUM CHLORIDE 9 MG/ML
50 INJECTION, SOLUTION INTRAVENOUS ONCE
Status: COMPLETED | OUTPATIENT
Start: 2025-02-17 | End: 2025-02-17

## 2025-02-17 RX ORDER — IPRATROPIUM BROMIDE AND ALBUTEROL SULFATE 2.5; .5 MG/3ML; MG/3ML
3 SOLUTION RESPIRATORY (INHALATION) ONCE AS NEEDED
Status: DISCONTINUED | OUTPATIENT
Start: 2025-02-17 | End: 2025-02-17 | Stop reason: HOSPADM

## 2025-02-17 RX ORDER — CYCLOBENZAPRINE HCL 10 MG
10 TABLET ORAL 3 TIMES DAILY PRN
Qty: 15 TABLET | Refills: 0 | Status: SHIPPED | OUTPATIENT
Start: 2025-02-17

## 2025-02-17 RX ORDER — OXYCODONE HYDROCHLORIDE 5 MG/1
10 TABLET ORAL EVERY 4 HOURS PRN
Status: COMPLETED | OUTPATIENT
Start: 2025-02-17 | End: 2025-02-17

## 2025-02-17 RX ADMIN — Medication 10 MG: at 11:00

## 2025-02-17 RX ADMIN — FENTANYL CITRATE 100 MCG: 50 INJECTION, SOLUTION INTRAMUSCULAR; INTRAVENOUS at 10:38

## 2025-02-17 RX ADMIN — HYDROMORPHONE HYDROCHLORIDE 1 MG: 1 INJECTION, SOLUTION INTRAMUSCULAR; INTRAVENOUS; SUBCUTANEOUS at 12:44

## 2025-02-17 RX ADMIN — Medication 10 MG: at 10:56

## 2025-02-17 RX ADMIN — SUCCINYLCHOLINE CHLORIDE 160 MG: 20 INJECTION, SOLUTION INTRAMUSCULAR; INTRAVENOUS at 10:38

## 2025-02-17 RX ADMIN — PROPOFOL 150 MCG/KG/MIN: 10 INJECTION, EMULSION INTRAVENOUS at 10:37

## 2025-02-17 RX ADMIN — ACETAMINOPHEN 1000 MG: 500 TABLET, FILM COATED ORAL at 09:49

## 2025-02-17 RX ADMIN — DEXTROSE MONOHYDRATE 13 ML: 25 INJECTION, SOLUTION INTRAVENOUS at 09:30

## 2025-02-17 RX ADMIN — ROCURONIUM BROMIDE 10 MG: 10 INJECTION, SOLUTION INTRAVENOUS at 10:38

## 2025-02-17 RX ADMIN — LIDOCAINE HYDROCHLORIDE 50 MG: 10 INJECTION, SOLUTION EPIDURAL; INFILTRATION; INTRACAUDAL; PERINEURAL at 10:38

## 2025-02-17 RX ADMIN — DEXMEDETOMIDINE HYDROCHLORIDE 10 MCG: 100 INJECTION, SOLUTION INTRAVENOUS at 12:18

## 2025-02-17 RX ADMIN — Medication 100 MCG: at 11:09

## 2025-02-17 RX ADMIN — MAGNESIUM SULFATE HEPTAHYDRATE 1 G: 500 INJECTION, SOLUTION INTRAMUSCULAR; INTRAVENOUS at 11:00

## 2025-02-17 RX ADMIN — PROPOFOL 200 MG: 10 INJECTION, EMULSION INTRAVENOUS at 10:38

## 2025-02-17 RX ADMIN — ONDANSETRON 4 MG: 2 INJECTION, SOLUTION INTRAMUSCULAR; INTRAVENOUS at 10:47

## 2025-02-17 RX ADMIN — HYDROMORPHONE HYDROCHLORIDE 0.5 MG: 1 INJECTION, SOLUTION INTRAMUSCULAR; INTRAVENOUS; SUBCUTANEOUS at 13:11

## 2025-02-17 RX ADMIN — Medication 100 MCG: at 11:27

## 2025-02-17 RX ADMIN — OXYCODONE 10 MG: 5 TABLET ORAL at 13:11

## 2025-02-17 RX ADMIN — HYDROMORPHONE HYDROCHLORIDE 1 MG: 1 INJECTION, SOLUTION INTRAMUSCULAR; INTRAVENOUS; SUBCUTANEOUS at 10:57

## 2025-02-17 RX ADMIN — Medication 100 MCG: at 11:46

## 2025-02-17 RX ADMIN — DEXAMETHASONE SODIUM PHOSPHATE 4 MG: 4 INJECTION, SOLUTION INTRAMUSCULAR; INTRAVENOUS at 10:47

## 2025-02-17 RX ADMIN — OXYCODONE HYDROCHLORIDE 10 MG: 10 TABLET, FILM COATED, EXTENDED RELEASE ORAL at 09:50

## 2025-02-17 RX ADMIN — SODIUM CHLORIDE: 9 INJECTION, SOLUTION INTRAVENOUS at 10:34

## 2025-02-17 RX ADMIN — Medication 100 MCG: at 11:17

## 2025-02-17 RX ADMIN — SODIUM CHLORIDE 50 ML/HR: 9 INJECTION, SOLUTION INTRAVENOUS at 09:30

## 2025-02-17 RX ADMIN — GABAPENTIN 300 MG: 300 CAPSULE ORAL at 09:49

## 2025-02-17 RX ADMIN — Medication 100 MCG: at 11:38

## 2025-02-17 RX ADMIN — CEFAZOLIN 2 G: 2 INJECTION, POWDER, FOR SOLUTION INTRAMUSCULAR; INTRAVENOUS at 10:30

## 2025-02-17 NOTE — DISCHARGE SUMMARY
Discharge Summary    Patient: Mitchell Jacome  : 1965    Patient Care Team:  Estrada Arreaga MD as PCP - General (Family Medicine)    Date of Admit: 2025    Date of Discharge:  2025    Discharge Diagnosis:  Lumbar disc herniation      Procedures Performed  Procedure(s):  Right far lateral lumbar 4 5 microdiscectomy       Complications: None    Consultants:   Consults       No orders found from 2025 to 2025.            Condition on Discharge: stable    Discharge disposition: home    HPI: Mitchell Jacome is a 59 y.o. male who presented with a several month history of right lower extremity radiculopathy secondary to a large far lateral disc herniation on the right at L4-5 compressing the exiting nerve root.  Patient failed extensive conservative measures and was taken to the OR for the above procedure with Dr. Hernandez on 2025.    Hospital Course: Patient underwent surgery as scheduled.  They were transferred to PACU after their procedure.  Patient was observed in recovery until discharge criteria were met at which point they were discharged home to self-care.    Vitals:    25 1307   BP: 106/74   Pulse: 71   Resp: 12   Temp:    SpO2: 96%         Lab Results (last 24 hours)       Procedure Component Value Units Date/Time    POC Glucose Once [[id number]]  (Normal) Collected: 25 1224    Specimen: Blood Updated: 25 1226     Glucose 83 mg/dL      Comment: Serial Number: [id number]:  [id number]       POC Glucose Once [[id number]]  (Normal) Collected: 25 0952    Specimen: Blood Updated: 25 1221     Glucose 103 mg/dL      Comment: Serial Number: [id number]:  [id number]       POC Glucose STAT [[id number]]  (Normal) Collected: 25 09    Specimen: Blood Updated: 25 0922     Glucose 85 mg/dL      Comment: Serial Number: [id number]:  [id number]                                  Discharge Physical Exam:    General  - WD/WN male,  appears their stated age, awake, cooperative, in no acute distress  HEENT  - Normocephalic, atraumatic, PERRLA, EOM intact  Respiratory  - Normal respiratory rate and effort  Abdomen  - Flat, soft, NT/ND  Musculoskeletal  - Moves all extremities well, no joint swelling/tenderness  Skin  - Surgical incision well approximated, clean and dry, no swelling, redness, or drainage  NEUROLOGIC  - A/O x3  - CN II-XII grossly intact  - Moves all extremities symmetrically and with good strength  - Sensation intact throughout      Discharge Medications  Inspect has been reviewed and narcotic consent is on file in the patient's chart.     Your medication list        START taking these medications        Instructions Last Dose Given Next Dose Due   cyclobenzaprine 10 MG tablet  Commonly known as: FLEXERIL      Take 1 tablet by mouth 3 (Three) Times a Day As Needed for Muscle Spasms.              CHANGE how you take these medications        Instructions Last Dose Given Next Dose Due   HYDROcodone-acetaminophen 7.5-325 MG per tablet  Commonly known as: NORCO  What changed:   when to take this  reasons to take this      Take 1 tablet by mouth Every 4 (Four) Hours As Needed for Severe Pain.              CONTINUE taking these medications        Instructions Last Dose Given Next Dose Due   atenolol 25 MG tablet  Commonly known as: TENORMIN      Take 1 tablet by mouth Daily.       atorvastatin 20 MG tablet  Commonly known as: LIPITOR      Take 1 tablet by mouth Daily.       BD Pen Needle Fernanda 2nd Gen 32G X 4 MM misc  Generic drug: Insulin Pen Needle      Inject 1 package under the skin into the appropriate area as directed 4 (Four) Times a Day Before Meals & at Bedtime.       DULoxetine 30 MG capsule  Commonly known as: CYMBALTA      Take 1 capsule by mouth Daily.       fenofibrate 145 MG tablet  Commonly known as: TRICOR      Take 1 tablet by mouth Daily.       glucagon 1 MG injection  Commonly known as: GLUCAGEN      Inject 1 kit as  directed As Needed (for low BS).       Insulin Aspart 100 UNIT/ML injection  Commonly known as: novoLOG      Inject  under the skin into the appropriate area as directed 3 (Three) Times a Day Before Meals. Sliding scale       insulin glargine 100 UNIT/ML injection  Commonly known as: LANTUS, SEMGLEE      Inject 28-32 Units under the skin into the appropriate area as directed Daily.       levETIRAcetam 500 MG tablet  Commonly known as: KEPPRA      Take 1 tablet by mouth 2 (Two) Times a Day.       multivitamin with minerals tablet tablet      Take 1 tablet by mouth Daily.       omeprazole 40 MG capsule  Commonly known as: priLOSEC      Take 1 capsule by mouth Daily.       pancrelipase (Lip-Prot-Amyl) 11550-79502 units capsule delayed-release particles capsule  Commonly known as: CREON      Take 3 capsules by mouth 3 (Three) Times a Day With Meals.       sennosides-docusate 8.6-50 MG per tablet  Commonly known as: PERICOLACE      Take 2 tablets by mouth 2 (Two) Times a Day As Needed for Constipation.              STOP taking these medications      cefdinir 300 MG capsule  Commonly known as: OMNICEF        gabapentin 100 MG capsule  Commonly known as: NEURONTIN        Lidocaine 4 %                  Where to Get Your Medications        These medications were sent to [facility] [address] [place] [address]      Hours: Monday to Friday 7 AM to 7 PM Phone: [phone]   cyclobenzaprine 10 MG tablet  HYDROcodone-acetaminophen 7.5-325 MG per tablet         Discharge Diet: Regular, advance as tolerated      Activity at Discharge: No bending or twisting at the waist. No lifting greater than 5 pounds.  No driving for 1 week.  Do not soak or submerge incision underwater for 6 weeks.      Call for: questions or concerns    Follow-up Appointments  Future Appointments   Date Time Provider Department Center   3/3/2025  9:30 AM [facility]      Follow-up Information        Rita Alvarado PA-C. Go on 3/3/2025.    Specialty: Neurosurgery  Contact information:  [facility] [address] [place] [address] [phone]               Estrada Arreaga MD .    Specialty: Family Medicine  Contact information:  [address] [place] [address] [address] [place] [address] [phone]                               I discussed the discharge instructions with patient    ABBY Leavitt  02/17/25  13:23 EST            Part of this note may be an electronic transcription/translation of spoken language to printed text using the Dragon Dictation System.

## 2025-02-17 NOTE — DISCHARGE INSTRUCTIONS
Lumbar Microdiscectomy and Lumbar Decompression    Post-Operative Guide    Dr. Edmond Hernandez MD                                      WOUND CARE INSTRUCTIONS AFTER SURGERY    Keep incision covered with sterile dressing at all times for 48 hours after surgery.     You may shower 48 hours after surgery.  Keep the incision covered in the shower with watertight dressing for 2 weeks after surgery. When not in the shower you may remove the dressing and keep incision open to air.      Do not submerge your incision under water, to include hot pools, tubs, pools, lakes, and oceans x6 weeks after surgery.     Don't be alarmed if you experience some of your pre-operative symptoms after going home. This is not uncommon and normally goes away in a few days but may last longer. Pain, aching and stiffness in your neck, back, and down your legs is common.     If you have any questions or concerns don't hesitate to call the office.        Post-op  ACTIVITY GUIDE     DAY OF SURGERY   “We want you to get up and Move!    Getting out of bed soon after surgery will speed your recovery!”    GOAL:  Out of bed 2 hours after awaking from surgery for your first walk  You may require assistance from nurse  A walker for stability may be used initially but briefly  Short frequent walks (5min) every 2 hours while in hospital  Engage core when getting in and out of bed   Use smart movement strategies when changing positions  Practice DEEP BREATHING while you walk  3-6 count inhale and exhale  Rely on ORAL pain medications NOT IV       ADVANTAGES:  Prevent blood clots in the legs  Prevents pneumonia through promoting deep breathing  Prevents back muscles from stiffening - will decrease pain   You CANNOT walk too much  Oral pain meds have better steady control of pain     POST-OP DAY #1   Diet / Activity / Pain Control / GO HOME    Assessments by Physical Therapy and Occupational Therapy (OT)    GOAL  Review proper spine mechanics/  restrictions  Walking up and down stairs is GOOD   PT / OT will assess when you are safe to go home  Activities of Daily Living - okay to shower, dress, navigate around house  Surgical Incision needs to be covered during showers unless otherwise advised by Dr. Hernandez  No baths, hot tubs, swimming pools for 6 weeks or until incision closed without scab.   Go home !    Criteria for Discharge Home:  Cleared by PT / OT   Cleared by the Medicine Service  Adequate pain control with or without medications  Tolerating food and Liquids  Ability to urinate  Having a bowel movement IS NOT a discharge requirement unless there is a medical issue  Depends on pain control, support at home, mobility    Occasionally some patients with extra care needs continue their recovery at a local rehabilitation facility (e.g. patients with minimal home social support, additional medical needs). Hospital based case coordinator will assist with rehab placement.     ACTIVITY GUIDELINES    Careful with the BLT's for 3 months !    Bending  Engage Core when changing positions   Use smart movement strategies - Squat, kneel, support yourself using arms  Bending with bad form once or twice is NOT a problem  Not using core to bend can “strain” back muscles    Lifting  General weight limit for first 6 weeks is a maximum of 20-40 lbs   Anything that causes “strain” should not be lifted  Coughing, sneezing, vomiting, straining with bowel movements can cause damage  Lap top, gallon of milk, purse, infant children okay  Luggage, large backpack, heavy grocery bag, furniture - NOT okay  Lift things close to body - limit reaching to pick things up    Twisting  Turn hips, shoulders and spine as one unit  Avoid reaching across the body  Activate core during more complex movements  Remember it is repetitive poor spine mechanics not solitary actions which can be harmful to your spine    Driving    - Okay to consider during first 2 weeks after surgery   - MUST meet  following requirements    -You must be OFF narcotics and not under their influence     - You must be a SAFE  yourself.     - Patients may be considered to be UNSAFE if they are:     Distracted by their pain     Unable to sit comfortably for the required length of the journey               Unable to use mirrors safely because of restrictions or pain                ACTIVITY - FIRST 2 WEEKS:    Low Impact Aerobic Exercise  5-30min 2 times per day EVERY DAY  Walking, elliptical, stairs and/or recumbent bike outdoors,  Slow safe pace, okay to do intervals (walk 4 min rest 1 min x 3 -5 sets)  No hiking, speed walking or carrying.   FOCUS ON POSTURE, DEEP BREATHING (6 count) AND CORE ACTIVATION    Physical Therapy  Will start after your first post-operative office visit (2 weeks)  It's Important, So, YES it's Mandatory  2 times per week x 12 weeks  We can recommend the Physical Therapist near your home  They should help guide your core exercise program  Home exercises and low impact aerobic work  should be done 4-5x per week in addition to PT   The static core program we recommend your physical therapist take you though is attached  Additional modalities are balance and light resistance band training     RETURNING TO WORK     The timescale for Return to Work will vary from patient to patient and depends mainly on the nature of your specific surgery and the type of work / activity you wish to recommence.     General Guidelines:    Can work from home if needed within the first week or so of surgery  Do not work for longer than 30-45 minutes at a time without a break (standing and walking around)    Sedentary jobs (desk work, etc)   Typically within 1-6 weeks  Depends on particulars of job, driving distance, stress, etc  Light duty  <20 lbs weight limit, Minimal BLT  If you have the option, sometimes returning to work alternate days (i.e Mon-Wed-Fri) for 1-2 weeks assists with the transition back to work.     Manual  Labor  3-6 months - varies per case  depends on intensity and weight limit and specific surgery   must have exhibited a strong core with Level 3 or higher on core program or a note from PT reflecting a strong core                RECREATIONAL SPORTS & ACTIVITIES     After 2 weeks  swimming    After 6 weeks   hiking (no Pack, light grade)  Biking, jogging, resistance training, climbing, Pilates, sports specific drills, body weight interval training, golf, tennis  Core level 3 completed  Start slow and build up slowly   Do NOT go back FULL SPEED right away     After 3 months  Skiing, horseback riding, ATV riding, snow mobile etc      Sports - Non-Contact  Minimum 6 weeks  Must have core level 3 or greater  Must have completed aerobic and resistance training   Must have successfully done sports specific drill and been asymptomatic    Sports - Contact   Varies from sport to sport; usual 3-6 months  Must exhibit strong Core Level 4 or 5   Must have completed non-contact  sports specific drills without any symptoms      Lifelong recommendations:  Warm up before EVERY activity 5-10 minutes using Recumbent bike, Stair Master, elliptical , speed walk  Core exercises:   3 -5 x /week - forever!  10 minutes  Should follow warm up prior to activity / sport  Always know your core level

## 2025-02-17 NOTE — ANESTHESIA PREPROCEDURE EVALUATION
Anesthesia Evaluation     Patient summary reviewed and Nursing notes reviewed   no history of anesthetic complications:   NPO Solid Status: > 8 hours  NPO Liquid Status: > 4 hours           Airway   Mallampati: II  TM distance: >3 FB  Neck ROM: full  No difficulty expected  Dental - normal exam     Pulmonary - negative pulmonary ROS and normal exam   Cardiovascular - normal exam    (+) hypertension, hyperlipidemia      Neuro/Psych  (+) seizures well controlled, psychiatric history Depression  GI/Hepatic/Renal/Endo    (+) GERD, diabetes mellitus type 2    Musculoskeletal     Abdominal  - normal exam   Substance History - negative use     OB/GYN negative ob/gyn ROS         Other   arthritis,                       Anesthesia Plan    ASA 3     general and ERAS Protocol   total IV anesthesia  intravenous induction     Anesthetic plan, risks, benefits, and alternatives have been provided, discussed and informed consent has been obtained with: patient.    Plan discussed with CRNA.        CODE STATUS:

## 2025-02-17 NOTE — H&P
History of Present Illness: Mitchell Jacome is a 59 y.o. male with over a year history of worsening pain radiating down his right lower extremity along the lateral thigh into the shin and down to the big toe.  This is associated with numbness and tingling.  Initially presented with weakness in dorsiflexion of his foot, but he says this has improved.  He has undergone physical therapy without improvement.  He recently underwent an epidural injection which provided some relief of the pain although he continues to have significant issues.  No other changes since he was last seen              Previous treatment: Norco,      Previous neurosurgery:       Previous injections:      The following portions of the patient's history were reviewed and updated as appropriate: allergies, current medications, past family history, past medical history, past social history, past surgical history, and problem list.     Review of Systems   Constitutional:  Positive for activity change.   Respiratory:  Negative for chest tightness and shortness of breath.    Cardiovascular:  Negative for chest pain.   Musculoskeletal:  Positive for back pain, gait problem and myalgias.   Neurological:  Positive for numbness.            Objective    Neurological Exam  Mental Status  Awake, alert and oriented to person, place and time.     Motor  Bilateral lower extremities full strength                 Assessment & Plan    Medical Decision Making:       Mitchell Jacome is a 59 y.o. male with a history of right-sided radiculopathy with a far lateral disc herniation at L4-5.  His weakness has improved and he continues to have pain down his leg to his foot and in his shin.  I believe compression of the L4 nerve root as it exits and is compressed by the far lateral disc is the likely cause of his symptoms.  He does not have any overt compression of the L5 nerve root on the right and no left-sided symptoms.  We will proceed with right L4-5 far  lateral discectomy.  I did discuss with the patient that it is possible that some of his symptoms are not related to the disc herniation and nerve compression and could be related to his diabetes.  Patient understands the risks of surgery as well as increased risk due to medical comorbidities including type 2 diabetes chronic pancreatitis depression GERD seizures hypertension hyperlipidemia among other medical comorbidities and he has agreed to undergo the procedure

## 2025-02-17 NOTE — OP NOTE
LUMBAR DISCECTOMY MICRO  Procedure Report    Patient Name:  Mitchell Jacome  YOB: 1965    Date of Surgery:  2/17/2025     Indications: 59-year-old male with many month history of right lower extremity radiculopathy.  When he initially saw him he had significant foot weakness but this improved prior to surgery today.  He continues to have severe pain radiating down his right lower extremity into his shin and sometimes to his foot.  Imaging workup demonstrated a large right far lateral disc herniation at L4-5 causing severe compression of the exiting nerve root.  No other evidence of severe nerve compression on the right side or evidence of compression of the L5 nerve root.  Given this patient was taken to the OR for right L4-5 far lateral discectomy.  Patient understood the risks of surgery including bleeding infection CSF leak nerve damage spinal cord injury disc reherniation no improvement and some or all of his symptoms need for future surgery including fusion surgery and he agreed to undergo the procedure    Pre-op Diagnosis:   Lumbar disc herniation [M51.26]       Post-Op Diagnosis Codes:     * Lumbar disc herniation [M51.26]    Procedure/CPT® Codes:      Procedure(s):  Right far lateral lumbar 4 5 microdiscectomy    Staff:  Surgeon(s):  Edmond Hernandez IV, MD    Assistant: Ayaz Taylor PA    Anesthesia: General    Estimated Blood Loss:  40cc    Implants:    Implant Name Type Inv. Item Serial No.  Lot No. LRB No. Used Action   DEV CONTRL TISS STRATAFIX SPIRAL MNCRYL UD 3/0 PLS 30CM - [id number] Implant DEV CONTRL TISS STRATAFIX SPIRAL MNCRYL UD 3/0 PLS 30CM  [employer]  DIV [employer] J [employer] J [id number] Right 1 Implanted   KT SEAL HEMOS ABS FLOSEAL MATRX 1.5/FAST/PREP 5000/IU 10ML - [id number] Implant KT SEAL HEMOS ABS FLOSEAL MATRX 1.5/FAST/PREP 5000/IU 10ML  [facility] [id number] Right 1 Implanted       Specimen:          None        Findings: Herniated  disc    Complications: None    Description of Procedure: Patient was brought to the OR placed under general endotracheal anesthesia.  He was flipped in the prone position on Austen table and prepped and draped in the usual fashion.  AP and lateral fluoroscopy were used to plan an incision along the lateral pedicular border on the right at L4-5.  Incision was made and carried down through the fascia.  Tubular retractor was placed over the L4-5 disc space along the lateral edge of the facet on the right under AP and lateral fluoroscopy.  Microscope was brought in the field.  Remaining tissue was removed from over the lateral facet down to the transverse process.  High-speed bur was used to remove portion of the lateral facet and transverse process.  X-rays used to notify the disc space.  Upgoing curette was used to detach tissue from the inferior portion of the facet and disc space was identified as well as herniated disc.  The space was opened with blunt dissection and multiple fragments were removed of herniated disc from the disc space.  Once all disc fragments were removed with no evidence of remaining disc herniation or continued nerve root compression hemostasis was achieved with bipolar cautery and Gelfoam powder in the wound was thoroughly irrigated.  Steroid was placed over the exiting nerve root.  Tubular tractor was removed and further hemostasis was achieved with bipolar cautery and Gelfoam powder and the wound was thoroughly irrigated.  Fascia was closed with 0 Vicryl sutures, the deep dermal layer with 2-0 Vicryl sutures and the skin was closed with a running subcuticular Monocryl.  There were no changes in neuromonitoring throughout the case                Assistant: Ayaz Taylor PA  was responsible for performing the following activities: Retraction, Suction, Irrigation, Suturing, Closing, and Placing Dressing and their skilled assistance was necessary for the success of this case.    Edmond Hernandez  MD [facility]     Date: 2/17/2025  Time: 12:09 EST

## 2025-02-17 NOTE — ANESTHESIA POSTPROCEDURE EVALUATION
Patient: Mitchell Jacome    Procedure Summary       Date: 02/17/25 Room / Location: [facility] OR 04 / [facility] MAIN OR    Anesthesia Start: 1034 Anesthesia Stop: 1223    Procedure: Right far lateral lumbar 4 5 microdiscectomy (Right: Spine Lumbar) Diagnosis:       Lumbar disc herniation      (Lumbar disc herniation [M51.26])    Surgeons: Edmond Hernandez IV, MD Provider: Colton Cooley MD    Anesthesia Type: general, ERAS Protocol ASA Status: 3            Anesthesia Type: general, ERAS Protocol    Vitals  Vitals Value Taken Time   /78 02/17/25 1231   Temp 97.6 °F (36.4 °C) 02/17/25 1221   Pulse 77 02/17/25 1232   Resp 14 02/17/25 1227   SpO2 97 % 02/17/25 1232   Vitals shown include unfiled device data.        Post Anesthesia Care and Evaluation    Patient location during evaluation: PACU  Patient participation: complete - patient participated  Level of consciousness: awake  Pain scale: See nurse's notes for pain score.  Pain management: adequate    Airway patency: patent  Anesthetic complications: No anesthetic complications  PONV Status: none  Cardiovascular status: acceptable  Respiratory status: acceptable and spontaneous ventilation  Hydration status: acceptable    Comments: Patient seen and examined postoperatively; vital signs stable; SpO2 greater than or equal to 90%; cardiopulmonary status stable; nausea/vomiting adequately controlled; pain adequately controlled; no apparent anesthesia complications; patient discharged from anesthesia care when discharge criteria were met

## 2025-02-17 NOTE — ANESTHESIA PROCEDURE NOTES
Airway  Urgency: elective    Date/Time: 2/17/2025 10:41 AM    General Information and Staff    Patient location during procedure: OR  CRNA/CAA: Jenny Hightower CRNA    Indications and Patient Condition  Indications for airway management: airway protection    Preoxygenated: yes  Mask difficulty assessment: 2 - vent by mask + OA or adjuvant +/- NMBA    Final Airway Details  Final airway type: endotracheal airway      Successful airway: ETT  Cuffed: yes   Successful intubation technique: video laryngoscopy  Facilitating devices/methods: intubating stylet  Endotracheal tube insertion site: oral  Blade: Patel  Blade size: 4  ETT size (mm): 7.5  Cormack-Lehane Classification: grade I - full view of glottis  Placement verified by: chest auscultation and capnometry   Measured from: lips  ETT/EBT  to lips (cm): 22  Number of attempts at approach: 1  Assessment: lips, teeth, and gum same as pre-op and atraumatic intubation

## 2025-02-20 NOTE — PROGRESS NOTES
Subjective     Chief Complaint   Patient presents with    Post-op         Previous Treatment: Right far lateral lumbar 4 5 microdiscectomy 2/17/25    HPI: Mitchell Jacome is a 59 y.o. male who presents to clinic for his 2-week postoperative appointment.  Patient states that overall he is doing very well and has very little residual symptoms.  He states that he has some slight numbness on the lateral aspect of his right calf.  Otherwise he is doing very well.  Patient has no difficulty going to the bathroom, nor does he endorse any fevers, chills or discharge from his incision site since surgery.        PMH:  Past Medical History:   Diagnosis Date    Chronic recurrent pancreatitis     COVID-19     Depression     DMII     GERD     Hearing loss, right     History of transfusion 2016    HTN     Hyperlipidemia 2018    PSA     Seizures     Solitary kidney          Current Outpatient Medications:     atenolol (TENORMIN) 25 MG tablet, Take 1 tablet by mouth Daily., Disp: , Rfl:     atorvastatin (LIPITOR) 20 MG tablet, Take 1 tablet by mouth Daily., Disp: 90 tablet, Rfl: 1    BD Pen Needle Fernanda 2nd Gen 32G X 4 MM misc, Inject 1 package under the skin into the appropriate area as directed 4 (Four) Times a Day Before Meals & at Bedtime., Disp: 360 each, Rfl: 3    cyclobenzaprine (FLEXERIL) 10 MG tablet, Take 1 tablet by mouth 3 (Three) Times a Day As Needed for Muscle Spasms., Disp: 15 tablet, Rfl: 0    DULoxetine (CYMBALTA) 30 MG capsule, Take 1 capsule by mouth Daily., Disp: 90 capsule, Rfl: 0    fenofibrate (TRICOR) 145 MG tablet, Take 1 tablet by mouth Daily., Disp: , Rfl:     Glucagon, rDNA, (Glucagon Emergency) 1 MG kit, Inject 1 kit as directed As Needed (for low BS)., Disp: 1 each, Rfl: 2    HYDROcodone-acetaminophen (NORCO) 7.5-325 MG per tablet, Take 1 tablet by mouth Every 4 (Four) Hours As Needed for Severe Pain., Disp: 18 tablet, Rfl: 0    Insulin Aspart (novoLOG) 100 UNIT/ML injection, Inject  under the  skin into the appropriate area as directed 3 (Three) Times a Day Before Meals. Sliding scale, Disp: , Rfl:     insulin glargine (LANTUS, SEMGLEE) 100 UNIT/ML injection, Inject 28-32 Units under the skin into the appropriate area as directed Daily., Disp: , Rfl:     levETIRAcetam (KEPPRA) 500 MG tablet, Take 1 tablet by mouth 2 (Two) Times a Day., Disp: , Rfl:     multivitamin with minerals tablet tablet, Take 1 tablet by mouth Daily., Disp: 90 tablet, Rfl: 2    omeprazole (priLOSEC) 40 MG capsule, Take 1 capsule by mouth Daily., Disp: 90 capsule, Rfl: 0    pancrelipase, Lip-Prot-Amyl, (CREON) 54466-27985 units capsule delayed-release particles capsule, Take 3 capsules by mouth 3 (Three) Times a Day With Meals., Disp: , Rfl:      Allergies   Allergen Reactions    Basaglar Kwikpen [Insulin Glargine] Other (See Comments)     Injection bruising    Gemfibrozil Rash        Past Surgical History:   Procedure Laterality Date    APPENDECTOMY      CHOLECYSTECTOMY      COLONOSCOPY      NEG = 2016, rech 2026     GSI    LUMBAR DISCECTOMY Right 2/17/2025    Procedure: Right far lateral lumbar 4 5 microdiscectomy;  Surgeon: Edmond Hernandez IV, MD;  Location: [facility] MAIN OR;  Service: Neurosurgery;  Laterality: Right;        Family History   Problem Relation Age of Onset    Cerebral aneurysm Mother     Early death Mother         Anurism    Hypertension Father     Cancer Father 60        Bladder    Diabetes Father     Hyperlipidemia Father     Hypertension Brother     Hyperlipidemia Brother          Social Hx:  Social History     Tobacco Use   Smoking Status Never    Passive exposure: Never   Smokeless Tobacco Never      Alcohol Use: Not At Risk (1/7/2025)    AUDIT-C     Frequency of Alcohol Consumption: Never     Average Number of Drinks: Patient does not drink     Frequency of Binge Drinking: Never      Social History     Substance and Sexual Activity   Drug Use Not Currently    Types: Hydrocodone, Marijuana          Review of Systems  "  Constitutional:  Positive for activity change. Negative for fever.   Respiratory:  Negative for chest tightness and shortness of breath.    Cardiovascular:  Negative for chest pain.   Musculoskeletal:  Positive for back pain and myalgias.   Neurological:  Positive for numbness. Negative for weakness.         Objective     Pulse 108   Resp 18   Ht 188 cm (74\")   Wt 77.1 kg (170 lb)   SpO2 95%   BMI 21.83 kg/m²    Body mass index is 21.83 kg/m².      Physical Exam  Vitals reviewed.   Eyes:      Extraocular Movements: Extraocular movements intact.      Conjunctiva/sclera: Conjunctivae normal.      Pupils: Pupils are equal, round, and reactive to light.   Musculoskeletal:         General: Normal range of motion.      Cervical back: Normal range of motion.   Skin:     General: Skin is warm and dry.   Neurological:      General: No focal deficit present.   Psychiatric:         Mood and Affect: Mood normal.          Neurological Exam  Mental Status  Awake, alert and oriented to person, place and time. Oriented to person, place and time. Language is fluent with no aphasia.    Cranial Nerves  CN III, IV, VI: Extraocular movements intact bilaterally. Pupils equal round and reactive to light bilaterally.    Motor  Normal muscle bulk throughout.                                               Right                     Left   Iliopsoas                               5                          5   Quadriceps                           5                          5   Hamstring                             5                          5   Gastrocnemius                     5                           5   Anterior tibialis                      5                          5   Posterior tibialis                    5                          5    Sensory  Sensation is intact to light touch, pinprick, vibration and proprioception in all four extremities.    Gait    Patient walks without difficulty.          Results Review  I personally " reviewed and interpreted the images from the following studies:          XR Spine Lumbar 2 or 3 View    Result Date: 2/17/2025  This procedure was auto-finalized with no dictation required.       Assessment & Plan     MDM: Mitchell Jacome is a 59 y.o. male who presents to clinic for 2-week postop appointment.  Overall, patient is doing very well and has very little complaints during this visit.    On his physical examination patient has good strength in his lower extremities.  Patient's incision looks really good and is healing very nicely.  At this time I told the patient that he is now able to take a shower without covering his incision.  He should not be lifting more than 10 pounds and should continue to be cognizant of bending, lifting and twisting.  He is now to start physical therapy.  I encouraged the patient to walk as much as possible.    The patient is to follow-up in 10 weeks for his 3-month follow-up with .  The patient did not request any medication refills during this visit.  I told the patient that should he need any refills he is more than welcome to call.  I also told that should he have any questions or concerns please call the office.  He is agreeable to this plan and knows to call any questions or concerns.       Diagnosis Plan   1. S/P lumbar microdiscectomy  Ambulatory Referral to Physical Therapy for Evaluation & Treatment          Return 3-month follow-up, for Next scheduled follow up.      Mitchell Jacome  reports that he has never smoked. He has never been exposed to tobacco smoke. He has never used smokeless tobacco.          BMI is within normal parameters. No other follow-up for BMI required.         This patient was examined wearing appropriate personal protective equipment.            Rita Alvarado PA-C    03/03/25  10:51 EST      Part of this note may be an electronic transcription/translation of spoken language to printed text using the Dragon Dictation  System.

## 2025-02-21 ENCOUNTER — TELEPHONE (OUTPATIENT)
Dept: NEUROSURGERY | Facility: CLINIC | Age: 60
End: 2025-02-21

## 2025-02-21 NOTE — TELEPHONE ENCOUNTER
The [facility] received a fax that requires your attention. The document has been indexed to the patient’s chart for your review.      Reason for sending: RQST FOR MED INFO FORM TO BE COMPLETED    Documents Description:  MED RQST FORM [NEUROSURG]_[facility]_2.20.25    Name of Sender: LANDRY Alba VIVEK    Date Indexed: 2/20/25    Notes (if needed): MED INFO DUE DATE 3/13/25

## 2025-02-27 NOTE — TELEPHONE ENCOUNTER
The [facility] received a fax that requires your attention. The document has been indexed to the patient’s chart for your review.        Reason for sending: URGENTY-RQST FOR MED INFO FORM & DISABILITY & LEAVE HLTH CARE PROVIDER STATEMENT TO BE COMPLETED     Documents Description:  MED RQST FORM &DISABILITY AND LEAVE HLTHCRE PROVIDER STATEMENT_[employer]_2.27.25     Name of Sender: [employer] -GERONIMO CALLAHAN,RN     Date Indexed: 2/27/25     Notes (if needed):

## 2025-03-03 ENCOUNTER — OFFICE VISIT (OUTPATIENT)
Dept: NEUROSURGERY | Facility: CLINIC | Age: 60
End: 2025-03-03

## 2025-03-03 VITALS
HEART RATE: 108 BPM | BODY MASS INDEX: 21.82 KG/M2 | OXYGEN SATURATION: 95 % | RESPIRATION RATE: 18 BRPM | WEIGHT: 170 LBS | HEIGHT: 74 IN

## 2025-03-03 DIAGNOSIS — Z98.890 S/P LUMBAR MICRODISCECTOMY: Primary | ICD-10-CM

## 2025-03-03 PROCEDURE — 99024 POSTOP FOLLOW-UP VISIT: CPT

## 2025-03-10 ENCOUNTER — TREATMENT (OUTPATIENT)
Dept: PHYSICAL THERAPY | Facility: CLINIC | Age: 60
End: 2025-03-10
Payer: OTHER GOVERNMENT

## 2025-03-10 DIAGNOSIS — Z98.890 S/P LUMBAR MICRODISCECTOMY: Primary | ICD-10-CM

## 2025-03-10 DIAGNOSIS — M54.50 LUMBAR PAIN: ICD-10-CM

## 2025-03-10 PROCEDURE — 97110 THERAPEUTIC EXERCISES: CPT | Performed by: PHYSICAL THERAPIST

## 2025-03-10 PROCEDURE — 97162 PT EVAL MOD COMPLEX 30 MIN: CPT | Performed by: PHYSICAL THERAPIST

## 2025-03-10 PROCEDURE — 97535 SELF CARE MNGMENT TRAINING: CPT | Performed by: PHYSICAL THERAPIST

## 2025-03-10 NOTE — PROGRESS NOTES
Physical Therapy Initial Evaluation and Plan of Care      Patient: Mitchell Jacome   : 1965  Primary diagnosis/ICD-10 Code:  S/P lumbar microdiscectomy [Z98.890]  Referring practitioner: Rita Alvarado PA-C  Date of Initial Visit: 3/10/2025  Mitchell Jacome was seen by Magdiel Silveira PT at [facility] PHYSICAL THERAPY  [address]  [place] [address]  Fax [phone]  Phone [phone]   Today's Date: 3/10/2025  Patient seen for 1 sessions    Visit Diagnoses:     ICD-10-CM ICD-9-CM   1. S/P lumbar microdiscectomy  Z98.890 V45.89   2. Lumbar pain  M54.50 724.2              Subjective Questionnaire: Oswestry: 48% disability      Subjective Evaluation    History of Present Illness  Date of surgery: 2025  Mechanism of injury: 60 y/o male s/p lumbar discectomy on : c/o pain in Lumbar region; R posterior thigh; wraps around leg with numb patch (leg).  No  lifting >10 lbs; no BLT's. Pain has reduced as compared to surgery and seems to be improving.     Bowel/bladder: normal    PMH and pertinent information reviewed in Epic.          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 He works and drives.  Lives with: spouse    Hand dominance: right    Diagnostic Tests  X-ray: abnormal  MRI studies: abnormal    Treatments  Previous treatment: physical therapy, medication and injection treatment  Patient Goals  Patient goals for therapy:  increased strength, decreased pain, increased motion, independence with ADLs/IADLs, return to work and return to sport/leisure activities  Patient goal: return to walking in hoods; run chainsaw           Objective        Special Questions      Additional Special Questions  N/a      Postural Observations  Seated posture: fair  Standing posture: fair    Additional Postural Observation Details  Tissue bulge R LB with well-healed incision with no signs of infection.    Neurological Testing     Sensation     Lumbar     Right   Diminished: light touch    Comments   Right light touch: lateral proximal leg    Reflexes   Left   Patellar (L4): normal (2+)  Achilles (S1): trace (1+)    Right   Patellar (L4): normal (2+)  Achilles (S1): trace (1+)    Active Range of Motion     Additional Active Range of Motion Details  Deferred secondary to surgery.    Strength/Myotome Testing     Lumbar   Left   Normal strength    Right Hip   Planes of Motion   Flexion: 5    Right Knee   Flexion: 5  Extension: 4- (atrophy noted R quad)    Right Ankle/Foot   Dorsiflexion: 4-  Great toe extension: 4    Muscle Activation     Additional Muscle Activation Details  Fair muscle activation with cues.    Lumbar Flexibility Comments:   Decreased flexibility: HS, hip flexor and rotators B         Access Code: [email or web address]  URL: [email or web address]  Date: 03/10/2025  Prepared by: Magdiel Silveira    Exercises  - Hooklying Transversus Abdominis Palpation  - 1 x daily - 7 x weekly - 1 sets - 3 reps - 5 sec hold  - Supine March  - 1 x daily - 7 x weekly - 2 sets - 10 reps  - Supine Hip Adduction Isometric with Ball  - 1 x daily - 7 x weekly - 2 sets - 10 reps - 5 sec hold  - Hooklying Isometric Hip Abduction with Belt  - 1 x daily - 7 x weekly - 2 sets - 10 reps - 5 sec hold    . Self- care/educations: Additional Rx: reviewed use of pillow for lumbar support of spine; advised to avoid any bending, lifting, and twisting; sleep w/ pillow b/w LE's on R/L  side; avoid exacerbating positions; discussed central vs peripheral pain pattern; lifting techniques and general back care; posture.      Assessment & Plan       Assessment  Impairments: abnormal muscle firing, abnormal muscle tone, abnormal or restricted ROM, activity intolerance, impaired physical strength, lacks appropriate home exercise program and pain with function   Other impairment: sleeping  Functional limitations: lifting, sleeping, pulling, pushing and sitting   Assessment details: The patient is a 59 y.o. male who presents to physical therapy today for therapy s/p lumbar microdiscectomy on 2/191/25. Upon initial evaluation, the patient demonstrates the following impairments: pain; weakness R LE; impaired spinal mobility secondary to post-op restrictions; difficulty sleeping due to positional intolerance/pain.      Due to these impairments, the patient is unable to perform or has difficulty with the following functional tasks: Work; lift >10 lbs; No BLT activities; exercise or home/yard tasks. The patient would benefit from skilled PT services to address functional limitations and impairments and to improve patient quality of life.      Prognosis: good    Goals  Plan Goals: STG: 3 weeks  1. Pt will be independent and compliant with initial HEP in 2 weeks.  2. Pt will report a 25% improvement in symptoms since starting therapy in 2 weeks.  3. Pt will report pain level at worst <6 during walking activity in 3 weeks.  4. Pt will show improvement in body mechanics when lifting and sitting in 2 weeks in order to decrease strain on back.     LTG: by d/c  1. Pt will be independent with final HEP for self-management of condition by DC.  2. Pt will improve score on Oswesty to less than 15% by DC.   3. Pt will report a 75% improvement in symptoms by DC in order to allow return to PLOF and work.  4. Pt will improve lumbar AROM to WFL's in order to complete ADLs with improved function by DC.   5. Voice readiness for  discharge.      Plan  Therapy options: will be seen for skilled therapy services  Planned modality interventions: cryotherapy, electrical stimulation/Russian stimulation, TENS, traction, thermotherapy (hydrocollator packs) and ultrasound  Planned therapy interventions: abdominal trunk stabilization, body mechanics training, flexibility, functional ROM exercises, gait training, joint mobilization, home exercise program, manual therapy, neuromuscular re-education, postural training, soft tissue mobilization, spinal/joint mobilization, strengthening, stretching, therapeutic activities and motor coordination training  Treatment plan discussed with: patient        History # of Personal Factors and/or Comorbidities: MODERATE (1-2)  Examination of Body System(s): # of elements: MODERATE (3)  Clinical Presentation: EVOLVING  Clinical Decision Making: MODERATE      Timed:         Manual Therapy:         mins  93515;     Therapeutic Exercise:    15     mins  82600;     Neuromuscular Juli:        mins  13533;    Therapeutic Activity:         mins  08590;     Gait Training:           mins  23818;     Ultrasound:          mins  19964;    Ionto                                   mins   17658  Self Care                        10   mins   29168  Aquatic                               mins 23529      Un-Timed:  Canalith Repositioning __ mins 69258  Electrical Stimulation:         mins  50467 ( );  Dry Needling          mins self-pay  Traction          mins 45642  Low Eval          Mins  81415  Mod Eval      35    Mins  77950  High Eval                            Mins  80152  Re-Eval                               mins  66144        Timed Treatment:  25    mins   Total Treatment:     60   mins    PT SIGNATURE: Magdiel Silveira PT, DPT  [place] License#: [id number]       Electronically signed by Magdiel Silveira PT, 03/10/25, 1:21 PM EDT      Initial Certification  Certification Period:  3/10/2025 thru 6/7/2025  I certify that the therapy  services are furnished while this patient is under my care.  The services outlined above are required by this patient, and will be reviewed every 90 days.       Physician Signature:__________________________________________________    PHYSICIAN: Rita Alvarado PA-C      DATE:     Please sign and return via fax to [phone].. Thank you, [facility].

## 2025-03-17 ENCOUNTER — TREATMENT (OUTPATIENT)
Dept: PHYSICAL THERAPY | Facility: CLINIC | Age: 60
End: 2025-03-17
Payer: OTHER GOVERNMENT

## 2025-03-17 DIAGNOSIS — Z98.890 S/P LUMBAR MICRODISCECTOMY: ICD-10-CM

## 2025-03-17 DIAGNOSIS — M54.50 LUMBAR PAIN: Primary | ICD-10-CM

## 2025-03-17 NOTE — PROGRESS NOTES
Physical Therapy Daily Note      Mitchell Jacome was seen by Magdiel Silveira, PT at [facility] PHYSICAL THERAPY  [address]  [place] [address]  Fax [phone]  Phone [phone]       Diagnosis Plan   1. Lumbar pain        2. S/P lumbar microdiscectomy            VISIT#: 2  Referring practitioner: Rita Alvarado PA-C    Subjective   Mitchell Jacome reports: slow improvement; walking daily to barn and back for exercise.      Objective     See Exercise, Manual, and Modality Logs for complete treatment.     Patient Education: HEP    Access Code: [email or web address]  URL: [email or web address]  Date: 03/17/2025  Prepared by: Magdiel Silveira    Exercises  - Supine Sciatic Nerve Glide  - 1-2 x daily - 7 x weekly - 1 sets - 10 reps    Assessment/Plan  - issued sciatic N glide to address distal issues. Advised to discontinue if it worsens symptoms.  - focused on core stabilization and reducing R LE symptoms to facilitate functional improvement with walking; reaching; squatting and eventually lifting.  .  Goals  Plan Goals: STG: 3 weeks  1. Pt will be independent and compliant with initial HEP in 2 weeks.  2. Pt will report a 25% improvement in symptoms since starting therapy in 2 weeks.  3. Pt will report pain level at worst <6 during walking activity in 3 weeks.  4. Pt will show improvement in body mechanics when lifting and sitting in 2 weeks in order to decrease strain on back.      LTG: by d/c  1. Pt will be independent with final HEP for self-management of condition by DC.  2. Pt will improve score on Oswesty to less than 15% by DC.   3. Pt will report a 75% improvement in symptoms by DC in order to allow return to PLOF and work.  4. Pt will improve lumbar AROM to WFL's in order to complete ADLs with improved function by DC.   5. Voice readiness for discharge.       Progress strengthening /stabilization /functional activity            Timed:         Manual Therapy:          mins  77895;     Therapeutic Exercise:   20      mins  01552;     Neuromuscular Juli:   10     mins  60262;    Therapeutic Activity:      10    mins  14662;     Gait Training:           mins  95281;     Ultrasound:          mins  03805;    Ionto                                   mins   57677  Self Care                            mins   72249  Aquatic                               mins 77486    Un-Timed:  Canalith Repos                   mins  82581  Electrical Stimulation:         mins  02421 ( );  Dry Needling          mins self-pay  Traction          mins 39624  Low Eval          Mins  63040  Mod Eval          Mins  37266  High Eval                            Mins  66026  Re-Eval                               mins  14111    Timed Treatment:  40    mins   Total Treatment:     40   mins    Magdiel Silveira PT PT, DPT, [place] License #: [id number]

## 2025-03-19 ENCOUNTER — TREATMENT (OUTPATIENT)
Dept: PHYSICAL THERAPY | Facility: CLINIC | Age: 60
End: 2025-03-19
Payer: OTHER GOVERNMENT

## 2025-03-19 DIAGNOSIS — Z98.890 S/P LUMBAR MICRODISCECTOMY: ICD-10-CM

## 2025-03-19 DIAGNOSIS — M54.50 LUMBAR PAIN: Primary | ICD-10-CM

## 2025-03-19 NOTE — PROGRESS NOTES
Physical Therapy Daily Note      Mitchell Jacome was seen by Magdiel Silveira, PT at [facility] PHYSICAL THERAPY  [address]  [place] [address]  Fax [phone]  Phone [phone]       Diagnosis Plan   1. Lumbar pain        2. S/P lumbar microdiscectomy            VISIT#: 3  Referring practitioner: Rita Alvarado PA-C    Subjective   Mitchell Jacome reports: on feet shopping this morning with slight increase in LBP      Objective     See Exercise, Manual, and Modality Logs for complete treatment.     Patient Education: HEP    Access Code: [email or web address]  URL: [email or web address]  Date: 03/17/2025  Prepared by: Magdiel Silveira    Exercises  - Supine Sciatic Nerve Glide  - 1-2 x daily - 7 x weekly - 1 sets - 10 reps    Assessment/Plan  - utilized Nustep due to slight increase in LBP due to prolonged walking.  - initiated therapy 10 min earlier than appt time.  - focused on core stabilization and reducing R LE symptoms to facilitate functional improvement with walking; reaching; squatting and eventually lifting.  - reviewed HEP frequency with patient.  .  Goals  Plan Goals: STG: 3 weeks  1. Pt will be independent and compliant with initial HEP in 2 weeks.  2. Pt will report a 25% improvement in symptoms since starting therapy in 2 weeks.  3. Pt will report pain level at worst <6 during walking activity in 3 weeks.  4. Pt will show improvement in body mechanics when lifting and sitting in 2 weeks in order to decrease strain on back.      LTG: by d/c  1. Pt will be independent with final HEP for self-management of condition by DC.  2. Pt will improve score on Oswesty to less than 15% by DC.   3. Pt will report a 75% improvement in symptoms by DC in order to allow return to PLOF and work.  4. Pt will improve lumbar AROM to WFL's in order to complete ADLs with improved function by DC.   5. Voice readiness for discharge.       Progress strengthening /stabilization  /functional activity            Timed:         Manual Therapy:         mins  23689;     Therapeutic Exercise:   20      mins  72543;     Neuromuscular Juli:   15     mins  54293;    Therapeutic Activity:      10    mins  88925;     Gait Training:           mins  36482;     Ultrasound:          mins  70573;    Ionto                                   mins   48594  Self Care                            mins   95579  Aquatic                               mins 37434    Un-Timed:  Canalith Repos                   mins  96932  Electrical Stimulation:         mins  57464 ( );  Dry Needling          mins self-pay  Traction          mins 82286  Low Eval          Mins  25599  Mod Eval          Mins  92068  High Eval                            Mins  39034  Re-Eval                               mins  48776    Timed Treatment:  45   mins   Total Treatment:     45   mins    Magdiel Silveira PT PT, DPT, [place] License #: [id number]

## 2025-03-24 ENCOUNTER — TREATMENT (OUTPATIENT)
Dept: PHYSICAL THERAPY | Facility: CLINIC | Age: 60
End: 2025-03-24
Payer: OTHER GOVERNMENT

## 2025-03-24 DIAGNOSIS — M54.50 LUMBAR PAIN: Primary | ICD-10-CM

## 2025-03-24 DIAGNOSIS — Z98.890 S/P LUMBAR MICRODISCECTOMY: ICD-10-CM

## 2025-03-24 PROCEDURE — 97110 THERAPEUTIC EXERCISES: CPT | Performed by: PHYSICAL THERAPIST

## 2025-03-24 PROCEDURE — 97112 NEUROMUSCULAR REEDUCATION: CPT | Performed by: PHYSICAL THERAPIST

## 2025-03-24 PROCEDURE — 97530 THERAPEUTIC ACTIVITIES: CPT | Performed by: PHYSICAL THERAPIST

## 2025-03-24 NOTE — PROGRESS NOTES
Physical Therapy Daily Note      Mitchell Jacome was seen by Magdiel Silveira, PT at [facility] PHYSICAL THERAPY  [address]  [place] [address]  Fax [phone]  Phone [phone]       Diagnosis Plan   1. Lumbar pain        2. S/P lumbar microdiscectomy            VISIT#: 4  Referring practitioner: Rita Alvarado PA-C    Subjective   Mitchell Jacome reports: still having some pain in R LB, less in LE; sits in recliner for long periods of time during day.      Objective     See Exercise, Manual, and Modality Logs for complete treatment.     Patient Education: HEP  Advised to begin progressive walking program: start at 10 min with progression to 30 min daily as tolerated: level surface in park.  Educated on lumbar support for LB support: advised to use in recliner.    Assessment/Plan  - returned to TM with improved tolerance  - initiated therapy 10 min earlier than appt time.  - focused on core stabilization and reducing R LE symptoms to facilitate functional improvement with walking; reaching; squatting and eventually lifting.  - reviewed above and progressed HEP with progressive walking program.  .  Goals  Plan Goals: STG: 3 weeks  1. Pt will be independent and compliant with initial HEP in 2 weeks.  2. Pt will report a 25% improvement in symptoms since starting therapy in 2 weeks.  3. Pt will report pain level at worst <6 during walking activity in 3 weeks.  4. Pt will show improvement in body mechanics when lifting and sitting in 2 weeks in order to decrease strain on back.      LTG: by d/c  1. Pt will be independent with final HEP for self-management of condition by DC.  2. Pt will improve score on Oswesty to less than 15% by DC.   3. Pt will report a 75% improvement in symptoms by DC in order to allow return to PLOF and work.  4. Pt will improve lumbar AROM to WFL's in order to complete ADLs with improved function by DC.   5. Voice readiness for discharge.        Progress strengthening /stabilization /functional activity            Timed:         Manual Therapy:         mins  23696;     Therapeutic Exercise:   20      mins  60382;     Neuromuscular Juli:   15     mins  86273;    Therapeutic Activity:      10    mins  75691;     Gait Training:           mins  28919;     Ultrasound:          mins  80982;    Ionto                                   mins   41493  Self Care                            mins   41426  Aquatic                               mins 50428    Un-Timed:  Canalith Repos                   mins  33506  Electrical Stimulation:         mins  19346 ( );  Dry Needling          mins self-pay  Traction          mins 09776  Low Eval          Mins  41576  Mod Eval          Mins  54440  High Eval                            Mins  86800  Re-Eval                               mins  61875    Timed Treatment:  45   mins   Total Treatment:     45   mins    Magdiel Silveira PT PT, DPT, [place] License #: [id number]

## 2025-03-26 ENCOUNTER — TREATMENT (OUTPATIENT)
Dept: PHYSICAL THERAPY | Facility: CLINIC | Age: 60
End: 2025-03-26
Payer: OTHER GOVERNMENT

## 2025-03-26 DIAGNOSIS — M54.50 LUMBAR PAIN: Primary | ICD-10-CM

## 2025-03-26 DIAGNOSIS — Z98.890 S/P LUMBAR MICRODISCECTOMY: ICD-10-CM

## 2025-03-26 NOTE — PROGRESS NOTES
Physical Therapy Daily Note      Mitchell Jacome was seen by Magdiel Silveira, PT at [facility] PHYSICAL THERAPY  [address]  [place] [address]  Fax [phone]  Phone [phone]       Diagnosis Plan   1. Lumbar pain        2. S/P lumbar microdiscectomy            VISIT#: 5  Referring practitioner: Rita Alvarado PA-C    Subjective   Mitchell Jacome reports: no change; ordered lumbar pillow for support; states bring knee to chest relieves pain.      Objective     See Exercise, Manual, and Modality Logs for complete treatment.     Patient Education: HEP  Access Code: [email or web address]  URL: [email or web address]  Date: 03/26/2025  Prepared by: Magdiel Silveira    Exercises  - Hooklying Single Knee to Chest  - 2 x daily - 7 x weekly - 1 sets - 10 reps - 10 sec hold    Assessment/Plan  - returned to TM with improved tolerance  - initiated therapy 10 min earlier than appt time.  - focused on core stabilization and reducing R LE symptoms to facilitate functional improvement with walking; reaching; squatting and eventually lifting.  - discontinued neural stretch and replaced with single knee to chest   .  Goals  Plan Goals: STG: 3 weeks  1. Pt will be independent and compliant with initial HEP in 2 weeks.  2. Pt will report a 25% improvement in symptoms since starting therapy in 2 weeks.  3. Pt will report pain level at worst <6 during walking activity in 3 weeks.  4. Pt will show improvement in body mechanics when lifting and sitting in 2 weeks in order to decrease strain on back.      LTG: by d/c  1. Pt will be independent with final HEP for self-management of condition by DC.  2. Pt will improve score on Oswesty to less than 15% by DC.   3. Pt will report a 75% improvement in symptoms by DC in order to allow return to PLOF and work.  4. Pt will improve lumbar AROM to WFL's in order to complete ADLs with improved function by DC.   5. Voice readiness for discharge.        Progress strengthening /stabilization /functional activity            Timed:         Manual Therapy:         mins  99466;     Therapeutic Exercise:   20      mins  21945;     Neuromuscular Juli:   15     mins  70666;    Therapeutic Activity:      10    mins  45696;     Gait Training:           mins  14264;     Ultrasound:          mins  07073;    Ionto                                   mins   67433  Self Care                            mins   03827  Aquatic                               mins 60357    Un-Timed:  Canalith Repos                   mins  48227  Electrical Stimulation:         mins  29211 ( );  Dry Needling          mins self-pay  Traction          mins 85523  Low Eval          Mins  31013  Mod Eval          Mins  62298  High Eval                            Mins  58260  Re-Eval                               mins  76755    Timed Treatment:  45   mins   Total Treatment:     45   mins    Magdiel Silveira PT PT, DPT, [place] License #: [id number]

## 2025-03-31 ENCOUNTER — TELEPHONE (OUTPATIENT)
Dept: PHYSICAL THERAPY | Facility: OTHER | Age: 60
End: 2025-03-31
Payer: OTHER GOVERNMENT

## 2025-03-31 NOTE — TELEPHONE ENCOUNTER
Caller: Mitchell Jacome    Relationship: Self    What was the call regarding: PATIENT CANCELLED TODAY DUE TO TRANSPORTATION ISSUES

## 2025-04-02 ENCOUNTER — TREATMENT (OUTPATIENT)
Dept: PHYSICAL THERAPY | Facility: CLINIC | Age: 60
End: 2025-04-02
Payer: OTHER GOVERNMENT

## 2025-04-02 DIAGNOSIS — M54.50 LUMBAR PAIN: Primary | ICD-10-CM

## 2025-04-02 DIAGNOSIS — Z98.890 S/P LUMBAR MICRODISCECTOMY: ICD-10-CM

## 2025-04-02 NOTE — PROGRESS NOTES
Physical Therapy Daily Note      Mitchell Jacome was seen by Magdiel Silveira, PT at [facility] PHYSICAL THERAPY  [address]  [place] [address]  Fax [phone]  Phone [phone]       Diagnosis Plan   1. Lumbar pain        2. S/P lumbar microdiscectomy            VISIT#: 6  Referring practitioner: Rita Alvarado PA-C    Subjective   Mitchell Jacome reports: states MD said it would take awhile to heal due to length of time prior to injury: better since surgery, but no change since therapy. No difference in pain without neural glide in HEP.      Objective     See Exercise, Manual, and Modality Logs for complete treatment.     Patient Education: HEP  Access Code: [email or web address]  URL: [email or web address]  Date: 03/26/2025  Prepared by: Magdiel Silveira    Exercises  - Hooklying Single Knee to Chest  - 2 x daily - 7 x weekly - 1 sets - 10 reps - 10 sec hold    Assessment/Plan  - resumed neural glide and added bias with IR of LE  - initiated therapy 10 min earlier than appt time.  - focused on core stabilization and reducing R LE symptoms to facilitate functional improvement with walking; reaching; squatting and eventually lifting.  - Imbalance and pain with high stepping  - discontinued.       Goals  Plan Goals: STG: 3 weeks  1. Pt will be independent and compliant with initial HEP in 2 weeks.  2. Pt will report a 25% improvement in symptoms since starting therapy in 2 weeks.  3. Pt will report pain level at worst <6 during walking activity in 3 weeks.  4. Pt will show improvement in body mechanics when lifting and sitting in 2 weeks in order to decrease strain on back.      LTG: by d/c  1. Pt will be independent with final HEP for self-management of condition by DC.  2. Pt will improve score on Oswesty to less than 15% by DC.   3. Pt will report a 75% improvement in symptoms by DC in order to allow return to PLOF and work.  4. Pt will improve lumbar AROM to WFL's in  order to complete ADLs with improved function by DC.   5. Voice readiness for discharge.       Progress strengthening /stabilization /functional activity            Timed:         Manual Therapy:         mins  85626;     Therapeutic Exercise:   20      mins  20551;     Neuromuscular Juli:   15     mins  75227;    Therapeutic Activity:      10    mins  86855;     Gait Training:           mins  30342;     Ultrasound:          mins  33009;    Ionto                                   mins   31135  Self Care                            mins   05010  Aquatic                               mins 42278    Un-Timed:  Canalith Repos                   mins  63237  Electrical Stimulation:         mins  78951 ( );  Dry Needling          mins self-pay  Traction          mins 15785  Low Eval          Mins  06065  Mod Eval          Mins  12183  High Eval                            Mins  65666  Re-Eval                               mins  87848    Timed Treatment:  50   mins   Total Treatment:     50   mins    Magdiel Silveira PT PT, DPT, [place] License #: [id number]

## 2025-04-07 ENCOUNTER — TREATMENT (OUTPATIENT)
Dept: PHYSICAL THERAPY | Facility: CLINIC | Age: 60
End: 2025-04-07
Payer: OTHER GOVERNMENT

## 2025-04-07 DIAGNOSIS — M54.50 LUMBAR PAIN: Primary | ICD-10-CM

## 2025-04-07 DIAGNOSIS — Z98.890 S/P LUMBAR MICRODISCECTOMY: ICD-10-CM

## 2025-04-07 PROCEDURE — 97110 THERAPEUTIC EXERCISES: CPT | Performed by: PHYSICAL THERAPIST

## 2025-04-07 PROCEDURE — 97112 NEUROMUSCULAR REEDUCATION: CPT | Performed by: PHYSICAL THERAPIST

## 2025-04-07 NOTE — PROGRESS NOTES
Physical Therapy Daily Note      Mitchell Jacome was seen by Magdiel Silveira, PT at [facility] PHYSICAL THERAPY  [address]  [place] [address]  Fax [phone]  Phone [phone]       Diagnosis Plan   1. Lumbar pain        2. S/P lumbar microdiscectomy              VISIT#: 7  Referring practitioner: Rita Alvarado PA-C    Subjective   Mitchell Jacome reports: no change since last visit.    Objective     See Exercise, Manual, and Modality Logs for complete treatment.     Patient Education: HEP  Access Code: [email or web address]  URL: [email or web address]  Date: 04/07/2025  Prepared by: Magdiel Silveira    Exercises  - Quadruped Cat Cow  - 1 x daily - 7 x weekly - 2 sets - 10 reps    Assessment/Plan  - resumed neural glide and added bias with IR of LE  - focused on core stabilization and reducing R LE symptoms to facilitate functional improvement with walking; reaching; squatting and eventually lifting.  - Imbalance and pain with high stepping  - less pain with high-steping: verbal cues to avoid lifting leg as high.  - updated HEP.  - waiting on auth date to be extended. Will continue therapy or discharge as appropriate.       Goals  Plan Goals: STG: 3 weeks  1. Pt will be independent and compliant with initial HEP in 2 weeks.  2. Pt will report a 25% improvement in symptoms since starting therapy in 2 weeks.  3. Pt will report pain level at worst <6 during walking activity in 3 weeks.  4. Pt will show improvement in body mechanics when lifting and sitting in 2 weeks in order to decrease strain on back.      LTG: by d/c  1. Pt will be independent with final HEP for self-management of condition by DC.  2. Pt will improve score on Oswesty to less than 15% by DC.   3. Pt will report a 75% improvement in symptoms by DC in order to allow return to PLOF and work.  4. Pt will improve lumbar AROM to WFL's in order to complete ADLs with improved function by DC.   5. Voice readiness  for discharge.       Progress strengthening /stabilization /functional activity            Timed:         Manual Therapy:         mins  58963;     Therapeutic Exercise:   20      mins  52127;     Neuromuscular Juli:   10     mins  23728;    Therapeutic Activity:    5      mins  78089;     Gait Training:           mins  26728;     Ultrasound:          mins  01251;    Ionto                                   mins   55064  Self Care                            mins   96208  Aquatic                               mins 65786    Un-Timed:  Canalith Repos                   mins  19990  Electrical Stimulation:         mins  05468 ( );  Dry Needling          mins self-pay  Traction          mins 47549  Low Eval          Mins  83937  Mod Eval          Mins  96775  High Eval                            Mins  10734  Re-Eval                               mins  13135    Timed Treatment:  35   mins   Total Treatment:     35   mins    Magdiel Silveira PT PT, DPT, [place] License #: [id number]

## 2025-04-16 ENCOUNTER — TREATMENT (OUTPATIENT)
Dept: PHYSICAL THERAPY | Facility: CLINIC | Age: 60
End: 2025-04-16
Payer: OTHER GOVERNMENT

## 2025-04-16 DIAGNOSIS — Z98.890 S/P LUMBAR MICRODISCECTOMY: ICD-10-CM

## 2025-04-16 DIAGNOSIS — M54.50 LUMBAR PAIN: Primary | ICD-10-CM

## 2025-04-16 PROCEDURE — 97110 THERAPEUTIC EXERCISES: CPT | Performed by: PHYSICAL THERAPIST

## 2025-04-16 PROCEDURE — 97112 NEUROMUSCULAR REEDUCATION: CPT | Performed by: PHYSICAL THERAPIST

## 2025-04-16 PROCEDURE — 97530 THERAPEUTIC ACTIVITIES: CPT | Performed by: PHYSICAL THERAPIST

## 2025-04-16 NOTE — PROGRESS NOTES
Physical Therapy Daily Treatment Note  [facility] Physical Therapy [place]  [address]  [place]. [address]  P: [phone] F: [phone]    Patient: Mitchell Jacome   : 1965  Referring practitioner: Rita Alvarado PA-C  Date of Initial Visit: Type: THERAPY  Noted: 3/10/2025  Today's Date: 2025  Patient seen for 8 sessions       Visit Diagnoses:    ICD-10-CM ICD-9-CM   1. Lumbar pain  M54.50 724.2   2. S/P lumbar microdiscectomy  Z98.890 V45.89         Subjective:  Mitchell Jacome reports: doing well.  Reports pain/soreness  4/10 currently. States pain in his Rt leg is there all the time, but does state it is better now compared to prior to surgery. Voices no other concerns.       Objective   See Exercise, Manual, and Modality Logs for complete treatment.  Started with TM ambulation. Followed by standing stretches and ex's and then supine there ex. Added some new balance activities.  Educated pt on seated RLE nerve  glide to do at home as well.       Assessment/Plan:  Pt with RLE pain throughout tx but pt is motivated and pushes self throughout session in order to improve strength and function.  Pt did well with new balance activities added this date.-will continue to address.            Timed:         Manual Therapy:         mins  29227;     Therapeutic Exercise:    21     mins  28683;     Neuromuscular Juli:   10     mins  76317;    Therapeutic Activity:     10     mins  55703;     Gait Training:           mins  10276;     Ultrasound:          mins  64741;    Self Care                            mins  49452      Un-Timed:  Electrical Stimulation:         mins  89619 ( );  Traction          mins 94093        Timed Treatment:   41   mins   Total Treatment:     41   mins    Claudia Swift PTA  [place] License #: [id number]    Physical Therapist Assistant

## 2025-04-25 ENCOUNTER — TREATMENT (OUTPATIENT)
Dept: PHYSICAL THERAPY | Facility: CLINIC | Age: 60
End: 2025-04-25
Payer: OTHER GOVERNMENT

## 2025-04-25 DIAGNOSIS — Z98.890 S/P LUMBAR MICRODISCECTOMY: ICD-10-CM

## 2025-04-25 DIAGNOSIS — M54.50 LUMBAR PAIN: Primary | ICD-10-CM

## 2025-04-25 PROCEDURE — 97530 THERAPEUTIC ACTIVITIES: CPT | Performed by: PHYSICAL THERAPIST

## 2025-04-25 PROCEDURE — 97110 THERAPEUTIC EXERCISES: CPT | Performed by: PHYSICAL THERAPIST

## 2025-04-25 PROCEDURE — 97112 NEUROMUSCULAR REEDUCATION: CPT | Performed by: PHYSICAL THERAPIST

## 2025-04-25 NOTE — PROGRESS NOTES
Physical Therapy Daily Treatment Note  [facility] Physical Therapy [place] [address]  [place]. [address]  P: [phone] F: [phone]    Patient: Mitchell Jacome   : 1965  Referring practitioner: Rita Alvarado PA-C  Date of Initial Visit: Type: THERAPY  Noted: 3/10/2025  Today's Date: 2025  Patient seen for 9 sessions       Visit Diagnoses:    ICD-10-CM ICD-9-CM   1. Lumbar pain  M54.50 724.2   2. S/P lumbar microdiscectomy  Z98.890 V45.89         Subjective:  Mitchell Jacome reports: that he went hunting for wild asparagus yesterday. Walking on a lot of uneven ground but states he did well, just tired and a little sore today.       Objective   See Exercise, Manual, and Modality Logs for complete treatment. Started with TM walking followed  by gait/balance activities on carpet. Ended with supine there ex.        Assessment/plan:  Pt is progressing well demo's better balance during gait activities with 0 LOB and 0 asst needed.  Seated RB's taken throughout due to RLE pain. Pt continues to be motivated to improve function and decrease pain .  Tolerated the addition of new balance activities given as well. -demo'ed ankle strategy during tandem stance on blue foam.             Timed:         Manual Therapy:         mins  68328;     Therapeutic Exercise:    22    mins  72904;     Neuromuscular Juli:    15    mins  56289;    Therapeutic Activity:          mins  30427;     Gait Training:        10   mins  29898;     Ultrasound:          mins  24294;    Self Care                            mins  81821      Un-Timed:  Electrical Stimulation:         mins  79850 ( );  Traction          mins 68514        Timed Treatment:   47   mins   Total Treatment:     47   mins    Claudia Swift PTA  [place] License #: [id number]    Physical Therapist Assistant

## 2025-04-30 ENCOUNTER — TREATMENT (OUTPATIENT)
Dept: PHYSICAL THERAPY | Facility: CLINIC | Age: 60
End: 2025-04-30
Payer: OTHER GOVERNMENT

## 2025-04-30 DIAGNOSIS — M54.50 LUMBAR PAIN: Primary | ICD-10-CM

## 2025-04-30 DIAGNOSIS — Z98.890 S/P LUMBAR MICRODISCECTOMY: ICD-10-CM

## 2025-04-30 PROCEDURE — 97110 THERAPEUTIC EXERCISES: CPT | Performed by: PHYSICAL THERAPIST

## 2025-04-30 PROCEDURE — 97530 THERAPEUTIC ACTIVITIES: CPT | Performed by: PHYSICAL THERAPIST

## 2025-04-30 PROCEDURE — 97112 NEUROMUSCULAR REEDUCATION: CPT | Performed by: PHYSICAL THERAPIST

## 2025-04-30 NOTE — PROGRESS NOTES
Physical Therapy Daily Treatment Note  [facility] Physical Therapy [place]  [address]  [place]. [address]  P: [phone] F: [phone]    Patient: Mitchell Jacome   : 1965  Referring practitioner: Rita Alvarado PA-C  Date of Initial Visit: Type: THERAPY  Noted: 3/10/2025  Today's Date: 2025  Patient seen for 10 sessions       Visit Diagnoses:    ICD-10-CM ICD-9-CM   1. Lumbar pain  M54.50 724.2   2. S/P lumbar microdiscectomy  Z98.890 V45.89         Subjective:  Mitchell Jacome reports: some soreness in his Rt leg that started yesterday.....made sleeping last night hard as he feels like he is being stabbed in the side of his hip.        Objective   See Exercise, Manual, and Modality Logs for complete treatment.  Started with treadmill ambulation as warm-up. Added in high stepping on carpet with opp hand/opp knee tap to challenge balance more. Continued with balance activities as last week.  Ended with supine there ex.       Assessment/Plan:  Pt progressing well with all activities.   Demos better ability to control movement during gait/balance activities. No assist req. Continues to need RB's due to RLE pain.  Will try some standing LE strengthening ex's at next visit depending on how pt feels.               Timed:         Manual Therapy:         mins  60580;     Therapeutic Exercise:    13     mins  62708;     Neuromuscular Juli:    15    mins  15887;    Therapeutic Activity:     15     mins  28295;     Gait Training:           mins  14944;     Ultrasound:          mins  84054;    Self Care                            mins  85020      Un-Timed:  Electrical Stimulation:         mins  66556 ( );  Traction          mins 28894        Timed Treatment:   43   mins   Total Treatment:     43   mins    Claudia Swift PTA  [place] License #: [id number]    Physical Therapist Assistant

## 2025-05-02 ENCOUNTER — TREATMENT (OUTPATIENT)
Dept: PHYSICAL THERAPY | Facility: CLINIC | Age: 60
End: 2025-05-02
Payer: OTHER GOVERNMENT

## 2025-05-02 DIAGNOSIS — M54.50 LUMBAR PAIN: Primary | ICD-10-CM

## 2025-05-02 DIAGNOSIS — Z98.890 S/P LUMBAR MICRODISCECTOMY: ICD-10-CM

## 2025-05-02 PROCEDURE — 97116 GAIT TRAINING THERAPY: CPT | Performed by: PHYSICAL THERAPIST

## 2025-05-02 PROCEDURE — 97110 THERAPEUTIC EXERCISES: CPT | Performed by: PHYSICAL THERAPIST

## 2025-05-02 PROCEDURE — 97112 NEUROMUSCULAR REEDUCATION: CPT | Performed by: PHYSICAL THERAPIST

## 2025-05-02 NOTE — PROGRESS NOTES
"Physical Therapy Daily Treatment Note  [facility] [place]  [address]  [place]. [address]  P: [phone] F: [phone]    Patient: Mitchell Jacome   : 1965  Referring practitioner: Rita Alvarado PA-C  Date of Initial Visit: Type: THERAPY  Noted: 3/10/2025  Today's Date: 2025  Patient seen for 11 sessions       Visit Diagnoses:    ICD-10-CM ICD-9-CM   1. Lumbar pain  M54.50 724.2   2. S/P lumbar microdiscectomy  Z98.890 V45.89         Subjective:  Mitchell Jacome reports: his \"usual\" Rt leg pain, but states it has not gotten any worse. No other concerns voiced.       Objective   See Exercise, Manual, and Modality Logs for complete treatment. Started with warm-up on treadmill. Seated RB's taken as needed. Increased reps on a few ex's today, of which pt tolerated well. Added in standing there ex today to help address LE strengthening. Continued with gait/balance activities on rug. Ended with a few supine stretches.       Assessment/Plan:  Pts Rt hip bothering him a little more today compared to previous sessions.  Pt continues to be motivated to do well and improve overall function and strength.  Pt has progressed with balance activities no longer needing any assist. Pt fatigued at the end of session, but no complaints of any increased pain.             Timed:         Manual Therapy:         mins  35720;     Therapeutic Exercise:    15     mins  96162;     Neuromuscular Juli:    18    mins  28934;    Therapeutic Activity:          mins  04932;     Gait Training:      15     mins  14061;     Ultrasound:          mins  28701;    Self Care                            mins  21528      Un-Timed:  Electrical Stimulation:         mins  64394 (MC );  Traction          mins 49619        Timed Treatment:   48   mins   Total Treatment:     53   mins    Claudia Swift PTA  [place] License #: [id number]    Physical Therapist Assistant      " F/u Spine appt scheduled with Dr. Kaiser on 6/16

## 2025-05-08 ENCOUNTER — TREATMENT (OUTPATIENT)
Dept: PHYSICAL THERAPY | Facility: CLINIC | Age: 60
End: 2025-05-08
Payer: OTHER GOVERNMENT

## 2025-05-08 DIAGNOSIS — Z98.890 S/P LUMBAR MICRODISCECTOMY: ICD-10-CM

## 2025-05-08 DIAGNOSIS — M54.50 LUMBAR PAIN: Primary | ICD-10-CM

## 2025-05-08 NOTE — PROGRESS NOTES
Re-Assessment / Re-Certification        Patient: Mitchell Jacome   : 1965  Diagnosis/ICD-10 Code:  Lumbar pain [M54.50]  Referring practitioner: Rita Alvarado PA-C  Date of Initial Visit: Type: THERAPY  Noted: 3/10/2025  Mitchell aJcome was seen by Magdiel Silveira PT at [facility]  [address]  [place] [address]  Fax [phone]  Phone [phone]   Today's Date: 2025  Patient seen for 12 sessions      Subjective:   Mitchell Jacome reports: still having pain in back and leg, but has improved since therapy: about 40% better. 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 To f/u with MD next week.    Subjective Questionnaire: Oswestry: 42% disability  Clinical Progress: improved  Home Program Compliance: Yes  Treatment has included: therapeutic exercise, neuromuscular re-education, therapeutic activity, electrical stimulation, moist heat, and cryotherapy    Subjective - see above    Objective        Special Questions      Additional Special Questions  N/a      Postural Observations  Seated posture: good  Standing posture: fair    Additional Postural Observation Details  Tissue bulge R LB with well-healed incision with no signs of infection: has reduced in size and sensitivity.    Neurological Testing     Sensation     Lumbar     Right   Diminished: light touch    Comments   Right light touch: lateral proximal leg    Reflexes   Left   Patellar (L4): normal (2+)  Achilles (S1): normal (2+)    Right   Patellar (L4): normal (2+)  Achilles (S1): normal (2+)    Active Range of Motion     Lumbar   Flexion: WFL  Extension: WFL and with pain    Additional Active Range of Motion Details  Flexion produces pain in LE; extension more centralized.    Strength/Myotome Testing     Lumbar   Left   Normal strength    Right Hip   Planes of Motion   Flexion: 5    Right Knee   Flexion:  5  Extension: 4+ (atrophy noted R quad)    Right Ankle/Foot   Dorsiflexion: 4-  Great toe extension: 4    Additional Strength Details  Mild imbalance noted with high stepping and SLS R LE.       Assessment/Plan  - has shown gradual, but slow improvement: less neural sensitivity in LE; improved tolerance to walking: some mild balance issues secondary to mld weakness in R LE.  - Goals met or progressing.  - would benefit from additional visits to focus on LTG's and returning to work.   - Deferred some prior exercises due to re-assess and time constraints.    Progress toward previous goals: Partially Met  Goals  Plan Goals: STG: 3 weeks  1. Pt will be independent and compliant with initial HEP in 2 weeks. MET  2. Pt will report a 25% improvement in symptoms since starting therapy in 2 weeks. MET  3. Pt will report pain level at worst <6 during walking activity in 3 weeks. NOT MET  4. Pt will show improvement in body mechanics when lifting and sitting in 2 weeks in order to decrease strain on back. MET - occasional verbal cues for posture.     LTG: by d/c  1. Pt will be independent with final HEP for self-management of condition by DC.  2. Pt will improve score on Oswesty to less than 15% by DC.   3. Pt will report a 75% improvement in symptoms by DC in order to allow return to PLOF and work.  4. Pt will improve lumbar AROM to WFL's in order to complete ADLs with improved function by DC.   5. Voice readiness for discharge.      Recommendations: Continue with recommendations add 12 visits to POC  Timeframe: 3 months  Prognosis to achieve goals: good    PT: Magdiel Silveira PT, DPT     [place] License #:  [id number]    Electronically signed by Magdiel Silveira PT, 05/08/25, 9:30 AM EDT    Certification Period: 5/8/2025 thru 8/5/2025  I certify that the therapy services are furnished while this patient is under my care.  The services outlined above are required by this patient, and will be reviewed every 90 days.         Physician  Signature:__________________________________________________    PHYSICIAN: Rita Alvarado PA-C      DATE:     Please sign and return via fax to [email or web address] . Thank you, [facility]      Timed:         Manual Therapy:         mins  73212;     Therapeutic Exercise:   20      mins  72983;     Neuromuscular Juli:  10      mins  40358;    Therapeutic Activity:    10      mins  34879;     Gait Training:           mins  87913;     Ultrasound:          mins  38893;    Ionto                                  mins   35641  Self Care                            mins   62817  Aquatic                               mins 01000      Un-Timed:  Canalith Repositioning __ mins 32601  Electrical Stimulation:         mins  93677 ( );  Dry Needling          mins self-pay  Traction          mins 32760  Low Eval          Mins  91989  Mod Eval          Mins  55896  High Eval                            Mins  63209  Re-Eval                               mins  67514      Timed Treatment:  40    mins   Total Treatment:     40   mins

## 2025-05-09 NOTE — PROGRESS NOTES
Subjective   History of Present Illness: Mitchell Jacome is a 59 y.o. male is here today for surgical follow-up. Today patient reports right sided buttock and hip pain with numbness in his right lower extremity.  Overall he seen significant improvement from preop.  He does have some residual numbness and tingling as well as some pain that will radiate down his leg.  Again overall significantly improved.  He continues with physical therapy.  He has a plan to return to work in about a month.      Chief Complaint   Patient presents with    Back Pain          Previous treatment:   NSAID'S, Muscle Relaxants, Greater than 6 weeks of physical therapy, Home exercise program, Narcotic's, Rest, and Post-op PT    Previous neurosurgery:  2/17/2025- Right far lateral lumbar 4 5 microdiscectomy     Previous injections:     The following portions of the patient's history were reviewed and updated as appropriate: allergies, current medications, past family history, past medical history, past social history, past surgical history, and problem list.    Review of Systems   Constitutional:  Positive for activity change.   HENT: Negative.     Eyes: Negative.    Respiratory: Negative.     Cardiovascular: Negative.    Gastrointestinal: Negative.    Endocrine: Negative.    Genitourinary: Negative.    Musculoskeletal:  Positive for arthralgias, back pain and myalgias.   Skin: Negative.    Allergic/Immunologic: Negative.    Neurological:  Positive for weakness (right leg) and numbness (right lower leg).   Hematological: Negative.    Psychiatric/Behavioral:  Positive for sleep disturbance.        Objective      /94 (BP Location: Left arm, Patient Position: Sitting)   Pulse 90   Wt 82.1 kg (181 lb)   SpO2 96%   BMI 23.24 kg/m²    Body mass index is 23.24 kg/m².  Vitals:    05/12/25 1140   PainSc: 5          Neurological Exam        Assessment & Plan   Independent Review of Radiographic Studies:      I personally reviewed and  interpreted the images from the following studies.    No      Medical Decision Making:      Mitchell Jacome is a 59 y.o. male status post right L4-5 far lateral discectomy performed about 3 months ago who is seen significant improvement in preoperative symptoms.  I reassured him that his symptoms are likely to continue to improve over the course of time.  He is okay to return to work as scheduled in early June on light duty.  He should continue light duty up until about 6 months postop at which point he would have no restrictions.  I will see him back as needed in the future      Diagnoses and all orders for this visit:    1. S/P lumbar microdiscectomy (Primary)      No follow-ups on file.    This patient was examined wearing appropriate personal protective equipment.                      Dr. Edmond Hernandez IV    05/12/25  11:56 EDT

## 2025-05-12 ENCOUNTER — OFFICE VISIT (OUTPATIENT)
Dept: NEUROSURGERY | Facility: CLINIC | Age: 60
End: 2025-05-12
Payer: OTHER GOVERNMENT

## 2025-05-12 VITALS
SYSTOLIC BLOOD PRESSURE: 142 MMHG | HEART RATE: 90 BPM | WEIGHT: 181 LBS | OXYGEN SATURATION: 96 % | BODY MASS INDEX: 23.24 KG/M2 | DIASTOLIC BLOOD PRESSURE: 94 MMHG

## 2025-05-12 DIAGNOSIS — Z98.890 S/P LUMBAR MICRODISCECTOMY: Primary | ICD-10-CM

## 2025-05-12 PROCEDURE — 99024 POSTOP FOLLOW-UP VISIT: CPT | Performed by: NEUROLOGICAL SURGERY

## 2025-05-12 RX ORDER — INSULIN ASPART 100 [IU]/ML
INJECTION, SOLUTION INTRAVENOUS; SUBCUTANEOUS
COMMUNITY
Start: 2025-04-03

## 2025-05-12 RX ORDER — INSULIN GLARGINE 100 [IU]/ML
INJECTION, SOLUTION SUBCUTANEOUS
COMMUNITY
Start: 2025-04-10

## 2025-05-13 ENCOUNTER — TREATMENT (OUTPATIENT)
Dept: PHYSICAL THERAPY | Facility: CLINIC | Age: 60
End: 2025-05-13
Payer: OTHER GOVERNMENT

## 2025-05-13 DIAGNOSIS — M54.50 LUMBAR PAIN: Primary | ICD-10-CM

## 2025-05-13 DIAGNOSIS — Z98.890 S/P LUMBAR MICRODISCECTOMY: ICD-10-CM

## 2025-05-13 NOTE — PROGRESS NOTES
Physical Therapy Daily Note      Mitchell Jacome was seen by Magdiel Silveira, PT at [facility]  [address]  [place] [address]  Fax [phone]  Phone [phone]       Diagnosis Plan   1. Lumbar pain        2. S/P lumbar microdiscectomy              VISIT#: [id number]  Referring practitioner: Rita Alvarado PA-C    Subjective   Mitchell Jacome reports: he was seen by MD: to return to work 6/3/25 - light duty. Status about the same as on las re-assess. Still having some balance issues.     Objective     See Exercise, Manual, and Modality Logs for complete treatment.     Patient Education: HEP  Access Code: [email or web address]  URL: [email or web address]  Date: 04/07/2025  Prepared by: Magdiel Silveira    Exercises  - Quadruped Cat Cow  - 1 x daily - 7 x weekly - 2 sets - 10 reps    Assessment/Plan  - initiated therapy earlier than appt time.   - focused on stretching after TM, then stabilization and balance  - neural glide reduces pain slightly.    Goals  Plan Goals: STG: 3 weeks  1. Pt will be independent and compliant with initial HEP in 2 weeks.  2. Pt will report a 25% improvement in symptoms since starting therapy in 2 weeks.  3. Pt will report pain level at worst <6 during walking activity in 3 weeks.  4. Pt will show improvement in body mechanics when lifting and sitting in 2 weeks in order to decrease strain on back.      LTG: by d/c  1. Pt will be independent with final HEP for self-management of condition by DC.  2. Pt will improve score on Oswesty to less than 15% by DC.   3. Pt will report a 75% improvement in symptoms by DC in order to allow return to PLOF and work.  4. Pt will improve lumbar AROM to WFL's in order to complete ADLs with improved function by DC.   5. Voice readiness for discharge.       Progress strengthening /stabilization /functional activity            Timed:         Manual Therapy:         mins  73090;     Therapeutic Exercise:   20       mins  53703;     Neuromuscular Juli:   15     mins  35796;    Therapeutic Activity:    10      mins  71434;     Gait Training:           mins  70699;     Ultrasound:          mins  07562;    Ionto                                   mins   43380  Self Care                            mins   23998  Aquatic                               mins 66565    Un-Timed:  Canalith Repos                   mins  46684  Electrical Stimulation:         mins  33104 ( );  Dry Needling          mins self-pay  Traction          mins 33397  Low Eval          Mins  17828  Mod Eval          Mins  45052  High Eval                            Mins  48858  Re-Eval                               mins  10599    Timed Treatment:  45  mins   Total Treatment:     45   mins    Magdiel Silveira PT PT, DPT, [place] License #: [id number]

## 2025-08-04 ENCOUNTER — TELEPHONE (OUTPATIENT)
Dept: NEUROSURGERY | Facility: CLINIC | Age: 60
End: 2025-08-04
Payer: OTHER GOVERNMENT

## (undated) DEVICE — THE STERILE LIGHT HANDLE COVER IS USED WITH STERIS SURGICAL LIGHTING AND VISUALIZATION SYSTEMS.

## (undated) DEVICE — KENDALL SCD EXPRESS FOOT CUFF, MEDIUM: Brand: KENDALL SCD

## (undated) DEVICE — DECANTER: Brand: UNBRANDED

## (undated) DEVICE — PK BASIC SPINE 50

## (undated) DEVICE — ELECTRD BLD EZ CLN MOD 6.5IN

## (undated) DEVICE — Device

## (undated) DEVICE — DRP C/ARMOR

## (undated) DEVICE — TUBING, SUCTION, 1/4" X 12', STRAIGHT: Brand: MEDLINE

## (undated) DEVICE — ANTIBACTERIAL VIOLET BRAIDED (POLYGLACTIN 910), SYNTHETIC ABSORBABLE SUTURE: Brand: COATED VICRYL

## (undated) DEVICE — SPONGE,NEURO,1"X1",XR,STRL,LF,10/PK: Brand: MEDLINE

## (undated) DEVICE — ANTIBACTERIAL UNDYED BRAIDED (POLYGLACTIN 910), SYNTHETIC ABSORBABLE SUTURE: Brand: COATED VICRYL

## (undated) DEVICE — UNDERGLV SURG BIOGEL/PI PF SYNTH SURG SZ8.5 BLU 50/BX

## (undated) DEVICE — GLV SURG BIOGEL LTX PF 8

## (undated) DEVICE — KT SURG TURNOVER 050

## (undated) DEVICE — DRSNG WND BORDR/ADHS NONADHR/GZ LF 4X4IN STRL

## (undated) DEVICE — 6.0MM PRECISION ROUND

## (undated) DEVICE — THE STERILE CAMERA HANDLE COVER IS FOR USE WITH THE STERIS SURGICAL LIGHTING AND VISUALIZATION SYSTEMS.

## (undated) DEVICE — KT SPINE SURG TOP W/PRONEVIEW

## (undated) DEVICE — SOLUTION,WATER,IRRIGATION,1000ML,STERILE: Brand: MEDLINE

## (undated) DEVICE — PRT MIN/ACC MARS PEEK STR 22X50MM 1P/U

## (undated) DEVICE — SHEET, DRAPE, SPLIT, STERILE: Brand: MEDLINE

## (undated) DEVICE — SMOKE EVACUATION TUBING WITH 7/8 IN TO 1/4 IN REDUCER: Brand: BUFFALO FILTER